# Patient Record
Sex: FEMALE | Race: WHITE | Employment: OTHER | URBAN - METROPOLITAN AREA
[De-identification: names, ages, dates, MRNs, and addresses within clinical notes are randomized per-mention and may not be internally consistent; named-entity substitution may affect disease eponyms.]

---

## 2018-03-20 ENCOUNTER — OFFICE VISIT (OUTPATIENT)
Dept: OBGYN | Age: 73
End: 2018-03-20
Payer: COMMERCIAL

## 2018-03-20 VITALS
HEIGHT: 66 IN | BODY MASS INDEX: 31.18 KG/M2 | SYSTOLIC BLOOD PRESSURE: 122 MMHG | TEMPERATURE: 98.2 F | WEIGHT: 194 LBS | DIASTOLIC BLOOD PRESSURE: 70 MMHG | HEART RATE: 82 BPM

## 2018-03-20 DIAGNOSIS — Z09 FOLLOW UP: Primary | ICD-10-CM

## 2018-03-20 PROCEDURE — G8399 PT W/DXA RESULTS DOCUMENT: HCPCS | Performed by: OBSTETRICS & GYNECOLOGY

## 2018-03-20 PROCEDURE — 1090F PRES/ABSN URINE INCON ASSESS: CPT | Performed by: OBSTETRICS & GYNECOLOGY

## 2018-03-20 PROCEDURE — 1123F ACP DISCUSS/DSCN MKR DOCD: CPT | Performed by: OBSTETRICS & GYNECOLOGY

## 2018-03-20 PROCEDURE — G8484 FLU IMMUNIZE NO ADMIN: HCPCS | Performed by: OBSTETRICS & GYNECOLOGY

## 2018-03-20 PROCEDURE — G8417 CALC BMI ABV UP PARAM F/U: HCPCS | Performed by: OBSTETRICS & GYNECOLOGY

## 2018-03-20 PROCEDURE — 99212 OFFICE O/P EST SF 10 MIN: CPT | Performed by: OBSTETRICS & GYNECOLOGY

## 2018-03-20 PROCEDURE — 4040F PNEUMOC VAC/ADMIN/RCVD: CPT | Performed by: OBSTETRICS & GYNECOLOGY

## 2018-03-20 PROCEDURE — 3017F COLORECTAL CA SCREEN DOC REV: CPT | Performed by: OBSTETRICS & GYNECOLOGY

## 2018-03-20 PROCEDURE — G8427 DOCREV CUR MEDS BY ELIG CLIN: HCPCS | Performed by: OBSTETRICS & GYNECOLOGY

## 2018-03-20 PROCEDURE — 1036F TOBACCO NON-USER: CPT | Performed by: OBSTETRICS & GYNECOLOGY

## 2018-03-20 PROCEDURE — 3014F SCREEN MAMMO DOC REV: CPT | Performed by: OBSTETRICS & GYNECOLOGY

## 2018-03-20 NOTE — PROGRESS NOTES
Sonogram results today Nothing further needed from a GYN standpoint  See back in one year or return if any bleeding.

## 2018-03-20 NOTE — PROGRESS NOTES
Here today for follow up of her sonogram.  Patient states she has not had any bleeding. The sonogram recently done shows again a couple old fibroids. The stripe could not be visualized and I belive that is because of atrophic changes. At this time I see no need for anything further from a Gyn standpoint at this time but would certainly see her immediately if any bleeding. I did not receive anything from Dr. Rock Ramos but she had not seen him since 1993 and those records may have been purged. RTC 1 year or with any bleeding.

## 2019-04-15 ENCOUNTER — HOSPITAL ENCOUNTER (EMERGENCY)
Age: 74
Discharge: PSYCHIATRIC OR SUBSTANCE USE FACILITY | End: 2019-04-16
Attending: EMERGENCY MEDICINE
Payer: COMMERCIAL

## 2019-04-15 DIAGNOSIS — R45.851 SUICIDAL IDEATION: Primary | ICD-10-CM

## 2019-04-15 DIAGNOSIS — N39.0 URINARY TRACT INFECTION IN FEMALE: ICD-10-CM

## 2019-04-15 DIAGNOSIS — T14.91XA SUICIDE ATTEMPT (HCC): ICD-10-CM

## 2019-04-15 LAB
ACETAMINOPHEN LEVEL: <5 MCG/ML (ref 10–30)
ALBUMIN SERPL-MCNC: 4.3 G/DL (ref 3.5–5.2)
ALP BLD-CCNC: 76 U/L (ref 35–104)
ALT SERPL-CCNC: 11 U/L (ref 0–32)
AMPHETAMINE SCREEN, URINE: NOT DETECTED
ANION GAP SERPL CALCULATED.3IONS-SCNC: 10 MMOL/L (ref 7–16)
AST SERPL-CCNC: 15 U/L (ref 0–31)
BACTERIA: ABNORMAL /HPF
BARBITURATE SCREEN URINE: NOT DETECTED
BASOPHILS ABSOLUTE: 0.05 E9/L (ref 0–0.2)
BASOPHILS RELATIVE PERCENT: 0.6 % (ref 0–2)
BENZODIAZEPINE SCREEN, URINE: NOT DETECTED
BILIRUB SERPL-MCNC: <0.2 MG/DL (ref 0–1.2)
BILIRUBIN URINE: NEGATIVE
BLOOD, URINE: NEGATIVE
BUN BLDV-MCNC: 21 MG/DL (ref 8–23)
CALCIUM SERPL-MCNC: 10 MG/DL (ref 8.6–10.2)
CANNABINOID SCREEN URINE: NOT DETECTED
CHLORIDE BLD-SCNC: 109 MMOL/L (ref 98–107)
CLARITY: CLEAR
CO2: 25 MMOL/L (ref 22–29)
COCAINE METABOLITE SCREEN URINE: NOT DETECTED
COLOR: YELLOW
CREAT SERPL-MCNC: 1.4 MG/DL (ref 0.5–1)
EOSINOPHILS ABSOLUTE: 0.3 E9/L (ref 0.05–0.5)
EOSINOPHILS RELATIVE PERCENT: 3.9 % (ref 0–6)
EPITHELIAL CELLS, UA: ABNORMAL /HPF
ETHANOL: <10 MG/DL (ref 0–0.08)
GFR AFRICAN AMERICAN: 45
GFR NON-AFRICAN AMERICAN: 37 ML/MIN/1.73
GLUCOSE BLD-MCNC: 114 MG/DL (ref 74–99)
GLUCOSE URINE: NEGATIVE MG/DL
HCT VFR BLD CALC: 38 % (ref 34–48)
HEMOGLOBIN: 11.7 G/DL (ref 11.5–15.5)
IMMATURE GRANULOCYTES #: 0.02 E9/L
IMMATURE GRANULOCYTES %: 0.3 % (ref 0–5)
KETONES, URINE: NEGATIVE MG/DL
LEUKOCYTE ESTERASE, URINE: ABNORMAL
LYMPHOCYTES ABSOLUTE: 1.83 E9/L (ref 1.5–4)
LYMPHOCYTES RELATIVE PERCENT: 23.6 % (ref 20–42)
MCH RBC QN AUTO: 29.5 PG (ref 26–35)
MCHC RBC AUTO-ENTMCNC: 30.8 % (ref 32–34.5)
MCV RBC AUTO: 95.7 FL (ref 80–99.9)
METHADONE SCREEN, URINE: NOT DETECTED
MONOCYTES ABSOLUTE: 0.41 E9/L (ref 0.1–0.95)
MONOCYTES RELATIVE PERCENT: 5.3 % (ref 2–12)
NEUTROPHILS ABSOLUTE: 5.14 E9/L (ref 1.8–7.3)
NEUTROPHILS RELATIVE PERCENT: 66.3 % (ref 43–80)
NITRITE, URINE: NEGATIVE
OPIATE SCREEN URINE: NOT DETECTED
PDW BLD-RTO: 14.6 FL (ref 11.5–15)
PH UA: 6 (ref 5–9)
PHENCYCLIDINE SCREEN URINE: NOT DETECTED
PLATELET # BLD: 212 E9/L (ref 130–450)
PMV BLD AUTO: 11.2 FL (ref 7–12)
POTASSIUM SERPL-SCNC: 4.4 MMOL/L (ref 3.5–5)
PROPOXYPHENE SCREEN: NOT DETECTED
PROTEIN UA: NEGATIVE MG/DL
RBC # BLD: 3.97 E12/L (ref 3.5–5.5)
RBC UA: ABNORMAL /HPF (ref 0–2)
RENAL EPITHELIAL, UA: ABNORMAL /HPF
SALICYLATE, SERUM: <0.3 MG/DL (ref 0–30)
SODIUM BLD-SCNC: 144 MMOL/L (ref 132–146)
SPECIFIC GRAVITY UA: 1.01 (ref 1–1.03)
T4 TOTAL: 4.8 MCG/DL (ref 4.5–11.7)
TOTAL PROTEIN: 7.5 G/DL (ref 6.4–8.3)
TRICYCLIC ANTIDEPRESSANTS SCREEN SERUM: NEGATIVE NG/ML
TSH SERPL DL<=0.05 MIU/L-ACNC: 0.23 UIU/ML (ref 0.27–4.2)
UROBILINOGEN, URINE: 0.2 E.U./DL
WBC # BLD: 7.8 E9/L (ref 4.5–11.5)
WBC UA: >20 /HPF (ref 0–5)

## 2019-04-15 PROCEDURE — 80053 COMPREHEN METABOLIC PANEL: CPT

## 2019-04-15 PROCEDURE — 84436 ASSAY OF TOTAL THYROXINE: CPT

## 2019-04-15 PROCEDURE — 81001 URINALYSIS AUTO W/SCOPE: CPT

## 2019-04-15 PROCEDURE — 85025 COMPLETE CBC W/AUTO DIFF WBC: CPT

## 2019-04-15 PROCEDURE — G0480 DRUG TEST DEF 1-7 CLASSES: HCPCS

## 2019-04-15 PROCEDURE — 99285 EMERGENCY DEPT VISIT HI MDM: CPT

## 2019-04-15 PROCEDURE — 84443 ASSAY THYROID STIM HORMONE: CPT

## 2019-04-15 PROCEDURE — 36415 COLL VENOUS BLD VENIPUNCTURE: CPT

## 2019-04-15 PROCEDURE — 93005 ELECTROCARDIOGRAM TRACING: CPT

## 2019-04-15 PROCEDURE — 80307 DRUG TEST PRSMV CHEM ANLYZR: CPT

## 2019-04-16 ENCOUNTER — APPOINTMENT (OUTPATIENT)
Dept: CT IMAGING | Age: 74
End: 2019-04-16
Payer: COMMERCIAL

## 2019-04-16 VITALS
HEART RATE: 66 BPM | OXYGEN SATURATION: 98 % | RESPIRATION RATE: 16 BRPM | SYSTOLIC BLOOD PRESSURE: 146 MMHG | WEIGHT: 194 LBS | TEMPERATURE: 98.3 F | DIASTOLIC BLOOD PRESSURE: 69 MMHG | BODY MASS INDEX: 31.31 KG/M2

## 2019-04-16 PROCEDURE — 6360000004 HC RX CONTRAST MEDICATION: Performed by: RADIOLOGY

## 2019-04-16 PROCEDURE — 70498 CT ANGIOGRAPHY NECK: CPT

## 2019-04-16 PROCEDURE — 6370000000 HC RX 637 (ALT 250 FOR IP): Performed by: STUDENT IN AN ORGANIZED HEALTH CARE EDUCATION/TRAINING PROGRAM

## 2019-04-16 PROCEDURE — 2580000003 HC RX 258: Performed by: STUDENT IN AN ORGANIZED HEALTH CARE EDUCATION/TRAINING PROGRAM

## 2019-04-16 PROCEDURE — 2580000003 HC RX 258: Performed by: RADIOLOGY

## 2019-04-16 RX ORDER — CEFDINIR 300 MG/1
300 CAPSULE ORAL 2 TIMES DAILY
Qty: 20 CAPSULE | Refills: 0 | Status: SHIPPED | OUTPATIENT
Start: 2019-04-16 | End: 2019-04-26

## 2019-04-16 RX ORDER — SODIUM CHLORIDE 0.9 % (FLUSH) 0.9 %
10 SYRINGE (ML) INJECTION PRN
Status: COMPLETED | OUTPATIENT
Start: 2019-04-16 | End: 2019-04-16

## 2019-04-16 RX ORDER — KETOCONAZOLE 20 MG/G
CREAM TOPICAL 2 TIMES DAILY
Status: ON HOLD | COMMUNITY
End: 2022-08-10 | Stop reason: HOSPADM

## 2019-04-16 RX ORDER — SENNOSIDES 8.6 MG
650 CAPSULE ORAL EVERY 4 HOURS PRN
COMMUNITY

## 2019-04-16 RX ORDER — CEFDINIR 250 MG/5ML
600 POWDER, FOR SUSPENSION ORAL ONCE
Status: COMPLETED | OUTPATIENT
Start: 2019-04-16 | End: 2019-04-16

## 2019-04-16 RX ORDER — 0.9 % SODIUM CHLORIDE 0.9 %
1000 INTRAVENOUS SOLUTION INTRAVENOUS ONCE
Status: COMPLETED | OUTPATIENT
Start: 2019-04-16 | End: 2019-04-16

## 2019-04-16 RX ORDER — MAGNESIUM HYDROXIDE/ALUMINUM HYDROXICE/SIMETHICONE 120; 1200; 1200 MG/30ML; MG/30ML; MG/30ML
30 SUSPENSION ORAL 2 TIMES DAILY PRN
Status: ON HOLD | COMMUNITY
End: 2022-08-04

## 2019-04-16 RX ORDER — DOCUSATE SODIUM 100 MG/1
100 CAPSULE, LIQUID FILLED ORAL PRN
Status: ON HOLD | COMMUNITY
End: 2022-08-04

## 2019-04-16 RX ORDER — ECHINACEA PURPUREA EXTRACT 125 MG
1 TABLET ORAL 2 TIMES DAILY PRN
Status: ON HOLD | COMMUNITY
End: 2022-08-10 | Stop reason: HOSPADM

## 2019-04-16 RX ADMIN — Medication 10 ML: at 01:52

## 2019-04-16 RX ADMIN — SODIUM CHLORIDE 1000 ML: 9 INJECTION, SOLUTION INTRAVENOUS at 02:27

## 2019-04-16 RX ADMIN — IOPAMIDOL 60 ML: 755 INJECTION, SOLUTION INTRAVENOUS at 01:56

## 2019-04-16 RX ADMIN — CEFDINIR 600 MG: 250 POWDER, FOR SUSPENSION ORAL at 00:37

## 2019-04-16 ASSESSMENT — ENCOUNTER SYMPTOMS
ABDOMINAL DISTENTION: 0
COUGH: 0
DIARRHEA: 0
EYE REDNESS: 0
VOMITING: 0
RHINORRHEA: 0
NAUSEA: 0
SINUS PRESSURE: 0
SORE THROAT: 0
SHORTNESS OF BREATH: 0
BLOOD IN STOOL: 0
BACK PAIN: 0
EYE PAIN: 0
TROUBLE SWALLOWING: 0
ABDOMINAL PAIN: 0
PHOTOPHOBIA: 0
WHEEZING: 0
CONSTIPATION: 0
CHEST TIGHTNESS: 0

## 2019-04-16 NOTE — ED NOTES
Patient back from CT.  Assisted to restroom with wheelchair and 2 person assist.      Trev Louis RN  04/16/19 5834

## 2019-04-16 NOTE — ED NOTES
Pt is alert and oriented x4, pleasant and cooperative. Denies SI/HI. Denies A/V hallucinations. Pt's speech is difficult to understand due to impairment from past CVA. If this RN understood pt correctly, she wonders why she made a suicide attempt. IV fluids infusing to right AC without difficulty. Pt was up to the bathroom with 2 assist in a wheelchair. No other complaints. Ambulates with a steady gait. Will continue to monitor.       Elías Moraes RN  04/16/19 104 Monika Fletcher, ARCHIE  04/16/19 6844

## 2019-04-16 NOTE — ED NOTES
[facility] here to transport pt to [facility].       Boston Temple RN  04/16/19 8606

## 2019-04-16 NOTE — ED NOTES
[facility] requesting a document stating that [facility] will take the patient back after she receives her treatment. Spoke with Gualberto Nair at [facility]. She is going to forward this request to SERA Rodney first thing in the morning for this paperwork. DON arrives between [phone]. Call back number for LAZARO given. Iglesia Roberts at [facility] notified.       Zora Wells RN  04/16/19 4382

## 2019-04-16 NOTE — ED PROVIDER NOTES
Patient is a 19-year-old female who presents to ED for evaluation of suicidal ideation with attempts. Patient reportedly at facility-[facility]-which she was found to have a cord from call white wrapped around her neck, and pillows placed over her head. Patient with significant history of depression, paranoid schizophrenia, bipolar 1 disorder, history of previous suicide attempts. Patient denies any ingestions other than prescribed drug in recommended amounts. Review of Systems   Constitutional: Negative for chills, diaphoresis, fatigue and fever. HENT: Negative for congestion, ear pain, rhinorrhea, sinus pressure, sneezing, sore throat and trouble swallowing. Eyes: Negative for photophobia, pain and redness. Respiratory: Negative for cough, chest tightness, shortness of breath and wheezing. Cardiovascular: Negative for chest pain and palpitations. Gastrointestinal: Negative for abdominal distention, abdominal pain, blood in stool, constipation, diarrhea, nausea and vomiting. Endocrine: Negative for polydipsia and polyuria. Genitourinary: Negative for difficulty urinating, dysuria, flank pain, hematuria and urgency. Musculoskeletal: Negative for arthralgias, back pain, myalgias and neck pain. Skin: Negative for rash and wound. Neurological: Negative for weakness, light-headedness, numbness and headaches. Psychiatric/Behavioral: Positive for behavioral problems, self-injury and suicidal ideas. Negative for agitation and confusion. The patient is not nervous/anxious and is not hyperactive. Physical Exam   Constitutional: She is oriented to person, place, and time. She appears well-developed and well-nourished. No distress. HENT:   Head: Normocephalic and atraumatic. Right Ear: External ear normal.   Left Ear: External ear normal.   Mouth/Throat: Oropharynx is clear and moist. No oropharyngeal exudate. Eyes: Pupils are equal, round, and reactive to light. Conjunctivae and EOM are normal. Right eye exhibits no discharge. Left eye exhibits no discharge. Neck: Normal range of motion. Neck supple. No thyromegaly present. No obvious bruit noted on auscultation. Ligature mary-- erythema-- noted to the anterior neck. Cardiovascular: Normal rate, regular rhythm and normal heart sounds. No murmur heard. Pulmonary/Chest: Effort normal and breath sounds normal. No respiratory distress. She has no wheezes. She has no rales. She exhibits no tenderness. Abdominal: Soft. She exhibits no distension and no mass. There is no tenderness. There is no rebound and no guarding. Musculoskeletal: Normal range of motion. She exhibits no tenderness. Neurological: She is alert and oriented to person, place, and time. Skin: Skin is warm and dry. No rash noted. She is not diaphoretic. Psychiatric: Judgment normal. She is slowed. She exhibits a depressed mood. She expresses suicidal ideation. She expresses no homicidal ideation. She expresses suicidal plans. She expresses no homicidal plans. Procedures    MDM  Number of Diagnoses or Management Options  Suicidal ideation:   Suicide attempt [facility]):   Urinary tract infection in female:   Diagnosis management comments: Patient is a 70-year-old female who presented to ED for evaluation of suicidal ideation with attempt. On arrival to ED, patient alert, in no apparent distress. Anterior portion of neck with evidence of ligature mary with no bruit on auscultation or crepitus on palpation. No ecchymosis surrounding, and no cervical spinous process tenderness to palpation. Patient was placed on suicide precautions with constant observation. Labs reviewed-- UTI, patient was given antibiotic in ED.  Creatinine mildly elevated, waiver was placed for evaluation of vascular structures with benefits outweighing risk.       --------------------------------------------- PAST HISTORY ---------------------------------------------  Past Medical History:  has a past medical history of Arthritis, Ataxia, Atrophy of muscle, Bipolar 1 disorder, mixed, moderate ([address] Cerebrovascular disease, Coordination problem, Hyperlipidemia, Hypertension, Overactive bladder, Paranoid schizophrenia ([address] and Thyroid disease. Past Surgical History:  has a past surgical history that includes Tonsillectomy and adenoidectomy; Endometrial ablation; Dilation and curettage of uterus; bladder suspension; and Induced . Social History:  reports that she has never smoked. She has never used smokeless tobacco. She reports that she does not drink alcohol or use drugs. Family History: Family history is unknown by patient. The patients home medications have been reviewed.     Allergies: Mysoline [primidone] and Tofranil [imipramine hcl]    -------------------------------------------------- RESULTS -------------------------------------------------    LABS:  Results for orders placed or performed during the hospital encounter of 04/15/19   CBC auto differential   Result Value Ref Range    WBC 7.8 4.5 - 11.5 E9/L    RBC 3.97 3.50 - 5.50 E12/L    Hemoglobin 11.7 11.5 - 15.5 g/dL    Hematocrit 38.0 34.0 - 48.0 %    MCV 95.7 80.0 - 99.9 fL    MCH 29.5 26.0 - 35.0 pg    MCHC 30.8 (L) 32.0 - 34.5 %    RDW 14.6 11.5 - 15.0 fL    Platelets 421 484 - 493 E9/L    MPV 11.2 7.0 - 12.0 fL    Neutrophils % 66.3 43.0 - 80.0 %    Immature Granulocytes % 0.3 0.0 - 5.0 %    Lymphocytes % 23.6 20.0 - 42.0 %    Monocytes % 5.3 2.0 - 12.0 %    Eosinophils % 3.9 0.0 - 6.0 %    Basophils % 0.6 0.0 - 2.0 %    Neutrophils # 5.14 1.80 - 7.30 E9/L    Immature Granulocytes # 0.02 E9/L    Lymphocytes # 1.83 1.50 - 4.00 E9/L    Monocytes # 0.41 0.10 - 0.95 E9/L    Eosinophils # 0.30 0.05 - 0.50 E9/L    Basophils # 0.05 0.00 - 0.20 E9/L   Comprehensive Metabolic Panel   Result Value Ref Range    Sodium 144 132 - 146 mmol/L    Potassium 4.4 3.5 - 5.0 mmol/L    Chloride 109 (H) 98 - 107 mmol/L    CO2 25 22 - 29 mmol/L    Anion Gap 10 7 - 16 mmol/L    Glucose 114 (H) 74 - 99 mg/dL    BUN 21 8 - 23 mg/dL    CREATININE 1.4 (H) 0.5 - 1.0 mg/dL    GFR Non-African American 37 >=60 mL/min/1.73    GFR African American 45     Calcium 10.0 8.6 - 10.2 mg/dL    Total Protein 7.5 6.4 - 8.3 g/dL    Alb 4.3 3.5 - 5.2 g/dL    Total Bilirubin <0.2 0.0 - 1.2 mg/dL    Alkaline Phosphatase 76 35 - 104 U/L    ALT 11 0 - 32 U/L    AST 15 0 - 31 U/L   Serum Drug Screen   Result Value Ref Range    Ethanol Lvl <10 mg/dL    Acetaminophen Level <5.0 (L) 10.0 - 42.7 mcg/mL    Salicylate, Serum <2.8 0.0 - 30.0 mg/dL    TCA Scrn NEGATIVE Cutoff:300 ng/mL   Urine Drug Screen   Result Value Ref Range    Amphetamine Screen, Urine NOT DETECTED Negative <1000 ng/mL    Barbiturate Screen, Ur NOT DETECTED Negative < 200 ng/mL    Benzodiazepine Screen, Urine NOT DETECTED Negative < 200 ng/mL    Cannabinoid Scrn, Ur NOT DETECTED Negative < 50ng/mL    Cocaine Metabolite Screen, Urine NOT DETECTED Negative < 300 ng/mL    Opiate Scrn, Ur NOT DETECTED Negative < 300ng/mL    PCP Screen, Urine NOT DETECTED Negative < 25 ng/mL    Methadone Screen, Urine NOT DETECTED Negative <300 ng/mL    Propoxyphene Scrn, Ur NOT DETECTED Negative <300 ng/mL   TSH without Reflex   Result Value Ref Range    TSH 0.229 (L) 0.270 - 4.200 uIU/mL   T4   Result Value Ref Range    T4, Total 4.8 4.5 - 11.7 mcg/dL   Urinalysis   Result Value Ref Range    Color, UA Yellow Straw/Yellow    Clarity, UA Clear Clear    Glucose, Ur Negative Negative mg/dL    Bilirubin Urine Negative Negative    Ketones, Urine Negative Negative mg/dL    Specific Gravity, UA 1.010 1.005 - 1.030    Blood, Urine Negative Negative    pH, UA 6.0 5.0 - 9.0    Protein, UA Negative Negative mg/dL    Urobilinogen, Urine 0.2 <2.0 E.U./dL    Nitrite, Urine Negative Negative    Leukocyte Esterase, Urine LARGE (A) Negative   Microscopic Urinalysis   Result Value Ref Range    WBC, UA >20 0 - 5 /HPF    RBC, UA 1-3 0 - 2 presented to the emergency department for evaluation of Suicidal (at [facility], pt got upset the [facility] was quitting and wrapped call light around neck in an attempt to commit suicide.  )    I have reviewed and discussed the case, including pertinent history (medical, surgical, family and social) and exam findings with the Resident and the Nurse assigned to Latanya Perez. I have personally performed and/or participated in the history, exam, medical decision making, and procedures and agree with all pertinent clinical information. I have reviewed my findings and recommendations with Latanya Perez and members of family present at the time of disposition. MDM: Patient is a 82-year-old female presenting to the emergency department with a chief complaint of suicide attempt. Patient with labs and imaging are reviewed. Patient was urinary tract infection. Patient medically cleared. My findings/plan: The primary encounter diagnosis was Suicidal ideation. Diagnoses of Suicide attempt [facility]) and Urinary tract infection in female were also pertinent to this visit.   Discharge Medication List as of 4/16/2019  3:29 PM      START taking these medications    Details   cefdinir (OMNICEF) 300 MG capsule Take 1 capsule by mouth 2 times daily for 10 days, Disp-20 capsule, R-0Print           [facility], [address] [place] [address],   04/17/19 7321

## 2019-04-16 NOTE — ED NOTES
Patient moved to room 26 to be monitored closer from nurse's station.       Jairo Kaplan RN  04/15/19 1624

## 2019-04-16 NOTE — ED NOTES
Discussed discontinuing patient's CO order with ED resident, patient is no longer having suicidal ideations and was moved closer to nurse's station for safety. Order discontinued at this time.       Jairo Kaplan RN  04/16/19 9155

## 2019-04-16 NOTE — ED NOTES
Patient still waiting for CT. Spoke with CT who reports patient's GFR is too low for her to receive contrast.   Ty Levine, resident notified.       Prakash Welsh RN  04/15/19 4070

## 2019-04-16 NOTE — ED NOTES
Radiology Procedure Waiver   Name: Eduar Ramires  : 1945  MRN: [id number]    Date:  19    Time: 12:07 AM    Benefits of immediately proceeding with Radiology exam(s) without pre-testing outweigh the risks or are not indicated as specified below and therefore the following is/are being waived:    [] Pregnancy test   [] Patients LMP on-time and regular.   [] Patient had Tubal Ligation or has other Contraception Device. [] Patient  is Menopausal or Premenarcheal.    [] Patient had Full or Partial Hysterectomy. [] Protocol for Iodine allergy    [] MRI Questionnaire     [x] BUN/Creatinine   [x] Benefits outweigh risk   [] Patient on Dialysis. [] Recent Normal Labs.   Electronically signed by Rustam Dominguez DO on 19 at 12:07 AM               Rustam Dominguez DO  Resident  [phone]

## 2019-04-16 NOTE — ED NOTES
Patient is a 72year old female who presented to the ED from [facility] for suicide attempt by wrapping the call light cord around her neck. Patient reported to the ED physician that she had the cord around her neck for 1 hour without losing consciousness or having SOB. Patient reports she wanted to end it all d/t \"a combination of things\". Patient denies SI at this time. Patient does report a previous suicide attempt by OD on ibuprofen in May 2017, this was her last admission. Patient states she has HI to slap people, no one specific. Patient denies AVH. Patient is currently on psychiatric medications and reports taking all medications as prescribed. She reports a history of schizophrenia and stroke. Denies drug and alcohol misuse. Reports good family support, good sleep and good appetite. Patient uses a wheelchair to get around and is very unsteady on her feet. Called guardian Easton Leonardo at [facility] to receive consent to treat. Consent given to treat. Spoke with Ankit Resendiz at [facility] who reports an aide had found her with the bed controller cord around her neck. Patient treats with Dr. Shawnee Lugo for psychiatry.        Lorelei Dawson, ARCHIE  04/15/19 [facility] ARCHIE Tamayo  04/15/19 4956

## 2019-04-16 NOTE — ED NOTES
Bed: [facility]  Expected date:   Expected time:   Means of arrival:   Comments:     Reynaldo West RN  04/15/19 2106

## 2019-04-16 NOTE — ED NOTES
Patient is still receiving the ordered 1000 ml N.S.  IV pump states line is partially occluded and will not pump the fluids. Fluids are running slowly at wide open. Dr. Ed Hernandez was notified and is okay with patient receiving them at this rate.           Joanne Cam RN  04/16/19 7312

## 2019-04-16 NOTE — ED NOTES
CALL #3 PLACED TO [facility] - SPOKE WITH PATRICIO KOHLI WILL [facility]ASNP[facility] BY [phone]    RF# [phone]     Joaquim Mallory, EVA  04/16/19 4531

## 2019-04-16 NOTE — ED NOTES
I CALLED [facility] AND SPOKE  [facility] WHO REPORTS THAT SHE NEEDS THE AMBULANCE FORM FAXED [phone] TO THEIR AMBULANCE TEAM.    FORM HAS BEEN FAXED AND WAITING FOR A CALL [address] [facility] [address] [place], [place]  04/16/19 5863

## 2019-04-16 NOTE — ED NOTES
I CALLED [facility] AGAIN, I SPOKE TO SHOLA WHO SAID THAT THE SIGNATURE ON THE EMS FORM HAS TO BE TBY THE DOCTOR    REPRINTED AND FAXED AGAIN (THIRD TIME)      EVA Burns  04/16/19 4779

## 2019-04-16 NOTE — PROGRESS NOTES
Laura Moffett called from [facility]. Patient accepted to [facility] by Dr. Esau Cheadle. Room 309 Bed B. Nurse to nurse call [phone]. Fax [place] to [facility]. Transportation will need arranged by us.

## 2019-04-19 LAB
EKG ATRIAL RATE: 60 BPM
EKG P AXIS: 50 DEGREES
EKG P-R INTERVAL: 146 MS
EKG Q-T INTERVAL: 420 MS
EKG QRS DURATION: 100 MS
EKG QTC CALCULATION (BAZETT): 420 MS
EKG R AXIS: -37 DEGREES
EKG T AXIS: 44 DEGREES
EKG VENTRICULAR RATE: 60 BPM

## 2020-11-25 ENCOUNTER — OFFICE VISIT (OUTPATIENT)
Dept: OBGYN | Age: 75
End: 2020-11-25
Payer: MEDICARE

## 2020-11-25 VITALS
DIASTOLIC BLOOD PRESSURE: 70 MMHG | TEMPERATURE: 98 F | HEART RATE: 73 BPM | SYSTOLIC BLOOD PRESSURE: 140 MMHG | WEIGHT: 194 LBS | BODY MASS INDEX: 31.31 KG/M2

## 2020-11-25 PROCEDURE — 1090F PRES/ABSN URINE INCON ASSESS: CPT | Performed by: OBSTETRICS & GYNECOLOGY

## 2020-11-25 PROCEDURE — G8484 FLU IMMUNIZE NO ADMIN: HCPCS | Performed by: OBSTETRICS & GYNECOLOGY

## 2020-11-25 PROCEDURE — 1036F TOBACCO NON-USER: CPT | Performed by: OBSTETRICS & GYNECOLOGY

## 2020-11-25 PROCEDURE — G8427 DOCREV CUR MEDS BY ELIG CLIN: HCPCS | Performed by: OBSTETRICS & GYNECOLOGY

## 2020-11-25 PROCEDURE — G8417 CALC BMI ABV UP PARAM F/U: HCPCS | Performed by: OBSTETRICS & GYNECOLOGY

## 2020-11-25 PROCEDURE — G8399 PT W/DXA RESULTS DOCUMENT: HCPCS | Performed by: OBSTETRICS & GYNECOLOGY

## 2020-11-25 PROCEDURE — 4040F PNEUMOC VAC/ADMIN/RCVD: CPT | Performed by: OBSTETRICS & GYNECOLOGY

## 2020-11-25 PROCEDURE — 3017F COLORECTAL CA SCREEN DOC REV: CPT | Performed by: OBSTETRICS & GYNECOLOGY

## 2020-11-25 PROCEDURE — 99213 OFFICE O/P EST LOW 20 MIN: CPT | Performed by: OBSTETRICS & GYNECOLOGY

## 2020-11-25 PROCEDURE — 1123F ACP DISCUSS/DSCN MKR DOCD: CPT | Performed by: OBSTETRICS & GYNECOLOGY

## 2020-11-25 PROCEDURE — 99212 OFFICE O/P EST SF 10 MIN: CPT | Performed by: OBSTETRICS & GYNECOLOGY

## 2020-12-14 ENCOUNTER — HOSPITAL ENCOUNTER (OUTPATIENT)
Dept: ULTRASOUND IMAGING | Age: 75
Discharge: HOME | End: 2020-12-16
Payer: MEDICARE

## 2020-12-14 PROCEDURE — 76830 TRANSVAGINAL US NON-OB: CPT

## 2020-12-16 ENCOUNTER — OFFICE VISIT (OUTPATIENT)
Dept: OBGYN | Age: 75
End: 2020-12-16
Payer: MEDICARE

## 2020-12-16 VITALS
DIASTOLIC BLOOD PRESSURE: 78 MMHG | TEMPERATURE: 98 F | HEART RATE: 88 BPM | RESPIRATION RATE: 16 BRPM | BODY MASS INDEX: 31.65 KG/M2 | WEIGHT: 190 LBS | HEIGHT: 65 IN | SYSTOLIC BLOOD PRESSURE: 126 MMHG

## 2020-12-16 PROCEDURE — 1036F TOBACCO NON-USER: CPT | Performed by: OBSTETRICS & GYNECOLOGY

## 2020-12-16 PROCEDURE — G8417 CALC BMI ABV UP PARAM F/U: HCPCS | Performed by: OBSTETRICS & GYNECOLOGY

## 2020-12-16 PROCEDURE — 99212 OFFICE O/P EST SF 10 MIN: CPT | Performed by: OBSTETRICS & GYNECOLOGY

## 2020-12-16 PROCEDURE — 3017F COLORECTAL CA SCREEN DOC REV: CPT | Performed by: OBSTETRICS & GYNECOLOGY

## 2020-12-16 PROCEDURE — G8484 FLU IMMUNIZE NO ADMIN: HCPCS | Performed by: OBSTETRICS & GYNECOLOGY

## 2020-12-16 PROCEDURE — 99213 OFFICE O/P EST LOW 20 MIN: CPT | Performed by: OBSTETRICS & GYNECOLOGY

## 2020-12-16 PROCEDURE — G8399 PT W/DXA RESULTS DOCUMENT: HCPCS | Performed by: OBSTETRICS & GYNECOLOGY

## 2020-12-16 PROCEDURE — G8427 DOCREV CUR MEDS BY ELIG CLIN: HCPCS | Performed by: OBSTETRICS & GYNECOLOGY

## 2020-12-16 PROCEDURE — 4040F PNEUMOC VAC/ADMIN/RCVD: CPT | Performed by: OBSTETRICS & GYNECOLOGY

## 2020-12-16 PROCEDURE — 1090F PRES/ABSN URINE INCON ASSESS: CPT | Performed by: OBSTETRICS & GYNECOLOGY

## 2020-12-16 PROCEDURE — 1123F ACP DISCUSS/DSCN MKR DOCD: CPT | Performed by: OBSTETRICS & GYNECOLOGY

## 2020-12-16 NOTE — PROGRESS NOTES
Review sono results  Will need a D&C  Discussed at length with patient  Schedule her D&C in February with

## 2020-12-16 NOTE — PROGRESS NOTES
Here today to go over her sonogram.  Abnormally thickened endometrium with a complex cystic area noted in the cavity as well. Will need to under go a D&C and I discussed this case with Dr. Desire Armijo. Will schedule with her in January but she will schedule her D&C in February. Long discussion with the patient about the findings and need for D&C. Booklet for D&C also given to the pateint. All questions answered.

## 2020-12-28 ENCOUNTER — TELEPHONE (OUTPATIENT)
Dept: OBGYN | Age: 75
End: 2020-12-28

## 2020-12-28 NOTE — TELEPHONE ENCOUNTER
Surgery scheduled for 2/16/21 @ 9:30 AM  [address]  Hysteroscopy D&C  PRE-OP appt 1/20/21  This nurse spoke with the nurse at the facility this patient lives  Advised nurse that this patient will need medical clearance. The nurse states she will schedule that for this patient   All information was given to nurse at this time.   Voiced understanding

## 2021-01-26 ENCOUNTER — OFFICE VISIT (OUTPATIENT)
Dept: OBGYN | Age: 76
End: 2021-01-26
Payer: MEDICAID

## 2021-01-26 VITALS
WEIGHT: 190 LBS | TEMPERATURE: 97 F | BODY MASS INDEX: 31.65 KG/M2 | DIASTOLIC BLOOD PRESSURE: 64 MMHG | SYSTOLIC BLOOD PRESSURE: 118 MMHG | HEIGHT: 65 IN | HEART RATE: 63 BPM

## 2021-01-26 DIAGNOSIS — N95.0 PMB (POSTMENOPAUSAL BLEEDING): Primary | ICD-10-CM

## 2021-01-26 DIAGNOSIS — R93.89 ENDOMETRIAL THICKENING ON ULTRASOUND: ICD-10-CM

## 2021-01-26 PROCEDURE — 99213 OFFICE O/P EST LOW 20 MIN: CPT | Performed by: OBSTETRICS & GYNECOLOGY

## 2021-01-26 PROCEDURE — 3017F COLORECTAL CA SCREEN DOC REV: CPT | Performed by: OBSTETRICS & GYNECOLOGY

## 2021-01-26 PROCEDURE — 4040F PNEUMOC VAC/ADMIN/RCVD: CPT | Performed by: OBSTETRICS & GYNECOLOGY

## 2021-01-26 PROCEDURE — 1090F PRES/ABSN URINE INCON ASSESS: CPT | Performed by: OBSTETRICS & GYNECOLOGY

## 2021-01-26 PROCEDURE — G8427 DOCREV CUR MEDS BY ELIG CLIN: HCPCS | Performed by: OBSTETRICS & GYNECOLOGY

## 2021-01-26 PROCEDURE — 99212 OFFICE O/P EST SF 10 MIN: CPT | Performed by: OBSTETRICS & GYNECOLOGY

## 2021-01-26 PROCEDURE — G8399 PT W/DXA RESULTS DOCUMENT: HCPCS | Performed by: OBSTETRICS & GYNECOLOGY

## 2021-01-26 PROCEDURE — 1123F ACP DISCUSS/DSCN MKR DOCD: CPT | Performed by: OBSTETRICS & GYNECOLOGY

## 2021-01-26 PROCEDURE — G8417 CALC BMI ABV UP PARAM F/U: HCPCS | Performed by: OBSTETRICS & GYNECOLOGY

## 2021-01-26 PROCEDURE — G8484 FLU IMMUNIZE NO ADMIN: HCPCS | Performed by: OBSTETRICS & GYNECOLOGY

## 2021-01-26 PROCEDURE — 1036F TOBACCO NON-USER: CPT | Performed by: OBSTETRICS & GYNECOLOGY

## 2021-01-26 NOTE — PROGRESS NOTES
Laney Fitzgerald     Patient presents for preop appointment. Patient is scheduled for hysteroscopy, D&C 21. Patient was noted to have blood tinged discharge at her nursing facility. She saw Dr. Beverly Maldonado who ordered a pelvic ultrasound. A 14mm uterine lining was noted. A complex cystic area was noted in the center of the endometrial stripe. Procedure reviewed in detail. Patient is aware she will need a COVID test prior. Risks of surgery discussed, including blood loss requiring transfusion, injury to surrounding structures (bowel, bladder, ureters, etc.), and infection. Normal pap smear 11/15/16.      Past Medical History:   Diagnosis Date    Arthritis     hips    Ataxia     Atrophy of muscle     Bipolar 1 disorder, mixed, moderate (HCC)     Cerebrovascular disease     Coordination problem     Hyperlipidemia     Hypertension     Overactive bladder     Paranoid schizophrenia [facility]     Thyroid disease         Past Surgical History:   Procedure Laterality Date    BLADDER SUSPENSION      DILATION AND CURETTAGE OF UTERUS      ENDOMETRIAL ABLATION      INDUCED       TONSILLECTOMY AND ADENOIDECTOMY          Family History   Family history unknown: Yes        Social History     Tobacco History     Smoking Status  Never Smoker    Smokeless Tobacco Use  Never Used          Alcohol History     Alcohol Use Status  No          Drug Use     Drug Use Status  No          Sexual Activity     Sexually Active  Not Currently Partners  Male                  Current Outpatient Medications:     acetaminophen (ARTHRITIS PAIN RELIEF) 650 MG extended release tablet, Take 650 mg by mouth 2 times daily as needed for Pain DO NOT EXCEED 3 GRAMS MAX TYLENOL PER DAY, Disp: , Rfl:     Hypromellose (NATURAL BALANCE TEARS OP), Apply 1 drop to eye 4 times daily as needed (dry eyes) 1 drop in both eyes, Disp: , Rfl:     docusate sodium (STOOL SOFTENER) 100 MG capsule, Take 100 mg by mouth as needed for Constipation, Disp: , Rfl:     acetaminophen (TYLENOL) 325 MG tablet, Take 650 mg by mouth every 6 hours as needed for Pain, Disp: , Rfl:     aspirin 81 MG EC tablet, Take 1 tablet by mouth 2 times daily, Disp: 30 tablet, Rfl: 3    DULOXETINE HCL PO, Take 30 mg by mouth nightly, Disp: , Rfl:     ketoconazole (NIZORAL) 2 % cream, Apply topically 2 times daily Apply topically to face every shift for skin, Disp: , Rfl:     magnesium hydroxide (MILK OF MAGNESIA) 400 MG/5ML suspension, Take 30 mLs by mouth daily as needed for Constipation GIVE AS NEEDED FOR CONSTIPATION IF NO BM IN 3 DAYS AND PRN DAILY, Disp: , Rfl:     aluminum & magnesium hydroxide-simethicone (MYLANTA) 200-200-20 MG/5ML SUSP suspension, Take 30 mLs by mouth 2 times daily as needed for Indigestion, Disp: , Rfl:     sodium chloride (V-R NASAL SPRAY SALINE) 0.65 % nasal spray, 1 spray by Nasal route 2 times daily as needed (dryness), Disp: , Rfl:     haloperidol decanoate (HALDOL DECANOATE) 50 MG/ML injection, Inject 250 mg into the muscle every 30 days 1st Monday of the month, Disp: , Rfl:     lactase (LACTAID) 3000 UNITS tablet, Take 1 tablet by mouth as needed , Disp: , Rfl:      Allergies   Allergen Reactions    Mysoline [Primidone]     Tofranil [Imipramine Hcl]         Vitals:    01/26/21 1407   BP: 118/64   Pulse: 63   Temp: 97 °F (36.1 °C)        Physical Exam:  General: pleasant, difficulty with word finding, slow speech     Breasts: deferred     Pelvic exam: deferred     Kat Smart was seen today for pre-op exam.    Diagnoses and all orders for this visit:    PMB (postmenopausal bleeding)  -     COVID-19 Ambulatory; Future    Endometrial thickening on ultrasound  -     COVID-19 Ambulatory; Future      Proceed with [facility] due to PMB and thickened endometrium     Return in about 4 weeks (around 2/23/2021) for postop appointment .      Steffi Brunner, MD

## 2021-01-26 NOTE — PROGRESS NOTES
Patient alert and pleasant with no complaints  Here today for pre-op appointment   [facility] scheduled for 2/16/21 @ 9:30 AM  [address] [place]  Patient given all information   Discharge instructions have been discussed with the patient. Patient advised to call our office with any questions or concerns. Voiced understanding.

## 2021-02-02 NOTE — PROGRESS NOTES
Spoke with nurse at [facility].  They will obtain covid test on 2/11/2021 and fax result

## 2021-02-11 RX ORDER — HYDROXYZINE PAMOATE 50 MG/1
50 CAPSULE ORAL EVERY 6 HOURS PRN
Status: ON HOLD | COMMUNITY
End: 2022-08-04

## 2021-02-11 RX ORDER — PANTOPRAZOLE SODIUM 40 MG/1
40 TABLET, DELAYED RELEASE ORAL DAILY
Status: ON HOLD | COMMUNITY
End: 2022-08-04

## 2021-02-11 NOTE — PROGRESS NOTES
Have you been tested for COVID to be done by [facility] 2/11/21          Have you been told you were positive for COVID No  Have you had any known exposure to someone that is positive for COVID No  Do you have a cough                   No              Do you have shortness of breath No                 Do you have a sore throat            No                Are you having chills                    No                Are you having muscle aches. No                    Please come to the hospital wearing a mask and have your significant other wear a mask as well. Both of you should check your temperature before leaving to come here,  if it is 100 or higher please call [phone] for instruction.     [facility] PRE-ADMISSION TESTING INSTRUCTIONS      GENERAL INSTRUCTIONS:    [x] Nothing by mouth after midnight, including gum, candy, mints or water    [x] You may brush your teeth, but do not swallow any water    [x] Take medications as instructed with 1-2 oz of water    [x] Follow preop dosing of blood thinners per physician instructions    [x] Shower or bath with soap, lather and rinse well, AM of Surgery, no lotion, powders or creams to surgical site    [x] Jewelry, body piercing's, eyeglasses, contact lenses and dentures are not permitted into surgery (bring cases)      [x] Please do not wear any nail polish, make up or hair products on the day of surgery    [x] You can expect a call the business day prior to procedure to notify you if your arrival time changes

## 2021-02-16 ENCOUNTER — ANESTHESIA EVENT (OUTPATIENT)
Dept: OPERATING ROOM | Age: 76
End: 2021-02-16
Payer: MEDICARE

## 2021-02-16 ENCOUNTER — HOSPITAL ENCOUNTER (OUTPATIENT)
Age: 76
Setting detail: OUTPATIENT SURGERY
Discharge: HOME | End: 2021-02-16
Attending: OBSTETRICS & GYNECOLOGY | Admitting: OBSTETRICS & GYNECOLOGY
Payer: MEDICARE

## 2021-02-16 ENCOUNTER — ANESTHESIA (OUTPATIENT)
Dept: OPERATING ROOM | Age: 76
End: 2021-02-16
Payer: MEDICARE

## 2021-02-16 VITALS
BODY MASS INDEX: 29.89 KG/M2 | OXYGEN SATURATION: 99 % | HEART RATE: 52 BPM | SYSTOLIC BLOOD PRESSURE: 150 MMHG | DIASTOLIC BLOOD PRESSURE: 88 MMHG | RESPIRATION RATE: 16 BRPM | HEIGHT: 66 IN | WEIGHT: 186 LBS | TEMPERATURE: 97.2 F

## 2021-02-16 VITALS — DIASTOLIC BLOOD PRESSURE: 61 MMHG | OXYGEN SATURATION: 96 % | SYSTOLIC BLOOD PRESSURE: 131 MMHG

## 2021-02-16 LAB
ABO/RH: NORMAL
ANTIBODY SCREEN: NORMAL
EKG ATRIAL RATE: 62 BPM
EKG P AXIS: 24 DEGREES
EKG P-R INTERVAL: 162 MS
EKG Q-T INTERVAL: 404 MS
EKG QRS DURATION: 106 MS
EKG QTC CALCULATION (BAZETT): 410 MS
EKG R AXIS: -25 DEGREES
EKG T AXIS: 50 DEGREES
EKG VENTRICULAR RATE: 62 BPM
HCT VFR BLD CALC: 35 % (ref 34–48)
HEMOGLOBIN: 10.9 G/DL (ref 11.5–15.5)
MCH RBC QN AUTO: 29.6 PG (ref 26–35)
MCHC RBC AUTO-ENTMCNC: 31.1 % (ref 32–34.5)
MCV RBC AUTO: 95.1 FL (ref 80–99.9)
PDW BLD-RTO: 14.9 FL (ref 11.5–15)
PLATELET # BLD: 211 E9/L (ref 130–450)
PMV BLD AUTO: 11.6 FL (ref 7–12)
RBC # BLD: 3.68 E12/L (ref 3.5–5.5)
WBC # BLD: 5.3 E9/L (ref 4.5–11.5)

## 2021-02-16 PROCEDURE — 93005 ELECTROCARDIOGRAM TRACING: CPT

## 2021-02-16 PROCEDURE — 7100000011 HC PHASE II RECOVERY - ADDTL 15 MIN: Performed by: OBSTETRICS & GYNECOLOGY

## 2021-02-16 PROCEDURE — 88305 TISSUE EXAM BY PATHOLOGIST: CPT

## 2021-02-16 PROCEDURE — 2580000003 HC RX 258: Performed by: NURSE ANESTHETIST, CERTIFIED REGISTERED

## 2021-02-16 PROCEDURE — 3600000002 HC SURGERY LEVEL 2 BASE: Performed by: OBSTETRICS & GYNECOLOGY

## 2021-02-16 PROCEDURE — 36415 COLL VENOUS BLD VENIPUNCTURE: CPT

## 2021-02-16 PROCEDURE — 3700000000 HC ANESTHESIA ATTENDED CARE: Performed by: OBSTETRICS & GYNECOLOGY

## 2021-02-16 PROCEDURE — 58558 HYSTEROSCOPY BIOPSY: CPT | Performed by: OBSTETRICS & GYNECOLOGY

## 2021-02-16 PROCEDURE — 2500000003 HC RX 250 WO HCPCS: Performed by: NURSE ANESTHETIST, CERTIFIED REGISTERED

## 2021-02-16 PROCEDURE — 3700000001 HC ADD 15 MINUTES (ANESTHESIA): Performed by: OBSTETRICS & GYNECOLOGY

## 2021-02-16 PROCEDURE — 7100000001 HC PACU RECOVERY - ADDTL 15 MIN: Performed by: OBSTETRICS & GYNECOLOGY

## 2021-02-16 PROCEDURE — 2709999900 HC NON-CHARGEABLE SUPPLY: Performed by: OBSTETRICS & GYNECOLOGY

## 2021-02-16 PROCEDURE — 85027 COMPLETE CBC AUTOMATED: CPT

## 2021-02-16 PROCEDURE — 3600000012 HC SURGERY LEVEL 2 ADDTL 15MIN: Performed by: OBSTETRICS & GYNECOLOGY

## 2021-02-16 PROCEDURE — 86901 BLOOD TYPING SEROLOGIC RH(D): CPT

## 2021-02-16 PROCEDURE — 7100000000 HC PACU RECOVERY - FIRST 15 MIN: Performed by: OBSTETRICS & GYNECOLOGY

## 2021-02-16 PROCEDURE — 86850 RBC ANTIBODY SCREEN: CPT

## 2021-02-16 PROCEDURE — 86900 BLOOD TYPING SEROLOGIC ABO: CPT

## 2021-02-16 PROCEDURE — 7100000010 HC PHASE II RECOVERY - FIRST 15 MIN: Performed by: OBSTETRICS & GYNECOLOGY

## 2021-02-16 PROCEDURE — 6360000002 HC RX W HCPCS: Performed by: NURSE ANESTHETIST, CERTIFIED REGISTERED

## 2021-02-16 RX ORDER — MEPERIDINE HYDROCHLORIDE 25 MG/ML
12.5 INJECTION INTRAMUSCULAR; INTRAVENOUS; SUBCUTANEOUS EVERY 10 MIN PRN
Status: DISCONTINUED | OUTPATIENT
Start: 2021-02-16 | End: 2021-02-16 | Stop reason: HOSPADM

## 2021-02-16 RX ORDER — SODIUM CHLORIDE 0.9 % (FLUSH) 0.9 %
10 SYRINGE (ML) INJECTION PRN
Status: DISCONTINUED | OUTPATIENT
Start: 2021-02-16 | End: 2021-02-16 | Stop reason: SDUPTHER

## 2021-02-16 RX ORDER — ACETAMINOPHEN 325 MG/1
650 TABLET ORAL EVERY 4 HOURS PRN
Status: DISCONTINUED | OUTPATIENT
Start: 2021-02-16 | End: 2021-02-16 | Stop reason: HOSPADM

## 2021-02-16 RX ORDER — PROPOFOL 10 MG/ML
INJECTION, EMULSION INTRAVENOUS PRN
Status: DISCONTINUED | OUTPATIENT
Start: 2021-02-16 | End: 2021-02-16 | Stop reason: SDUPTHER

## 2021-02-16 RX ORDER — ONDANSETRON 2 MG/ML
4 INJECTION INTRAMUSCULAR; INTRAVENOUS EVERY 6 HOURS PRN
Status: DISCONTINUED | OUTPATIENT
Start: 2021-02-16 | End: 2021-02-16 | Stop reason: HOSPADM

## 2021-02-16 RX ORDER — SODIUM CHLORIDE 0.9 % (FLUSH) 0.9 %
10 SYRINGE (ML) INJECTION EVERY 12 HOURS SCHEDULED
Status: DISCONTINUED | OUTPATIENT
Start: 2021-02-16 | End: 2021-02-16 | Stop reason: HOSPADM

## 2021-02-16 RX ORDER — PROMETHAZINE HYDROCHLORIDE 25 MG/ML
6.25 INJECTION, SOLUTION INTRAMUSCULAR; INTRAVENOUS
Status: DISCONTINUED | OUTPATIENT
Start: 2021-02-16 | End: 2021-02-16 | Stop reason: HOSPADM

## 2021-02-16 RX ORDER — IBUPROFEN 800 MG/1
800 TABLET ORAL EVERY 8 HOURS PRN
Status: DISCONTINUED | OUTPATIENT
Start: 2021-02-16 | End: 2021-02-16 | Stop reason: HOSPADM

## 2021-02-16 RX ORDER — SODIUM CHLORIDE 0.9 % (FLUSH) 0.9 %
10 SYRINGE (ML) INJECTION EVERY 12 HOURS SCHEDULED
Status: DISCONTINUED | OUTPATIENT
Start: 2021-02-16 | End: 2021-02-16 | Stop reason: SDUPTHER

## 2021-02-16 RX ORDER — FENTANYL CITRATE 50 UG/ML
INJECTION, SOLUTION INTRAMUSCULAR; INTRAVENOUS PRN
Status: DISCONTINUED | OUTPATIENT
Start: 2021-02-16 | End: 2021-02-16 | Stop reason: SDUPTHER

## 2021-02-16 RX ORDER — ONDANSETRON 2 MG/ML
INJECTION INTRAMUSCULAR; INTRAVENOUS PRN
Status: DISCONTINUED | OUTPATIENT
Start: 2021-02-16 | End: 2021-02-16 | Stop reason: SDUPTHER

## 2021-02-16 RX ORDER — FENTANYL CITRATE 50 UG/ML
50 INJECTION, SOLUTION INTRAMUSCULAR; INTRAVENOUS EVERY 5 MIN PRN
Status: DISCONTINUED | OUTPATIENT
Start: 2021-02-16 | End: 2021-02-16 | Stop reason: HOSPADM

## 2021-02-16 RX ORDER — HYDROCODONE BITARTRATE AND ACETAMINOPHEN 5; 325 MG/1; MG/1
1 TABLET ORAL
Status: DISCONTINUED | OUTPATIENT
Start: 2021-02-16 | End: 2021-02-16 | Stop reason: HOSPADM

## 2021-02-16 RX ORDER — SODIUM CHLORIDE 9 MG/ML
INJECTION, SOLUTION INTRAVENOUS CONTINUOUS PRN
Status: DISCONTINUED | OUTPATIENT
Start: 2021-02-16 | End: 2021-02-16 | Stop reason: SDUPTHER

## 2021-02-16 RX ORDER — PROMETHAZINE HYDROCHLORIDE 25 MG/1
12.5 TABLET ORAL EVERY 6 HOURS PRN
Status: DISCONTINUED | OUTPATIENT
Start: 2021-02-16 | End: 2021-02-16 | Stop reason: HOSPADM

## 2021-02-16 RX ORDER — SODIUM CHLORIDE 0.9 % (FLUSH) 0.9 %
10 SYRINGE (ML) INJECTION PRN
Status: DISCONTINUED | OUTPATIENT
Start: 2021-02-16 | End: 2021-02-16 | Stop reason: HOSPADM

## 2021-02-16 RX ORDER — LIDOCAINE HYDROCHLORIDE 20 MG/ML
INJECTION, SOLUTION EPIDURAL; INFILTRATION; INTRACAUDAL; PERINEURAL PRN
Status: DISCONTINUED | OUTPATIENT
Start: 2021-02-16 | End: 2021-02-16 | Stop reason: SDUPTHER

## 2021-02-16 RX ORDER — DIPHENHYDRAMINE HYDROCHLORIDE 50 MG/ML
12.5 INJECTION INTRAMUSCULAR; INTRAVENOUS
Status: DISCONTINUED | OUTPATIENT
Start: 2021-02-16 | End: 2021-02-16 | Stop reason: HOSPADM

## 2021-02-16 RX ADMIN — ONDANSETRON 4 MG: 2 INJECTION INTRAMUSCULAR; INTRAVENOUS at 09:36

## 2021-02-16 RX ADMIN — PROPOFOL 200 MG: 10 INJECTION, EMULSION INTRAVENOUS at 09:36

## 2021-02-16 RX ADMIN — SODIUM CHLORIDE: 9 INJECTION, SOLUTION INTRAVENOUS at 09:31

## 2021-02-16 RX ADMIN — FENTANYL CITRATE 50 MCG: 50 INJECTION, SOLUTION INTRAMUSCULAR; INTRAVENOUS at 09:36

## 2021-02-16 RX ADMIN — LIDOCAINE HYDROCHLORIDE 60 MG: 20 INJECTION, SOLUTION EPIDURAL; INFILTRATION; INTRACAUDAL; PERINEURAL at 09:36

## 2021-02-16 RX ADMIN — ONDANSETRON 4 MG: 2 INJECTION INTRAMUSCULAR; INTRAVENOUS at 09:45

## 2021-02-16 ASSESSMENT — PAIN SCALES - GENERAL
PAINLEVEL_OUTOF10: 0
PAINLEVEL_OUTOF10: 0

## 2021-02-16 ASSESSMENT — LIFESTYLE VARIABLES: SMOKING_STATUS: 0

## 2021-02-16 ASSESSMENT — PULMONARY FUNCTION TESTS
PIF_VALUE: 1
PIF_VALUE: 0
PIF_VALUE: 12
PIF_VALUE: 4
PIF_VALUE: 0
PIF_VALUE: 10
PIF_VALUE: 12
PIF_VALUE: 3
PIF_VALUE: 12
PIF_VALUE: 10
PIF_VALUE: 3
PIF_VALUE: 12
PIF_VALUE: 12
PIF_VALUE: 0

## 2021-02-16 NOTE — H&P
Department of Gynecology  History and Physical        CHIEF COMPLAINT:   Postmenopausal bleeding     Reason for Admission:  Hysterosopy, D&C    History obtained from patient    HISTORY OF PRESENT ILLNESS:                     The patient is a 70 y.o. female. Patient was noted to have blood tinged discharge at her nursing facility. She saw Dr. Enrique Macias who ordered a pelvic ultrasound. A 14mm uterine lining was noted. A complex cystic area was noted in the center of the endometrial stripe. Procedure reviewed in detail. Patient is aware she will need a COVID test prior. Risks of surgery discussed, including blood loss requiring transfusion, injury to surrounding structures (bowel, bladder, ureters, etc.), and infection.      Past Medical History:        Diagnosis Date    Arthritis     hips    Ataxia     Atrophy of muscle     Bipolar 1 disorder, mixed, moderate (HCC)     Cerebrovascular disease     Coordination problem     Hyperlipidemia     Hypertension     Overactive bladder     Paranoid schizophrenia ([facility]     Thyroid disease      Past Surgical History:        Procedure Laterality Date    BLADDER SUSPENSION      DILATION AND CURETTAGE OF UTERUS      ENDOMETRIAL ABLATION      INDUCED       TONSILLECTOMY AND ADENOIDECTOMY           OB History    Para Term  AB Living   3 1     2 1   SAB TAB Ectopic Molar Multiple Live Births                    # Outcome Date GA Lbr Ever/2nd Weight Sex Delivery Anes PTL Lv   3 AB            2 AB            1 Para                Medications Prior to Admission:   Medications Prior to Admission: pantoprazole (PROTONIX) 40 MG tablet, Take 40 mg by mouth daily  hydrOXYzine (VISTARIL) 50 MG capsule, Take 50 mg by mouth every 6 hours as needed for Anxiety  acetaminophen (ARTHRITIS PAIN RELIEF) 650 MG extended release tablet, Take 650 mg by mouth 2 times daily as needed for Pain DO NOT EXCEED 3 GRAMS MAX TYLENOL PER DAY  ketoconazole (NIZORAL) 2 % cream, endometrium with complex cystic area also noted in   the central uterus/endometrial region. [facility] follow-up/further   evaluation.    Small uterine fibroid         ASSESSMENT AND PLAN:    70 female with PMB  - IVF, NPO  - CBC, T&S  - Consent form reviewed and signed    Ryann White MD

## 2021-02-16 NOTE — ANESTHESIA PRE PROCEDURE
Department of Anesthesiology  Preprocedure Note       Name:  Kareen Calvin   Age:  70 y.o.  :  1945                                          MRN:  [id number]         Date:  2021      Surgeon: Addi Colby):  Stephanie Harrell MD    Procedure: Procedure(s):  DILATATION AND CURETTAGE HYSTEROSCOPY    Medications prior to admission:   Prior to Admission medications    Medication Sig Start Date End Date Taking?  Authorizing Provider   pantoprazole (PROTONIX) 40 MG tablet Take 40 mg by mouth daily   Yes Historical Provider, MD   hydrOXYzine (VISTARIL) 50 MG capsule Take 50 mg by mouth every 6 hours as needed for Anxiety   Yes Historical Provider, MD   acetaminophen (ARTHRITIS PAIN RELIEF) 650 MG extended release tablet Take 650 mg by mouth 2 times daily as needed for Pain DO NOT EXCEED 3 GRAMS MAX TYLENOL PER DAY   Yes Historical Provider, MD   ketoconazole (NIZORAL) 2 % cream Apply topically 2 times daily Apply topically to face every shift for skin   Yes Historical Provider, MD   magnesium hydroxide (MILK OF MAGNESIA) 400 MG/5ML suspension Take 30 mLs by mouth daily as needed for Constipation GIVE AS NEEDED FOR CONSTIPATION IF NO BM IN 3 DAYS AND PRN DAILY   Yes Historical Provider, MD   aluminum & magnesium hydroxide-simethicone (MYLANTA) 200-200-20 MG/5ML SUSP suspension Take 30 mLs by mouth 2 times daily as needed for Indigestion   Yes Historical Provider, MD   Hypromellose (NATURAL BALANCE TEARS OP) Apply 1 drop to eye 4 times daily as needed (dry eyes) 1 drop in both eyes   Yes Historical Provider, MD   sodium chloride (V-R NASAL SPRAY SALINE) 0.65 % nasal spray 1 spray by Nasal route 2 times daily as needed (dryness)   Yes Historical Provider, MD   docusate sodium (STOOL SOFTENER) 100 MG capsule Take 100 mg by mouth as needed for Constipation   Yes Historical Provider, MD   aspirin 81 MG EC tablet Take 1 tablet by mouth 2 times daily 6/10/16  Yes Milton Qureshi MD   lactase (LACTAID) 3000 UNITS tablet Take 1 tablet by mouth as needed    Yes Historical Provider, MD       Current medications:    No current facility-administered medications for this encounter. Allergies: Allergies   Allergen Reactions    Mysoline [Primidone]     Tofranil [Imipramine Hcl]        Problem List:    Patient Active Problem List   Diagnosis Code    [facility] F20.9    Severe episode of recurrent major depressive disorder, with psychotic features ([facility] F33.3    NSAID overdose J96.760W    Metabolic acidosis with increased anion gap and accumulation of organic acids E87.2    Suicide attempt ([facility] T14.91XA       Past Medical History:        Diagnosis Date    Arthritis     hips    Ataxia     Atrophy of muscle     Bipolar 1 disorder, mixed, moderate ([facility]     Cerebrovascular disease     Coordination problem     Hyperlipidemia     Hypertension     Overactive bladder     Paranoid schizophrenia ([facility]     Thyroid disease        Past Surgical History:        Procedure Laterality Date    BLADDER SUSPENSION      DILATION AND CURETTAGE OF UTERUS      ENDOMETRIAL ABLATION      INDUCED       TONSILLECTOMY AND ADENOIDECTOMY         Social History:    Social History     Tobacco Use    Smoking status: Never Smoker    Smokeless tobacco: Never Used   Substance Use Topics    Alcohol use: No                                Counseling given: Not Answered      Vital Signs (Current): There were no vitals filed for this visit.                                            BP Readings from Last 3 Encounters:   21 118/64   20 126/78   20 (!) 140/70       NPO Status:                                                                                 BMI:   Wt Readings from Last 3 Encounters:   21 190 lb (86.2 kg)   20 190 lb (86.2 kg)   20 194 lb (88 kg)     There is no height or weight on file to calculate BMI.    CBC:   Lab Results   Component Value Date    WBC 7.8 04/15/2019 RBC 3.97 04/15/2019    HGB 11.7 04/15/2019    HCT 38.0 04/15/2019    MCV 95.7 04/15/2019    RDW 14.6 04/15/2019     04/15/2019       CMP:   Lab Results   Component Value Date     04/15/2019    K 4.4 04/15/2019     04/15/2019    CO2 25 04/15/2019    BUN 21 04/15/2019    CREATININE 1.4 04/15/2019    GFRAA 45 04/15/2019    LABGLOM 37 04/15/2019    GLUCOSE 114 04/15/2019    PROT 7.5 04/15/2019    CALCIUM 10.0 04/15/2019    BILITOT <0.2 04/15/2019    ALKPHOS 76 04/15/2019    AST 15 04/15/2019    ALT 11 04/15/2019       POC Tests: No results for input(s): POCGLU, POCNA, POCK, POCCL, POCBUN, POCHEMO, POCHCT in the last 72 hours. Coags: No results found for: PROTIME, INR, APTT    HCG (If Applicable): No results found for: PREGTESTUR, PREGSERUM, HCG, HCGQUANT     ABGs: No results found for: PHART, PO2ART, RCW1ZRE, XZZ7KSG, BEART, U4DHQHFH     Type & Screen (If Applicable):  No results found for: LABABO, LABRH    Drug/Infectious Status (If Applicable):  No results found for: HIV, HEPCAB    COVID-19 Screening (If Applicable): No results found for: COVID19      Anesthesia Evaluation  Patient summary reviewed and Nursing notes reviewed no history of anesthetic complications:   Airway: Mallampati: III  TM distance: >3 FB   Neck ROM: full  Mouth opening: > = 3 FB Dental:      Comment: Many are damaged    Pulmonary:Negative Pulmonary ROS breath sounds clear to auscultation      (-) not a current smoker                           Cardiovascular:  Exercise tolerance: poor (<4 METS),   (+) hypertension:, hyperlipidemia      ECG reviewed  Rhythm: regular  Rate: normal           Beta Blocker:  Not on Beta Blocker         Neuro/Psych:   (+) neuromuscular disease:, psychiatric history:depression/anxiety             GI/Hepatic/Renal: Neg GI/Hepatic/Renal ROS            Endo/Other:    (+) : arthritis:., .                 Abdominal:           Vascular: negative vascular ROS.                                      Anesthesia Plan      general     ASA 3       Induction: intravenous. Anesthetic plan and risks discussed with patient.                       Finn Andrews MD   2/16/2021

## 2021-02-16 NOTE — OP NOTE
Operative Note      Patient: Keny Reinoso  YOB: 1945  MRN: [id number]    Date of Procedure: 2/16/2021    Pre-Op Diagnosis: THICKENED ENDOMETRIUM    Post-Op Diagnosis: Same and uterine performation       Procedure(s):  DILATATION AND CURETTAGE HYSTEROSCOPY    Surgeon(s):  Baylee Liu MD    Assistant:   * No surgical staff found *    Anesthesia: General    Estimated Blood Loss (mL): 5    Complications: Other: Uterine perforation at the fundus, hemostatic     Specimens:   Endometrial biopsy     Findings: Small cavity with filmy adhessions, unable to visualize the ostia. Fundus perforation noted with hemostasis, no evidence of injury to bowel. Detailed Description of Procedure: The patient was taken to the operating room where general anesthesia was administered without difficulty. Patient was placed in the dorsal lithotomy position and was prepped and draped in the normal sterile fashion. A sterile retractor was placed in the posterior and anterior vagina. The cervix was visualized. A tenaculum was placed on the anterior lip of the cervix. The cervix was serially dilated using Armando dilators to size 17. Hysteroscopy was then performed. The scope was passed through the cervix to the uterine fundus under direct visualization. The above findings were noted in the uterine cavity. Cervical canal was observed as scope withdrawn. Curette inserted into the uterus and a pop felt. The camera was reinserted and a perforation was noted at the fundus. Bowel was visualized without evidence of injury. There was no bleeding noted from perforation site. The camera was removed. EMC was not performed. There was minimal bleeding noted. The tenaculum was removed with good hemostasis. The patient tolerated the procedure well. The instrument and sponge counts were correct times two. The patient was taken to   the recovery room in stable condition.           Electronically signed by Baylee Liu MD on 2/16/2021 at 9:06 AM

## 2021-02-16 NOTE — ANESTHESIA POSTPROCEDURE EVALUATION
Department of Anesthesiology  Postprocedure Note    Patient: Sammy Choudhury  MRN: [id number]  YOB: 1945  Date of evaluation: 2/16/2021  Time:  12:32 PM     Procedure Summary     Date: 02/16/21 Room / Location: [facility] OR 03 / [facility]    Anesthesia Start: 7950 Anesthesia Stop: [phone]    Procedure: endometrial biopsy, HYSTEROSCOPY (N/A Vagina ) Diagnosis: (THICKENED ENDOMETRIUM)    Surgeons: Rafia Fairchild MD Responsible Provider: Shaheed Ribera MD    Anesthesia Type: general ASA Status: 3          Anesthesia Type: general    Latrell Phase I: Latrell Score: 10    Latrell Phase II: Latrell Score: 10    Last vitals: Reviewed and per EMR flowsheets.        Anesthesia Post Evaluation    Patient location during evaluation: PACU  Patient participation: complete - patient participated  Level of consciousness: awake and alert  Airway patency: patent  Nausea & Vomiting: no vomiting and no nausea  Complications: no  Cardiovascular status: blood pressure returned to baseline  Respiratory status: acceptable  Hydration status: euvolemic

## 2021-02-16 NOTE — PROGRESS NOTES
Per Dr. James Urbina, patient to resume ASA tomorrow 2/17. Leonard Bowie at [facility] aware. Discharge instructions reviewed with patient and Leonard Bowie at [facility], copy sent w pt. Anesthesia instructions reviewed and copy given. Denies questions or concerns.

## 2021-03-04 ENCOUNTER — TELEPHONE (OUTPATIENT)
Dept: ADMINISTRATIVE | Age: 76
End: 2021-03-04

## 2021-03-11 ENCOUNTER — TELEPHONE (OUTPATIENT)
Dept: OBGYN | Age: 76
End: 2021-03-11

## 2021-03-11 NOTE — TELEPHONE ENCOUNTER
Called Patient Facility Tuesday, 3/9 and today, 3/11/21 to speak with Jonas Doll RN re: arranging a time for Dr. Albert Amaya to speak with the patients guardian. Was transferred to phone with no voicemail the first day and today, left a message with another staff member.     -Flavia,

## 2021-04-08 ENCOUNTER — VIRTUAL VISIT (OUTPATIENT)
Dept: OBGYN | Age: 76
End: 2021-04-08
Payer: MEDICARE

## 2021-04-08 DIAGNOSIS — R93.89 ENDOMETRIAL THICKENING ON ULTRASOUND: Primary | ICD-10-CM

## 2021-04-08 PROCEDURE — 99212 OFFICE O/P EST SF 10 MIN: CPT | Performed by: OBSTETRICS & GYNECOLOGY

## 2021-04-08 PROCEDURE — G8428 CUR MEDS NOT DOCUMENT: HCPCS | Performed by: OBSTETRICS & GYNECOLOGY

## 2021-04-08 PROCEDURE — 99211 OFF/OP EST MAY X REQ PHY/QHP: CPT | Performed by: OBSTETRICS & GYNECOLOGY

## 2021-04-08 PROCEDURE — G8417 CALC BMI ABV UP PARAM F/U: HCPCS | Performed by: OBSTETRICS & GYNECOLOGY

## 2021-04-08 NOTE — PROGRESS NOTES
Resa Dakin is a 70 y.o. female evaluated via telephone on 4/8/2021. Consent:  She and/or health care decision maker is aware that that she may receive a bill for this telephone service, depending on her insurance coverage, and has provided verbal consent to proceed: Yes      Documentation:  Spoke with Kasandra Kebede, patient's legal guardian regarding surgery. Patient had [facility] 2/16/21 due to cystic structures noted in her endometrium on pelvic ultrasound 12/14/20. She had been noted to have light tinged blood when wiping at her facility. Filmy adhesions were noted on hysteroscopy, and a uterine perforation happened at that time. Small sampling was performed but not adequate for evaluation. Discussed with guardian that uterine perforation is a common complication of surgery. She was hemostatic at that time and nothing further was needed to be done. Due to the adhesions, an adequate scraping was not performed. The adhesions are likely from her prior uterine ablation. Discussed options going forward, including repeat ultrasound or hysterectomy. Plan to repeat pelvic ultrasound. Will call Chioma back with results when available. I affirm this is a Patient Initiated Episode with a Patient who has not had a related appointment within my department in the past 7 days or scheduled within the next 24 hours. Patient identification was verified at the start of the visit: Yes    Total Time: minutes: 5-10 minutes    The visit was conducted pursuant to the emergency declaration under the [facility], [address] [place] [address] authority and the [employer] Act. Patient identification was verified, and a caregiver was present when appropriate. The patient was located in a state where the provider was credentialed to provide care.     Note: not billable if this call serves to triage the patient into an appointment for the relevant concern Kapil Plummer

## 2021-04-19 ENCOUNTER — HOSPITAL ENCOUNTER (OUTPATIENT)
Dept: ULTRASOUND IMAGING | Age: 76
Discharge: HOME | End: 2021-04-21
Payer: MEDICARE

## 2021-04-19 DIAGNOSIS — R93.89 ENDOMETRIAL THICKENING ON ULTRASOUND: ICD-10-CM

## 2021-04-19 PROCEDURE — 76856 US EXAM PELVIC COMPLETE: CPT

## 2021-04-19 PROCEDURE — 76830 TRANSVAGINAL US NON-OB: CPT

## 2022-08-03 ENCOUNTER — HOSPITAL ENCOUNTER (INPATIENT)
Age: 77
LOS: 8 days | Discharge: NURSING, REHAB OR LONG-TERM CARE | DRG: 469 | End: 2022-08-11
Attending: STUDENT IN AN ORGANIZED HEALTH CARE EDUCATION/TRAINING PROGRAM | Admitting: INTERNAL MEDICINE
Payer: MEDICAID

## 2022-08-03 ENCOUNTER — APPOINTMENT (OUTPATIENT)
Dept: CT IMAGING | Age: 77
DRG: 469 | End: 2022-08-03
Payer: MEDICAID

## 2022-08-03 ENCOUNTER — APPOINTMENT (OUTPATIENT)
Dept: GENERAL RADIOLOGY | Age: 77
DRG: 469 | End: 2022-08-03
Payer: MEDICAID

## 2022-08-03 ENCOUNTER — APPOINTMENT (OUTPATIENT)
Dept: MRI IMAGING | Age: 77
DRG: 469 | End: 2022-08-03
Payer: MEDICAID

## 2022-08-03 DIAGNOSIS — R47.1 DYSARTHRIA: Primary | ICD-10-CM

## 2022-08-03 DIAGNOSIS — N17.9 AKI (ACUTE KIDNEY INJURY) (HCC): ICD-10-CM

## 2022-08-03 DIAGNOSIS — E87.0 HYPERNATREMIA: ICD-10-CM

## 2022-08-03 PROBLEM — N39.0 COMPLICATED UTI (URINARY TRACT INFECTION): Status: ACTIVE | Noted: 2022-08-03

## 2022-08-03 LAB
ALBUMIN SERPL-MCNC: 4.1 G/DL (ref 3.5–5.2)
ALP BLD-CCNC: 82 U/L (ref 35–104)
ALT SERPL-CCNC: 10 U/L (ref 0–32)
ANION GAP SERPL CALCULATED.3IONS-SCNC: 18 MMOL/L (ref 7–16)
APTT: 30.9 SEC (ref 24.5–35.1)
AST SERPL-CCNC: 13 U/L (ref 0–31)
BACTERIA: ABNORMAL /HPF
BASOPHILS ABSOLUTE: 0.06 E9/L (ref 0–0.2)
BASOPHILS RELATIVE PERCENT: 0.5 % (ref 0–2)
BILIRUB SERPL-MCNC: 0.2 MG/DL (ref 0–1.2)
BILIRUBIN URINE: NEGATIVE
BLOOD, URINE: ABNORMAL
BUN BLDV-MCNC: 67 MG/DL (ref 6–23)
CALCIUM SERPL-MCNC: 11.1 MG/DL (ref 8.6–10.2)
CHLORIDE BLD-SCNC: 119 MMOL/L (ref 98–107)
CHP ED QC CHECK: YES
CLARITY: ABNORMAL
CO2: 20 MMOL/L (ref 22–29)
COLOR: YELLOW
CREAT SERPL-MCNC: 2.9 MG/DL (ref 0.5–1)
CREATININE URINE: 43 MG/DL (ref 29–226)
EOSINOPHILS ABSOLUTE: 0.01 E9/L (ref 0.05–0.5)
EOSINOPHILS RELATIVE PERCENT: 0.1 % (ref 0–6)
GFR AFRICAN AMERICAN: 19
GFR NON-AFRICAN AMERICAN: 16 ML/MIN/1.73
GLUCOSE BLD-MCNC: 107 MG/DL
GLUCOSE BLD-MCNC: 107 MG/DL (ref 74–99)
GLUCOSE URINE: NEGATIVE MG/DL
HCT VFR BLD CALC: 34.1 % (ref 34–48)
HEMOGLOBIN: 10.5 G/DL (ref 11.5–15.5)
IMMATURE GRANULOCYTES #: 0.08 E9/L
IMMATURE GRANULOCYTES %: 0.7 % (ref 0–5)
INR BLD: 1
KETONES, URINE: NEGATIVE MG/DL
LACTIC ACID: 1.2 MMOL/L (ref 0.5–2.2)
LEUKOCYTE ESTERASE, URINE: ABNORMAL
LYMPHOCYTES ABSOLUTE: 1.56 E9/L (ref 1.5–4)
LYMPHOCYTES RELATIVE PERCENT: 14.1 % (ref 20–42)
MCH RBC QN AUTO: 28.3 PG (ref 26–35)
MCHC RBC AUTO-ENTMCNC: 30.8 % (ref 32–34.5)
MCV RBC AUTO: 91.9 FL (ref 80–99.9)
METER GLUCOSE: 107 MG/DL (ref 74–99)
MONOCYTES ABSOLUTE: 0.89 E9/L (ref 0.1–0.95)
MONOCYTES RELATIVE PERCENT: 8 % (ref 2–12)
NEUTROPHILS ABSOLUTE: 8.5 E9/L (ref 1.8–7.3)
NEUTROPHILS RELATIVE PERCENT: 76.6 % (ref 43–80)
NITRITE, URINE: POSITIVE
OSMOLALITY URINE: 415 MOSM/KG (ref 300–900)
OSMOLALITY: 348 MOSM/KG (ref 285–310)
PDW BLD-RTO: 16.5 FL (ref 11.5–15)
PH UA: 6 (ref 5–9)
PLATELET # BLD: 278 E9/L (ref 130–450)
PMV BLD AUTO: 11.3 FL (ref 7–12)
POTASSIUM REFLEX MAGNESIUM: 5 MMOL/L (ref 3.5–5)
PROTEIN UA: 30 MG/DL
PROTHROMBIN TIME: 11.1 SEC (ref 9.3–12.4)
RBC # BLD: 3.71 E12/L (ref 3.5–5.5)
RBC UA: ABNORMAL /HPF (ref 0–2)
SODIUM BLD-SCNC: 157 MMOL/L (ref 132–146)
SODIUM URINE: 71 MMOL/L
SPECIFIC GRAVITY UA: 1.02 (ref 1–1.03)
TOTAL PROTEIN: 7.7 G/DL (ref 6.4–8.3)
TROPONIN, HIGH SENSITIVITY: 34 NG/L (ref 0–9)
UROBILINOGEN, URINE: 0.2 E.U./DL
WBC # BLD: 11.1 E9/L (ref 4.5–11.5)
WBC UA: >20 /HPF (ref 0–5)

## 2022-08-03 PROCEDURE — 70496 CT ANGIOGRAPHY HEAD: CPT | Performed by: RADIOLOGY

## 2022-08-03 PROCEDURE — 99449 NTRPROF PH1/NTRNET/EHR 31/>: CPT | Performed by: PSYCHIATRY & NEUROLOGY

## 2022-08-03 PROCEDURE — 82570 ASSAY OF URINE CREATININE: CPT

## 2022-08-03 PROCEDURE — 70496 CT ANGIOGRAPHY HEAD: CPT

## 2022-08-03 PROCEDURE — 81001 URINALYSIS AUTO W/SCOPE: CPT

## 2022-08-03 PROCEDURE — 70450 CT HEAD/BRAIN W/O DYE: CPT | Performed by: RADIOLOGY

## 2022-08-03 PROCEDURE — 4A03X5D MEASUREMENT OF ARTERIAL FLOW, INTRACRANIAL, EXTERNAL APPROACH: ICD-10-PCS | Performed by: RADIOLOGY

## 2022-08-03 PROCEDURE — 93005 ELECTROCARDIOGRAM TRACING: CPT | Performed by: STUDENT IN AN ORGANIZED HEALTH CARE EDUCATION/TRAINING PROGRAM

## 2022-08-03 PROCEDURE — 2580000003 HC RX 258: Performed by: STUDENT IN AN ORGANIZED HEALTH CARE EDUCATION/TRAINING PROGRAM

## 2022-08-03 PROCEDURE — 6360000002 HC RX W HCPCS: Performed by: STUDENT IN AN ORGANIZED HEALTH CARE EDUCATION/TRAINING PROGRAM

## 2022-08-03 PROCEDURE — 6360000004 HC RX CONTRAST MEDICATION: Performed by: RADIOLOGY

## 2022-08-03 PROCEDURE — 83930 ASSAY OF BLOOD OSMOLALITY: CPT

## 2022-08-03 PROCEDURE — 85610 PROTHROMBIN TIME: CPT

## 2022-08-03 PROCEDURE — 71045 X-RAY EXAM CHEST 1 VIEW: CPT

## 2022-08-03 PROCEDURE — 85730 THROMBOPLASTIN TIME PARTIAL: CPT

## 2022-08-03 PROCEDURE — 36415 COLL VENOUS BLD VENIPUNCTURE: CPT

## 2022-08-03 PROCEDURE — 80053 COMPREHEN METABOLIC PANEL: CPT

## 2022-08-03 PROCEDURE — 0042T CT BRAIN PERFUSION: CPT | Performed by: RADIOLOGY

## 2022-08-03 PROCEDURE — 70450 CT HEAD/BRAIN W/O DYE: CPT

## 2022-08-03 PROCEDURE — 96375 TX/PRO/DX INJ NEW DRUG ADDON: CPT

## 2022-08-03 PROCEDURE — 83605 ASSAY OF LACTIC ACID: CPT

## 2022-08-03 PROCEDURE — 70498 CT ANGIOGRAPHY NECK: CPT | Performed by: RADIOLOGY

## 2022-08-03 PROCEDURE — 85025 COMPLETE CBC W/AUTO DIFF WBC: CPT

## 2022-08-03 PROCEDURE — 99285 EMERGENCY DEPT VISIT HI MDM: CPT

## 2022-08-03 PROCEDURE — 87088 URINE BACTERIA CULTURE: CPT

## 2022-08-03 PROCEDURE — 96365 THER/PROPH/DIAG IV INF INIT: CPT

## 2022-08-03 PROCEDURE — 0042T CT BRAIN PERFUSION: CPT

## 2022-08-03 PROCEDURE — 84484 ASSAY OF TROPONIN QUANT: CPT

## 2022-08-03 PROCEDURE — 87186 SC STD MICRODIL/AGAR DIL: CPT

## 2022-08-03 PROCEDURE — 84300 ASSAY OF URINE SODIUM: CPT

## 2022-08-03 PROCEDURE — 82962 GLUCOSE BLOOD TEST: CPT

## 2022-08-03 PROCEDURE — 70498 CT ANGIOGRAPHY NECK: CPT

## 2022-08-03 PROCEDURE — 2140000000 HC CCU INTERMEDIATE R&B

## 2022-08-03 PROCEDURE — 70551 MRI BRAIN STEM W/O DYE: CPT

## 2022-08-03 PROCEDURE — 83935 ASSAY OF URINE OSMOLALITY: CPT

## 2022-08-03 PROCEDURE — 87077 CULTURE AEROBIC IDENTIFY: CPT

## 2022-08-03 RX ORDER — 0.9 % SODIUM CHLORIDE 0.9 %
1000 INTRAVENOUS SOLUTION INTRAVENOUS ONCE
Status: COMPLETED | OUTPATIENT
Start: 2022-08-03 | End: 2022-08-03

## 2022-08-03 RX ORDER — LEVETIRACETAM 10 MG/ML
1000 INJECTION INTRAVASCULAR ONCE
Status: COMPLETED | OUTPATIENT
Start: 2022-08-03 | End: 2022-08-03

## 2022-08-03 RX ORDER — DEXTROSE MONOHYDRATE 50 MG/ML
INJECTION, SOLUTION INTRAVENOUS CONTINUOUS
Status: DISCONTINUED | OUTPATIENT
Start: 2022-08-03 | End: 2022-08-04

## 2022-08-03 RX ADMIN — SODIUM CHLORIDE 1000 ML: 9 INJECTION, SOLUTION INTRAVENOUS at 20:14

## 2022-08-03 RX ADMIN — IOPAMIDOL 105 ML: 755 INJECTION, SOLUTION INTRAVENOUS at 18:24

## 2022-08-03 RX ADMIN — LEVETIRACETAM 1000 MG: 10 INJECTION INTRAVENOUS at 20:54

## 2022-08-03 RX ADMIN — WATER 1000 MG: 1 INJECTION INTRAMUSCULAR; INTRAVENOUS; SUBCUTANEOUS at 20:55

## 2022-08-03 RX ADMIN — DEXTROSE MONOHYDRATE: 50 INJECTION, SOLUTION INTRAVENOUS at 21:43

## 2022-08-03 ASSESSMENT — PAIN DESCRIPTION - PAIN TYPE: TYPE: ACUTE PAIN

## 2022-08-03 ASSESSMENT — PAIN DESCRIPTION - FREQUENCY: FREQUENCY: CONTINUOUS

## 2022-08-03 ASSESSMENT — PAIN - FUNCTIONAL ASSESSMENT: PAIN_FUNCTIONAL_ASSESSMENT: 0-10

## 2022-08-03 ASSESSMENT — PAIN SCALES - GENERAL: PAINLEVEL_OUTOF10: 5

## 2022-08-03 ASSESSMENT — PAIN DESCRIPTION - LOCATION: LOCATION: SHOULDER;HIP

## 2022-08-03 ASSESSMENT — PAIN DESCRIPTION - ORIENTATION: ORIENTATION: RIGHT

## 2022-08-03 NOTE — LETTER
[place] Department Medicaid  CERTIFICATION OF NECESSITY  FOR NON-EMERGENCY TRANSPORTATION   BY GROUND AMBULANCE      Individual Information   1. Name: Adam Apple 2. [place] Medicaid Billing Number:    3. Address: [place] [address]      Transportation Provider Information   4. Provider Name:    5. [place] Medicaid Provider Number:  National Provider Identifier (NPI):      Certification  7. Criteria:  During transport, this individual requires:  [x] Medical treatment or continuous     supervision by an EMT. [] The administration or regulation of oxygen by another person. [] Supervised protective restraint. 8. Period Beginning Date: 8/4/22   9. Length  [x] Not more than 10 day(s)  [] One Year     Additional Information Relevant to Certification   10. Comments or Explanations, If Necessary or Appropriate   Dysarthria, Hypernatremia, JOSE A (acute kidney injury) ([facility] Complicated UTI, altered mental status, alert and oriented to self, hx of CVA     Certifying Practitioner Information   11. Name of Practitioner: Paige Blunt MD   12. [place] Medicaid Provider Number, If Applicable:  [address] Provider Identifier (NPI):      Signature Information   14. Date of Signature: 8/4/22 15. Name of Person Signing: Allison Ferris   12. Signature and Professional Designation: Electronically signed by EVA Alas on 8/4/2022 at 3:44 PM     OD 12975  Rev. 7/2015    [facility] Encounter Date/Time: 8/3/2022 [address] [place] [address] Account: [de-identified]    MRN: [id number]    Patient: Adam Apple    Contact Serial #: [id number]      ENCOUNTER          Patient Class: I Private Enc?   No Unit [facility] 5068/3552-Z   Hospital Service: MED   Encounter DX: Dysarthria [R47.1]   ADM Provider: Paige Blunt MD   Procedure:     ATT Provider: Paige Blunt MD   REF Provider:        Admission DX: Dysarthria, Hypernatremia, JOSE A (acute kidney injury) ([facility] Complicated UTI (urinary tract infection) and DX codes: R47.1, E87.0, N17.9, N39.0      PATIENT                 Name: Roque Garibay : 1945 (76 yrs)   Address: [address] [place] [address] Sex: Female   City: [facility] [place] [facility] [address]         Marital Status: Single   Employer: RETIRED         Mormonism: Scientologist   Primary Care Provider: Alexandra Black MD         Primary Phone: [phone]   EMERGENCY CONTACT   Contact Name Legal Guardian? Relationship to Patient Home Phone Work Phone   1. Stephenie Montoya  2. Mellisa Junior Yes    Legal Guardian  Other [phone]              GUARANTOR            Guarantor: Roque Garibay     : 1945   Address: [address] [place] [address]* Sex: Female     [facility] [address]     Relation to Patient: Self       Home Phone: [phone]   Guarantor ID: [id number]       Work Phone:     Guarantor Employer: RETIRED         Status: RETIRED      COVERAGE        PRIMARY INSURANCE   Payor: Shira Garcia* Plan: [employer] M*   Payor Address: [facility]       Group Number: [id number] Insurance Type: INDEMNITY   Subscriber Name: Michelle Morales : 1945   Subscriber ID: [id number] Pat. Rel. to Sub: Self   SECONDARY INSURANCE   Payor:   Plan:     Payor Address: ,          Group Number:   Insurance Type:     Subscriber Name:   Subscriber :     Subscriber ID:   Pat.  Rel. to Sub:             CSN: [id number]

## 2022-08-03 NOTE — ED NOTES
Consent to treat patient verified by Jairon Huff RN, and Eve Crenshaw RN    MRI screening verified with the legal guardian Lc Domingo.      Sumit Lr RN  08/03/22 [id number]

## 2022-08-03 NOTE — VIRTUAL HEALTH
[facility] [place] [facility] Vascular Neurology Consult for  [address] Stroke Alert through [address] @ 06:41  8/3/2022 7:57 PM  Pt Name: Andrei Booker  MRN: [id number]  YOB: 1945  Date of evaluation: 8/3/2022  Primary Care Physician: Jame Cogan, MD  Reason for Evaluation: Stroke evaluation with Phone Consult, Discussion and Review of imaging    Andrei Booker is a 78 y.o. female who presents with     Slurred speech  Language difficulty    Generalized weakness    She has past medical history of Arthritis, Ataxia, Atrophy of muscle, Bipolar 1 disorder, mixed, moderate ([address] Cerebrovascular disease, Coordination problem, Hyperlipidemia, Hypertension, Overactive bladder, Paranoid schizophrenia ([address] and Thyroid disease. LKW: 530pm  NIH:  15    Allergies  is allergic to mysoline [primidone] and tofranil [imipramine hcl]. Medications  Prior to Admission medications    Medication Sig Start Date End Date Taking?  Authorizing Provider   pantoprazole (PROTONIX) 40 MG tablet Take 40 mg by mouth daily    Historical Provider, MD   hydrOXYzine (VISTARIL) 50 MG capsule Take 50 mg by mouth every 6 hours as needed for Anxiety    Historical Provider, MD   acetaminophen (ARTHRITIS PAIN RELIEF) 650 MG extended release tablet Take 650 mg by mouth 2 times daily as needed for Pain DO NOT EXCEED 3 GRAMS MAX TYLENOL PER DAY    Historical Provider, MD   ketoconazole (NIZORAL) 2 % cream Apply topically 2 times daily Apply topically to face every shift for skin    Historical Provider, MD   magnesium hydroxide (MILK OF MAGNESIA) 400 MG/5ML suspension Take 30 mLs by mouth daily as needed for Constipation GIVE AS NEEDED FOR CONSTIPATION IF NO BM IN 3 DAYS AND PRN DAILY    Historical Provider, MD   aluminum & magnesium hydroxide-simethicone (MYLANTA) 200-200-20 MG/5ML SUSP suspension Take 30 mLs by mouth 2 times daily as needed for Indigestion    Historical Provider, MD   Hypromellose (NATURAL BALANCE TEARS OP) Apply 1 drop to eye 4 times daily as needed (dry eyes) 1 drop in both eyes    Historical Provider, MD   sodium chloride (V-R NASAL SPRAY SALINE) 0.65 % nasal spray 1 spray by Nasal route 2 times daily as needed (dryness)    Historical Provider, MD   docusate sodium (STOOL SOFTENER) 100 MG capsule Take 100 mg by mouth as needed for Constipation    Historical Provider, MD   aspirin 81 MG EC tablet Take 1 tablet by mouth 2 times daily 6/10/16   Prudy Show, MD   lactase (LACTAID) 3000 UNITS tablet Take 1 tablet by mouth as needed     Historical Provider, MD    Scheduled Meds:   sodium chloride  1,000 mL IntraVENous Once     Continuous Infusions:  PRN Meds:.  Past Medical History   has a past medical history of Arthritis, Ataxia, Atrophy of muscle, Bipolar 1 disorder, mixed, moderate ([address] Cerebrovascular disease, Coordination problem, Hyperlipidemia, Hypertension, Overactive bladder, Paranoid schizophrenia ([address] and Thyroid disease.   Social History  Social History     Socioeconomic History    Marital status: Single     Spouse name: Not on file    Number of children: Not on file    Years of education: Not on file    Highest education level: Not on file   Occupational History    Not on file   Tobacco Use    Smoking status: Never    Smokeless tobacco: Never   Vaping Use    Vaping Use: Never used   Substance and Sexual Activity    Alcohol use: No    Drug use: No    Sexual activity: Not on file   Other Topics Concern    Not on file   Social History Narrative    Not on file     Social Determinants of Health     Financial Resource Strain: Not on file   Food Insecurity: Not on file   Transportation Needs: Not on file   Physical Activity: Not on file   Stress: Not on file   Social Connections: Not on file   Intimate Partner Violence: Not on file   Housing Stability: Not on file     Family History      Family history unknown: Yes       OBJECTIVE  BP (!) 152/79   Pulse 89   Temp 98.1 °F (36.7 °C) Resp 18   Wt 186 lb (84.4 kg)   SpO2 95%   BMI 30.02 kg/m²        Pre-Morbid mRS: 2    Imaging:  Images were personally reviewed with VIZ. AI used to review images including:  CT brain without contrast: atrophy  CTA imaging: no large vessel occlusion   MRI brain, artifact but no clear acute infarction    Assessment    Andrei Booker is a 78 y.o. female who presents with     Slurred speech  Language difficulty    Generalized weakness    She has past medical history of Arthritis, Ataxia, Atrophy of muscle, Bipolar 1 disorder, mixed, moderate ([address] Cerebrovascular disease, Coordination problem, Hyperlipidemia, Hypertension, Overactive bladder, Paranoid schizophrenia ([address] and Thyroid disease. DDx:    LKW: 530pm  NIH:  13    DDX:  Toxic metabolic encephalopathy  Psych medical side effect  Seizure    Recommendations:  Stat MRI with DWI and FLAIR  Keppra Load  EEG  Gen Neuro consult    Discussed with ED Physician    At least 35 min of Telemedicine and time in conversation directly with ED staff and physician for the patient who is in imminent and life threatening deterioration without further treatment and evaluation. This Virtual Visit was conducted with patient's (and/or legal guardian's) consent, to provide telestroke consultation and necessary medical care. Time spent examining patient, reviewing the images personally, reviewing the chart, perform high complexity decision making and speaking with the nursing staff regarding recommendations    This is a Phone Consult, I have not seen the patient face to face, the telemedicine device was not utilized.     Gayla Hylton MD, MD   Stroke, Neurocritical Care And/or [address] [place] [address] Stroke [address]  Electronically signed 8/3/2022 at 7:57 PM

## 2022-08-03 NOTE — ED PROVIDER NOTES
Department of Emergency Medicine   ED  Provider Note  Admit Date/RoomTime: 8/3/2022  6:00 PM  ED Room: 01/01        8/3/22  6:37 PM EDT    Stroke Alert called: 1815    HISTORY OF PRESENT ILLNESS:  (Nurses Notes Reviewed)    Chief Complaint:   Fall (Shoes slipped off, fell onto right side, c/o right shoulder and hip pain)      Source of history provided by:  nursing home. History/Exam Limitations: Aphasic, dysarthric. Ronak Moffett is a 78 y.o. old female presenting to the emergency department by EMS, with unknown onset of aphasia and dysarthria, which began prior to arrival.  Last known well time: Approximately [id number]. The episode occurred at a nursing home. Since recognized the symptoms have been persistent. She has history of stroke. She has stroke risk factors of: hyperlipidemia and hypertension. There have been associated symptoms of nothing. There has been history of recent trauma, patient was found down. Baseline mentation is alert and oriented x3 with normal speech according to facility. Code Status on file: Prior. NIH Stroke Scale at time of initial evaluation:    NIH Stroke Scale/Score at time of initial evaluation:  1A: Level of Consciousness 0 - alert; keenly responsive   1B: Ask Month and Age 2 - answers neither question correctly   1C:  Tell Patient To Open and Close Eyes, then Hand  Squeeze 0 - performs both tasks correctly   2: Test Horizontal Extraocular Movements 0 - normal   3: Test Visual Fields 0 - no visual loss   4: Test Facial Palsy 0 - normal symmetric movement   5A: Test Left Arm Motor Drift 0 - no drift, limb holds 90 (or 45) degrees for full 10 seconds   5B: Test Right Arm Motor Drift 0 - no drift, limb holds 90 (or 45) degrees for full 10 seconds   6A: Test Left Leg Motor Drift 0 - no drift; leg holds 30 degree position for full 5 seconds   6B: Test Right Leg Motor Drift 0 - no drift; leg holds 30 degree position for full 5 seconds   7: Test Limb Ataxia (FNF/Heel-Shin) 2 - present in two limbs   8: Test Sensation 0 - normal; no sensory loss   9: Test Language/Aphasia 2 - severe aphasia; all communication is through fragmentary expression; great need for inference, questioning, and guessing by the listener. Range of information that can be exchanged is limited; listener carries burden of communication. Examiner cannot identify materials provided from patient response. 10: Test Dysarthria 2 - severe; patient speech is so slurred as to be unintelligible in the absence of or our of proportion to any dysphagia, or is mute/anarthric    11: Test Extinction/Inattention 0 - no abnormality   Total 8           Past Medical History:  has a past medical history of Arthritis, Ataxia, Atrophy of muscle, Bipolar 1 disorder, mixed, moderate ([address] Cerebrovascular disease, Coordination problem, Hyperlipidemia, Hypertension, Overactive bladder, Paranoid schizophrenia ([address] and Thyroid disease. Past Surgical History:  has a past surgical history that includes Tonsillectomy and adenoidectomy; Endometrial ablation; Dilation and curettage of uterus; bladder suspension; Induced ; and Dilation and curettage of uterus (N/A, 2021). Social History:  reports that she has never smoked. She has never used smokeless tobacco. She reports that she does not drink alcohol and does not use drugs. Prior Functional Status(Modified [place] Scale): 3=Moderate disability; requiring some help, but able to walk without assistance    Family History: Family history is unknown by patient. The patients home medications have been reviewed. Prior to Admission medications    Medication Sig Start Date End Date Taking?  Authorizing Provider   pantoprazole (PROTONIX) 40 MG tablet Take 40 mg by mouth daily    Historical Provider, MD   hydrOXYzine (VISTARIL) 50 MG capsule Take 50 mg by mouth every 6 hours as needed for Anxiety    Historical Provider, MD   acetaminophen (ARTHRITIS PAIN RELIEF) 650 MG extended release tablet Take 650 mg by mouth 2 times daily as needed for Pain DO NOT EXCEED 3 GRAMS MAX TYLENOL PER DAY    Historical Provider, MD   ketoconazole (NIZORAL) 2 % cream Apply topically 2 times daily Apply topically to face every shift for skin    Historical Provider, MD   magnesium hydroxide (MILK OF MAGNESIA) 400 MG/5ML suspension Take 30 mLs by mouth daily as needed for Constipation GIVE AS NEEDED FOR CONSTIPATION IF NO BM IN 3 DAYS AND PRN DAILY    Historical Provider, MD   aluminum & magnesium hydroxide-simethicone (MYLANTA) 200-200-20 MG/5ML SUSP suspension Take 30 mLs by mouth 2 times daily as needed for Indigestion    Historical Provider, MD   Hypromellose (NATURAL BALANCE TEARS OP) Apply 1 drop to eye 4 times daily as needed (dry eyes) 1 drop in both eyes    Historical Provider, MD   sodium chloride (V-R NASAL SPRAY SALINE) 0.65 % nasal spray 1 spray by Nasal route 2 times daily as needed (dryness)    Historical Provider, MD   docusate sodium (STOOL SOFTENER) 100 MG capsule Take 100 mg by mouth as needed for Constipation    Historical Provider, MD   aspirin 81 MG EC tablet Take 1 tablet by mouth 2 times daily 6/10/16   Ton Rosado MD   lactase (LACTAID) 3000 UNITS tablet Take 1 tablet by mouth as needed     Historical Provider, MD       Allergies: Mysoline [primidone] and Tofranil [imipramine hcl]       Review of Systems:   Pertinent positives and negatives are stated within HPI, all other systems reviewed and are negative. Review of Systems   Unable to perform ROS: Patient nonverbal      ---------------------------------------------------PHYSICAL EXAM--------------------------------------    Constitutional/General: Alert and oriented x3, well appearing, non toxic in NAD  Head: Normocephalic and atraumatic  Eyes: PERRL, EOMI  Mouth: Oropharynx clear, handling secretions, no trismus.  no facial droop  Neck: Supple, full ROM, non tender to palpation in the midline, no Panel w/ Reflex to MG   Result Value Ref Range    Sodium 157 (H) 132 - 146 mmol/L    Potassium reflex Magnesium 5.0 3.5 - 5.0 mmol/L    Chloride 119 (H) 98 - 107 mmol/L    CO2 20 (L) 22 - 29 mmol/L    Anion Gap 18 (H) 7 - 16 mmol/L    Glucose 107 (H) 74 - 99 mg/dL    BUN 67 (H) 6 - 23 mg/dL    Creatinine 2.9 (H) 0.5 - 1.0 mg/dL    GFR Non-African American 16 >=60 mL/min/1.73    GFR African American 19     Calcium 11.1 (H) 8.6 - 10.2 mg/dL    Total Protein 7.7 6.4 - 8.3 g/dL    Albumin 4.1 3.5 - 5.2 g/dL    Total Bilirubin 0.2 0.0 - 1.2 mg/dL    Alkaline Phosphatase 82 35 - 104 U/L    ALT 10 0 - 32 U/L    AST 13 0 - 31 U/L   Troponin   Result Value Ref Range    Troponin, High Sensitivity 34 (H) 0 - 9 ng/L   APTT   Result Value Ref Range    aPTT 30.9 24.5 - 35.1 sec   Protime-INR   Result Value Ref Range    Protime 11.1 9.3 - 12.4 sec    INR 1.0    Urinalysis with Microscopic   Result Value Ref Range    Color, UA Yellow Straw/Yellow    Clarity, UA SL CLOUDY Clear    Glucose, Ur Negative Negative mg/dL    Bilirubin Urine Negative Negative    Ketones, Urine Negative Negative mg/dL    Specific Gravity, UA 1.020 1.005 - 1.030    Blood, Urine MODERATE (A) Negative    pH, UA 6.0 5.0 - 9.0    Protein, UA 30 (A) Negative mg/dL    Urobilinogen, Urine 0.2 <2.0 E.U./dL    Nitrite, Urine POSITIVE (A) Negative    Leukocyte Esterase, Urine MODERATE (A) Negative    WBC, UA >20 (A) 0 - 5 /HPF    RBC, UA 1-3 0 - 2 /HPF    Bacteria, UA MANY (A) None Seen /HPF   Lactic Acid   Result Value Ref Range    Lactic Acid 1.2 0.5 - 2.2 mmol/L   CREATININE, RANDOM URINE   Result Value Ref Range    Creatinine, Ur 43 29 - 226 mg/dL   SODIUM, URINE, RANDOM   Result Value Ref Range    Sodium, Ur 71 Not Established mmol/L   OSMOLALITY, SERUM   Result Value Ref Range    Osmolality 348 (H) 285 - 310 mOsm/Kg   POCT Glucose   Result Value Ref Range    Glucose 107 mg/dL    QC OK?  yes    POCT Glucose   Result Value Ref Range    Meter Glucose 107 (H) 74 - 99 mg/dL       RADIOLOGY:  Interpreted by Radiologist.  [facility]   Final Result   1. Artifact on DWI sequence limits evaluation for an acute infarction. Within this limitation, no acute infarction is seen. 2.  No acute intracranial abnormality. 3. Somewhat disproportionately prominent volume loss in the superior   cerebellar hemispheres and the vermis, which may be the result of multiple   potential etiologies, including ethanol abuse, certain seizure medications   and neuro degenerative processes. Clinical correlation is needed. RECOMMENDATIONS:   Unavailable         XR CHEST PORTABLE   Final Result   No acute cardiopulmonary abnormality. CT Head WO Contrast   Final Result   Diffuse atrophy likely age related   Findings compatible with small vessel ischemic changes. The findings were called to the ordering physician. CTA HEAD W CONTRAST   Final Result   1. Estimated stenosis of the proximal right and left internal carotid   artery by NASCET criteria is not hemodynamically significant   2. Mild atherosclerotic disease . 3. No large vessel occlusion identified            This study was analyzed by the [employer] ai algorithm. CTA NECK W CONTRAST   Final Result   1. Estimated stenosis of the proximal right and left internal carotid   artery by NASCET criteria is not hemodynamically significant   2. Mild atherosclerotic disease . 3. No large vessel occlusion identified            This study was analyzed by the [employer] ai algorithm. CT BRAIN PERFUSION   Final Result      No significant ischemic penumbra identified      This study was analyzed by the [employer] ai algorithm. ED Course as of 08/03/22 2331   Wed Aug 03, 2022   2001 EKG shows sinus tachycardia with a ventricular rate of 105 bpm.  There is a left axis deviation. Does not meet STEMI criteria. There are nonspecific T wave inversions in aVL. New compared to previous EKG on 3/23/2022.   As interpreted by myself ED physician. [BB]      ED Course User Index  [BB] Justin Guerin DO         ------------------------- NURSING NOTES AND VITALS REVIEWED ---------------------------   The nursing notes within the ED encounter and vital signs as below have been reviewed. /61   Pulse 97   Temp 98.1 °F (36.7 °C)   Resp 19   Wt 186 lb (84.4 kg)   SpO2 96%   BMI 30.02 kg/m²   Oxygen Saturation Interpretation: Normal    The patients available past medical records and past encounters were reviewed. ------------------------------ ED COURSE/MEDICAL DECISION MAKING----------------------  Medications   dextrose 5 % solution ( IntraVENous New Bag 8/3/22 2143)   iopamidol (ISOVUE-370) 76 % injection 105 mL (105 mLs IntraVENous Given 8/3/22 1824)   0.9 % sodium chloride bolus (0 mLs IntraVENous Stopped 8/3/22 2131)   cefTRIAXone (ROCEPHIN) 1,000 mg in sterile water 10 mL IV syringe (1,000 mg IntraVENous Given 8/3/22 2055)   levETIRAcetam (KEPPRA) 1000 mg/100 mL IVPB (0 mg IntraVENous Stopped 8/3/22 2131)       Based upon patient's stroke like symptoms a stroke neurology consult is indicated. Consult to Neurology completed. [facility] Neurology via Telepresence      Acute CVA Core Measures:      NIH Stroke Scale Total:    t-PA Eligibility: was recommeded by [employer] and under the order of the ED physician  IV t-PA was considered and not given at the recommendation of telestroke neurology. Given timeframe and patient's psych history. It was recommended to obtain a STAT MRI. MRI did not show concern for stroke and so the recommendation was to give heparin and correct patient's metabolic issues. Medical Decision Making:    Patient presents with concern for stroke. Stroke alert called. No LVO. UA showed UTI. Patient started on ceftriaxone. CMP showed a hypernatremia. Patient also had an JOSE A. Nephrology was consulted who recommended starting the patient on a D5W at 100cc/hr.  Decision

## 2022-08-04 LAB
ANION GAP SERPL CALCULATED.3IONS-SCNC: 12 MMOL/L (ref 7–16)
ANION GAP SERPL CALCULATED.3IONS-SCNC: 9 MMOL/L (ref 7–16)
BUN BLDV-MCNC: 61 MG/DL (ref 6–23)
BUN BLDV-MCNC: 63 MG/DL (ref 6–23)
CALCIUM SERPL-MCNC: 9.8 MG/DL (ref 8.6–10.2)
CALCIUM SERPL-MCNC: 9.8 MG/DL (ref 8.6–10.2)
CHLORIDE BLD-SCNC: 123 MMOL/L (ref 98–107)
CHLORIDE BLD-SCNC: 123 MMOL/L (ref 98–107)
CO2: 22 MMOL/L (ref 22–29)
CO2: 23 MMOL/L (ref 22–29)
CREAT SERPL-MCNC: 2.5 MG/DL (ref 0.5–1)
CREAT SERPL-MCNC: 2.6 MG/DL (ref 0.5–1)
EKG ATRIAL RATE: 105 BPM
EKG P AXIS: 64 DEGREES
EKG P-R INTERVAL: 156 MS
EKG Q-T INTERVAL: 354 MS
EKG QRS DURATION: 96 MS
EKG QTC CALCULATION (BAZETT): 467 MS
EKG R AXIS: -33 DEGREES
EKG T AXIS: 80 DEGREES
EKG VENTRICULAR RATE: 105 BPM
GFR AFRICAN AMERICAN: 22
GFR AFRICAN AMERICAN: 23
GFR NON-AFRICAN AMERICAN: 18 ML/MIN/1.73
GFR NON-AFRICAN AMERICAN: 19 ML/MIN/1.73
GLUCOSE BLD-MCNC: 130 MG/DL (ref 74–99)
GLUCOSE BLD-MCNC: 87 MG/DL (ref 74–99)
POTASSIUM SERPL-SCNC: 4.1 MMOL/L (ref 3.5–5)
POTASSIUM SERPL-SCNC: 4.5 MMOL/L (ref 3.5–5)
SODIUM BLD-SCNC: 155 MMOL/L (ref 132–146)
SODIUM BLD-SCNC: 157 MMOL/L (ref 132–146)

## 2022-08-04 PROCEDURE — 2580000003 HC RX 258: Performed by: INTERNAL MEDICINE

## 2022-08-04 PROCEDURE — 36415 COLL VENOUS BLD VENIPUNCTURE: CPT

## 2022-08-04 PROCEDURE — 6370000000 HC RX 637 (ALT 250 FOR IP): Performed by: INTERNAL MEDICINE

## 2022-08-04 PROCEDURE — 99222 1ST HOSP IP/OBS MODERATE 55: CPT | Performed by: INTERNAL MEDICINE

## 2022-08-04 PROCEDURE — 6360000002 HC RX W HCPCS: Performed by: INTERNAL MEDICINE

## 2022-08-04 PROCEDURE — 2140000000 HC CCU INTERMEDIATE R&B

## 2022-08-04 PROCEDURE — 2580000003 HC RX 258

## 2022-08-04 PROCEDURE — 80048 BASIC METABOLIC PNL TOTAL CA: CPT

## 2022-08-04 RX ORDER — ASPIRIN 81 MG/1
81 TABLET ORAL 2 TIMES DAILY
Status: DISCONTINUED | OUTPATIENT
Start: 2022-08-04 | End: 2022-08-11 | Stop reason: HOSPADM

## 2022-08-04 RX ORDER — LEVETIRACETAM 500 MG/1
500 TABLET ORAL 2 TIMES DAILY
Status: DISCONTINUED | OUTPATIENT
Start: 2022-08-04 | End: 2022-08-11 | Stop reason: HOSPADM

## 2022-08-04 RX ORDER — RIVASTIGMINE 13.3 MG/24H
1 PATCH, EXTENDED RELEASE TRANSDERMAL NIGHTLY
COMMUNITY

## 2022-08-04 RX ORDER — GLYCERIN .002; .002; .01 MG/MG; MG/MG; MG/MG
1 SOLUTION/ DROPS OPHTHALMIC 2 TIMES DAILY PRN
Status: ON HOLD | COMMUNITY
End: 2022-08-10 | Stop reason: HOSPADM

## 2022-08-04 RX ORDER — HYDROXYZINE PAMOATE 25 MG/1
50 CAPSULE ORAL EVERY 6 HOURS PRN
Status: DISCONTINUED | OUTPATIENT
Start: 2022-08-04 | End: 2022-08-11 | Stop reason: HOSPADM

## 2022-08-04 RX ORDER — PANTOPRAZOLE SODIUM 40 MG/1
40 TABLET, DELAYED RELEASE ORAL DAILY
Status: DISCONTINUED | OUTPATIENT
Start: 2022-08-04 | End: 2022-08-11 | Stop reason: HOSPADM

## 2022-08-04 RX ORDER — ACETAMINOPHEN 325 MG/1
650 TABLET ORAL 2 TIMES DAILY PRN
Status: DISCONTINUED | OUTPATIENT
Start: 2022-08-04 | End: 2022-08-11 | Stop reason: HOSPADM

## 2022-08-04 RX ORDER — BENZTROPINE MESYLATE 0.5 MG/1
0.5 TABLET ORAL 2 TIMES DAILY
Status: ON HOLD | COMMUNITY
End: 2022-08-10 | Stop reason: HOSPADM

## 2022-08-04 RX ORDER — DOCUSATE SODIUM 100 MG/1
100 CAPSULE, LIQUID FILLED ORAL PRN
Status: DISCONTINUED | OUTPATIENT
Start: 2022-08-04 | End: 2022-08-11 | Stop reason: HOSPADM

## 2022-08-04 RX ORDER — SODIUM CHLORIDE 0.9 % (FLUSH) 0.9 %
SYRINGE (ML) INJECTION
Status: COMPLETED
Start: 2022-08-04 | End: 2022-08-04

## 2022-08-04 RX ORDER — DEXTROSE AND SODIUM CHLORIDE 5; .45 G/100ML; G/100ML
INJECTION, SOLUTION INTRAVENOUS CONTINUOUS
Status: DISCONTINUED | OUTPATIENT
Start: 2022-08-04 | End: 2022-08-08

## 2022-08-04 RX ORDER — IBUPROFEN 600 MG/1
600 TABLET ORAL EVERY 6 HOURS PRN
Status: ON HOLD | COMMUNITY
End: 2022-08-10 | Stop reason: HOSPADM

## 2022-08-04 RX ADMIN — SODIUM CHLORIDE, PRESERVATIVE FREE: 5 INJECTION INTRAVENOUS at 14:31

## 2022-08-04 RX ADMIN — LEVETIRACETAM 500 MG: 500 TABLET, FILM COATED ORAL at 14:30

## 2022-08-04 RX ADMIN — WATER 1000 MG: 1 INJECTION INTRAMUSCULAR; INTRAVENOUS; SUBCUTANEOUS at 20:52

## 2022-08-04 RX ADMIN — ASPIRIN 81 MG: 81 TABLET, COATED ORAL at 20:56

## 2022-08-04 RX ADMIN — DEXTROSE AND SODIUM CHLORIDE: 5; 450 INJECTION, SOLUTION INTRAVENOUS at 14:30

## 2022-08-04 RX ADMIN — PANTOPRAZOLE SODIUM 40 MG: 40 TABLET, DELAYED RELEASE ORAL at 14:30

## 2022-08-04 RX ADMIN — ASPIRIN 81 MG: 81 TABLET, COATED ORAL at 14:29

## 2022-08-04 RX ADMIN — LEVETIRACETAM 500 MG: 500 TABLET, FILM COATED ORAL at 20:56

## 2022-08-04 ASSESSMENT — PAIN SCALES - GENERAL
PAINLEVEL_OUTOF10: 0
PAINLEVEL_OUTOF10: 0

## 2022-08-04 ASSESSMENT — PAIN SCALES - WONG BAKER: WONGBAKER_NUMERICALRESPONSE: 0

## 2022-08-04 NOTE — CARE COORDINATION
Patient presented to the ED from [facility] following a fall, dysarthria, hypernatremia; admitted for complicated UTI. Call placed to patient's legal guardian, Gold Garduno through [facility]. She confirmed the plan is for the patient to return to [facility]. She mentioned the patient as being very talkative at baseline, typically alert and oriented and able to push herself around in a wheelchair. Patient was having some behaviors at the facility and plan was for the patient to be transferred to [address] [place] [address] if the patient needs to be sent there instead of [place] due to behaviors while in the hospital, she is in agreement. Per nursing rounds, patient is non-verbal and total care. Brookie Sicard, liaison at [facility] and she reports the patient is a long-term care resident, can return anytime and will only need a covid test on day of discharge. Ambulance form completed and envelope placed on the soft chart.     Kat Kerr, MAYRA, LSW [phone]

## 2022-08-04 NOTE — CONSULTS
Neuro Inpatient Consult Note       Shelley Vale is a 78 y.o. right handed     Neurology was consulted for aphasia and dysarthria    Past Medical History:     Past Medical History:   Diagnosis Date    Arthritis     hips    Ataxia     Atrophy of muscle     Bipolar 1 disorder, mixed, moderate ([facility]     Cerebrovascular disease     Coordination problem     Hyperlipidemia     Hypertension     Overactive bladder     Paranoid schizophrenia ([facility]     Thyroid disease        Past Surgical History:     Past Surgical History:   Procedure Laterality Date    BLADDER SUSPENSION      DILATION AND CURETTAGE OF UTERUS      DILATION AND CURETTAGE OF UTERUS N/A 2/16/2021    endometrial biopsy, HYSTEROSCOPY performed by Romina Bailey MD at [facility] ADENOIDECTOMY         Allergies:     Mysoline [primidone] and Tofranil [imipramine hcl]    Medications:     Prior to Admission medications    Medication Sig Start Date End Date Taking?  Authorizing Provider   pantoprazole (PROTONIX) 40 MG tablet Take 40 mg by mouth daily    Historical Provider, MD   hydrOXYzine (VISTARIL) 50 MG capsule Take 50 mg by mouth every 6 hours as needed for Anxiety    Historical Provider, MD   acetaminophen (ARTHRITIS PAIN RELIEF) 650 MG extended release tablet Take 650 mg by mouth 2 times daily as needed for Pain DO NOT EXCEED 3 GRAMS MAX TYLENOL PER DAY    Historical Provider, MD   ketoconazole (NIZORAL) 2 % cream Apply topically 2 times daily Apply topically to face every shift for skin    Historical Provider, MD   magnesium hydroxide (MILK OF MAGNESIA) 400 MG/5ML suspension Take 30 mLs by mouth daily as needed for Constipation GIVE AS NEEDED FOR CONSTIPATION IF NO BM IN 3 DAYS AND PRN DAILY    Historical Provider, MD   aluminum & magnesium hydroxide-simethicone (MYLANTA) 200-200-20 MG/5ML SUSP suspension Take 30 mLs by mouth 2 times daily as needed for Indigestion    Historical Provider, MD Hypromellose (NATURAL BALANCE TEARS OP) Apply 1 drop to eye 4 times daily as needed (dry eyes) 1 drop in both eyes    Historical Provider, MD   sodium chloride (V-R NASAL SPRAY SALINE) 0.65 % nasal spray 1 spray by Nasal route 2 times daily as needed (dryness)    Historical Provider, MD   docusate sodium (STOOL SOFTENER) 100 MG capsule Take 100 mg by mouth as needed for Constipation    Historical Provider, MD   aspirin 81 MG EC tablet Take 1 tablet by mouth 2 times daily 6/10/16   Beata Rebollar MD   lactase (LACTAID) 3000 UNITS tablet Take 1 tablet by mouth as needed     Historical Provider, MD       Social History:     Denies ETOH, tobacco, or illicit drugs    Review of Systems:     No chest pain or palpitations  No SOB  No vertigo, lightheadedness or loss of consciousness  No incontinence of bowels or bladder  No itching or bruising appreciated  No numbness, tingling or focal arm/leg weakness    ROS is otherwise negative     Family History:     Family History   Family history unknown: Yes        History of Present Illness:     Patient is a 68yo F with a PMHx of bipolar disorder, DJD, paranoid schizophrenia, cognitive communication deficit who presented to the ED for concerns of aphasia and dysarthria. Neurology consulted for concerns of aphasia and dysarthria. LKW approximately [id number]. Patient is from nursing home. Since symptom onset, symptoms have been persistently present. In the ED, vitals overall stable and CBC WNL. BMP significant for Na: 157, BUN: 67, Cr: 2.9. UA significant for Blood: MODERATE, Nitrites: POSITIVE, Leukocyte Esterase: MODERATE, WBC: > 20, Bacteria: MANY. Ucx ordered and pending at this time. Imaging showed CTA head and neck no significant stenosis of internal carotid A. Bilaterally. CT Brain perfusion: No significant ischemic penumbra. CT Head w/o contrast: Diffuse atrophy likely age related with small vessel ischemic changes.      Patient seen at bedside this AM. Patient still seen to be dysarthric with fragmentation of speech. Patient responds coherently and is AAOx2 (not oriented to self [states name is [place]], oriented to place and time). Baseline is AAOx3 with normal speech per facility. Patient states she is aware that her speech is slower than baseline. Patient able to properly follow commands. Objective:     BP (!) 115/54   Pulse 56   Temp 97.3 °F (36.3 °C) (Oral)   Resp 16   Wt 186 lb (84.4 kg)   SpO2 100%   BMI 30.02 kg/m²     General appearance: alert, cooperative and no distress, bradykinetic with fragmented speech. AAOx3. Head: normocephalic, without obvious abnormality, atraumatic  Eyes: conjunctivae/corneas clear.  .  Neck: no adenopathy, no carotid bruit, supple, symmetrical, trachea midline and thyroid not enlarged, symmetric, no tenderness/mass/nodules  Lungs: clear to auscultation bilaterally  Heart: regular rate and rhythm, S1, S2 normal, no murmur, click, rub or gallop  Extremities: normal, atraumatic, no cyanosis or edema  Pulses: 2+ and symmetric  Skin: color, texture, turgor normal---no rashes or lesions    Mental Status: alert, oriented, thought content appropriate    Appropriate attention/concentration  Intact fundus of knowledge  Memories intact    Speech: dysarthric  Language: no aphasias    Cranial Nerves:  I: smell    II: visual acuity     II: visual fields Full    II: pupils SHELBY   III,VII: ptosis None   III,IV,VI: extraocular muscles  EOMI without nystagmus    V: mastication Normal   V: facial light touch sensation  Normal   V,VII: corneal reflex  Present   VII: facial muscle function - upper  Normal   VII: facial muscle function - lower Normal   VIII: hearing Normal   IX: soft palate elevation  Normal   IX,X: gag reflex Present   XI: trapezius strength  5/5   XI: sternocleidomastoid strength 5/5   XI: neck extension strength  5/5   XII: tongue strength  Normal     Motor:  Right   5/5              Left   5/5               Right Bicep 5/5           Left Bicep  5/5              Right Triceps   5/5       Left Triceps  5/5          Right Deltoid  5/5     Left Deltoid  5/5         Right IPS  5/5            Left IPS  5/5               Right Quadriceps  5/5          Left Quadriceps    5/5           Right Gastrocnemius    5/5    Left Gastrocnemius   5/5  Right Ant Tibialis   5/5  Left Ant Tibialis   5/5   5/5 throughout  Normal bulk and tone   No drift  No abnormal movements    Sensory:  LT and PP normal  Vibration normal     Coordination:   FN, FFM and JADE normal  HS normal    DTR:   Right Brachioradialis reflex 2+  Left Brachioradialis reflex 2+  Right Biceps reflex 2+  Left Biceps reflex 2+  Right Triceps reflex 2+  Left Triceps reflex 2+  Right Quadriceps reflex 2+  Left Quadriceps reflex 2+  Right Achilles reflex 2+  Left Achilles reflex 2+    No Babinskis  No Turner's     No other pathological reflexes    Laboratory/Radiology:     CBC with Differential:    Lab Results   Component Value Date/Time    WBC 11.1 08/03/2022 06:30 PM    RBC 3.71 08/03/2022 06:30 PM    HGB 10.5 08/03/2022 06:30 PM    HCT 34.1 08/03/2022 06:30 PM     08/03/2022 06:30 PM    MCV 91.9 08/03/2022 06:30 PM    MCH 28.3 08/03/2022 06:30 PM    MCHC 30.8 08/03/2022 06:30 PM    RDW 16.5 08/03/2022 06:30 PM    LYMPHOPCT 14.1 08/03/2022 06:30 PM    MONOPCT 8.0 08/03/2022 06:30 PM    BASOPCT 0.5 08/03/2022 06:30 PM    MONOSABS 0.89 08/03/2022 06:30 PM    LYMPHSABS 1.56 08/03/2022 06:30 PM    EOSABS 0.01 08/03/2022 06:30 PM    BASOSABS 0.06 08/03/2022 06:30 PM     CMP:    Lab Results   Component Value Date/Time     08/03/2022 06:30 PM    K 5.0 08/03/2022 06:30 PM     08/03/2022 06:30 PM    CO2 20 08/03/2022 06:30 PM    BUN 67 08/03/2022 06:30 PM    CREATININE 2.9 08/03/2022 06:30 PM    GFRAA 19 08/03/2022 06:30 PM    LABGLOM 16 08/03/2022 06:30 PM    GLUCOSE 107 08/03/2022 06:30 PM    PROT 7.7 08/03/2022 06:30 PM    LABALBU 4.1 08/03/2022 06:30 PM    CALCIUM 11.1 08/03/2022 06:30 PM    BILITOT 0.2 08/03/2022 06:30 PM    ALKPHOS 82 08/03/2022 06:30 PM    AST 13 08/03/2022 06:30 PM    ALT 10 08/03/2022 06:30 PM       I independently reviewed all pertinent labs and imaging studies today    Assessment:     Worsening dysarthria with concerns for cerebellar dysfunction in the setting of UTI - concerns for metabolic etiology in regards to acute worsening of symptoms  NIHSS: 8 on admission  STAT MRI with DWI and FLAIR showed no acute intracranial abnormalities  Patient loaded on Keppra and started on Keppra 500mg BID. EEG ordered  Altered Mental Status in the presence of new-found UTI and dehydration  Nephrology on board for concerns of JOSE A and hypernatremia - patient currently on D5 1/2 NS @150cc/hr per Nephro  In concerns for UTI, on Rocephin at this time with Ucx pending. Plan:     Speech eval consulted for concerns of dysarthria  Continue to monitor mentation in the setting of concerns for UTI and and dehydration. Continue abx and fluid replacement per primary and nephro  Will follow pending results, concerns for acute mentation change likely in concerns of UTI.  Will continue to monitor    Danitza Easley MD,   10:20 AM  8/4/2022

## 2022-08-04 NOTE — CONSULTS
Department of Internal Medicine  Nephrology Nurse Practitioner Consult Note      Reason for Consult:  Hypernatremia and JOSE A on CKD  Requesting Physician:  IVETH Jeffers    CHIEF COMPLAINT:  Fall    History Obtained From:  electronic medical record    HISTORY OF PRESENT ILLNESS:  Briefly Ms. Bernabe Porras is a 78 yro female with a PMH CKD stage 3, HTN, over active bladder, bipolar schizophrenia, and CVA who was admitted on August 8, 2022 after she presented to the ER for evaluation following a fall at her SNF accompanied by aphasia and dysarthria, baseline in alert and oriented with normal speech. CT head demonstrated diffuse atrophy likely age related, MRI brain showed no acute intracranial abnormality. Labs on admission were significant for sodium 157, potassium 5.0, chloride 119, bicarbonate 20, BUN 67, creatinine 2.9 mg/dl and urinalysis was positive for nitrates and reveal leukocytes. We are consulted for hypernatremia. Ibuprofen, rivastigmine, cariprazine, pantoprazole and hydroxyzine are medications she was taking prior to admission.     Past Medical History:        Diagnosis Date    Arthritis     hips    Ataxia     Atrophy of muscle     Bipolar 1 disorder, mixed, moderate (HCC)     Cerebrovascular disease     Coordination problem     Hyperlipidemia     Hypertension     Overactive bladder     Paranoid schizophrenia ([facility] [address]     Thyroid disease      Past Surgical History:        Procedure Laterality Date    BLADDER SUSPENSION      DILATION AND CURETTAGE OF UTERUS      DILATION AND CURETTAGE OF UTERUS N/A 2/16/2021    endometrial biopsy, HYSTEROSCOPY performed by Eunice Montes MD at [address] [place] [address] AND ADENOIDECTOMY       Current Medications:    Current Facility-Administered Medications: acetaminophen (TYLENOL) tablet 650 mg, 650 mg, Oral, BID PRN  aspirin EC tablet 81 mg, 81 mg, Oral, BID  docusate sodium (COLACE) capsule 100 mg, 100 mg, Oral, PRN  hydrOXYzine pamoate (VISTARIL) capsule 50 mg, 50 mg, Oral, Q6H PRN  pantoprazole (PROTONIX) tablet 40 mg, 40 mg, Oral, Daily  levETIRAcetam (KEPPRA) tablet 500 mg, 500 mg, Oral, BID  cefTRIAXone (ROCEPHIN) 1,000 mg in sterile water 10 mL IV syringe, 1,000 mg, IntraVENous, Q24H  dextrose 5 % and 0.45 % sodium chloride infusion, , IntraVENous, Continuous  Allergies:  Mysoline [primidone] and Tofranil [imipramine hcl]    Social History:    TOBACCO:   reports that she has never smoked. She has never used smokeless tobacco.  ETOH:   reports no history of alcohol use.     Family History:       Family history unknown: Yes     REVIEW OF SYSTEMS:    Review of systems not obtained due to patient factors - mental status  PHYSICAL EXAM:      Vitals:    VITALS:  BP (!) 115/54   Pulse 56   Temp 97.3 °F (36.3 °C) (Oral)   Resp 16   Wt 186 lb (84.4 kg)   SpO2 100%   BMI 30.02 kg/m²   24HR INTAKE/OUTPUT:    Intake/Output Summary (Last 24 hours) at 8/4/2022 1325  Last data filed at 8/4/2022 0314  Gross per 24 hour   Intake --   Output 550 ml   Net -550 ml       Constitutional:  Awake, alert, NAD  HEENT:  PERRL, normocephalic, atraumatic  Respiratory:  CTAB, normal respirations, on room air  Cardiovascular/Edema:  RRR, S1/S2  Gastrointestinal:  abd rounded, soft, non tender, BS+  Neurologic:  Alert, confused  Skin:  Warm, dry, no rash or lesions  Other:      DATA:    CBC:   Lab Results   Component Value Date/Time    WBC 11.1 08/03/2022 06:30 PM    RBC 3.71 08/03/2022 06:30 PM    HGB 10.5 08/03/2022 06:30 PM    HCT 34.1 08/03/2022 06:30 PM    MCV 91.9 08/03/2022 06:30 PM    MCH 28.3 08/03/2022 06:30 PM    MCHC 30.8 08/03/2022 06:30 PM    RDW 16.5 08/03/2022 06:30 PM     08/03/2022 06:30 PM    MPV 11.3 08/03/2022 06:30 PM     CMP:    Lab Results   Component Value Date/Time     08/03/2022 06:30 PM    K 5.0 08/03/2022 06:30 PM     08/03/2022 06:30 PM    CO2 20 08/03/2022 06:30 PM    BUN 67 08/03/2022 06:30 PM    CREATININE 2.9 08/03/2022 06:30 PM    GFRAA 19 08/03/2022 06:30 PM    LABGLOM 16 08/03/2022 06:30 PM    GLUCOSE 107 08/03/2022 06:30 PM    PROT 7.7 08/03/2022 06:30 PM    LABALBU 4.1 08/03/2022 06:30 PM    CALCIUM 11.1 08/03/2022 06:30 PM    BILITOT 0.2 08/03/2022 06:30 PM    ALKPHOS 82 08/03/2022 06:30 PM    AST 13 08/03/2022 06:30 PM    ALT 10 08/03/2022 06:30 PM     Magnesium:  No results found for: MG  Phosphorus:  No results found for: PHOS  Radiology Review:      CT Head August 3, 2022   Diffuse atrophy likely age related   Findings compatible with small vessel ischemic changes. The findings were called to the ordering physician. CTA Head with contrast August 3, 2022   1. Estimated stenosis of the proximal right and left internal carotid   artery by NASCET criteria is not hemodynamically significant   2. Mild atherosclerotic disease . 3. No large vessel occlusion identified               This study was analyzed by the Viz. ai algorithm. MRI Brain WO Contrast August 3, 2022   1. Artifact on DWI sequence limits evaluation for an acute infarction. Within this limitation, no acute infarction is seen. 2.  No acute intracranial abnormality. 3. Somewhat disproportionately prominent volume loss in the superior   cerebellar hemispheres and the vermis, which may be the result of multiple   potential etiologies, including ethanol abuse, certain seizure medications   and neuro degenerative processes. Clinical correlation is needed. RECOMMENDATIONS:   Unavailable           Chest X-ray August 3,  2022   No acute cardiopulmonary abnormality. BRIEF SUMMARY OF INITIAL CONSULT:    Briefly Ms. Christopher Arce is a 78 yro female with a PMH CKD stage 3, HTN, over active bladder, bipolar schizophrenia, and CVA who was admitted on August 8, 2022 after she presented to the ER for evaluation following a fall at her SNF accompanied by aphasia and dysarthria, baseline in alert and oriented with normal speech. CT head demonstrated diffuse atrophy likely age related, MRI brain showed no acute intracranial abnormality. Labs on admission were significant for sodium 157, potassium 5.0, chloride 119, bicarbonate 20, BUN 67, creatinine 2.9 mg/dl and urinalysis was positive for nitrites and reveal leukocytes. We are consulted for hypernatremia. Ibuprofen, rivastigmine, cariprazine, pantoprazole and hydroxyzine are medications she was taking prior to admission. IMPRESSION/RECOMMENDATIONS:      JOSE A stage II on CKD, likely volume responsive pre-renal JOSE A from prolonged poor oral intake vs ischemic ATN from acute drop in BP vs contrast associated JOSE A. Creatinine on  admission 2.9 mg/dl. FENa 2.1%. CKD stage 3b with baseline creatinine about 1.7-1.9 mg/dl. Will need to obtain UACR/UPCR once UTI resolves. Hypernatremia, 2/2 decreased free water intake. Calculated free water deficit about 5.1L. Will change IVF to D5 0.45% at 150 cc/hr  Borderline hyperkalemia, 2/2 decrease GFR from JOSE A. Hyperchloremic metabolic acidosis, 2/2 decreased GFR from JOSE A. Hypercalcemia, possible d/t dehydration. Will work up by obtaining ionized calcium, vitamin D 25 level and PTH level.  --------------------------------------------------------------  UTI, UA demonstrates + nitites and moderate leukocytes, on ceftriaxone  Fall with accompanied aphasia and dysarthia, CT head demonstrates diffuse atrophy likely age related. Bipolar schizophrenia, on vistaril, Vralyar and Exelon  Reported hx of HTN, not on antihypertensives  GERD, on PPI  Nutrition, NPO, on D5 0.45%    Plan:    STAT bmp  Change fluids to D5 0.45% at 150cc/hr  Place rojas catheter  Strict I&Os  Monitor renal function daily, repeat BMP 4pm  Monitor potassium levels  Obtain proBNP in am       Thank you Gabriela for including us in the care of Ms. Mccall Better  Electronically signed by FISH Arcos - CNP on 8/4/2022 at 1:26 PM

## 2022-08-04 NOTE — H&P
hydroxide-simethicone (MYLANTA) [phone] MG/5ML SUSP suspension Take 30 mLs by mouth 2 times daily as needed for Indigestion    Historical Provider, MD   Hypromellose (NATURAL BALANCE TEARS OP) Apply 1 drop to eye 4 times daily as needed (dry eyes) 1 drop in both eyes    Historical Provider, MD   sodium chloride (V-R NASAL SPRAY SALINE) 0.65 % nasal spray 1 spray by Nasal route 2 times daily as needed (dryness)    Historical Provider, MD   docusate sodium (STOOL SOFTENER) 100 MG capsule Take 100 mg by mouth as needed for Constipation    Historical Provider, MD   aspirin 81 MG EC tablet Take 1 tablet by mouth 2 times daily 6/10/16   Ton Rosado MD   lactase (LACTAID) 3000 UNITS tablet Take 1 tablet by mouth as needed     Historical Provider, MD        Allergies: Mysoline [primidone] and Tofranil [imipramine hcl]    Social History     Tobacco Use    Smoking status: Never    Smokeless tobacco: Never   Substance Use Topics    Alcohol use: No        Review of Systems:  Respiratory: negative for cough and hemoptysis  Cardiovascular: negative for chest pain and dyspnea  Gastrointestinal: negative for abdominal pain, diarrhea, nausea and vomiting  Genitourinary:negative for dysuria and hematuria  Derm: negative for rash and skin lesion(s)  Neurological: negative for seizures and tremors  Endocrine: negative for diabetic symptoms including polydipsia and polyuria    Physical Exam:  Vitals:    08/04/22 0830   BP: (!) 115/54   Pulse: 56   Resp: 16   Temp: 97.3 °F (36.3 °C)   SpO2: 100%      Skin:  Warm and dry. No rash or bruises  HEENT:  PERRLA, EOMI  Neck:  No JVD, No thyromegaly, No carotid bruit  Cardiac:  RRR, No gallop or murmur  Lungs:  CTA, Normal percussion  Abdomen: Normal bowel sounds, no HSM, non-tender  Extremities:  No clubbing, edema or cyanosis  Neurological:  Moves all extremities.     Labs:    CBC with Differential:    Lab Results   Component Value Date/Time    WBC 11.1 08/03/2022 06:30 PM    RBC 3.71 08/03/2022 06:30 PM    HGB 10.5 08/03/2022 06:30 PM    HCT 34.1 08/03/2022 06:30 PM     08/03/2022 06:30 PM    MCV 91.9 08/03/2022 06:30 PM    MCH 28.3 08/03/2022 06:30 PM    MCHC 30.8 08/03/2022 06:30 PM    RDW 16.5 08/03/2022 06:30 PM    LYMPHOPCT 14.1 08/03/2022 06:30 PM    MONOPCT 8.0 08/03/2022 06:30 PM    BASOPCT 0.5 08/03/2022 06:30 PM    MONOSABS 0.89 08/03/2022 06:30 PM    LYMPHSABS 1.56 08/03/2022 06:30 PM    EOSABS 0.01 08/03/2022 06:30 PM    BASOSABS 0.06 08/03/2022 06:30 PM     CMP:    Lab Results   Component Value Date/Time     08/03/2022 06:30 PM    K 5.0 08/03/2022 06:30 PM     08/03/2022 06:30 PM    CO2 20 08/03/2022 06:30 PM    BUN 67 08/03/2022 06:30 PM    CREATININE 2.9 08/03/2022 06:30 PM    GFRAA 19 08/03/2022 06:30 PM    LABGLOM 16 08/03/2022 06:30 PM    GLUCOSE 107 08/03/2022 06:30 PM    PROT 7.7 08/03/2022 06:30 PM    LABALBU 4.1 08/03/2022 06:30 PM    CALCIUM 11.1 08/03/2022 06:30 PM    BILITOT 0.2 08/03/2022 06:30 PM    ALKPHOS 82 08/03/2022 06:30 PM    AST 13 08/03/2022 06:30 PM    ALT 10 08/03/2022 06:30 PM      Imaging:MRI head :  1. Artifact on DWI sequence limits evaluation for an acute infarction. Within this limitation, no acute infarction is seen. 2.  No acute intracranial abnormality. 3. Somewhat disproportionately prominent volume loss in the superior   cerebellar hemispheres and the vermis, which may be the result of multiple   potential etiologies, including ethanol abuse, certain seizure medications   and neuro degenerative processes. Clinical correlation is needed.          Assessment and Plan:    Patient Active Problem List   Diagnosis    JOSE A  Hypernatremia  Dehydration   UTI  Schizophrenia   Bipolar   HTN

## 2022-08-05 PROBLEM — R41.0 DELIRIUM: Status: ACTIVE | Noted: 2022-08-05

## 2022-08-05 PROBLEM — R47.1 DYSARTHRIA: Status: ACTIVE | Noted: 2022-08-05

## 2022-08-05 LAB
ANION GAP SERPL CALCULATED.3IONS-SCNC: 12 MMOL/L (ref 7–16)
ANION GAP SERPL CALCULATED.3IONS-SCNC: 13 MMOL/L (ref 7–16)
BUN BLDV-MCNC: 48 MG/DL (ref 6–23)
BUN BLDV-MCNC: 53 MG/DL (ref 6–23)
CALCIUM SERPL-MCNC: 9.2 MG/DL (ref 8.6–10.2)
CALCIUM SERPL-MCNC: 9.6 MG/DL (ref 8.6–10.2)
CHLORIDE BLD-SCNC: 123 MMOL/L (ref 98–107)
CHLORIDE BLD-SCNC: 123 MMOL/L (ref 98–107)
CO2: 18 MMOL/L (ref 22–29)
CO2: 20 MMOL/L (ref 22–29)
CREAT SERPL-MCNC: 2.1 MG/DL (ref 0.5–1)
CREAT SERPL-MCNC: 2.3 MG/DL (ref 0.5–1)
GFR AFRICAN AMERICAN: 25
GFR AFRICAN AMERICAN: 28
GFR NON-AFRICAN AMERICAN: 21 ML/MIN/1.73
GFR NON-AFRICAN AMERICAN: 23 ML/MIN/1.73
GLUCOSE BLD-MCNC: 88 MG/DL (ref 74–99)
GLUCOSE BLD-MCNC: 93 MG/DL (ref 74–99)
POTASSIUM SERPL-SCNC: 4.3 MMOL/L (ref 3.5–5)
POTASSIUM SERPL-SCNC: 4.4 MMOL/L (ref 3.5–5)
PRO-BNP: 303 PG/ML (ref 0–450)
SODIUM BLD-SCNC: 154 MMOL/L (ref 132–146)
SODIUM BLD-SCNC: 155 MMOL/L (ref 132–146)

## 2022-08-05 PROCEDURE — 90792 PSYCH DIAG EVAL W/MED SRVCS: CPT | Performed by: NURSE PRACTITIONER

## 2022-08-05 PROCEDURE — 97129 THER IVNTJ 1ST 15 MIN: CPT

## 2022-08-05 PROCEDURE — 6360000002 HC RX W HCPCS: Performed by: INTERNAL MEDICINE

## 2022-08-05 PROCEDURE — 2140000000 HC CCU INTERMEDIATE R&B

## 2022-08-05 PROCEDURE — 2580000003 HC RX 258: Performed by: INTERNAL MEDICINE

## 2022-08-05 PROCEDURE — 80048 BASIC METABOLIC PNL TOTAL CA: CPT

## 2022-08-05 PROCEDURE — 99233 SBSQ HOSP IP/OBS HIGH 50: CPT

## 2022-08-05 PROCEDURE — 83880 ASSAY OF NATRIURETIC PEPTIDE: CPT

## 2022-08-05 PROCEDURE — 6370000000 HC RX 637 (ALT 250 FOR IP): Performed by: INTERNAL MEDICINE

## 2022-08-05 PROCEDURE — 6370000000 HC RX 637 (ALT 250 FOR IP): Performed by: NURSE PRACTITIONER

## 2022-08-05 PROCEDURE — 99232 SBSQ HOSP IP/OBS MODERATE 35: CPT | Performed by: INTERNAL MEDICINE

## 2022-08-05 PROCEDURE — 36415 COLL VENOUS BLD VENIPUNCTURE: CPT

## 2022-08-05 PROCEDURE — 92523 SPEECH SOUND LANG COMPREHEN: CPT

## 2022-08-05 PROCEDURE — 97110 THERAPEUTIC EXERCISES: CPT

## 2022-08-05 RX ORDER — MECOBALAMIN 5000 MCG
5 TABLET,DISINTEGRATING ORAL DAILY
Status: DISCONTINUED | OUTPATIENT
Start: 2022-08-05 | End: 2022-08-11 | Stop reason: HOSPADM

## 2022-08-05 RX ORDER — DIVALPROEX SODIUM 125 MG/1
250 CAPSULE, COATED PELLETS ORAL EVERY 12 HOURS SCHEDULED
Status: DISCONTINUED | OUTPATIENT
Start: 2022-08-05 | End: 2022-08-11 | Stop reason: HOSPADM

## 2022-08-05 RX ADMIN — LEVETIRACETAM 500 MG: 500 TABLET, FILM COATED ORAL at 20:43

## 2022-08-05 RX ADMIN — ASPIRIN 81 MG: 81 TABLET, COATED ORAL at 08:31

## 2022-08-05 RX ADMIN — WATER 1000 MG: 1 INJECTION INTRAMUSCULAR; INTRAVENOUS; SUBCUTANEOUS at 10:39

## 2022-08-05 RX ADMIN — ASPIRIN 81 MG: 81 TABLET, COATED ORAL at 20:43

## 2022-08-05 RX ADMIN — LEVETIRACETAM 500 MG: 500 TABLET, FILM COATED ORAL at 08:31

## 2022-08-05 RX ADMIN — DIVALPROEX SODIUM 250 MG: 125 CAPSULE, COATED PELLETS ORAL at 20:43

## 2022-08-05 RX ADMIN — PANTOPRAZOLE SODIUM 40 MG: 40 TABLET, DELAYED RELEASE ORAL at 08:31

## 2022-08-05 RX ADMIN — DEXTROSE AND SODIUM CHLORIDE: 5; 450 INJECTION, SOLUTION INTRAVENOUS at 18:14

## 2022-08-05 ASSESSMENT — PAIN SCALES - GENERAL: PAINLEVEL_OUTOF10: 0

## 2022-08-05 ASSESSMENT — PAIN SCALES - WONG BAKER
WONGBAKER_NUMERICALRESPONSE: 0
WONGBAKER_NUMERICALRESPONSE: 0

## 2022-08-05 NOTE — CONSULTS
Reason for consult: Bipolar, behavior problem      Consulting Physician: Dr. Candance Lab      HPI:  Patient is a 68-year-old female with a past medical history of DJD, cognitive communication deficit, paranoid schizophrenia and bipolar disorder presented to the ED for aphasia and dysarthria, neurology was consulted for these concerns. Patient was found to have UTI, dehydration and nephrology was also consulted for concerns of JOSE A and hypernatremia. The patient is from a nursing home and has a guardian Maddie Mendieta. Psychiatry was consulted due to bipolar, behavior problem. Upon assessment today the patient agrees to speak with me and Dr Franky Parks, she participates in assessment to the best of her ability and remains pleasant. She is not demonstrating any behavioral disturbances during our encounter. She is calm, cooperative. She denies any auditory of visual hallucinations. She is oriented to self, place and situation. She is able to tell me the name of her Guardian and that she resides at [facility]. She does have past history of schizophrenia, however review of her medication list does not show that she is taking any medications that support this currently. Her behavioral presentation is more in line with delirium than psychiatric. She denies any suicidal or homicidal ideation intent or plan. here is no acute need for psychiatric intervention. Please see the below recommendations. MSE  Mental status examination reveals a 68-year-old  female average hygiene average grooming is cooperative for assessment of the rest of her ability. Psychomotor reveals no abnormality. Speech is dysthartic and delayed, however she is able to answer questions with relevance. Eye contact is good during assessment. Mood is I am okay. Affect is relaxed, cooperative. Thought process is linear. Thought content is devoid of auditory or visual hallucinations currently.   She is devoid of suicidal or homicidal ideation intent or plan. Memory is not assessed. Insight judgment impulse control are adequate. DX  Delirium      Plan/Recommendations: The patient case, plan of care and recommendations has been discussed with Dr Melinda Silvestre and the collaborative psychiatric care team.     Depakote sprinkle 250 mg BID for 7 days for mood stability and reduce any confusion. Melatonin 5 mg daily at 1800 for 10 days  to reset sleep wake cycle and reduce confusion. Patient does not meet criteria for inpatient psychiatric hospitalization. Her presentation seems to be more in line with delirium and is likely secondary to her UTI.     Thank you for the consult psychiatry signs off

## 2022-08-05 NOTE — PROGRESS NOTES
Department of Internal Medicine  Nephrology Progress Note      Events reviewed. SUBJECTIVE:  We are following Ms. Jose Arshad for Hypernatremia and JOSE A. She remains with fragmented speech and dysarthria.     PHYSICAL EXAM:      Vitals:    VITALS:  BP (!) 114/50   Pulse 62   Temp 97.5 °F (36.4 °C) (Axillary)   Resp 16   Ht 5' 6\" (1.676 m)   Wt 186 lb (84.4 kg)   SpO2 97%   BMI 30.02 kg/m²   24HR INTAKE/OUTPUT:    Intake/Output Summary (Last 24 hours) at 8/5/2022 1333  Last data filed at 8/5/2022 1057  Gross per 24 hour   Intake 60 ml   Output 1825 ml   Net -1765 ml         Constitutional:  Awake, alert, NAD  HEENT:  PERRL, normocephalic, atraumatic  Respiratory:  CTAB, normal respirations, on room air  Cardiovascular/Edema:  RRR, S1/S2  Gastrointestinal:  abd rounded, soft, non tender, BS+  Neurologic:  Alert, confused  Skin:  Warm, dry, no rash or lesions  Other:      Scheduled Meds:   aspirin  81 mg Oral BID    pantoprazole  40 mg Oral Daily    levETIRAcetam  500 mg Oral BID    cefTRIAXone (ROCEPHIN) IV  1,000 mg IntraVENous Q24H     Continuous Infusions:   dextrose 5 % and 0.45 % NaCl 150 mL/hr at 08/04/22 1430     PRN Meds:.acetaminophen, docusate sodium, hydrOXYzine pamoate    DATA:    CBC:   Lab Results   Component Value Date/Time    WBC 11.1 08/03/2022 06:30 PM    RBC 3.71 08/03/2022 06:30 PM    HGB 10.5 08/03/2022 06:30 PM    HCT 34.1 08/03/2022 06:30 PM    MCV 91.9 08/03/2022 06:30 PM    MCH 28.3 08/03/2022 06:30 PM    MCHC 30.8 08/03/2022 06:30 PM    RDW 16.5 08/03/2022 06:30 PM     08/03/2022 06:30 PM    MPV 11.3 08/03/2022 06:30 PM     CMP:    Lab Results   Component Value Date/Time     08/05/2022 05:00 AM    K 4.4 08/05/2022 05:00 AM    K 5.0 08/03/2022 06:30 PM     08/05/2022 05:00 AM    CO2 18 08/05/2022 05:00 AM    BUN 53 08/05/2022 05:00 AM    CREATININE 2.3 08/05/2022 05:00 AM    GFRAA 25 08/05/2022 05:00 AM    LABGLOM 21 08/05/2022 05:00 AM    GLUCOSE 88 08/05/2022 05:00 AM    PROT 7.7 08/03/2022 06:30 PM    LABALBU 4.1 08/03/2022 06:30 PM    CALCIUM 9.6 08/05/2022 05:00 AM    BILITOT 0.2 08/03/2022 06:30 PM    ALKPHOS 82 08/03/2022 06:30 PM    AST 13 08/03/2022 06:30 PM    ALT 10 08/03/2022 06:30 PM     Magnesium:  No results found for: MG  Phosphorus:  No results found for: [facility]  Radiology Review:      CT Head August 3, 2022   Diffuse atrophy likely age related   Findings compatible with small vessel ischemic changes. The findings were called to the ordering physician. CTA Head with contrast August 3, 2022   1. Estimated stenosis of the proximal right and left internal carotid   artery by NASCET criteria is not hemodynamically significant   2. Mild atherosclerotic disease . 3. No large vessel occlusion identified               This study was analyzed by the Viz. ai algorithm. MRI Brain WO Contrast August 3, 2022   1. Artifact on DWI sequence limits evaluation for an acute infarction. Within this limitation, no acute infarction is seen. 2.  No acute intracranial abnormality. 3. Somewhat disproportionately prominent volume loss in the superior   cerebellar hemispheres and the vermis, which may be the result of multiple   potential etiologies, including ethanol abuse, certain seizure medications   and neuro degenerative processes. Clinical correlation is needed. RECOMMENDATIONS:   Unavailable           Chest X-ray August 3,  2022   No acute cardiopulmonary abnormality. BRIEF SUMMARY OF INITIAL CONSULT:    Briefly Ms. Irma López is a 78 yro female with a PMH CKD stage 3, HTN, over active bladder, bipolar schizophrenia, and CVA who was admitted on August 8, 2022 after she presented to the ER for evaluation following a fall at her SNF accompanied by aphasia and dysarthria, baseline in alert and oriented with normal speech.  CT head demonstrated diffuse atrophy likely age related, MRI brain showed no acute intracranial abnormality. Labs on admission were significant for sodium 157, potassium 5.0, chloride 119, bicarbonate 20, BUN 67, creatinine 2.9 mg/dl and urinalysis was positive for nitrites and reveal leukocytes. We are consulted for hypernatremia. Ibuprofen, rivastigmine, cariprazine, pantoprazole and hydroxyzine are medications she was taking prior to admission. Problems resolved. Borderline hyperkalemia, 2/2 decrease GFR from JOSE A. Hypercalcemia, possible d/t dehydration. Will work up by obtaining ionized calcium, vitamin D 25 level and PTH level. IMPRESSION/RECOMMENDATIONS:      JOSE A stage II on CKD, likely volume responsive pre-renal JOSE A from prolonged poor oral intake vs ischemic ATN from acute drop in BP vs contrast associated JOSE A. Creatinine on  admission 2.9 mg/dl. FENa 2.1%. Renal function has improved over the last 24 hrs, creatinine 2.3 mg/ld, to continue IV fluids. CKD stage 3b with baseline creatinine about 1.7-1.9 mg/dl. Will need to obtain UACR/UPCR once UTI resolves. Hypernatremia, 2/2 decreased free water intake. Calculated free water deficit about 5.1L. Continue D5 0.45% at 150 cc/hr  Hyperchloremic metabolic acidosis, 2/2 decreased GFR from JOSE A. --------------------------------------------------------------  UTI, UA demonstrates + nitites and moderate leukocytes, on ceftriaxone  Fall with accompanied aphasia and dysarthia, CT head demonstrates diffuse atrophy likely age related. Bipolar schizophrenia, on vistaril, Vralyar and Exelon  Reported hx of HTN, not on antihypertensives  GERD, on PPI  Nutrition, regular, remains on D5 0.45%    Plan:    Continue D5 0.45% at 150cc/hr  Continue strict I&Os  Continue to monitor renal function daily, repeat BMP 4pm  Continue to monitor potassium levels       Thank you Teagan Ferris for including us in the care of Ms. Ike Britt  Electronically signed by Ramón Cerna, FISH - CNP on 8/5/2022 at 1:33 PM   Mg/dl.

## 2022-08-05 NOTE — PROGRESS NOTES
Shelley Vale is a 78 y.o. right handed female with a history of previous CVA, hyperlipidemia, hypertension, paranoid schizophrenia, overactive bladder, bipolar disorder, and arthritis presented to the ER on 8/4 for dysarthria and aphasia. Patient resides in a nursing home   And was noticed to have fragmented speech and dysarthria. She was found to have a UTI and was started on antibiotics. CT brain without contrast demonstrated atrophy, CTA imaging resulted in no large vessel occlusion, and MRI brain demonstrated artifact but no clear acute infarction. She continues to have fragmented speech and dysarthria. She also has some expressive aphasia and takes a long time to say the words she is trying to speak. When asked what he name was she asked me what I wanted he name to be. I asked her again if her name was Torin Gruber and she asked me if she was supposed to be Torin Gruber.          Allergies as of 08/03/2022 - Fully Reviewed 08/03/2022   Allergen Reaction Noted    Mysoline [primidone]  06/07/2016    Tofranil [imipramine hcl]  06/07/2016       Objective:     BP (!) 114/50   Pulse 62   Temp 97.5 °F (36.4 °C) (Axillary)   Resp 16   Ht 5' 6\" (1.676 m)   Wt 186 lb (84.4 kg)   SpO2 97%   BMI 30.02 kg/m²      General appearance: alert, appears stated age and cooperative  Head: Normocephalic, without obvious abnormality, atraumatic  Extremities: no cyanosis or edema  Pulses: 2+ and symmetric  Skin: no rashes or lesions    Mental Status: Alert, oriented to place and time    Speech: clear  Language: appropriate    Cranial Nerves:  I: smell    II: visual acuity     II: visual fields Full   II: pupils SHELBY   III,VII: ptosis None   III,IV,VI: extraocular muscles  EOMI without nystagmus    V: mastication Normal   V: facial light touch sensation  Normal   V,VII: corneal reflex  Present   VII: facial muscle function - upper     VII: facial muscle function - lower Normal   VIII: hearing Normal   IX: soft palate elevation Normal   IX,X: gag reflex    XI: trapezius strength  5/5   XI: sternocleidomastoid strength 5/5   XI: neck extension strength  5/5   XII: tongue strength  Normal     Motor:  5/5 throughout upper extremities, 4- lower extremities  Decreased bulk and tone    Sensory:  Normal to LT and PP  Vibration normal in the ankles     Coordination:   FN ataxic , FFM and JADE symmetrical    Gait:  Deferred for patient safety    DTR:   2+ throughout    No Babinski    No Turner's     Laboratory/Radiology:     CBC with Differential:    Lab Results   Component Value Date/Time    WBC 11.1 08/03/2022 06:30 PM    RBC 3.71 08/03/2022 06:30 PM    HGB 10.5 08/03/2022 06:30 PM    HCT 34.1 08/03/2022 06:30 PM     08/03/2022 06:30 PM    MCV 91.9 08/03/2022 06:30 PM    MCH 28.3 08/03/2022 06:30 PM    MCHC 30.8 08/03/2022 06:30 PM    RDW 16.5 08/03/2022 06:30 PM    LYMPHOPCT 14.1 08/03/2022 06:30 PM    MONOPCT 8.0 08/03/2022 06:30 PM    BASOPCT 0.5 08/03/2022 06:30 PM    MONOSABS 0.89 08/03/2022 06:30 PM    LYMPHSABS 1.56 08/03/2022 06:30 PM    EOSABS 0.01 08/03/2022 06:30 PM    BASOSABS 0.06 08/03/2022 06:30 PM     CMP:    Lab Results   Component Value Date/Time     08/05/2022 05:00 AM    K 4.4 08/05/2022 05:00 AM    K 5.0 08/03/2022 06:30 PM     08/05/2022 05:00 AM    CO2 18 08/05/2022 05:00 AM    BUN 53 08/05/2022 05:00 AM    CREATININE 2.3 08/05/2022 05:00 AM    GFRAA 25 08/05/2022 05:00 AM    LABGLOM 21 08/05/2022 05:00 AM    GLUCOSE 88 08/05/2022 05:00 AM    PROT 7.7 08/03/2022 06:30 PM    LABALBU 4.1 08/03/2022 06:30 PM    CALCIUM 9.6 08/05/2022 05:00 AM    BILITOT 0.2 08/03/2022 06:30 PM    ALKPHOS 82 08/03/2022 06:30 PM    AST 13 08/03/2022 06:30 PM    ALT 10 08/03/2022 06:30 PM     FLP:    Lab Results   Component Value Date/Time    TRIG 134 06/09/2016 07:20 AM    HDL 30 06/09/2016 07:20 AM    LDLCALC 111 06/09/2016 07:20 AM    LABVLDL 27 06/09/2016 07:20 AM   CTH  Diffuse atrophy likely age related  Findings compatible with small vessel ischemic changes. CTA Head/ Neck/Brain perfusion  1. Estimated stenosis of the proximal right and left internal carotid  artery by NASCET criteria is not hemodynamically significant  2. Mild atherosclerotic disease . 3. No large vessel occlusion identified      MRI Brain  1. Artifact on DWI sequence limits evaluation for an acute infarction. Within this limitation, no acute infarction is seen. 2.  No acute intracranial abnormality. 3. Somewhat disproportionately prominent volume loss in the superior  cerebellar hemispheres and the vermis, which may be the result of multiple  potential etiologies, including ethanol abuse, certain seizure medications  and neuro degenerative processes. Clinical correlation is needed. I personally reviewed the patient's lab and imaging studies at this time. Assessment:     Worsening dysarthria with concerns for cerebellar dysfunction in the setting of UTI - concerns for metabolic etiology in regards to acute worsening of symptoms  NIHSS: 8 on admission  STAT MRI with DWI and FLAIR showed no acute intracranial abnormalities  Patient loaded on Keppra and started on Keppra 500mg BID. EEG ordered  Altered Mental Status in the presence of new-found UTI and dehydration  Nephrology on board for concerns of JOSE A and hypernatremia - patient currently on D5 1/2 NS @150cc/hr per Nephro  In concerns for UTI, on Rocephin at this time with Ucx pending.        Plan:     Speech evaluation  Continue antibiotic therapy  PT/OT  Neurology will sign off - call if needed       FISH Yost CNP  11:23 AM  8/5/2022

## 2022-08-05 NOTE — PROGRESS NOTES
Admit Date: 8/3/2022    Subjective:     Awake NAD calm     Objective:     Patient Vitals for the past 8 hrs:   BP Pulse Resp SpO2   08/05/22 0006 116/61 73 19 97 %     I/O last 3 completed shifts: In: 0   Out: 1250 [Urine:1250]  I/O this shift:  In: -   Out: 625 [Urine:625]    HEENT: Normal  NECK: Thyroid normal. No carotid bruit. No lymphphadenopathy. CVS: RRR  RS: Clear. No wheeze. No rhonchi. Good airflow bilaterally. ABD: Soft. Non tender. No mass. Normal BS. EXT: No edema. Non tender. Pulses present. Skin intact.   NEURO: moving all extremities       Scheduled Meds:   aspirin  81 mg Oral BID    pantoprazole  40 mg Oral Daily    levETIRAcetam  500 mg Oral BID    cefTRIAXone (ROCEPHIN) IV  1,000 mg IntraVENous Q24H     Continuous Infusions:   dextrose 5 % and 0.45 % NaCl 150 mL/hr at 08/04/22 1430       CBC with Differential:    Lab Results   Component Value Date/Time    WBC 11.1 08/03/2022 06:30 PM    RBC 3.71 08/03/2022 06:30 PM    HGB 10.5 08/03/2022 06:30 PM    HCT 34.1 08/03/2022 06:30 PM     08/03/2022 06:30 PM    MCV 91.9 08/03/2022 06:30 PM    MCH 28.3 08/03/2022 06:30 PM    MCHC 30.8 08/03/2022 06:30 PM    RDW 16.5 08/03/2022 06:30 PM    LYMPHOPCT 14.1 08/03/2022 06:30 PM    MONOPCT 8.0 08/03/2022 06:30 PM    BASOPCT 0.5 08/03/2022 06:30 PM    MONOSABS 0.89 08/03/2022 06:30 PM    LYMPHSABS 1.56 08/03/2022 06:30 PM    EOSABS 0.01 08/03/2022 06:30 PM    BASOSABS 0.06 08/03/2022 06:30 PM     CMP:    Lab Results   Component Value Date/Time     08/04/2022 03:37 PM    K 4.1 08/04/2022 03:37 PM    K 5.0 08/03/2022 06:30 PM     08/04/2022 03:37 PM    CO2 23 08/04/2022 03:37 PM    BUN 61 08/04/2022 03:37 PM    CREATININE 2.5 08/04/2022 03:37 PM    GFRAA 23 08/04/2022 03:37 PM    LABGLOM 19 08/04/2022 03:37 PM    PROT 7.7 08/03/2022 06:30 PM    LABALBU 4.1 08/03/2022 06:30 PM    CALCIUM 9.8 08/04/2022 03:37 PM    BILITOT 0.2 08/03/2022 06:30 PM    ALKPHOS 82 08/03/2022 06:30 PM    AST 13 08/03/2022 06:30 PM    ALT 10 08/03/2022 06:30 PM     PT/INR:    Lab Results   Component Value Date/Time    PROTIME 11.1 08/03/2022 06:15 PM    INR 1.0 08/03/2022 06:15 PM       Assessment:     Principal Problem:  JOSE A on CKD   Hypernatremia  Dehydration  UTI  Schizophrenia  Bipolar  HTN       Plan:   Continue IV fluid as per renal , ATB pending culture ,psych.  Evaluation for behavior problem at [facility] , monitor

## 2022-08-05 NOTE — PROGRESS NOTES
SPEECH/LANGUAGE PATHOLOGY  SPEECH/LANGUAGE/COGNITIVE EVALUATION   and PLAN OF CARE      PATIENT NAME:  Alexus Mosher  (female)     MRN:  [id number]    :  1945  (78 y.o.)  STATUS:  Inpatient: Room 6423/6423-B    TODAY'S DATE:  2022  REFERRING PROVIDER:   Dr. Greenberg Quiet: SLP eval and treat  Date of order:  22  REASON FOR REFERRAL:  AMS  EVALUATING THERAPIST: BRYCE Campos    ADMITTING DIAGNOSIS: Dysarthria [R47.1]  Hypernatremia [E87.0]  JOSE A (acute kidney injury) ([facility] [address] [W79.1]  Complicated UTI (urinary tract infection) [N39.0]    VISIT DIAGNOSIS:   Visit Diagnoses         Codes    Dysarthria    -  Primary R47.1    JOSE A (acute kidney injury) ([facility] [address]     N17.9    Hypernatremia     E87.0               SPEECH THERAPY  PLAN OF CARE   The speech therapy  POC is established based on physician order, speech pathology diagnosis and results of clinical assessment     SPEECH PATHOLOGY DIAGNOSIS:    Moderate-marked cognitive linguistic deficits, dysarthria    Speech Pathology intervention is recommended up to 6 times per week for LOS or when goals are met with emphasis on the following:      Conditions Requiring Skilled Therapeutic Intervention for speech, language and/or cognition    Dysarthria   Cognitive linguistic impairment    Specific Speech Therapy Interventions to Include:   Receptive language training   Expressive language training   Therapeutic tasks for Cognition  Therapeutic exercises for dysarthria    Specific instructions for next treatment: To initiate therapeutic exercises  To initiate language tasks  To initiate memory tasks  To initiate thought organization tasks  To initiate speech production tasks    SHORT/LONG TERM GOALS  Pt will improve orientation to spatial and temporal surroundings with use of external memory aides.   Pt will improve immediate, short term, recent memory during structured and unstructured tasks with 30% accuracy   Pt will improve problem solving/thought organization during structured and unstructured tasks with 30% accuracy   Pt will improve receptive and expressive language skills with adequate thought content, organization, and processing time to facilitate improved communication with maximal.  Pt will improve speech intelligibility and increased articulatory precision via compensatory strategies and oral motor exercises     Patient goals: Patient/family involved in developing goals and treatment plan:   Treatment goals discussed with Patient    The Patient did not demonstrate complete understanding of the diagnosis, prognosis and plan of care   The patient/family Did not state,     This plan may be re-evaluated and revised as warranted.         Rehabilitation Potential/Prognosis: good                CLINICAL ASSESSMENT:  MOTOR SPEECH       Oral Peripheral Examination   Generalized oral weakness    Parameters of Speech Production  Respiration:  Adequate for speech production  Articulation:  Distortion, Anticipatory struggle  Resonance:  Within functional limits  Quality:   Strained  Pitch:    Low  Intensity: Within functional limits  Fluency:  Dysfluent  Prosody Monotone    RECEPTIVE LANGUAGE    Comprehension of Yes/No Questions:   Inconsistent    Process  Simple Verbal Commands:   Within functional limits  Process Intermediate Verbal Commands:   Latent  Process Complex Verbal Commands:     Unable    Comprehension of Conversation:      Inconsistent      EXPRESSIVE LANGUAGE     Serials: Impaired    Imitation:  Words   Functional   Sentences Functional    Naming:  (Modality used:  Verbal)  Confrontation Naming  Functional  Functional Description  Impaired  Response Naming: Functional    Conversation:      Confusion was noted during conversation    COGNITION     Attention/Orientation  Attention: Easily Distracted  Orientation:  Oriented to Reason for hospitalization    Memory   Immediate Recall: Repeated 3/3    Delayed Recall:   Recalled 0/3    Long Term Recall:   Recalled Family    Organization/Problem Solving/Reasoning   Verbal Sequencing:   Impaired        Verbal Problem solving:   Impaired          CLINICAL OBSERVATIONS NOTED DURING THE EVALUATION  Latent responses, Inconsistent responses, Perseveration errors, Cueing was required, and Reduced eye contact                  EDUCATION:   The Speech Language Pathologist (SLP) completed education regarding results of evaluation and that intervention is warranted at this time. Learner: Patient  Education: Reviewed results and recommendations of this evaluation  Evaluation of Education:  No evidence of learning    Evaluation Time includes thorough review of current medical information, gathering information on past medical history/social history and prior level of function, completion of standardized testing/informal observation of tasks, assessment of data and education on plan of care and goals. CPT code:    30438  eval speech sound lang comprehension      The admitting diagnosis and active problem list, as listed below have been reviewed prior to initiation of this evaluation.         ACTIVE PROBLEM LIST:   Patient Active Problem List   Diagnosis    Schizophrenia ([facility] [address]    Severe episode of recurrent major depressive disorder, with psychotic features (HCC)    NSAID overdose    Metabolic acidosis with increased anion gap and accumulation of organic acids    Suicide attempt ([facility] [address]    Post-menopausal bleeding    Complicated UTI (urinary tract infection)    Dysarthria

## 2022-08-06 LAB
ANION GAP SERPL CALCULATED.3IONS-SCNC: 10 MMOL/L (ref 7–16)
BUN BLDV-MCNC: 42 MG/DL (ref 6–23)
CALCIUM SERPL-MCNC: 8.9 MG/DL (ref 8.6–10.2)
CHLORIDE BLD-SCNC: 121 MMOL/L (ref 98–107)
CO2: 20 MMOL/L (ref 22–29)
CREAT SERPL-MCNC: 2.1 MG/DL (ref 0.5–1)
GFR AFRICAN AMERICAN: 28
GFR NON-AFRICAN AMERICAN: 23 ML/MIN/1.73
GLUCOSE BLD-MCNC: 122 MG/DL (ref 74–99)
ORGANISM: ABNORMAL
POTASSIUM SERPL-SCNC: 4.1 MMOL/L (ref 3.5–5)
SODIUM BLD-SCNC: 151 MMOL/L (ref 132–146)
URINE CULTURE, ROUTINE: ABNORMAL

## 2022-08-06 PROCEDURE — 6370000000 HC RX 637 (ALT 250 FOR IP): Performed by: INTERNAL MEDICINE

## 2022-08-06 PROCEDURE — 36415 COLL VENOUS BLD VENIPUNCTURE: CPT

## 2022-08-06 PROCEDURE — 2580000003 HC RX 258: Performed by: INTERNAL MEDICINE

## 2022-08-06 PROCEDURE — 2140000000 HC CCU INTERMEDIATE R&B

## 2022-08-06 PROCEDURE — 6370000000 HC RX 637 (ALT 250 FOR IP): Performed by: NURSE PRACTITIONER

## 2022-08-06 PROCEDURE — 6360000002 HC RX W HCPCS: Performed by: INTERNAL MEDICINE

## 2022-08-06 PROCEDURE — 80048 BASIC METABOLIC PNL TOTAL CA: CPT

## 2022-08-06 RX ADMIN — DEXTROSE AND SODIUM CHLORIDE: 5; 450 INJECTION, SOLUTION INTRAVENOUS at 23:28

## 2022-08-06 RX ADMIN — DIVALPROEX SODIUM 250 MG: 125 CAPSULE, COATED PELLETS ORAL at 09:14

## 2022-08-06 RX ADMIN — ASPIRIN 81 MG: 81 TABLET, COATED ORAL at 21:04

## 2022-08-06 RX ADMIN — WATER 1000 MG: 1 INJECTION INTRAMUSCULAR; INTRAVENOUS; SUBCUTANEOUS at 11:17

## 2022-08-06 RX ADMIN — DIVALPROEX SODIUM 250 MG: 125 CAPSULE, COATED PELLETS ORAL at 21:04

## 2022-08-06 RX ADMIN — ASPIRIN 81 MG: 81 TABLET, COATED ORAL at 09:15

## 2022-08-06 RX ADMIN — Medication 5 MG: at 18:19

## 2022-08-06 RX ADMIN — DEXTROSE AND SODIUM CHLORIDE: 5; 450 INJECTION, SOLUTION INTRAVENOUS at 00:57

## 2022-08-06 RX ADMIN — LEVETIRACETAM 500 MG: 500 TABLET, FILM COATED ORAL at 21:05

## 2022-08-06 RX ADMIN — LEVETIRACETAM 500 MG: 500 TABLET, FILM COATED ORAL at 09:15

## 2022-08-06 RX ADMIN — PANTOPRAZOLE SODIUM 40 MG: 40 TABLET, DELAYED RELEASE ORAL at 09:14

## 2022-08-06 NOTE — PROGRESS NOTES
Department of Internal Medicine  Nephrology Progress Note      Events reviewed. SUBJECTIVE:  We are following MsJoey Dhaliwal Che for Hypernatremia and JOSE A. She remains with fragmented speech and dysarthria.     PHYSICAL EXAM:      Vitals:    VITALS:  /68   Pulse 73   Temp (!) 96.3 °F (35.7 °C) (Temporal)   Resp 18   Ht 5' 6\" (1.676 m)   Wt 186 lb (84.4 kg)   SpO2 95%   BMI 30.02 kg/m²   24HR INTAKE/OUTPUT:    Intake/Output Summary (Last 24 hours) at 8/6/2022 1043  Last data filed at 8/6/2022 0546  Gross per 24 hour   Intake 180 ml   Output 1200 ml   Net -1020 ml         Constitutional:  Awake, alert, NAD  HEENT:  PERRL, normocephalic, atraumatic  Respiratory:  Crackles auscultated posteriorly, normal respirations, on room air sat 95%  Cardiovascular/Edema:  RRR, S1/S2  Gastrointestinal:  abd rounded, soft, non tender, BS+  Neurologic:  Alert, confused  Skin:  Warm, dry, no rash or lesions  Other:      Scheduled Meds:   divalproex  250 mg Oral 2 times per day    melatonin  5 mg Oral Daily    aspirin  81 mg Oral BID    pantoprazole  40 mg Oral Daily    levETIRAcetam  500 mg Oral BID    cefTRIAXone (ROCEPHIN) IV  1,000 mg IntraVENous Q24H     Continuous Infusions:   dextrose 5 % and 0.45 % NaCl 150 mL/hr at 08/06/22 0057     PRN Meds:.acetaminophen, docusate sodium, hydrOXYzine pamoate    DATA:    CBC:   Lab Results   Component Value Date/Time    WBC 11.1 08/03/2022 06:30 PM    RBC 3.71 08/03/2022 06:30 PM    HGB 10.5 08/03/2022 06:30 PM    HCT 34.1 08/03/2022 06:30 PM    MCV 91.9 08/03/2022 06:30 PM    MCH 28.3 08/03/2022 06:30 PM    MCHC 30.8 08/03/2022 06:30 PM    RDW 16.5 08/03/2022 06:30 PM     08/03/2022 06:30 PM    MPV 11.3 08/03/2022 06:30 PM     CMP:    Lab Results   Component Value Date/Time     08/06/2022 05:41 AM    K 4.1 08/06/2022 05:41 AM    K 5.0 08/03/2022 06:30 PM     08/06/2022 05:41 AM    CO2 20 08/06/2022 05:41 AM    BUN 42 08/06/2022 05:41 AM    CREATININE 2.1 08/06/2022 05:41 AM    GFRAA 28 08/06/2022 05:41 AM    LABGLOM 23 08/06/2022 05:41 AM    GLUCOSE 122 08/06/2022 05:41 AM    PROT 7.7 08/03/2022 06:30 PM    LABALBU 4.1 08/03/2022 06:30 PM    CALCIUM 8.9 08/06/2022 05:41 AM    BILITOT 0.2 08/03/2022 06:30 PM    ALKPHOS 82 08/03/2022 06:30 PM    AST 13 08/03/2022 06:30 PM    ALT 10 08/03/2022 06:30 PM     Magnesium:  No results found for: MG  Phosphorus:  No results found for: PHOS  Radiology Review:      CT Head August 3, 2022   Diffuse atrophy likely age related   Findings compatible with small vessel ischemic changes. The findings were called to the ordering physician. CTA Head with contrast August 3, 2022   1. Estimated stenosis of the proximal right and left internal carotid   artery by NASCET criteria is not hemodynamically significant   2. Mild atherosclerotic disease . 3. No large vessel occlusion identified               This study was analyzed by the [employer] algorithm. MRI Brain WO Contrast August 3, 2022   1. Artifact on DWI sequence limits evaluation for an acute infarction. Within this limitation, no acute infarction is seen. 2.  No acute intracranial abnormality. 3. Somewhat disproportionately prominent volume loss in the superior   cerebellar hemispheres and the vermis, which may be the result of multiple   potential etiologies, including ethanol abuse, certain seizure medications   and neuro degenerative processes. Clinical correlation is needed. RECOMMENDATIONS:   Unavailable           Chest X-ray August 3,  2022   No acute cardiopulmonary abnormality. BRIEF SUMMARY OF INITIAL CONSULT:    Briefly Ms. Patricia Guillen is a 78 yro female with a PMH CKD stage 3, HTN, over active bladder, bipolar schizophrenia, and CVA who was admitted on August 8, 2022 after she presented to the ER for evaluation following a fall at her SNF accompanied by aphasia and dysarthria, baseline in alert and oriented with normal speech. CT head demonstrated diffuse atrophy likely age related, MRI brain showed no acute intracranial abnormality. Labs on admission were significant for sodium 157, potassium 5.0, chloride 119, bicarbonate 20, BUN 67, creatinine 2.9 mg/dl and urinalysis was positive for nitrites and reveal leukocytes. We are consulted for hypernatremia. Ibuprofen, rivastigmine, cariprazine, pantoprazole and hydroxyzine are medications she was taking prior to admission. Problems resolved. Borderline hyperkalemia, 2/2 decrease GFR from JOSE A. Hypercalcemia, possible d/t dehydration. Will work up by obtaining ionized calcium, vitamin D 25 level and PTH level. IMPRESSION/RECOMMENDATIONS:      JOSE A stage II on CKD, likely volume responsive pre-renal JOSE A from prolonged poor oral intake vs ischemic ATN from acute drop in BP vs contrast associated JOSE A. Creatinine on  admission 2.9 mg/dl. FENa 2.1%. Renal function has improved over the last 24 hrs, creatinine 2.3 mg/ld, to continue IV fluids. CKD stage 3b with baseline creatinine about 1.7-1.9 mg/dl. Will need to obtain UACR/UPCR once UTI resolves. Hypernatremia, 2/2 decreased free water intake. Calculated free water deficit about 5.1L. Continue D5 0.45% at 150 cc/hr  Hyperchloremic metabolic acidosis, 2/2 decreased GFR from JOSE A. --------------------------------------------------------------  UTI, UA demonstrates + nitites and moderate leukocytes, on ceftriaxone  Fall with accompanied aphasia and dysarthia, CT head demonstrates diffuse atrophy likely age related.    Bipolar schizophrenia, on vistaril, Vralyar and Exelon  Reported hx of HTN, not on antihypertensives  GERD, on PPI  Nutrition, regular, remains on D5 0.45%    Plan:    Continue D5 0.45% at 150cc/hr  Chest X-ray in am  Continue strict I&Os  Continue to monitor renal function daily  Continue to monitor potassium levels     Electronically signed by FISH Pitts on 8/6/2022 at 10:43 AM

## 2022-08-06 NOTE — PROGRESS NOTES
PT SEEN AND EXAMINED. She is admitted for Hypernatremia and JOSE A. She remains with fragmented speech and dysarthria. Chart reviewed. meds reviewed. D/w nursing + family as available. EXAM: IN GENERAL, NAD. AWAKE AND ALERT. ROS NEGx10 EXCEPT: Unintelligible. /68   Pulse 73   Temp (!) 96.3 °F (35.7 °C) (Temporal)   Resp 18   Ht 5' 6\" (1.676 m)   Wt 186 lb (84.4 kg)   SpO2 95%   BMI 30.02 kg/m²   GEN: A+O NAD. HEENT: NCAT. EOMI. NAGA  NECK: NO JVD. TRACH MIDLINE. NO BRUITS. NO THYROMEGALY. LUNGS: CTA BL NO RALES, RHONCHI OR WHEEZES. GOOD EXCURSION. CV: Regular rate and rhythm, NO Murmurs, Rubs, Or gallops  ABD: Soft. Nontender. Normal bowel sounds. No organomegaly  EXT:No clubbing cyanosis or edema  Neuro: Alert and oriented x 3. No focal motor deficits. No sensory deficits. Reflexes appear intact.   Labs/data reviewedLABS: CBC with Differential:    Lab Results   Component Value Date/Time    WBC 11.1 08/03/2022 06:30 PM    RBC 3.71 08/03/2022 06:30 PM    HGB 10.5 08/03/2022 06:30 PM    HCT 34.1 08/03/2022 06:30 PM     08/03/2022 06:30 PM    MCV 91.9 08/03/2022 06:30 PM    MCH 28.3 08/03/2022 06:30 PM    MCHC 30.8 08/03/2022 06:30 PM    RDW 16.5 08/03/2022 06:30 PM    LYMPHOPCT 14.1 08/03/2022 06:30 PM    MONOPCT 8.0 08/03/2022 06:30 PM    BASOPCT 0.5 08/03/2022 06:30 PM    MONOSABS 0.89 08/03/2022 06:30 PM    LYMPHSABS 1.56 08/03/2022 06:30 PM    EOSABS 0.01 08/03/2022 06:30 PM    BASOSABS 0.06 08/03/2022 06:30 PM     Platelets:    Lab Results   Component Value Date/Time     08/03/2022 06:30 PM     CMP:    Lab Results   Component Value Date/Time     08/06/2022 05:41 AM    K 4.1 08/06/2022 05:41 AM    K 5.0 08/03/2022 06:30 PM     08/06/2022 05:41 AM    CO2 20 08/06/2022 05:41 AM    BUN 42 08/06/2022 05:41 AM    CREATININE 2.1 08/06/2022 05:41 AM    GFRAA 28 08/06/2022 05:41 AM    LABGLOM 23 08/06/2022 05:41 AM    GLUCOSE 122 08/06/2022 05:41 AM    PROT 7.7 08/03/2022 06:30 PM LABALBU 4.1 2022 06:30 PM    CALCIUM 8.9 2022 05:41 AM    BILITOT 0.2 2022 06:30 PM    ALKPHOS 82 2022 06:30 PM    AST 13 2022 06:30 PM    ALT 10 2022 06:30 PM     Magnesium:  No results found for: MG  LDH:  No results found for: LDH  PT/INR:    Lab Results   Component Value Date/Time    PROTIME 11.1 2022 06:15 PM    INR 1.0 2022 06:15 PM     Last 3 Troponin:    Lab Results   Component Value Date/Time    TROPONINI <0.01 2017 11:10 PM    TROPONINI <0.01 2016 01:45 PM     ABG:    Lab Results   Component Value Date/Time    PH 7.334 2017 11:48 PM    PCO2 35.6 2017 11:48 PM    PO2 78.5 2017 11:48 PM    HCO3 18.5 2017 11:48 PM    BE -6.6 2017 11:48 PM    O2SAT 94.8 2017 11:48 PM     IRON:  No results found for: IRON  IMAGING    CT Head WO Contrast    Result Date: 8/3/2022  Patient MRN:  [id number] : 1945 Age: 78 years Gender: Female Order Date:  8/3/2022 6:18 PM EXAM: CT HEAD WO CONTRAST NUMBER OF IMAGES:  277 INDICATION:  AMS AMS Decision Support Exception - unselect if not a suspected or confirmed emergency medical condition->Emergency Medical Condition (MA) What reading provider will be dictating this exam?->[facility] COMPARISON: None Technique: Low-dose CT  acquisition technique included one of following options; 1 . Automated exposure control, 2. Adjustment of MA and or KV according to patient's size or 3. Use of iterative reconstruction. Multiple CT sections were obtained with sagittal and coronal MPR reconstructions. The ventricles are prominent. The gyri and sulci appear  prominent. The white matter appears  prominent. There is no evidence for hemorrhage. There is no infarct identified. There is no mass effect identified. There is no mass identified. Diffuse atrophy likely age related Findings compatible with small vessel ischemic changes. The findings were called to the ordering physician.      XR CHEST are bilateral thyroid masses identified. Further evaluation with ultrasound is suggested. On the right measuring approximately 1 cm new from previous. There are calcifications seen. 1. Estimated stenosis of the proximal right and left internal carotid artery by NASCET criteria is not hemodynamically significant 2. Mild atherosclerotic disease . 3. No large vessel occlusion identified This study was analyzed by the Real Time Tomography. ai algorithm. CT BRAIN PERFUSION    Result Date: 8/3/2022  Patient MRN: [id number] : 1945 Age:  78 years Gender: Female Order Date: 8/3/2022 6:18 PM Exam: CT BRAIN PERFUSION Number of Images: 333 views Indication:   AMS AMS Decision Support Exception - unselect if not a suspected or confirmed emergency medical condition->Emergency Medical Condition (MA) What reading provider will be dictating this exam?->[facility] Comparison: None. Findings: Perfusion images demonstrate symmetric blood volume Blood flow images demonstrate symmetric blood flow There is no significant ischemic penumbra identified. There is no significant core infarct identified. No significant ischemic penumbra identified This study was analyzed by the Real Time Tomography. ai algorithm. CTA HEAD W CONTRAST    Result Date: 8/3/2022  Patient MRN:  [id number] : 1945 Age: 78 years Gender: Female Order Date:  8/3/2022 6:18 PM EXAM: CTA NECK W CONTRAST, CTA HEAD W CONTRAST NUMBER OF IMAGES:  12 INDICATION:  AMS AMS Decision Support Exception - unselect if not a suspected or confirmed emergency medical condition->Emergency Medical Condition (MA) What reading provider will be dictating this exam?->[facility] COMPARISON: 2019 Technique: Low-dose CT  acquisition technique included one of following options; 1 . Automated exposure control, 2. Adjustment of MA and or KV according to patient's size or 3. Use of iterative reconstruction.  Contiguous spiral images were obtained in the axial plane, following the administration of intravenous contrast using CT angiographic protocol. Sagittal and coronal images were reconstructed from the axial plane acquisition. Additional MIP reconstructions were presented to aid in the interpretation of this study. Images were obtained from the skull base cranially. There is mild calcified plaque identified in the vessels compatible with atherosclerotic disease. The right carotid is unremarkable. The left carotid is unremarkable. The right vertebral artery is unremarkable The left vertebral artery is unremarkable The basilar artery is unremarkable The middle cerebral arteries are unremarkable The anterior cerebral arteries are unremarkable The posterior cerebral arteries are unremarkable There are bilateral thyroid masses identified. Further evaluation with ultrasound is suggested. On the right measuring approximately 1 cm new from previous. There are calcifications seen. 1. Estimated stenosis of the proximal right and left internal carotid artery by NASCET criteria is not hemodynamically significant 2. Mild atherosclerotic disease . 3. No large vessel occlusion identified This study was analyzed by the Viz. ai algorithm. MRI BRAIN WO CONTRAST    Result Date: 8/3/2022  EXAMINATION: MRI OF THE BRAIN WITHOUT CONTRAST  8/3/2022 7:38 pm TECHNIQUE: Multiplanar multisequence MRI of the brain was performed without the administration of intravenous contrast. COMPARISON: CT head without contrast, 08/03/2022 HISTORY: ORDERING SYSTEM PROVIDED HISTORY: R/O Stroke TECHNOLOGIST PROVIDED HISTORY: Reason for exam:->R/O Stroke Decision Support Exception - unselect if not a suspected or confirmed emergency medical condition->Emergency Medical Condition (MA) What reading provider will be dictating this exam?->[facility] FINDINGS: INTRACRANIAL STRUCTURES/VENTRICLES: Susceptibility artifact limits evaluation of the DWI sequence. Within this limitation, there is no evidence of an acute or early subacute infarction.   No mass effect, edema or hemorrhage is seen in the brain. There is disproportionately prominent atrophy of the superior cerebellar hemispheres and the vermis. No significant cerebral volume loss is appreciated. Minimal chronic microvascular ischemic changes. No hydrocephalus or extra-axial fluid is seen. ORBITS: The visualized portion of the orbits demonstrate no acute abnormality. SINUSES: Minimal mucosal thickening in the paranasal sinuses. The mastoids are clear. BONES/SOFT TISSUES: The bone marrow signal intensity appears normal. The soft tissues demonstrate no acute abnormality. 1. Artifact on DWI sequence limits evaluation for an acute infarction. Within this limitation, no acute infarction is seen. 2.  No acute intracranial abnormality. 3. Somewhat disproportionately prominent volume loss in the superior cerebellar hemispheres and the vermis, which may be the result of multiple potential etiologies, including ethanol abuse, certain seizure medications and neuro degenerative processes. Clinical correlation is needed. RECOMMENDATIONS: Unavailable       DIET:  ADULT DIET;  Regular    Medications:    Scheduled Meds:   divalproex  250 mg Oral 2 times per day    melatonin  5 mg Oral Daily    aspirin  81 mg Oral BID    pantoprazole  40 mg Oral Daily    levETIRAcetam  500 mg Oral BID    cefTRIAXone (ROCEPHIN) IV  1,000 mg IntraVENous Q24H       Continuous Infusions:   dextrose 5 % and 0.45 % NaCl 150 mL/hr at 08/06/22 0057       PRN Meds:acetaminophen, docusate sodium, hydrOXYzine pamoate    A/P:      Patient Active Problem List   Diagnosis    Schizophrenia ([address]    Severe episode of recurrent major depressive disorder, with psychotic features ([address]    NSAID overdose    Metabolic acidosis with increased anion gap and accumulation of organic acids    Suicide attempt ([address]    Post-menopausal bleeding    Complicated UTI (urinary tract infection)    Dysarthria    Delirium    JOSE A stage II on CKD, likely volume responsive pre-renal JOSE A

## 2022-08-07 ENCOUNTER — APPOINTMENT (OUTPATIENT)
Dept: GENERAL RADIOLOGY | Age: 77
DRG: 469 | End: 2022-08-07
Payer: MEDICAID

## 2022-08-07 LAB
ANION GAP SERPL CALCULATED.3IONS-SCNC: 7 MMOL/L (ref 7–16)
BUN BLDV-MCNC: 38 MG/DL (ref 6–23)
CALCIUM SERPL-MCNC: 8.6 MG/DL (ref 8.6–10.2)
CHLORIDE BLD-SCNC: 121 MMOL/L (ref 98–107)
CO2: 18 MMOL/L (ref 22–29)
CREAT SERPL-MCNC: 2.1 MG/DL (ref 0.5–1)
GFR AFRICAN AMERICAN: 28
GFR NON-AFRICAN AMERICAN: 23 ML/MIN/1.73
GLUCOSE BLD-MCNC: 96 MG/DL (ref 74–99)
POTASSIUM SERPL-SCNC: 4.5 MMOL/L (ref 3.5–5)
SODIUM BLD-SCNC: 146 MMOL/L (ref 132–146)

## 2022-08-07 PROCEDURE — 2580000003 HC RX 258: Performed by: INTERNAL MEDICINE

## 2022-08-07 PROCEDURE — 80048 BASIC METABOLIC PNL TOTAL CA: CPT

## 2022-08-07 PROCEDURE — 6370000000 HC RX 637 (ALT 250 FOR IP): Performed by: NURSE PRACTITIONER

## 2022-08-07 PROCEDURE — 71045 X-RAY EXAM CHEST 1 VIEW: CPT

## 2022-08-07 PROCEDURE — 36415 COLL VENOUS BLD VENIPUNCTURE: CPT

## 2022-08-07 PROCEDURE — 6370000000 HC RX 637 (ALT 250 FOR IP): Performed by: INTERNAL MEDICINE

## 2022-08-07 PROCEDURE — 6360000002 HC RX W HCPCS: Performed by: INTERNAL MEDICINE

## 2022-08-07 PROCEDURE — 2140000000 HC CCU INTERMEDIATE R&B

## 2022-08-07 RX ADMIN — DIVALPROEX SODIUM 250 MG: 125 CAPSULE, COATED PELLETS ORAL at 08:30

## 2022-08-07 RX ADMIN — PANTOPRAZOLE SODIUM 40 MG: 40 TABLET, DELAYED RELEASE ORAL at 08:30

## 2022-08-07 RX ADMIN — ASPIRIN 81 MG: 81 TABLET, COATED ORAL at 20:45

## 2022-08-07 RX ADMIN — WATER 1000 MG: 1 INJECTION INTRAMUSCULAR; INTRAVENOUS; SUBCUTANEOUS at 11:40

## 2022-08-07 RX ADMIN — DEXTROSE AND SODIUM CHLORIDE: 5; 450 INJECTION, SOLUTION INTRAVENOUS at 21:44

## 2022-08-07 RX ADMIN — LEVETIRACETAM 500 MG: 500 TABLET, FILM COATED ORAL at 20:45

## 2022-08-07 RX ADMIN — LEVETIRACETAM 500 MG: 500 TABLET, FILM COATED ORAL at 08:30

## 2022-08-07 RX ADMIN — Medication 5 MG: at 20:44

## 2022-08-07 RX ADMIN — DIVALPROEX SODIUM 250 MG: 125 CAPSULE, COATED PELLETS ORAL at 20:44

## 2022-08-07 RX ADMIN — ASPIRIN 81 MG: 81 TABLET, COATED ORAL at 08:30

## 2022-08-07 NOTE — PROGRESS NOTES
Department of Internal Medicine  Nephrology Progress Note      Events reviewed. SUBJECTIVE:  We are following MsJoey Goel for Hypernatremia and JOSE A. She remains with fragmented speech and dysarthria.     PHYSICAL EXAM:      Vitals:    VITALS:  /67   Pulse 67   Temp 97.9 °F (36.6 °C) (Oral)   Resp 16   Ht 5' 6\" (1.676 m)   Wt 186 lb (84.4 kg)   SpO2 98%   BMI 30.02 kg/m²   24HR INTAKE/OUTPUT:    Intake/Output Summary (Last 24 hours) at 8/7/2022 1109  Last data filed at 8/7/2022 0835  Gross per 24 hour   Intake 2270 ml   Output 2700 ml   Net -430 ml         Constitutional:  Awake, alert, NAD  HEENT:  PERRL, normocephalic, atraumatic  Respiratory:  Crackles auscultated posteriorly, normal respirations, on room air sat 95%  Cardiovascular/Edema:  RRR, S1/S2  Gastrointestinal:  abd rounded, soft, non tender, BS+  Neurologic:  Alert, confused  Skin:  Warm, dry, no rash or lesions  Other:      Scheduled Meds:   divalproex  250 mg Oral 2 times per day    melatonin  5 mg Oral Daily    aspirin  81 mg Oral BID    pantoprazole  40 mg Oral Daily    levETIRAcetam  500 mg Oral BID    cefTRIAXone (ROCEPHIN) IV  1,000 mg IntraVENous Q24H     Continuous Infusions:   dextrose 5 % and 0.45 % NaCl 150 mL/hr at 08/06/22 2328     PRN Meds:.acetaminophen, docusate sodium, hydrOXYzine pamoate    DATA:    CBC:   Lab Results   Component Value Date/Time    WBC 11.1 08/03/2022 06:30 PM    RBC 3.71 08/03/2022 06:30 PM    HGB 10.5 08/03/2022 06:30 PM    HCT 34.1 08/03/2022 06:30 PM    MCV 91.9 08/03/2022 06:30 PM    MCH 28.3 08/03/2022 06:30 PM    MCHC 30.8 08/03/2022 06:30 PM    RDW 16.5 08/03/2022 06:30 PM     08/03/2022 06:30 PM    MPV 11.3 08/03/2022 06:30 PM     CMP:    Lab Results   Component Value Date/Time     08/07/2022 05:45 AM    K 4.5 08/07/2022 05:45 AM    K 5.0 08/03/2022 06:30 PM     08/07/2022 05:45 AM    CO2 18 08/07/2022 05:45 AM    BUN 38 08/07/2022 05:45 AM    CREATININE 2.1 08/07/2022 05:45 AM    GFRAA 28 08/07/2022 05:45 AM    LABGLOM 23 08/07/2022 05:45 AM    GLUCOSE 96 08/07/2022 05:45 AM    PROT 7.7 08/03/2022 06:30 PM    LABALBU 4.1 08/03/2022 06:30 PM    CALCIUM 8.6 08/07/2022 05:45 AM    BILITOT 0.2 08/03/2022 06:30 PM    ALKPHOS 82 08/03/2022 06:30 PM    AST 13 08/03/2022 06:30 PM    ALT 10 08/03/2022 06:30 PM     Magnesium:  No results found for: MG  Phosphorus:  No results found for: PHOS  Radiology Review:      CT Head August 3, 2022   Diffuse atrophy likely age related   Findings compatible with small vessel ischemic changes. The findings were called to the ordering physician. CTA Head with contrast August 3, 2022   1. Estimated stenosis of the proximal right and left internal carotid   artery by NASCET criteria is not hemodynamically significant   2. Mild atherosclerotic disease . 3. No large vessel occlusion identified               This study was analyzed by the Viz. ai algorithm. MRI Brain WO Contrast August 3, 2022   1. Artifact on DWI sequence limits evaluation for an acute infarction. Within this limitation, no acute infarction is seen. 2.  No acute intracranial abnormality. 3. Somewhat disproportionately prominent volume loss in the superior   cerebellar hemispheres and the vermis, which may be the result of multiple   potential etiologies, including ethanol abuse, certain seizure medications   and neuro degenerative processes. Clinical correlation is needed. RECOMMENDATIONS:   Unavailable           Chest X-ray August 3,  2022   No acute cardiopulmonary abnormality. BRIEF SUMMARY OF INITIAL CONSULT:    Briefly Ms. Rodney Pisano is a 78 yro female with a PMH CKD stage 3, HTN, over active bladder, bipolar schizophrenia, and CVA who was admitted on August 8, 2022 after she presented to the ER for evaluation following a fall at her SNF accompanied by aphasia and dysarthria, baseline in alert and oriented with normal speech. CT head demonstrated diffuse atrophy likely age related, MRI brain showed no acute intracranial abnormality. Labs on admission were significant for sodium 157, potassium 5.0, chloride 119, bicarbonate 20, BUN 67, creatinine 2.9 mg/dl and urinalysis was positive for nitrites and reveal leukocytes. We are consulted for hypernatremia. Ibuprofen, rivastigmine, cariprazine, pantoprazole and hydroxyzine are medications she was taking prior to admission. Problems resolved. Borderline hyperkalemia, 2/2 decrease GFR from JOSE A. Hypercalcemia, possible d/t dehydration. Will work up by obtaining ionized calcium, vitamin D 25 level and PTH level. IMPRESSION/RECOMMENDATIONS:      JOSE A stage II on CKD, likely volume responsive pre-renal JOSE A from prolonged poor oral intake vs ischemic ATN from acute drop in BP vs contrast associated JOSE A. Creatinine on  admission 2.9 mg/dl. FENa 2.1%. Renal function has improved over the last 24 hrs, creatinine 2.3 mg/ld, to continue IV fluids. CKD stage 3b with baseline creatinine about 1.7-1.9 mg/dl. Will need to obtain UACR/UPCR once UTI resolves. Hypernatremia, 2/2 decreased free water intake. Calculated free water deficit about 5.1L. Continue D5 0.45% at 150 cc/hr  Hyperchloremic metabolic acidosis, 2/2 decreased GFR from JOSE A. --------------------------------------------------------------  UTI, UA demonstrates + nitites and moderate leukocytes, on ceftriaxone  Fall with accompanied aphasia and dysarthia, CT head demonstrates diffuse atrophy likely age related.    Bipolar schizophrenia, on vistaril, Vralyar and Exelon  Reported hx of HTN, not on antihypertensives  GERD, on PPI  Nutrition, regular, remains on D5 0.45%    Plan:    Continue D5 0.45% at 150cc/hr  Chest X-ray reviewed  Continue strict I&Os  Continue to monitor renal function daily  Continue to monitor potassium levels     Electronically signed by Jesusita Day MD on 8/7/2022 at 11:09 AM

## 2022-08-07 NOTE — PROGRESS NOTES
PT SEEN AND EXAMINED. She is admitted for Hypernatremia and JOSE A. She remains with fragmented speech and dysarthria. Chart reviewed. meds reviewed. D/w nursing + family as available. EXAM: IN GENERAL, NAD. AWAKE AND ALERT. ROS NEGx10 EXCEPT: Unintelligible. /67   Pulse 67   Temp 97.9 °F (36.6 °C) (Oral)   Resp 16   Ht 5' 6\" (1.676 m)   Wt 186 lb (84.4 kg)   SpO2 98%   BMI 30.02 kg/m²   GEN: A+O NAD. HEENT: NCAT. EOMI. NAGA  NECK: NO JVD. TRACH MIDLINE. NO BRUITS. NO THYROMEGALY. LUNGS: CTA BL NO RALES, RHONCHI OR WHEEZES. GOOD EXCURSION. CV: Regular rate and rhythm, NO Murmurs, Rubs, Or gallops  ABD: Soft. Nontender. Normal bowel sounds. No organomegaly  EXT:No clubbing cyanosis or edema  Neuro: Alert and oriented x 3. No focal motor deficits. No sensory deficits. Reflexes appear intact.   Labs/data reviewedLABS: CBC with Differential:    Lab Results   Component Value Date/Time    WBC 11.1 08/03/2022 06:30 PM    RBC 3.71 08/03/2022 06:30 PM    HGB 10.5 08/03/2022 06:30 PM    HCT 34.1 08/03/2022 06:30 PM     08/03/2022 06:30 PM    MCV 91.9 08/03/2022 06:30 PM    MCH 28.3 08/03/2022 06:30 PM    MCHC 30.8 08/03/2022 06:30 PM    RDW 16.5 08/03/2022 06:30 PM    LYMPHOPCT 14.1 08/03/2022 06:30 PM    MONOPCT 8.0 08/03/2022 06:30 PM    BASOPCT 0.5 08/03/2022 06:30 PM    MONOSABS 0.89 08/03/2022 06:30 PM    LYMPHSABS 1.56 08/03/2022 06:30 PM    EOSABS 0.01 08/03/2022 06:30 PM    BASOSABS 0.06 08/03/2022 06:30 PM     Platelets:    Lab Results   Component Value Date/Time     08/03/2022 06:30 PM     CMP:    Lab Results   Component Value Date/Time     08/07/2022 05:45 AM    K 4.5 08/07/2022 05:45 AM    K 5.0 08/03/2022 06:30 PM     08/07/2022 05:45 AM    CO2 18 08/07/2022 05:45 AM    BUN 38 08/07/2022 05:45 AM    CREATININE 2.1 08/07/2022 05:45 AM    GFRAA 28 08/07/2022 05:45 AM    LABGLOM 23 08/07/2022 05:45 AM    GLUCOSE 96 08/07/2022 05:45 AM    PROT 7.7 08/03/2022 06:30 PM LABALBU 4.1 2022 06:30 PM    CALCIUM 8.6 2022 05:45 AM    BILITOT 0.2 2022 06:30 PM    ALKPHOS 82 2022 06:30 PM    AST 13 2022 06:30 PM    ALT 10 2022 06:30 PM     Magnesium:  No results found for: MG  LDH:  No results found for: LDH  PT/INR:    Lab Results   Component Value Date/Time    PROTIME 11.1 2022 06:15 PM    INR 1.0 2022 06:15 PM     Last 3 Troponin:    Lab Results   Component Value Date/Time    TROPONINI <0.01 2017 11:10 PM    TROPONINI <0.01 2016 01:45 PM     ABG:    Lab Results   Component Value Date/Time    PH 7.334 2017 11:48 PM    PCO2 35.6 2017 11:48 PM    PO2 78.5 2017 11:48 PM    HCO3 18.5 2017 11:48 PM    BE -6.6 2017 11:48 PM    O2SAT 94.8 2017 11:48 PM     IRON:  No results found for: IRON  IMAGING    CT Head WO Contrast    Result Date: 8/3/2022  Patient MRN:  [id number] : 1945 Age: 78 years Gender: Female Order Date:  8/3/2022 6:18 PM EXAM: CT HEAD WO CONTRAST NUMBER OF IMAGES:  277 INDICATION:  AMS AMS Decision Support Exception - unselect if not a suspected or confirmed emergency medical condition->Emergency Medical Condition (MA) What reading provider will be dictating this exam?->[facility] COMPARISON: None Technique: Low-dose CT  acquisition technique included one of following options; 1 . Automated exposure control, 2. Adjustment of MA and or KV according to patient's size or 3. Use of iterative reconstruction. Multiple CT sections were obtained with sagittal and coronal MPR reconstructions. The ventricles are prominent. The gyri and sulci appear  prominent. The white matter appears  prominent. There is no evidence for hemorrhage. There is no infarct identified. There is no mass effect identified. There is no mass identified. Diffuse atrophy likely age related Findings compatible with small vessel ischemic changes. The findings were called to the ordering physician.      XR CHEST PORTABLEWriter    Result Date: 8/3/2022  EXAMINATION: ONE XRAY VIEW OF THE CHEST 8/3/2022 7:08 pm COMPARISON: 2016 HISTORY: ORDERING SYSTEM PROVIDED HISTORY: AMS TECHNOLOGIST PROVIDED HISTORY: Reason for exam:->AMS What reading provider will be dictating this exam?->[facility] FINDINGS: The lungs are clear. The heart is normal in size. The aorta is tortuous. No pulmonary vascular congestion. No pneumothorax or pleural effusion is seen. No acute cardiopulmonary abnormality. CTA NECK W CONTRAST    Result Date: 8/3/2022  Patient MRN:  [id number] : 1945 Age: 78 years Gender: Female Order Date:  8/3/2022 6:18 PM EXAM: CTA NECK W CONTRAST, CTA HEAD W CONTRAST NUMBER OF IMAGES:  12 INDICATION:  AMS AMS Decision Support Exception - unselect if not a suspected or confirmed emergency medical condition->Emergency Medical Condition (MA) What reading provider will be dictating this exam?->[facility] COMPARISON: 2019 Technique: Low-dose CT  acquisition technique included one of following options; 1 . Automated exposure control, 2. Adjustment of MA and or KV according to patient's size or 3. Use of iterative reconstruction. Contiguous spiral images were obtained in the axial plane, following the administration of intravenous contrast using CT angiographic protocol. Sagittal and coronal images were reconstructed from the axial plane acquisition. Additional MIP reconstructions were presented to aid in the interpretation of this study. Images were obtained from the skull base cranially. There is mild calcified plaque identified in the vessels compatible with atherosclerotic disease. The right carotid is unremarkable. The left carotid is unremarkable.  The right vertebral artery is unremarkable The left vertebral artery is unremarkable The basilar artery is unremarkable The middle cerebral arteries are unremarkable The anterior cerebral arteries are unremarkable The posterior cerebral arteries are unremarkable There contrast using CT angiographic protocol. Sagittal and coronal images were reconstructed from the axial plane acquisition. Additional MIP reconstructions were presented to aid in the interpretation of this study. Images were obtained from the skull base cranially. There is mild calcified plaque identified in the vessels compatible with atherosclerotic disease. The right carotid is unremarkable. The left carotid is unremarkable. The right vertebral artery is unremarkable The left vertebral artery is unremarkable The basilar artery is unremarkable The middle cerebral arteries are unremarkable The anterior cerebral arteries are unremarkable The posterior cerebral arteries are unremarkable There are bilateral thyroid masses identified. Further evaluation with ultrasound is suggested. On the right measuring approximately 1 cm new from previous. There are calcifications seen. 1. Estimated stenosis of the proximal right and left internal carotid artery by NASCET criteria is not hemodynamically significant 2. Mild atherosclerotic disease . 3. No large vessel occlusion identified This study was analyzed by the Viz. ai algorithm. MRI BRAIN WO CONTRAST    Result Date: 8/3/2022  EXAMINATION: MRI OF THE BRAIN WITHOUT CONTRAST  8/3/2022 7:38 pm TECHNIQUE: Multiplanar multisequence MRI of the brain was performed without the administration of intravenous contrast. COMPARISON: CT head without contrast, 08/03/2022 HISTORY: ORDERING SYSTEM PROVIDED HISTORY: R/O Stroke TECHNOLOGIST PROVIDED HISTORY: Reason for exam:->R/O Stroke Decision Support Exception - unselect if not a suspected or confirmed emergency medical condition->Emergency Medical Condition (MA) What reading provider will be dictating this exam?->[facility] FINDINGS: INTRACRANIAL STRUCTURES/VENTRICLES: Susceptibility artifact limits evaluation of the DWI sequence. Within this limitation, there is no evidence of an acute or early subacute infarction.   No mass effect, edema or hemorrhage is seen in the brain. There is disproportionately prominent atrophy of the superior cerebellar hemispheres and the vermis. No significant cerebral volume loss is appreciated. Minimal chronic microvascular ischemic changes. No hydrocephalus or extra-axial fluid is seen. ORBITS: The visualized portion of the orbits demonstrate no acute abnormality. SINUSES: Minimal mucosal thickening in the paranasal sinuses. The mastoids are clear. BONES/SOFT TISSUES: The bone marrow signal intensity appears normal. The soft tissues demonstrate no acute abnormality. 1. Artifact on DWI sequence limits evaluation for an acute infarction. Within this limitation, no acute infarction is seen. 2.  No acute intracranial abnormality. 3. Somewhat disproportionately prominent volume loss in the superior cerebellar hemispheres and the vermis, which may be the result of multiple potential etiologies, including ethanol abuse, certain seizure medications and neuro degenerative processes. Clinical correlation is needed. RECOMMENDATIONS: Unavailable       DIET:  ADULT DIET;  Regular    Medications:    Scheduled Meds:   divalproex  250 mg Oral 2 times per day    melatonin  5 mg Oral Daily    aspirin  81 mg Oral BID    pantoprazole  40 mg Oral Daily    levETIRAcetam  500 mg Oral BID       Continuous Infusions:   dextrose 5 % and 0.45 % NaCl 150 mL/hr at 08/06/22 2328       PRN Meds:acetaminophen, docusate sodium, hydrOXYzine pamoate    A/P:      Patient Active Problem List   Diagnosis    Schizophrenia ([address]    Severe episode of recurrent major depressive disorder, with psychotic features ([address]    NSAID overdose    Metabolic acidosis with increased anion gap and accumulation of organic acids    Suicide attempt ([address]    Post-menopausal bleeding    Complicated UTI (urinary tract infection)    Dysarthria    Delirium    JOSE A stage II on CKD, likely volume responsive pre-renal JOSE A from prolonged poor oral intake vs ischemic ATN from acute drop in BP vs contrast associated JOSE A. Creatinine on  admission 2.9 mg/dl. FENa 2.1%. Renal function has improved over the last 24 hrs, creatinine 2.3 mg/ld, to continue IV fluids. CKD stage 3b with baseline creatinine about 1.7-1.9 mg/dl. Will need to obtain UACR/UPCR once UTI resolves. Hypernatremia, 2/2 decreased free water intake. Calculated free water deficit about 5.1L. Continue D5 0.45% at 150 cc/hr  Hyperchloremic metabolic acidosis, 2/2 decreased GFR from JOSE A. --------------------------------------------------------------  UTI, UA demonstrates + nitites and moderate leukocytes, on ceftriaxone - consider changing to [address] with accompanied aphasia and dysarthia, CT head demonstrates diffuse atrophy likely age related.   Bipolar schizophrenia, on vistaril, Yohan and Exelon  Reported hx of HTN, not on antihypertensives  GERD, on PPI  Nutrition, regular, remains on D5 0.45%I    I can be reached though [facility] or [facility] at [phone]

## 2022-08-08 LAB
ANION GAP SERPL CALCULATED.3IONS-SCNC: 9 MMOL/L (ref 7–16)
BUN BLDV-MCNC: 34 MG/DL (ref 6–23)
CALCIUM SERPL-MCNC: 8.6 MG/DL (ref 8.6–10.2)
CHLORIDE BLD-SCNC: 120 MMOL/L (ref 98–107)
CO2: 16 MMOL/L (ref 22–29)
CREAT SERPL-MCNC: 1.8 MG/DL (ref 0.5–1)
GFR AFRICAN AMERICAN: 33
GFR NON-AFRICAN AMERICAN: 27 ML/MIN/1.73
GLUCOSE BLD-MCNC: 106 MG/DL (ref 74–99)
PH VENOUS: 7.35 (ref 7.35–7.45)
POTASSIUM SERPL-SCNC: 4.3 MMOL/L (ref 3.5–5)
SODIUM BLD-SCNC: 145 MMOL/L (ref 132–146)
VALPROIC ACID LEVEL: 3 MCG/ML (ref 50–100)

## 2022-08-08 PROCEDURE — 97110 THERAPEUTIC EXERCISES: CPT

## 2022-08-08 PROCEDURE — 99232 SBSQ HOSP IP/OBS MODERATE 35: CPT | Performed by: INTERNAL MEDICINE

## 2022-08-08 PROCEDURE — 97130 THER IVNTJ EA ADDL 15 MIN: CPT

## 2022-08-08 PROCEDURE — 80164 ASSAY DIPROPYLACETIC ACD TOT: CPT

## 2022-08-08 PROCEDURE — 2500000003 HC RX 250 WO HCPCS: Performed by: INTERNAL MEDICINE

## 2022-08-08 PROCEDURE — 6370000000 HC RX 637 (ALT 250 FOR IP): Performed by: NURSE PRACTITIONER

## 2022-08-08 PROCEDURE — 80048 BASIC METABOLIC PNL TOTAL CA: CPT

## 2022-08-08 PROCEDURE — 6370000000 HC RX 637 (ALT 250 FOR IP): Performed by: INTERNAL MEDICINE

## 2022-08-08 PROCEDURE — 2580000003 HC RX 258: Performed by: INTERNAL MEDICINE

## 2022-08-08 PROCEDURE — 2140000000 HC CCU INTERMEDIATE R&B

## 2022-08-08 PROCEDURE — 36415 COLL VENOUS BLD VENIPUNCTURE: CPT

## 2022-08-08 PROCEDURE — 82800 BLOOD PH: CPT

## 2022-08-08 PROCEDURE — 97129 THER IVNTJ 1ST 15 MIN: CPT

## 2022-08-08 RX ADMIN — ASPIRIN 81 MG: 81 TABLET, COATED ORAL at 20:48

## 2022-08-08 RX ADMIN — LEVETIRACETAM 500 MG: 500 TABLET, FILM COATED ORAL at 20:48

## 2022-08-08 RX ADMIN — SODIUM BICARBONATE: 84 INJECTION, SOLUTION INTRAVENOUS at 13:04

## 2022-08-08 RX ADMIN — SODIUM BICARBONATE: 84 INJECTION, SOLUTION INTRAVENOUS at 23:06

## 2022-08-08 RX ADMIN — DIVALPROEX SODIUM 250 MG: 125 CAPSULE, COATED PELLETS ORAL at 20:48

## 2022-08-08 RX ADMIN — DEXTROSE AND SODIUM CHLORIDE: 5; 450 INJECTION, SOLUTION INTRAVENOUS at 03:58

## 2022-08-08 RX ADMIN — Medication 5 MG: at 18:10

## 2022-08-08 NOTE — PROGRESS NOTES
Patient's oral medications given in pudding, patient appeared to take medication and swallowed water after. However when staff re-entered room patient was observed spitting pills out onto bed and floor. Patient was asked if they could be given again, she was agreeable. When medications were administered again with and then without pudding patient continued to spit them out.      Candace Massey RN, BSN

## 2022-08-08 NOTE — PROGRESS NOTES
Patient's IV infiltrated, 2 attempts to gain IV access with no success. IVF currently delayed. Will ask  night turn to attempt IV as well as to get BMP.      Mary Ventura RN, BSN

## 2022-08-08 NOTE — PLAN OF CARE
Problem: Discharge Planning  Goal: Discharge to home or other facility with appropriate resources  Outcome: Progressing     Problem: Pain  Goal: Verbalizes/displays adequate comfort level or baseline comfort level  Outcome: Progressing     Problem: Skin/Tissue Integrity  Goal: Absence of new skin breakdown  Description: 1. Monitor for areas of redness and/or skin breakdown  2. Assess vascular access sites hourly  3. Every 4-6 hours minimum:  Change oxygen saturation probe site  4. Every 4-6 hours:  If on nasal continuous positive airway pressure, respiratory therapy assess nares and determine need for appliance change or resting period.   Outcome: Progressing     Problem: Safety - Adult  Goal: Free from fall injury  Outcome: Progressing  Flowsheets (Taken 8/8/2022 4657)  Free From Fall Injury: Based on caregiver fall risk screen, instruct family/caregiver to ask for assistance with transferring infant if caregiver noted to have fall risk factors

## 2022-08-08 NOTE — PROGRESS NOTES
Department of Internal Medicine  Nephrology Progress Note      Events reviewed. SUBJECTIVE:  We are following MsJoey Sparks for Hypernatremia and JOSE A. She remains with fragmented speech and dysarthria.     PHYSICAL EXAM:      Vitals:    VITALS:  /74   Pulse 79   Temp 97.6 °F (36.4 °C) (Axillary)   Resp 18   Ht 5' 6\" (1.676 m)   Wt 186 lb (84.4 kg)   SpO2 98%   BMI 30.02 kg/m²   24HR INTAKE/OUTPUT:    Intake/Output Summary (Last 24 hours) at 8/8/2022 1356  Last data filed at 8/8/2022 0539  Gross per 24 hour   Intake 2360 ml   Output 2000 ml   Net 360 ml         Constitutional:  Awake, alert, NAD  HEENT:  PERRL, normocephalic, atraumatic  Respiratory:  Crackles auscultated posteriorly, normal respirations, on room air sat 95%  Cardiovascular/Edema:  RRR, S1/S2  Gastrointestinal:  abd rounded, soft, non tender, BS+  Neurologic:  Alert, confused  Skin:  Warm, dry, no rash or lesions  Other:      Scheduled Meds:   divalproex  250 mg Oral 2 times per day    melatonin  5 mg Oral Daily    aspirin  81 mg Oral BID    pantoprazole  40 mg Oral Daily    levETIRAcetam  500 mg Oral BID     Continuous Infusions:   IV infusion builder 100 mL/hr at 08/08/22 1304     PRN Meds:.acetaminophen, docusate sodium, hydrOXYzine pamoate    DATA:    CBC:   Lab Results   Component Value Date/Time    WBC 11.1 08/03/2022 06:30 PM    RBC 3.71 08/03/2022 06:30 PM    HGB 10.5 08/03/2022 06:30 PM    HCT 34.1 08/03/2022 06:30 PM    MCV 91.9 08/03/2022 06:30 PM    MCH 28.3 08/03/2022 06:30 PM    MCHC 30.8 08/03/2022 06:30 PM    RDW 16.5 08/03/2022 06:30 PM     08/03/2022 06:30 PM    MPV 11.3 08/03/2022 06:30 PM     CMP:    Lab Results   Component Value Date/Time     08/08/2022 05:15 AM    K 4.3 08/08/2022 05:15 AM    K 5.0 08/03/2022 06:30 PM     08/08/2022 05:15 AM    CO2 16 08/08/2022 05:15 AM    BUN 34 08/08/2022 05:15 AM    CREATININE 1.8 08/08/2022 05:15 AM    GFRAA 33 08/08/2022 05:15 AM    LABGLOM 27 08/08/2022 05:15 AM    GLUCOSE 106 08/08/2022 05:15 AM    PROT 7.7 08/03/2022 06:30 PM    LABALBU 4.1 08/03/2022 06:30 PM    CALCIUM 8.6 08/08/2022 05:15 AM    BILITOT 0.2 08/03/2022 06:30 PM    ALKPHOS 82 08/03/2022 06:30 PM    AST 13 08/03/2022 06:30 PM    ALT 10 08/03/2022 06:30 PM     Magnesium:  No results found for: MG  Phosphorus:  No results found for: PHOS  Radiology Review:      CT Head August 3, 2022   Diffuse atrophy likely age related   Findings compatible with small vessel ischemic changes. The findings were called to the ordering physician. CTA Head with contrast August 3, 2022   1. Estimated stenosis of the proximal right and left internal carotid   artery by NASCET criteria is not hemodynamically significant   2. Mild atherosclerotic disease . 3. No large vessel occlusion identified               This study was analyzed by the Viz. ai algorithm. MRI Brain WO Contrast August 3, 2022   1. Artifact on DWI sequence limits evaluation for an acute infarction. Within this limitation, no acute infarction is seen. 2.  No acute intracranial abnormality. 3. Somewhat disproportionately prominent volume loss in the superior   cerebellar hemispheres and the vermis, which may be the result of multiple   potential etiologies, including ethanol abuse, certain seizure medications   and neuro degenerative processes. Clinical correlation is needed. RECOMMENDATIONS:   Unavailable           Chest X-ray August 3,  2022   No acute cardiopulmonary abnormality. BRIEF SUMMARY OF INITIAL CONSULT:    Briefly Ms. Lexi Goel is a 78 yro female with a PMH CKD stage 3, HTN, over active bladder, bipolar schizophrenia, and CVA who was admitted on August 8, 2022 after she presented to the ER for evaluation following a fall at her SNF accompanied by aphasia and dysarthria, baseline in alert and oriented with normal speech.  CT head demonstrated diffuse atrophy likely age related, MRI brain showed no acute intracranial abnormality. Labs on admission were significant for sodium 157, potassium 5.0, chloride 119, bicarbonate 20, BUN 67, creatinine 2.9 mg/dl and urinalysis was positive for nitrites and reveal leukocytes. We are consulted for hypernatremia. Ibuprofen, rivastigmine, cariprazine, pantoprazole and hydroxyzine are medications she was taking prior to admission. Problems resolved. Borderline hyperkalemia, 2/2 decrease GFR from JOSE A. Hypercalcemia, possible d/t dehydration. Will work up by obtaining ionized calcium, vitamin D 25 level and PTH level. IMPRESSION/RECOMMENDATIONS:      JOSE A stage II on CKD, volume responsive pre-renal JOSE A from prolonged poor oral intake vs ischemic ATN from acute drop in BP vs contrast associated JOSE A. Creatinine on  admission 2.9 mg/dl. FENa 2.1%. Renal function has improved over the last 24 hrs, creatinine 1.8 mg/ld, to continue IV fluids. CKD stage 3b with baseline creatinine about 1.7-1.9 mg/dl. Will need to obtain UACR/UPCR once UTI resolves. Hypernatremia, 2/2 decreased free water intake. Calculated free water deficit about 5.1L on admission. Na levels Improving, to continue IVF. Low bicarbonate levels, Hyperchloremic metabolic acidosis vs respiratory alkalsosis. Will obtain venous pH for definitive diagnosis.  --------------------------------------------------------------  UTI, UA demonstrates + nitites and moderate leukocytes, on ceftriaxone  Fall with accompanied aphasia and dysarthia, CT head demonstrates diffuse atrophy likely age related.    Bipolar schizophrenia, on vistaril, Vralyar and Exelon  Reported hx of HTN, not on antihypertensives  GERD, on PPI  Nutrition, regular    Plan:    Change IVF to D5 with 75 mEq sodium bicarbonate at 100 cc/hr  Continue strict I&Os  Continue to monitor renal function daily  Continue to monitor potassium levels  Obtain venous pH  Monitor bicarbonate levels     Electronically signed by

## 2022-08-08 NOTE — DISCHARGE INSTR - COC
Continuity of Care Form    Patient Name: Roque Garibay   :  1945  MRN:  [id number]    Admit date:  8/3/2022  Discharge date:  22    Code Status Order: Prior   Advance Directives:     Admitting Physician:  Alexandra Black MD  PCP: Alexandra Black MD    Discharging Nurse: [facility] Unit/Room#: 8057/5244-E  Discharging Unit Phone Number: [phone]    Emergency Contact:   Extended Emergency Contact Information  Primary Emergency Contact: Stephenie Montoya  Address: [address] [place] [address]           [address], [address] Phone: [phone]  Relation: Legal Guardian  Secondary Emergency Contact: Mellisa Pacheco  Address: [address] [place] [address] Phone: [phone]  Relation: Other    Past Surgical History:  Past Surgical History:   Procedure Laterality Date    BLADDER SUSPENSION      DILATION AND CURETTAGE OF UTERUS      DILATION AND CURETTAGE OF UTERUS N/A 2021    endometrial biopsy, HYSTEROSCOPY performed by Elodia Schroeder MD at [facility]         Immunization History:   Immunization History   Administered Date(s) Administered    COVID-19, PFIZER PURPLE top, DILUTE for use, (age 15 y+), 30mcg/0.3mL 2021, 02/10/2021, 2022    Influenza Vaccine, unspecified formulation 10/01/2016       Active Problems:  Patient Active Problem List   Diagnosis Code    Schizophrenia ([address] F20.9    Severe episode of recurrent major depressive disorder, with psychotic features ([address] F33.3    NSAID overdose Z10.159N    Metabolic acidosis with increased anion gap and accumulation of organic acids E87.2    Suicide attempt ([address] T14.91XA    Post-menopausal bleeding Y10.0    Complicated UTI (urinary tract infection) N39.0    Dysarthria R47.1    Delirium R41.0       Isolation/Infection:   Isolation            No Isolation          Patient Infection Status       None to display            Nurse Assessment:  Last Vital Signs: /66   Pulse 84   Temp 98.5 °F (36.9 °C) (Oral)   Resp 17   Ht 5' 6\" (1.676 m)   Wt 186 lb (84.4 kg)   SpO2 97%   BMI 30.02 kg/m²     Last documented pain score (0-10 scale): Pain Level: 0  Last Weight:   Wt Readings from Last 1 Encounters:   08/03/22 186 lb (84.4 kg)     Mental Status:  oriented, alert, coherent, and can be confused with situation at times     IV Access:  - None    Nursing Mobility/ADLs:  Walking   Assisted  Transfer  Assisted  Bathing  Assisted  Dressing  Assisted  Toileting  Assisted  Feeding  Independent  Med Admin  Assisted  Med Delivery   whole and prefers mixed with applesauce    Wound Care Documentation and Therapy:  Wound 05/14/17 Other (Comment) Buttocks Right; Inner; Upper (Active)   Number of days: 1911       Incision 02/16/21 Vagina (Active)   Number of days: 538        Elimination:  Continence: Bowel: No  Bladder: No  Urinary Catheter: Removal Date 8/10/22    Colostomy/Ileostomy/Ileal Conduit: No       Date of Last BM: 8/11/22    Intake/Output Summary (Last 24 hours) at 8/8/2022 0648  Last data filed at 8/8/2022 0539  Gross per 24 hour   Intake 2360 ml   Output 3025 ml   Net -665 ml     I/O last 3 completed shifts: In: 2630 [P.O.:830; I.V.:1800]  Out: 3675 [Urine:3675]    Safety Concerns: At Risk for Falls and History of Seizures    Impairments/Disabilities:      Speech and dysarthria     Nutrition Therapy:  Current Nutrition Therapy:   - Oral Diet:  General    Routes of Feeding: Oral  Liquids: Thin Liquids  Daily Fluid Restriction: no  Last Modified Barium Swallow with Video (Video Swallowing Test): not done    Treatments at the Time of Hospital Discharge:   Respiratory Treatments: n/a  Oxygen Therapy:  is not on home oxygen therapy.   Ventilator:    - No ventilator support    Rehab Therapies: Physical Therapy, Occupational Therapy, and Speech/Language Therapy  Weight Bearing Status/Restrictions: No weight bearing restrictions  Other Medical Equipment (for information only, NOT a DME order):  walker, bedside commode, and hospital bed  Other Treatments:       Patient's personal belongings (please select all that are sent with patient):  {[facility] DME Belongings:[id number]}    RN SIGNATURE:  Electronically signed by Peña Cullen RN on 8/11/22 at 2:04 PM EDT    CASE MANAGEMENT/SOCIAL WORK SECTION    Inpatient Status Date: ***    Readmission Risk Assessment Score:  Readmission Risk              Risk of Unplanned Readmission:  [id number]           Discharging to Facility/ Agency   Name:   Address:  Phone:  Fax:    Dialysis Facility (if applicable)   Name:  Address:  Dialysis Schedule:  Phone:  Fax:    / signature: {Esignature:[id number]}    PHYSICIAN SECTION    Prognosis: {Prognosis:5036971638}    Condition at Discharge: Juan John Kei Patient Condition:[id number]}    Rehab Potential (if transferring to Rehab): {Prognosis:8785769070}    Recommended Labs or Other Treatments After Discharge: ***    Physician Certification: I certify the above information and transfer of Alexus Showers  is necessary for the continuing treatment of the diagnosis listed and that she requires {Admit to Appropriate Level of Care:59268} for {GREATER/LESS:647631953} 30 days.      Update Admission H&P: {P DME Changes in JSK:312790638}    PHYSICIAN SIGNATURE:  Chelsey Tate MD.

## 2022-08-08 NOTE — PROGRESS NOTES
Patient is refusing morning depakote she states \"it is normally a plain white capsule\" when discussing medications with patient she also states \" I don't normally take it at home\" continued to refuse depakote even after patient informed she has taken medication at multiple times this admission.      Domonique Kruger RN, BSN

## 2022-08-08 NOTE — PROGRESS NOTES
Admit Date: 8/3/2022    Subjective:     Week end event reviewed  Awake feels fine back to her baseline     Objective:     No data found. I/O last 3 completed shifts: In: 2630 [P.O.:830; I.V.:1800]  Out: 0985 [Urine:3675]  I/O this shift:  In: 2000 [P.O.:200; I.V.:1800]  Out: 1500 [Urine:1500]    HEENT: Normal  NECK: Thyroid normal. No carotid bruit. No lymphphadenopathy. CVS: RRR  RS: Clear. No wheeze. No rhonchi. Good airflow bilaterally. ABD: Soft. Non tender. No mass. Normal BS. EXT: No edema. Non tender. Pulses present.    NEURO: moving all extremities       Scheduled Meds:   divalproex  250 mg Oral 2 times per day    melatonin  5 mg Oral Daily    aspirin  81 mg Oral BID    pantoprazole  40 mg Oral Daily    levETIRAcetam  500 mg Oral BID     Continuous Infusions:   dextrose 5 % and 0.45 % NaCl 150 mL/hr at 08/08/22 0358       CBC with Differential:    Lab Results   Component Value Date/Time    WBC 11.1 08/03/2022 06:30 PM    RBC 3.71 08/03/2022 06:30 PM    HGB 10.5 08/03/2022 06:30 PM    HCT 34.1 08/03/2022 06:30 PM     08/03/2022 06:30 PM    MCV 91.9 08/03/2022 06:30 PM    MCH 28.3 08/03/2022 06:30 PM    MCHC 30.8 08/03/2022 06:30 PM    RDW 16.5 08/03/2022 06:30 PM    LYMPHOPCT 14.1 08/03/2022 06:30 PM    MONOPCT 8.0 08/03/2022 06:30 PM    BASOPCT 0.5 08/03/2022 06:30 PM    MONOSABS 0.89 08/03/2022 06:30 PM    LYMPHSABS 1.56 08/03/2022 06:30 PM    EOSABS 0.01 08/03/2022 06:30 PM    BASOSABS 0.06 08/03/2022 06:30 PM     CMP:    Lab Results   Component Value Date/Time     08/08/2022 05:15 AM    K 4.3 08/08/2022 05:15 AM    K 5.0 08/03/2022 06:30 PM     08/08/2022 05:15 AM    CO2 16 08/08/2022 05:15 AM    BUN 34 08/08/2022 05:15 AM    CREATININE 1.8 08/08/2022 05:15 AM    GFRAA 33 08/08/2022 05:15 AM    LABGLOM 27 08/08/2022 05:15 AM    PROT 7.7 08/03/2022 06:30 PM    LABALBU 4.1 08/03/2022 06:30 PM    CALCIUM 8.6 08/08/2022 05:15 AM    BILITOT 0.2 08/03/2022 06:30 PM    ALKPHOS 82 08/03/2022 06:30 PM    AST 13 08/03/2022 06:30 PM    ALT 10 08/03/2022 06:30 PM     PT/INR:    Lab Results   Component Value Date/Time    PROTIME 11.1 08/03/2022 06:15 PM    INR 1.0 08/03/2022 06:15 PM       Assessment:     Principal Problem:    JOSE A on CKD   Hypernatremia  Dehydration  UTI  Schizophrenia  Bipolar  HTN       Plan:   Improvement continue ATB, fluid per renal transfer soon

## 2022-08-09 LAB
ANION GAP SERPL CALCULATED.3IONS-SCNC: 8 MMOL/L (ref 7–16)
BUN BLDV-MCNC: 32 MG/DL (ref 6–23)
CALCIUM SERPL-MCNC: 8.8 MG/DL (ref 8.6–10.2)
CHLORIDE BLD-SCNC: 117 MMOL/L (ref 98–107)
CO2: 21 MMOL/L (ref 22–29)
CREAT SERPL-MCNC: 1.7 MG/DL (ref 0.5–1)
GFR AFRICAN AMERICAN: 35
GFR NON-AFRICAN AMERICAN: 29 ML/MIN/1.73
GLUCOSE BLD-MCNC: 102 MG/DL (ref 74–99)
HCT VFR BLD CALC: 30.3 % (ref 34–48)
HEMOGLOBIN: 9.2 G/DL (ref 11.5–15.5)
MAGNESIUM: 1.9 MG/DL (ref 1.6–2.6)
MCH RBC QN AUTO: 28 PG (ref 26–35)
MCHC RBC AUTO-ENTMCNC: 30.4 % (ref 32–34.5)
MCV RBC AUTO: 92.4 FL (ref 80–99.9)
PDW BLD-RTO: 16.1 FL (ref 11.5–15)
PHOSPHORUS: 3.4 MG/DL (ref 2.5–4.5)
PLATELET # BLD: 212 E9/L (ref 130–450)
PMV BLD AUTO: 11.9 FL (ref 7–12)
POTASSIUM SERPL-SCNC: 4.2 MMOL/L (ref 3.5–5)
RBC # BLD: 3.28 E12/L (ref 3.5–5.5)
SODIUM BLD-SCNC: 146 MMOL/L (ref 132–146)
WBC # BLD: 9.6 E9/L (ref 4.5–11.5)

## 2022-08-09 PROCEDURE — 84100 ASSAY OF PHOSPHORUS: CPT

## 2022-08-09 PROCEDURE — 1200000000 HC SEMI PRIVATE

## 2022-08-09 PROCEDURE — 2500000003 HC RX 250 WO HCPCS: Performed by: INTERNAL MEDICINE

## 2022-08-09 PROCEDURE — 6370000000 HC RX 637 (ALT 250 FOR IP): Performed by: NURSE PRACTITIONER

## 2022-08-09 PROCEDURE — 6370000000 HC RX 637 (ALT 250 FOR IP): Performed by: INTERNAL MEDICINE

## 2022-08-09 PROCEDURE — 2140000000 HC CCU INTERMEDIATE R&B

## 2022-08-09 PROCEDURE — 97130 THER IVNTJ EA ADDL 15 MIN: CPT

## 2022-08-09 PROCEDURE — 97129 THER IVNTJ 1ST 15 MIN: CPT

## 2022-08-09 PROCEDURE — 2580000003 HC RX 258: Performed by: INTERNAL MEDICINE

## 2022-08-09 PROCEDURE — 85027 COMPLETE CBC AUTOMATED: CPT

## 2022-08-09 PROCEDURE — 36415 COLL VENOUS BLD VENIPUNCTURE: CPT

## 2022-08-09 PROCEDURE — 83735 ASSAY OF MAGNESIUM: CPT

## 2022-08-09 PROCEDURE — 99232 SBSQ HOSP IP/OBS MODERATE 35: CPT | Performed by: INTERNAL MEDICINE

## 2022-08-09 PROCEDURE — 80048 BASIC METABOLIC PNL TOTAL CA: CPT

## 2022-08-09 RX ADMIN — LEVETIRACETAM 500 MG: 500 TABLET, FILM COATED ORAL at 20:21

## 2022-08-09 RX ADMIN — ASPIRIN 81 MG: 81 TABLET, COATED ORAL at 08:48

## 2022-08-09 RX ADMIN — LEVETIRACETAM 500 MG: 500 TABLET, FILM COATED ORAL at 08:48

## 2022-08-09 RX ADMIN — DIVALPROEX SODIUM 250 MG: 125 CAPSULE, COATED PELLETS ORAL at 20:21

## 2022-08-09 RX ADMIN — SODIUM BICARBONATE: 84 INJECTION, SOLUTION INTRAVENOUS at 10:20

## 2022-08-09 RX ADMIN — Medication 5 MG: at 20:21

## 2022-08-09 RX ADMIN — ASPIRIN 81 MG: 81 TABLET, COATED ORAL at 20:22

## 2022-08-09 RX ADMIN — PANTOPRAZOLE SODIUM 40 MG: 40 TABLET, DELAYED RELEASE ORAL at 08:48

## 2022-08-09 RX ADMIN — SODIUM BICARBONATE: 84 INJECTION, SOLUTION INTRAVENOUS at 12:15

## 2022-08-09 RX ADMIN — DIVALPROEX SODIUM 250 MG: 125 CAPSULE, COATED PELLETS ORAL at 10:21

## 2022-08-09 NOTE — PROGRESS NOTES
Admit Date: 8/3/2022    Subjective:     Awake comfortable NAD     Objective:     No data found. I/O last 3 completed shifts: In: 2360 [P.O.:560; I.V.:1800]  Out: 4625 [Urine:4625]  I/O this shift:  In: -   Out: 1075 [Urine:1075]    HEENT: Normal  NECK: Thyroid normal. No carotid bruit. No lymphphadenopathy. CVS: RRR  RS: Clear. No wheeze. No rhonchi. Good airflow bilaterally. ABD: Soft. Non tender. No mass. Normal BS. EXT: No edema. Non tender. Pulses present.    NEURO: moving all extremities       Scheduled Meds:   divalproex  250 mg Oral 2 times per day    melatonin  5 mg Oral Daily    aspirin  81 mg Oral BID    pantoprazole  40 mg Oral Daily    levETIRAcetam  500 mg Oral BID     Continuous Infusions:   IV infusion builder 100 mL/hr at 08/08/22 2306       CBC with Differential:    Lab Results   Component Value Date/Time    WBC 11.1 08/03/2022 06:30 PM    RBC 3.71 08/03/2022 06:30 PM    HGB 10.5 08/03/2022 06:30 PM    HCT 34.1 08/03/2022 06:30 PM     08/03/2022 06:30 PM    MCV 91.9 08/03/2022 06:30 PM    MCH 28.3 08/03/2022 06:30 PM    MCHC 30.8 08/03/2022 06:30 PM    RDW 16.5 08/03/2022 06:30 PM    LYMPHOPCT 14.1 08/03/2022 06:30 PM    MONOPCT 8.0 08/03/2022 06:30 PM    BASOPCT 0.5 08/03/2022 06:30 PM    MONOSABS 0.89 08/03/2022 06:30 PM    LYMPHSABS 1.56 08/03/2022 06:30 PM    EOSABS 0.01 08/03/2022 06:30 PM    BASOSABS 0.06 08/03/2022 06:30 PM     CMP:    Lab Results   Component Value Date/Time     08/08/2022 05:15 AM    K 4.3 08/08/2022 05:15 AM    K 5.0 08/03/2022 06:30 PM     08/08/2022 05:15 AM    CO2 16 08/08/2022 05:15 AM    BUN 34 08/08/2022 05:15 AM    CREATININE 1.8 08/08/2022 05:15 AM    GFRAA 33 08/08/2022 05:15 AM    LABGLOM 27 08/08/2022 05:15 AM    PROT 7.7 08/03/2022 06:30 PM    LABALBU 4.1 08/03/2022 06:30 PM    CALCIUM 8.6 08/08/2022 05:15 AM    BILITOT 0.2 08/03/2022 06:30 PM    ALKPHOS 82 08/03/2022 06:30 PM    AST 13 08/03/2022 06:30 PM    ALT 10 08/03/2022 06:30 PM PT/INR:    Lab Results   Component Value Date/Time    PROTIME 11.1 08/03/2022 06:15 PM    INR 1.0 08/03/2022 06:15 PM       Assessment:     Principal Problem:  JOSE A on CKD improving  Hypernatremia resolved   Dehydration  UTI  Schizophrenia  Bipolar  HTN       Plan:   Continue IV fluid per renal  ,monitor renal function

## 2022-08-09 NOTE — CARE COORDINATION
Patient continues on IV fluids, renal following. Seen by psych on 8/5, recommended Depakote and patient does not meet criteria for inpatient psych. Plan is for patient to return to [facility] and will only need a covid test on day of discharge. Ambulance form completed and envelope placed on the soft chart.     MAYRA Dillard, LSW [phone]

## 2022-08-09 NOTE — PROGRESS NOTES
Department of Internal Medicine  Nephrology Progress Note      Events reviewed. SUBJECTIVE:  We are following MsJoey Guillen for Hypernatremia and JOSE A. She remains with fragmented speech and dysarthria.     PHYSICAL EXAM:      Vitals:    VITALS:  /76   Pulse 60   Temp 98.3 °F (36.8 °C) (Oral)   Resp 18   Ht 5' 6\" (1.676 m)   Wt 186 lb (84.4 kg)   SpO2 100%   BMI 30.02 kg/m²   24HR INTAKE/OUTPUT:    Intake/Output Summary (Last 24 hours) at 8/9/2022 1218  Last data filed at 8/9/2022 1025  Gross per 24 hour   Intake 240 ml   Output 2675 ml   Net -2435 ml         Constitutional:  Awake, alert, NAD  HEENT:  PERRL, normocephalic, atraumatic  Respiratory:  Crackles auscultated posteriorly, normal respirations, on room air sat 95%  Cardiovascular/Edema:  RRR, S1/S2  Gastrointestinal:  abd rounded, soft, non tender, BS+  Neurologic:  Alert, confused  Skin:  Warm, dry, no rash or lesions  Other:      Scheduled Meds:   divalproex  250 mg Oral 2 times per day    melatonin  5 mg Oral Daily    aspirin  81 mg Oral BID    pantoprazole  40 mg Oral Daily    levETIRAcetam  500 mg Oral BID     Continuous Infusions:   IV infusion builder 100 mL/hr at 08/09/22 1215     PRN Meds:.acetaminophen, docusate sodium, hydrOXYzine pamoate    DATA:    CBC:   Lab Results   Component Value Date/Time    WBC 9.6 08/09/2022 05:05 AM    RBC 3.28 08/09/2022 05:05 AM    HGB 9.2 08/09/2022 05:05 AM    HCT 30.3 08/09/2022 05:05 AM    MCV 92.4 08/09/2022 05:05 AM    MCH 28.0 08/09/2022 05:05 AM    MCHC 30.4 08/09/2022 05:05 AM    RDW 16.1 08/09/2022 05:05 AM     08/09/2022 05:05 AM    MPV 11.9 08/09/2022 05:05 AM     CMP:    Lab Results   Component Value Date/Time     08/09/2022 05:05 AM    K 4.2 08/09/2022 05:05 AM    K 5.0 08/03/2022 06:30 PM     08/09/2022 05:05 AM    CO2 21 08/09/2022 05:05 AM    BUN 32 08/09/2022 05:05 AM    CREATININE 1.7 08/09/2022 05:05 AM    GFRAA 35 08/09/2022 05:05 AM    LABGLOM 29 08/09/2022 05:05 AM    GLUCOSE 102 08/09/2022 05:05 AM    PROT 7.7 08/03/2022 06:30 PM    LABALBU 4.1 08/03/2022 06:30 PM    CALCIUM 8.8 08/09/2022 05:05 AM    BILITOT 0.2 08/03/2022 06:30 PM    ALKPHOS 82 08/03/2022 06:30 PM    AST 13 08/03/2022 06:30 PM    ALT 10 08/03/2022 06:30 PM     Magnesium:    Lab Results   Component Value Date/Time    MG 1.9 08/09/2022 05:05 AM     Phosphorus:    Lab Results   Component Value Date/Time    PHOS 3.4 08/09/2022 05:05 AM     Radiology Review:      CT Head August 3, 2022   Diffuse atrophy likely age related   Findings compatible with small vessel ischemic changes. The findings were called to the ordering physician. CTA Head with contrast August 3, 2022   1. Estimated stenosis of the proximal right and left internal carotid   artery by NASCET criteria is not hemodynamically significant   2. Mild atherosclerotic disease . 3. No large vessel occlusion identified               This study was analyzed by the Viz. ai algorithm. MRI Brain WO Contrast August 3, 2022   1. Artifact on DWI sequence limits evaluation for an acute infarction. Within this limitation, no acute infarction is seen. 2.  No acute intracranial abnormality. 3. Somewhat disproportionately prominent volume loss in the superior   cerebellar hemispheres and the vermis, which may be the result of multiple   potential etiologies, including ethanol abuse, certain seizure medications   and neuro degenerative processes. Clinical correlation is needed. RECOMMENDATIONS:   Unavailable           Chest X-ray August 3,  2022   No acute cardiopulmonary abnormality. BRIEF SUMMARY OF INITIAL CONSULT:    Briefly Ms. Rodney Pisano is a 78 yro female with a PMH CKD stage 3, HTN, over active bladder, bipolar schizophrenia, and CVA who was admitted on August 8, 2022 after she presented to the ER for evaluation following a fall at her SNF accompanied by aphasia and dysarthria, baseline in alert and oriented with normal speech. CT head demonstrated diffuse atrophy likely age related, MRI brain showed no acute intracranial abnormality. Labs on admission were significant for sodium 157, potassium 5.0, chloride 119, bicarbonate 20, BUN 67, creatinine 2.9 mg/dl and urinalysis was positive for nitrites and reveal leukocytes. We are consulted for hypernatremia. Ibuprofen, rivastigmine, cariprazine, pantoprazole and hydroxyzine are medications she was taking prior to admission. Problems resolved. Borderline hyperkalemia, 2/2 decrease GFR from JOSE A. Hypercalcemia, possible d/t dehydration. Will work up by obtaining ionized calcium, vitamin D 25 level and PTH level. JOSE A stage II on CKD, volume responsive pre-renal JOSE A from prolonged poor oral intake vs ischemic ATN from acute drop in BP vs contrast associated JOSE A. Creatinine on  admission 2.9 mg/dl. FENa 2.1%. Renal function has improved to baseline, creatinine 1.7 mg/ld, to continue IV fluids for 24 more hrs. IMPRESSION/RECOMMENDATIONS:      CKD stage 3b with baseline creatinine about 1.7-1.9 mg/dl. Will need to obtain UACR/UPCR once UTI resolves. Hypernatremia, 2/2 decreased free water intake. Calculated free water deficit about 5.1L on admission. Na levels Improving, to continue IVF. Acidemia, venous pH 7.35 (estimated arterial pH 5.83), metabolic acidosis (hyperchloremic) with respiratory alkalosis, remains on bicarb drip.  --------------------------------------------------------------  UTI, UA demonstrates + nitites and moderate leukocytes, s/p ceftriaxone  Fall with accompanied aphasia and dysarthia, CT head demonstrates diffuse atrophy likely age related.    Bipolar schizophrenia, on vistaril, Vralyar and Exelon at home  Reported hx of HTN, not on antihypertensives  GERD, on PPI  Anemia, obtain iron studies in am  Nutrition, regular    Plan:    Change IVF to D5 with 50 mEq sodium bicarbonate at 100 cc/hr  Continue strict I&Os  Continue to monitor renal function daily  Continue to monitor potassium levels  Monitor bicarbonate levels  Obtain iron studies     Electronically signed by FISH Valenzuela CNP on 8/9/2022 at 12:18 PM

## 2022-08-10 LAB
ANION GAP SERPL CALCULATED.3IONS-SCNC: 10 MMOL/L (ref 7–16)
ANION GAP SERPL CALCULATED.3IONS-SCNC: 9 MMOL/L (ref 7–16)
BUN BLDV-MCNC: 30 MG/DL (ref 6–23)
BUN BLDV-MCNC: 33 MG/DL (ref 6–23)
CALCIUM SERPL-MCNC: 9 MG/DL (ref 8.6–10.2)
CALCIUM SERPL-MCNC: 9 MG/DL (ref 8.6–10.2)
CHLORIDE BLD-SCNC: 110 MMOL/L (ref 98–107)
CHLORIDE BLD-SCNC: 113 MMOL/L (ref 98–107)
CO2: 24 MMOL/L (ref 22–29)
CO2: 24 MMOL/L (ref 22–29)
CREAT SERPL-MCNC: 1.7 MG/DL (ref 0.5–1)
CREAT SERPL-MCNC: 1.7 MG/DL (ref 0.5–1)
FERRITIN: 167 NG/ML
FOLATE: 3.3 NG/ML (ref 4.8–24.2)
GFR AFRICAN AMERICAN: 35
GFR AFRICAN AMERICAN: 35
GFR NON-AFRICAN AMERICAN: 29 ML/MIN/1.73
GFR NON-AFRICAN AMERICAN: 29 ML/MIN/1.73
GLUCOSE BLD-MCNC: 101 MG/DL (ref 74–99)
GLUCOSE BLD-MCNC: 121 MG/DL (ref 74–99)
IRON SATURATION: 18 % (ref 15–50)
IRON: 31 MCG/DL (ref 37–145)
POTASSIUM SERPL-SCNC: 4.1 MMOL/L (ref 3.5–5)
POTASSIUM SERPL-SCNC: 4.3 MMOL/L (ref 3.5–5)
SARS-COV-2, NAAT: NOT DETECTED
SODIUM BLD-SCNC: 144 MMOL/L (ref 132–146)
SODIUM BLD-SCNC: 146 MMOL/L (ref 132–146)
TOTAL IRON BINDING CAPACITY: 172 MCG/DL (ref 250–450)
VITAMIN B-12: 516 PG/ML (ref 211–946)

## 2022-08-10 PROCEDURE — 87635 SARS-COV-2 COVID-19 AMP PRB: CPT

## 2022-08-10 PROCEDURE — 6370000000 HC RX 637 (ALT 250 FOR IP): Performed by: NURSE PRACTITIONER

## 2022-08-10 PROCEDURE — 99232 SBSQ HOSP IP/OBS MODERATE 35: CPT | Performed by: INTERNAL MEDICINE

## 2022-08-10 PROCEDURE — 2580000003 HC RX 258: Performed by: INTERNAL MEDICINE

## 2022-08-10 PROCEDURE — 6370000000 HC RX 637 (ALT 250 FOR IP): Performed by: INTERNAL MEDICINE

## 2022-08-10 PROCEDURE — 97130 THER IVNTJ EA ADDL 15 MIN: CPT

## 2022-08-10 PROCEDURE — 9900000074 HC THERAPEUTIC ACTIVITIES PER 15 MIN (SELF-PAY)

## 2022-08-10 PROCEDURE — 2500000003 HC RX 250 WO HCPCS: Performed by: INTERNAL MEDICINE

## 2022-08-10 PROCEDURE — 83550 IRON BINDING TEST: CPT

## 2022-08-10 PROCEDURE — 80048 BASIC METABOLIC PNL TOTAL CA: CPT

## 2022-08-10 PROCEDURE — 1200000000 HC SEMI PRIVATE

## 2022-08-10 PROCEDURE — 82746 ASSAY OF FOLIC ACID SERUM: CPT

## 2022-08-10 PROCEDURE — 83540 ASSAY OF IRON: CPT

## 2022-08-10 PROCEDURE — 97110 THERAPEUTIC EXERCISES: CPT

## 2022-08-10 PROCEDURE — 36415 COLL VENOUS BLD VENIPUNCTURE: CPT

## 2022-08-10 PROCEDURE — 82607 VITAMIN B-12: CPT

## 2022-08-10 PROCEDURE — 2140000000 HC CCU INTERMEDIATE R&B

## 2022-08-10 PROCEDURE — 82728 ASSAY OF FERRITIN: CPT

## 2022-08-10 RX ORDER — DIVALPROEX SODIUM 125 MG/1
250 CAPSULE, COATED PELLETS ORAL EVERY 12 HOURS SCHEDULED
Qty: 60 CAPSULE | Refills: 3 | Status: ON HOLD | OUTPATIENT
Start: 2022-08-10 | End: 2022-10-01

## 2022-08-10 RX ORDER — PANTOPRAZOLE SODIUM 40 MG/1
40 TABLET, DELAYED RELEASE ORAL DAILY
Qty: 30 TABLET | Refills: 3 | Status: SHIPPED | OUTPATIENT
Start: 2022-08-10

## 2022-08-10 RX ORDER — LEVETIRACETAM 500 MG/1
500 TABLET ORAL 2 TIMES DAILY
Qty: 60 TABLET | Refills: 3 | Status: SHIPPED | OUTPATIENT
Start: 2022-08-10

## 2022-08-10 RX ORDER — DEXTROSE MONOHYDRATE 50 MG/ML
INJECTION, SOLUTION INTRAVENOUS CONTINUOUS
Status: DISCONTINUED | OUTPATIENT
Start: 2022-08-10 | End: 2022-08-11 | Stop reason: HOSPADM

## 2022-08-10 RX ORDER — MECOBALAMIN 5000 MCG
5 TABLET,DISINTEGRATING ORAL DAILY
Qty: 6 TABLET | Refills: 0 | Status: SHIPPED | OUTPATIENT
Start: 2022-08-10 | End: 2022-08-16

## 2022-08-10 RX ORDER — FOLIC ACID 1 MG/1
1 TABLET ORAL DAILY
Status: DISCONTINUED | OUTPATIENT
Start: 2022-08-10 | End: 2022-08-11 | Stop reason: HOSPADM

## 2022-08-10 RX ADMIN — ASPIRIN 81 MG: 81 TABLET, COATED ORAL at 20:47

## 2022-08-10 RX ADMIN — FOLIC ACID 1 MG: 1 TABLET ORAL at 09:06

## 2022-08-10 RX ADMIN — DIVALPROEX SODIUM 250 MG: 125 CAPSULE, COATED PELLETS ORAL at 20:47

## 2022-08-10 RX ADMIN — DIVALPROEX SODIUM 250 MG: 125 CAPSULE, COATED PELLETS ORAL at 08:53

## 2022-08-10 RX ADMIN — LEVETIRACETAM 500 MG: 500 TABLET, FILM COATED ORAL at 08:54

## 2022-08-10 RX ADMIN — ACETAMINOPHEN 650 MG: 325 TABLET ORAL at 08:57

## 2022-08-10 RX ADMIN — ASPIRIN 81 MG: 81 TABLET, COATED ORAL at 08:54

## 2022-08-10 RX ADMIN — Medication 5 MG: at 18:51

## 2022-08-10 RX ADMIN — SODIUM BICARBONATE: 84 INJECTION, SOLUTION INTRAVENOUS at 00:22

## 2022-08-10 RX ADMIN — LEVETIRACETAM 500 MG: 500 TABLET, FILM COATED ORAL at 20:47

## 2022-08-10 RX ADMIN — PANTOPRAZOLE SODIUM 40 MG: 40 TABLET, DELAYED RELEASE ORAL at 08:54

## 2022-08-10 RX ADMIN — DEXTROSE MONOHYDRATE: 50 INJECTION, SOLUTION INTRAVENOUS at 09:15

## 2022-08-10 ASSESSMENT — PAIN DESCRIPTION - DESCRIPTORS: DESCRIPTORS: ACHING

## 2022-08-10 ASSESSMENT — PAIN SCALES - GENERAL: PAINLEVEL_OUTOF10: 3

## 2022-08-10 ASSESSMENT — PAIN DESCRIPTION - LOCATION: LOCATION: BACK

## 2022-08-10 ASSESSMENT — PAIN - FUNCTIONAL ASSESSMENT: PAIN_FUNCTIONAL_ASSESSMENT: ACTIVITIES ARE NOT PREVENTED

## 2022-08-10 ASSESSMENT — PAIN DESCRIPTION - ORIENTATION: ORIENTATION: LOWER

## 2022-08-10 NOTE — PROGRESS NOTES
LABGLOM 29 08/10/2022 05:41 AM    GLUCOSE 121 08/10/2022 05:41 AM    PROT 7.7 08/03/2022 06:30 PM    LABALBU 4.1 08/03/2022 06:30 PM    CALCIUM 9.0 08/10/2022 05:41 AM    BILITOT 0.2 08/03/2022 06:30 PM    ALKPHOS 82 08/03/2022 06:30 PM    AST 13 08/03/2022 06:30 PM    ALT 10 08/03/2022 06:30 PM     Magnesium:    Lab Results   Component Value Date/Time    MG 1.9 08/09/2022 05:05 AM     Phosphorus:    Lab Results   Component Value Date/Time    PHOS 3.4 08/09/2022 05:05 AM     Radiology Review:      CT Head August 3, 2022   Diffuse atrophy likely age related   Findings compatible with small vessel ischemic changes. The findings were called to the ordering physician. CTA Head with contrast August 3, 2022   1. Estimated stenosis of the proximal right and left internal carotid   artery by NASCET criteria is not hemodynamically significant   2. Mild atherosclerotic disease . 3. No large vessel occlusion identified               This study was analyzed by the Viz. ai algorithm. MRI Brain WO Contrast August 3, 2022   1. Artifact on DWI sequence limits evaluation for an acute infarction. Within this limitation, no acute infarction is seen. 2.  No acute intracranial abnormality. 3. Somewhat disproportionately prominent volume loss in the superior   cerebellar hemispheres and the vermis, which may be the result of multiple   potential etiologies, including ethanol abuse, certain seizure medications   and neuro degenerative processes. Clinical correlation is needed. RECOMMENDATIONS:   Unavailable           Chest X-ray August 3,  2022   No acute cardiopulmonary abnormality. BRIEF SUMMARY OF INITIAL CONSULT:    Briefly Ms. Majo Berg is a 78 yro female with a PMH CKD stage 3, HTN, over active bladder, bipolar schizophrenia, and CVA who was admitted on August 8, 2022 after she presented to the ER for evaluation following a fall at her SNF accompanied by aphasia and dysarthria, baseline in alert and oriented with normal speech. CT head demonstrated diffuse atrophy likely age related, MRI brain showed no acute intracranial abnormality. Labs on admission were significant for sodium 157, potassium 5.0, chloride 119, bicarbonate 20, BUN 67, creatinine 2.9 mg/dl and urinalysis was positive for nitrites and reveal leukocytes. We are consulted for hypernatremia. Ibuprofen, rivastigmine, cariprazine, pantoprazole and hydroxyzine are medications she was taking prior to admission. Problems resolved. Borderline hyperkalemia, 2/2 decrease GFR from JOSE A. Hypercalcemia, possible d/t dehydration. Will work up by obtaining ionized calcium, vitamin D 25 level and PTH level. JOSE A stage II on CKD, volume responsive pre-renal JOSE A from prolonged poor oral intake vs ischemic ATN from acute drop in BP vs contrast associated JOSE A. Creatinine on  admission 2.9 mg/dl. FENa 2.1%. Renal function has improved to baseline, creatinine 1.7 mg/ld, to continue IV fluids for 24 more hrs. IMPRESSION/RECOMMENDATIONS:      CKD stage 3b with baseline creatinine about 1.7-1.9 mg/dl. Will need to obtain UACR/UPCR once UTI resolves. Hypernatremia, 2/2 decreased free water intake. Calculated free water deficit about 5.1L on admission. Change fluids to D5W at 75 cc/hr, would like to see sodium about 143. Acidemia, venous pH 7.35 (estimated arterial pH 5.96), metabolic acidosis (hyperchloremic) with respiratory alkalosis. Bicarb drip has been discontinued. Folate deficiency (3.3) started on folic acid 1 mg po daily  --------------------------------------------------------------  UTI, UA demonstrates + nitites and moderate leukocytes, s/p ceftriaxone  Fall with accompanied aphasia and dysarthia, CT head demonstrates diffuse atrophy likely age related. Bipolar schizophrenia, on vistaril, Vralyar and Exelon at home  Reported hx of HTN, not on antihypertensives  GERD, on PPI  Anemia with folate deficiency.

## 2022-08-10 NOTE — PROGRESS NOTES
Patient's scheduled labs for 1400 still have not been drawn. Call placed to lab to follow up on reasoning for labs not drawn.  stated someone would be up. Patient lost IV access, was able to insert IV and draw labs from insertion site. BMP sent to lab.      Arleth Hernandez RN, BSN

## 2022-08-10 NOTE — CARE COORDINATION
Care Coordination: Attending had discharged as na was 145 and 146 last few days. Renal does not feel pt should discharge unless na <143 and possible consideration for peg tube in future for recurrent hyper natremia.  Will check with attending for possible downgrade    Kolton Kumari

## 2022-08-10 NOTE — CARE COORDINATION
Discharge order noted. Patient to discharge back to [facility]. Ambulance transport arranged via [facility] for 10:30A . Contacted patient's guardian, Alta Salgado to inform her of patient's discharge back to [place]; left a detailed message. Ambulance form completed and envelope placed on the soft chart. Nurse and facility informed.     Velvet Hitchcock MSW, LSW [phone]

## 2022-08-10 NOTE — PROGRESS NOTES
Admit Date: 8/3/2022    Subjective:     Awake comfortable back to her baseline   Dysarthria chronic     Objective:     Patient Vitals for the past 8 hrs:   BP Temp Temp src Pulse Resp SpO2   08/10/22 0340 134/69 98.1 °F (36.7 °C) Oral 66 14 96 %     I/O last 3 completed shifts: In: 600 [P.O.:600]  Out: 3825 [NBUNA:9357]  I/O this shift:  In: 1500 [P.O.:300; I.V.:1200]  Out: 1475 [Urine:1475]    HEENT: Normal  NECK: Thyroid normal. No carotid bruit. No lymphphadenopathy. CVS: RRR  RS: Clear. No wheeze. No rhonchi. Good airflow bilaterally. ABD: Soft. Non tender. No mass. Normal BS. EXT: No edema. Non tender. Pulses present. Skin intact.   NEURO: moving all extremities       Scheduled Meds:   divalproex  250 mg Oral 2 times per day    melatonin  5 mg Oral Daily    aspirin  81 mg Oral BID    pantoprazole  40 mg Oral Daily    levETIRAcetam  500 mg Oral BID     Continuous Infusions:   IV infusion builder 100 mL/hr at 08/10/22 0022       CBC with Differential:    Lab Results   Component Value Date/Time    WBC 9.6 08/09/2022 05:05 AM    RBC 3.28 08/09/2022 05:05 AM    HGB 9.2 08/09/2022 05:05 AM    HCT 30.3 08/09/2022 05:05 AM     08/09/2022 05:05 AM    MCV 92.4 08/09/2022 05:05 AM    MCH 28.0 08/09/2022 05:05 AM    MCHC 30.4 08/09/2022 05:05 AM    RDW 16.1 08/09/2022 05:05 AM    LYMPHOPCT 14.1 08/03/2022 06:30 PM    MONOPCT 8.0 08/03/2022 06:30 PM    BASOPCT 0.5 08/03/2022 06:30 PM    MONOSABS 0.89 08/03/2022 06:30 PM    LYMPHSABS 1.56 08/03/2022 06:30 PM    EOSABS 0.01 08/03/2022 06:30 PM    BASOSABS 0.06 08/03/2022 06:30 PM     CMP:    Lab Results   Component Value Date/Time     08/10/2022 05:41 AM    K 4.3 08/10/2022 05:41 AM    K 5.0 08/03/2022 06:30 PM     08/10/2022 05:41 AM    CO2 24 08/10/2022 05:41 AM    BUN 33 08/10/2022 05:41 AM    CREATININE 1.7 08/10/2022 05:41 AM    GFRAA 35 08/10/2022 05:41 AM    LABGLOM 29 08/10/2022 05:41 AM    PROT 7.7 08/03/2022 06:30 PM    LABALBU 4.1 08/03/2022 06:30 PM    CALCIUM 9.0 08/10/2022 05:41 AM    BILITOT 0.2 08/03/2022 06:30 PM    ALKPHOS 82 08/03/2022 06:30 PM    AST 13 08/03/2022 06:30 PM    ALT 10 08/03/2022 06:30 PM     PT/INR:    Lab Results   Component Value Date/Time    PROTIME 11.1 08/03/2022 06:15 PM    INR 1.0 08/03/2022 06:15 PM       Assessment:     Principal Problem:    JOSE A on CKD improving  Hypernatremia resolved   Dehydration  UTI  Schizophrenia  Bipolar  HTN   Iron deficiency anemia       Plan:   Stable ,start iron supplement ,transfer to [facility].

## 2022-08-10 NOTE — PROGRESS NOTES
Called Dr. Beatriz Smith office to discontinue discharge. Nephrology said patient's sodium and BNP are elevated and they are not ok with her discharging.  Have not receive call back

## 2022-08-11 VITALS
BODY MASS INDEX: 29.89 KG/M2 | RESPIRATION RATE: 16 BRPM | TEMPERATURE: 98.4 F | WEIGHT: 186 LBS | SYSTOLIC BLOOD PRESSURE: 121 MMHG | OXYGEN SATURATION: 99 % | HEIGHT: 66 IN | DIASTOLIC BLOOD PRESSURE: 70 MMHG | HEART RATE: 59 BPM

## 2022-08-11 LAB
ANION GAP SERPL CALCULATED.3IONS-SCNC: 8 MMOL/L (ref 7–16)
BUN BLDV-MCNC: 31 MG/DL (ref 6–23)
CALCIUM SERPL-MCNC: 8.9 MG/DL (ref 8.6–10.2)
CHLORIDE BLD-SCNC: 109 MMOL/L (ref 98–107)
CO2: 24 MMOL/L (ref 22–29)
CREAT SERPL-MCNC: 1.6 MG/DL (ref 0.5–1)
GFR AFRICAN AMERICAN: 38
GFR NON-AFRICAN AMERICAN: 31 ML/MIN/1.73
GLUCOSE BLD-MCNC: 106 MG/DL (ref 74–99)
POTASSIUM SERPL-SCNC: 4.3 MMOL/L (ref 3.5–5)
SODIUM BLD-SCNC: 141 MMOL/L (ref 132–146)

## 2022-08-11 PROCEDURE — 80048 BASIC METABOLIC PNL TOTAL CA: CPT

## 2022-08-11 PROCEDURE — 6370000000 HC RX 637 (ALT 250 FOR IP): Performed by: NURSE PRACTITIONER

## 2022-08-11 PROCEDURE — 2580000003 HC RX 258: Performed by: INTERNAL MEDICINE

## 2022-08-11 PROCEDURE — 97129 THER IVNTJ 1ST 15 MIN: CPT

## 2022-08-11 PROCEDURE — 6370000000 HC RX 637 (ALT 250 FOR IP): Performed by: INTERNAL MEDICINE

## 2022-08-11 PROCEDURE — 99239 HOSP IP/OBS DSCHRG MGMT >30: CPT | Performed by: INTERNAL MEDICINE

## 2022-08-11 PROCEDURE — 36415 COLL VENOUS BLD VENIPUNCTURE: CPT

## 2022-08-11 PROCEDURE — 97110 THERAPEUTIC EXERCISES: CPT

## 2022-08-11 RX ADMIN — PANTOPRAZOLE SODIUM 40 MG: 40 TABLET, DELAYED RELEASE ORAL at 09:35

## 2022-08-11 RX ADMIN — DEXTROSE MONOHYDRATE: 50 INJECTION, SOLUTION INTRAVENOUS at 02:36

## 2022-08-11 RX ADMIN — FOLIC ACID 1 MG: 1 TABLET ORAL at 09:35

## 2022-08-11 RX ADMIN — ASPIRIN 81 MG: 81 TABLET, COATED ORAL at 09:35

## 2022-08-11 RX ADMIN — DIVALPROEX SODIUM 250 MG: 125 CAPSULE, COATED PELLETS ORAL at 09:35

## 2022-08-11 RX ADMIN — LEVETIRACETAM 500 MG: 500 TABLET, FILM COATED ORAL at 09:35

## 2022-08-11 NOTE — PROGRESS NOTES
Admit Date: 8/3/2022    Subjective:     Discharge held per renal   Doing fine back to her baseline     Objective:     Patient Vitals for the past 8 hrs:   BP Temp Temp src Pulse Resp SpO2   08/11/22 0741 121/70 98.4 °F (36.9 °C) Oral 59 16 99 %   08/11/22 0230 107/64 97.9 °F (36.6 °C) Oral 79 14 99 %     I/O last 3 completed shifts: In: 3125.8 [P.O.:420; I.V.:2705.8]  Out: 2275 [Urine:2275]  No intake/output data recorded. HEENT: Normal  NECK: Thyroid normal. No carotid bruit. No lymphphadenopathy. CVS: RRR  RS: Clear. No wheeze. No rhonchi. Good airflow bilaterally. ABD: Soft. Non tender. No mass. Normal BS. EXT: No edema. Non tender. Pulses present. Skin intact.   NEURO: unchanged       Scheduled Meds:   folic acid  1 mg Oral Daily    divalproex  250 mg Oral 2 times per day    melatonin  5 mg Oral Daily    aspirin  81 mg Oral BID    pantoprazole  40 mg Oral Daily    levETIRAcetam  500 mg Oral BID     Continuous Infusions:   dextrose 75 mL/hr at 08/11/22 0523       CBC with Differential:    Lab Results   Component Value Date/Time    WBC 9.6 08/09/2022 05:05 AM    RBC 3.28 08/09/2022 05:05 AM    HGB 9.2 08/09/2022 05:05 AM    HCT 30.3 08/09/2022 05:05 AM     08/09/2022 05:05 AM    MCV 92.4 08/09/2022 05:05 AM    MCH 28.0 08/09/2022 05:05 AM    MCHC 30.4 08/09/2022 05:05 AM    RDW 16.1 08/09/2022 05:05 AM    LYMPHOPCT 14.1 08/03/2022 06:30 PM    MONOPCT 8.0 08/03/2022 06:30 PM    BASOPCT 0.5 08/03/2022 06:30 PM    MONOSABS 0.89 08/03/2022 06:30 PM    LYMPHSABS 1.56 08/03/2022 06:30 PM    EOSABS 0.01 08/03/2022 06:30 PM    BASOSABS 0.06 08/03/2022 06:30 PM     CMP:    Lab Results   Component Value Date/Time     08/11/2022 05:32 AM    K 4.3 08/11/2022 05:32 AM    K 5.0 08/03/2022 06:30 PM     08/11/2022 05:32 AM    CO2 24 08/11/2022 05:32 AM    BUN 31 08/11/2022 05:32 AM    CREATININE 1.6 08/11/2022 05:32 AM    GFRAA 38 08/11/2022 05:32 AM    LABGLOM 31 08/11/2022 05:32 AM    PROT 7.7 08/03/2022 06:30 PM    LABALBU 4.1 08/03/2022 06:30 PM    CALCIUM 8.9 08/11/2022 05:32 AM    BILITOT 0.2 08/03/2022 06:30 PM    ALKPHOS 82 08/03/2022 06:30 PM    AST 13 08/03/2022 06:30 PM    ALT 10 08/03/2022 06:30 PM     PT/INR:    Lab Results   Component Value Date/Time    PROTIME 11.1 08/03/2022 06:15 PM    INR 1.0 08/03/2022 06:15 PM       Assessment:     Principal Problem:   JOSE A on CKD   Hypernatremia resolved   Dehydration  UTI  Schizophrenia  Bipolar  HTN   Iron deficiency anemia       Plan:   Stable for discharge

## 2022-08-11 NOTE — PROGRESS NOTES
Department of Internal Medicine  Nephrology Progress Note      Events reviewed. SUBJECTIVE:  We are following Ms. Getachew Maria for Hypernatremia and JOSE A. She remains with fragmented speech and dysarthria.     PHYSICAL EXAM:      Vitals:    VITALS:  /70   Pulse 59   Temp 98.4 °F (36.9 °C) (Oral)   Resp 16   Ht 5' 6\" (1.676 m)   Wt 186 lb (84.4 kg)   SpO2 99%   BMI 30.02 kg/m²   24HR INTAKE/OUTPUT:    Intake/Output Summary (Last 24 hours) at 8/11/2022 0950  Last data filed at 8/11/2022 0523  Gross per 24 hour   Intake 1625.81 ml   Output --   Net 1625.81 ml         Constitutional:  Awake, alert, NAD  HEENT:  PERRL, normocephalic, atraumatic  Respiratory:  Crackles auscultated posteriorly, normal respirations, on room air sat 95%  Cardiovascular/Edema:  RRR, S1/S2  Gastrointestinal:  abd rounded, soft, non tender, BS+  Neurologic:  Alert, confused  Skin:  Warm, dry, no rash or lesions  Other: No edema    Scheduled Meds:   folic acid  1 mg Oral Daily    divalproex  250 mg Oral 2 times per day    melatonin  5 mg Oral Daily    aspirin  81 mg Oral BID    pantoprazole  40 mg Oral Daily    levETIRAcetam  500 mg Oral BID     Continuous Infusions:   dextrose Stopped (08/11/22 0935)     PRN Meds:.acetaminophen, docusate sodium, hydrOXYzine pamoate    DATA:    CBC:   Lab Results   Component Value Date/Time    WBC 9.6 08/09/2022 05:05 AM    RBC 3.28 08/09/2022 05:05 AM    HGB 9.2 08/09/2022 05:05 AM    HCT 30.3 08/09/2022 05:05 AM    MCV 92.4 08/09/2022 05:05 AM    MCH 28.0 08/09/2022 05:05 AM    MCHC 30.4 08/09/2022 05:05 AM    RDW 16.1 08/09/2022 05:05 AM     08/09/2022 05:05 AM    MPV 11.9 08/09/2022 05:05 AM     CMP:    Lab Results   Component Value Date/Time     08/11/2022 05:32 AM    K 4.3 08/11/2022 05:32 AM    K 5.0 08/03/2022 06:30 PM     08/11/2022 05:32 AM    CO2 24 08/11/2022 05:32 AM    BUN 31 08/11/2022 05:32 AM    CREATININE 1.6 08/11/2022 05:32 AM    GFRAA 38 08/11/2022 05:32 AM LABGLOM 31 08/11/2022 05:32 AM    GLUCOSE 106 08/11/2022 05:32 AM    PROT 7.7 08/03/2022 06:30 PM    LABALBU 4.1 08/03/2022 06:30 PM    CALCIUM 8.9 08/11/2022 05:32 AM    BILITOT 0.2 08/03/2022 06:30 PM    ALKPHOS 82 08/03/2022 06:30 PM    AST 13 08/03/2022 06:30 PM    ALT 10 08/03/2022 06:30 PM     Magnesium:    Lab Results   Component Value Date/Time    MG 1.9 08/09/2022 05:05 AM     Phosphorus:    Lab Results   Component Value Date/Time    PHOS 3.4 08/09/2022 05:05 AM     Radiology Review:      CT Head August 3, 2022   Diffuse atrophy likely age related   Findings compatible with small vessel ischemic changes. The findings were called to the ordering physician. CTA Head with contrast August 3, 2022   1. Estimated stenosis of the proximal right and left internal carotid   artery by NASCET criteria is not hemodynamically significant   2. Mild atherosclerotic disease . 3. No large vessel occlusion identified               This study was analyzed by the [employer] ai algorithm. MRI Brain WO Contrast August 3, 2022   1. Artifact on DWI sequence limits evaluation for an acute infarction. Within this limitation, no acute infarction is seen. 2.  No acute intracranial abnormality. 3. Somewhat disproportionately prominent volume loss in the superior   cerebellar hemispheres and the vermis, which may be the result of multiple   potential etiologies, including ethanol abuse, certain seizure medications   and neuro degenerative processes. Clinical correlation is needed. RECOMMENDATIONS:   Unavailable           Chest X-ray August 3,  2022   No acute cardiopulmonary abnormality. BRIEF SUMMARY OF INITIAL CONSULT:    Briefly Ms. Carlos Thomas is a 78 yro female with a PMH CKD stage 3, HTN, over active bladder, bipolar schizophrenia, and CVA who was admitted on August 8, 2022 after she presented to the ER for evaluation following a fall at her SNF accompanied by aphasia and dysarthria, baseline in alert and oriented with normal speech. CT head demonstrated diffuse atrophy likely age related, MRI brain showed no acute intracranial abnormality. Labs on admission were significant for sodium 157, potassium 5.0, chloride 119, bicarbonate 20, BUN 67, creatinine 2.9 mg/dl and urinalysis was positive for nitrites and reveal leukocytes. We are consulted for hypernatremia. Ibuprofen, rivastigmine, cariprazine, pantoprazole and hydroxyzine are medications she was taking prior to admission. Problems resolved. Borderline hyperkalemia, 2/2 decrease GFR from JOSE A. Hypercalcemia, possible d/t dehydration. Will work up by obtaining ionized calcium, vitamin D 25 level and PTH level. JOSE A stage II on CKD, volume responsive pre-renal JOSE A from prolonged poor oral intake vs ischemic ATN from acute drop in BP vs contrast associated JOSE A. Creatinine on  admission 2.9 mg/dl. FENa 2.1%. Renal function has improved to baseline, creatinine 1.7 mg/ld, to continue IV fluids for 24 more hrs. Acidemia, venous pH 7.35 (estimated arterial pH 8.91), metabolic acidosis (hyperchloremic) with respiratory alkalosis. Bicarb drip has been discontinued. UTI, UA demonstrates + nitites and moderate leukocytes, s/p ceftriaxone      IMPRESSION/RECOMMENDATIONS:      CKD stage 3b with baseline creatinine about 1.7-1.9 mg/dl. Renal function remains stable. Hypernatremia, 2/2 decreased free water intake. Calculated free water deficit about 5.1L on admission. Resolved, sodium levels down to 141. Folate deficiency (3.3) started on folic acid 1 mg po daily  --------------------------------------------------------------    S/p Fall with accompanied aphasia and dysarthia, CT head demonstrates diffuse atrophy likely age related. Bipolar schizophrenia, on vistaril, Vralyar and Exelon at home  Reported hx of HTN, not on antihypertensives  GERD, on PPI  Anemia with folate deficiency.  Started on supplementation  Nutrition,

## 2022-08-11 NOTE — PROGRESS NOTES
Nurse to nurse called to [employer] to Beatriceneptali Perry. Patient's IV and cardiac monitor removed and placed in appropriate storage per unit guidelines. Patient and guardian updated on discharge and plans for transport at 1430.      Homer Quiros RN, BSN

## 2022-08-11 NOTE — CARE COORDINATION
Patient cleared for discharge. Ambulance transport set up for 2:30P via [facility]. Contacted patient's legal guardian, Clarisa Washington but no answer; left detailed message regarding patient's discharge. Facility and nurse informed.     Hansel Chacon, MAYRA, LSW [phone]

## 2022-08-11 NOTE — PLAN OF CARE
Problem: Discharge Planning  Goal: Discharge to home or other facility with appropriate resources  8/11/2022 1146 by Juni Villegas RN  Outcome: Completed  8/10/2022 2155 by Valentina Montemayor RN  Outcome: Progressing     Problem: Pain  Goal: Verbalizes/displays adequate comfort level or baseline comfort level  8/11/2022 1146 by Juni Villegas RN  Outcome: Completed  8/10/2022 2155 by Valentina Montemayor RN  Outcome: Progressing     Problem: Skin/Tissue Integrity  Goal: Absence of new skin breakdown  Description: 1. Monitor for areas of redness and/or skin breakdown  2. Assess vascular access sites hourly  3. Every 4-6 hours minimum:  Change oxygen saturation probe site  4. Every 4-6 hours:  If on nasal continuous positive airway pressure, respiratory therapy assess nares and determine need for appliance change or resting period.   8/11/2022 1146 by Juni Villegas RN  Outcome: Completed  8/10/2022 2155 by Valentina Montemayor RN  Outcome: Progressing     Problem: Safety - Adult  Goal: Free from fall injury  8/11/2022 1146 by Juni Villegas RN  Outcome: Completed  8/10/2022 2155 by Valentina Montemayor RN  Outcome: Progressing     Problem: ABCDS Injury Assessment  Goal: Absence of physical injury  Outcome: Completed

## 2022-08-12 NOTE — DISCHARGE SUMMARY
Discharge Summary    Date: 8/12/2022  Patient Name: Andrea Laura    YOB: 1945     Age: 78 y.o. Admit Date: 8/3/2022  Discharge Date: 8/11/2022  Discharge Condition: [address]    Admission Diagnosis  Dysarthria [R47.1]; Hypernatremia [E87.0]; JOSE A (acute kidney injury) ([address] [N17.9]; Complicated UTI (urinary tract infection) [N39.0]      Discharge Diagnosis  Principal Problem:    Complicated UTI (urinary tract infection)  Active Problems:    Dysarthria    Delirium  Resolved Problems:    * No resolved hospital problems. [facility] Stay  Narrative of Hospital Course:  [facility] patient admitted from ER with AMS known bipolar schizophrenia and chronic speech problem was hypernatremic and had UTI seen by neurology ,psych and renal treated with IV fluid ATB responded slowly back to her baseline transferred back to NH     Consultants:  Jesus Kirkland TO IV TEAM    Surgeries/procedures Performed:      Treatments:            Discharge Plan/Disposition:  To [facility]    Hospital/Incidental Findings Requiring Follow Up:    Patient Instructions:    Diet: Regular Diet    Activity:Activity as Tolerated  For number of days (if applicable): Other Instructions:    Provider Follow-Up:   No follow-ups on file. Significant Diagnostic Studies:    Recent Labs:  Admission on 08/03/2022, Discharged on 08/11/2022  No results displayed because visit has over 200 results. ------------    Radiology last 7 days:  XR CHEST PORTABLE    Result Date: 8/7/2022  No radiographic evidence of pulmonary edema/CHF.        [unfilled]    Discharge Medications    Discharge Medication List as of 8/11/2022  2:05 PM    START taking these medications    divalproex (DEPAKOTE SPRINKLE) 125 MG capsule  Take 2 capsules by mouth in the morning and 2 capsules before bedtime. Do all this for 6 doses. , Disp-60 capsule, R-3  Normal    levETIRAcetam (KEPPRA) 500 MG tablet  Take 1 tablet by mouth in the morning and 1 tablet before bedtime. , Disp-60 tablet, R-3  Normal    melatonin 5 MG TBDP disintegrating tablet  Take 1 tablet by mouth in the morning for 6 doses. , Disp-6 tablet, R-0  Normal          Discharge Medication List as of 8/11/2022  2:05 PM    CONTINUE these medications which have CHANGED    pantoprazole (PROTONIX) 40 MG tablet  Take 1 tablet by mouth in the morning., Disp-30 tablet, R-3  Normal          Discharge Medication List as of 8/11/2022  2:05 PM    CONTINUE these medications which have NOT CHANGED    rivastigmine (EXELON) 13.3 MG/24HR  Place 1 patch onto the skin nightly  Historical Med    acetaminophen (ARTHRITIS PAIN RELIEF) 650 MG extended release tablet  Take 650 mg by mouth 2 times daily as needed for Pain DO NOT EXCEED 3 GRAMS MAX TYLENOL PER DAY  Historical Med    aspirin 81 MG EC tablet  Take 1 tablet by mouth 2 times daily, Disp-30 tablet, R-3          Discharge Medication List as of 8/11/2022  2:05 PM    STOP taking these medications    glycerin-hypromellose- (ARTIFICIAL TEARS) 0.2-0.2-1 % SOLN opthalmic solution  Comments:  Reason for Stopping:    benztropine (COGENTIN) 0.5 MG tablet  Comments:  Reason for Stopping:    ibuprofen (ADVIL;MOTRIN) 600 MG tablet  Comments:  Reason for Stopping:    cariprazine hcl (VRAYLAR) 1.5 MG capsule  Comments:  Reason for Stopping:    hydrOXYzine (VISTARIL) 50 MG capsule  Comments:  Reason for Stopping:    ketoconazole (NIZORAL) 2 % cream  Comments:  Reason for Stopping:    magnesium hydroxide (MILK OF MAGNESIA) 400 MG/5ML suspension  Comments:  Reason for Stopping:    aluminum & magnesium hydroxide-simethicone (MYLANTA) 200-200-20 MG/5ML SUSP suspension  Comments:  Reason for Stopping:    Hypromellose (NATURAL BALANCE TEARS OP)  Comments:  Reason for Stopping:    sodium chloride (V-R NASAL SPRAY SALINE) 0.65 % nasal spray  Comments:  Reason for Stopping:    docusate sodium (STOOL SOFTENER) 100 MG capsule  Comments:  Reason for Stopping:    lactase (LACTAID) 3000 UNITS tablet  Comments:  Reason for Stopping:          Time Spent on Discharge:  30 minutes were spent in patient examination, evaluation, counseling as well as medication reconciliation, prescriptions for required medications, discharge plan, and follow up.     Electronically signed by Alexandra Black MD on 8/12/22 at 6:21 AM EDT

## 2022-09-20 PROCEDURE — 99307 SBSQ NF CARE SF MDM 10: CPT | Performed by: INTERNAL MEDICINE

## 2022-09-21 ENCOUNTER — OUTSIDE SERVICES (OUTPATIENT)
Dept: PRIMARY CARE CLINIC | Age: 77
End: 2022-09-21
Payer: MEDICARE

## 2022-09-21 DIAGNOSIS — F33.3 SEVERE EPISODE OF RECURRENT MAJOR DEPRESSIVE DISORDER, WITH PSYCHOTIC FEATURES (HCC): ICD-10-CM

## 2022-09-21 DIAGNOSIS — R47.1 DYSARTHRIA: ICD-10-CM

## 2022-09-21 DIAGNOSIS — F20.1 DISORGANIZED SCHIZOPHRENIA (HCC): Primary | ICD-10-CM

## 2022-09-21 DIAGNOSIS — R41.0 DELIRIUM: ICD-10-CM

## 2022-10-01 ENCOUNTER — APPOINTMENT (OUTPATIENT)
Dept: GENERAL RADIOLOGY | Age: 77
DRG: 683 | End: 2022-10-01
Payer: MEDICARE

## 2022-10-01 ENCOUNTER — HOSPITAL ENCOUNTER (INPATIENT)
Age: 77
LOS: 4 days | Discharge: NURSING, REHAB OR LONG-TERM CARE | DRG: 683 | End: 2022-10-05
Attending: EMERGENCY MEDICINE | Admitting: INTERNAL MEDICINE
Payer: MEDICARE

## 2022-10-01 ENCOUNTER — APPOINTMENT (OUTPATIENT)
Dept: CT IMAGING | Age: 77
DRG: 683 | End: 2022-10-01
Payer: MEDICARE

## 2022-10-01 DIAGNOSIS — E87.0 HYPERNATREMIA: Primary | ICD-10-CM

## 2022-10-01 DIAGNOSIS — R29.90 STROKE-LIKE EPISODE: ICD-10-CM

## 2022-10-01 LAB
ALBUMIN SERPL-MCNC: 3.9 G/DL (ref 3.5–5.2)
ALP BLD-CCNC: 71 U/L (ref 35–104)
ALT SERPL-CCNC: 8 U/L (ref 0–32)
ANION GAP SERPL CALCULATED.3IONS-SCNC: 13 MMOL/L (ref 7–16)
APTT: 33.6 SEC (ref 24.5–35.1)
AST SERPL-CCNC: 15 U/L (ref 0–31)
BASOPHILS ABSOLUTE: 0.03 E9/L (ref 0–0.2)
BASOPHILS RELATIVE PERCENT: 0.4 % (ref 0–2)
BILIRUB SERPL-MCNC: 0.2 MG/DL (ref 0–1.2)
BILIRUBIN URINE: NEGATIVE
BLOOD, URINE: NEGATIVE
BUN BLDV-MCNC: 27 MG/DL (ref 6–23)
CALCIUM SERPL-MCNC: 10.3 MG/DL (ref 8.6–10.2)
CHLORIDE BLD-SCNC: 113 MMOL/L (ref 98–107)
CLARITY: CLEAR
CO2: 22 MMOL/L (ref 22–29)
COLOR: YELLOW
CREAT SERPL-MCNC: 1.6 MG/DL (ref 0.5–1)
EOSINOPHILS ABSOLUTE: 0.05 E9/L (ref 0.05–0.5)
EOSINOPHILS RELATIVE PERCENT: 0.7 % (ref 0–6)
GFR AFRICAN AMERICAN: 38
GFR NON-AFRICAN AMERICAN: 31 ML/MIN/1.73
GLUCOSE BLD-MCNC: 104 MG/DL (ref 74–99)
GLUCOSE URINE: NEGATIVE MG/DL
HCT VFR BLD CALC: 34.6 % (ref 34–48)
HEMOGLOBIN: 10.8 G/DL (ref 11.5–15.5)
IMMATURE GRANULOCYTES #: 0.02 E9/L
IMMATURE GRANULOCYTES %: 0.3 % (ref 0–5)
INR BLD: 1
KETONES, URINE: NEGATIVE MG/DL
LEUKOCYTE ESTERASE, URINE: NEGATIVE
LYMPHOCYTES ABSOLUTE: 1.7 E9/L (ref 1.5–4)
LYMPHOCYTES RELATIVE PERCENT: 22.2 % (ref 20–42)
MCH RBC QN AUTO: 29.1 PG (ref 26–35)
MCHC RBC AUTO-ENTMCNC: 31.2 % (ref 32–34.5)
MCV RBC AUTO: 93.3 FL (ref 80–99.9)
MONOCYTES ABSOLUTE: 0.41 E9/L (ref 0.1–0.95)
MONOCYTES RELATIVE PERCENT: 5.4 % (ref 2–12)
NEUTROPHILS ABSOLUTE: 5.45 E9/L (ref 1.8–7.3)
NEUTROPHILS RELATIVE PERCENT: 71 % (ref 43–80)
NITRITE, URINE: NEGATIVE
PDW BLD-RTO: 16.5 FL (ref 11.5–15)
PH UA: 7 (ref 5–9)
PLATELET # BLD: 209 E9/L (ref 130–450)
PMV BLD AUTO: 11.8 FL (ref 7–12)
POTASSIUM SERPL-SCNC: 5.2 MMOL/L (ref 3.5–5)
PROTEIN UA: NEGATIVE MG/DL
PROTHROMBIN TIME: 10.9 SEC (ref 9.3–12.4)
RBC # BLD: 3.71 E12/L (ref 3.5–5.5)
SODIUM BLD-SCNC: 148 MMOL/L (ref 132–146)
SPECIFIC GRAVITY UA: 1.01 (ref 1–1.03)
TOTAL PROTEIN: 7 G/DL (ref 6.4–8.3)
TROPONIN, HIGH SENSITIVITY: 27 NG/L (ref 0–9)
TROPONIN, HIGH SENSITIVITY: 28 NG/L (ref 0–9)
UROBILINOGEN, URINE: 0.2 E.U./DL
WBC # BLD: 7.7 E9/L (ref 4.5–11.5)

## 2022-10-01 PROCEDURE — 70496 CT ANGIOGRAPHY HEAD: CPT | Performed by: RADIOLOGY

## 2022-10-01 PROCEDURE — 71045 X-RAY EXAM CHEST 1 VIEW: CPT

## 2022-10-01 PROCEDURE — 0042T CT BRAIN PERFUSION: CPT | Performed by: RADIOLOGY

## 2022-10-01 PROCEDURE — 85025 COMPLETE CBC W/AUTO DIFF WBC: CPT

## 2022-10-01 PROCEDURE — 99285 EMERGENCY DEPT VISIT HI MDM: CPT

## 2022-10-01 PROCEDURE — 93005 ELECTROCARDIOGRAM TRACING: CPT | Performed by: EMERGENCY MEDICINE

## 2022-10-01 PROCEDURE — 85730 THROMBOPLASTIN TIME PARTIAL: CPT

## 2022-10-01 PROCEDURE — 2580000003 HC RX 258: Performed by: INTERNAL MEDICINE

## 2022-10-01 PROCEDURE — 36415 COLL VENOUS BLD VENIPUNCTURE: CPT

## 2022-10-01 PROCEDURE — 80164 ASSAY DIPROPYLACETIC ACD TOT: CPT

## 2022-10-01 PROCEDURE — 81003 URINALYSIS AUTO W/O SCOPE: CPT

## 2022-10-01 PROCEDURE — 84484 ASSAY OF TROPONIN QUANT: CPT

## 2022-10-01 PROCEDURE — 2060000000 HC ICU INTERMEDIATE R&B

## 2022-10-01 PROCEDURE — 70498 CT ANGIOGRAPHY NECK: CPT | Performed by: RADIOLOGY

## 2022-10-01 PROCEDURE — 70498 CT ANGIOGRAPHY NECK: CPT

## 2022-10-01 PROCEDURE — 70450 CT HEAD/BRAIN W/O DYE: CPT

## 2022-10-01 PROCEDURE — 6360000004 HC RX CONTRAST MEDICATION: Performed by: RADIOLOGY

## 2022-10-01 PROCEDURE — 0042T CT BRAIN PERFUSION: CPT

## 2022-10-01 PROCEDURE — 6360000002 HC RX W HCPCS: Performed by: INTERNAL MEDICINE

## 2022-10-01 PROCEDURE — 85610 PROTHROMBIN TIME: CPT

## 2022-10-01 PROCEDURE — 80053 COMPREHEN METABOLIC PANEL: CPT

## 2022-10-01 PROCEDURE — 70450 CT HEAD/BRAIN W/O DYE: CPT | Performed by: RADIOLOGY

## 2022-10-01 PROCEDURE — 99449 NTRPROF PH1/NTRNET/EHR 31/>: CPT | Performed by: PSYCHIATRY & NEUROLOGY

## 2022-10-01 PROCEDURE — 6370000000 HC RX 637 (ALT 250 FOR IP): Performed by: INTERNAL MEDICINE

## 2022-10-01 PROCEDURE — 70496 CT ANGIOGRAPHY HEAD: CPT

## 2022-10-01 RX ORDER — SODIUM CHLORIDE 9 MG/ML
INJECTION, SOLUTION INTRAVENOUS CONTINUOUS
Status: DISCONTINUED | OUTPATIENT
Start: 2022-10-01 | End: 2022-10-01

## 2022-10-01 RX ORDER — ASPIRIN 81 MG/1
81 TABLET ORAL 2 TIMES DAILY
Status: DISCONTINUED | OUTPATIENT
Start: 2022-10-01 | End: 2022-10-05 | Stop reason: HOSPADM

## 2022-10-01 RX ORDER — ACETAMINOPHEN 500 MG
500 TABLET ORAL EVERY 4 HOURS PRN
Status: DISCONTINUED | OUTPATIENT
Start: 2022-10-01 | End: 2022-10-05 | Stop reason: HOSPADM

## 2022-10-01 RX ORDER — ENOXAPARIN SODIUM 100 MG/ML
40 INJECTION SUBCUTANEOUS DAILY
Status: DISCONTINUED | OUTPATIENT
Start: 2022-10-01 | End: 2022-10-03

## 2022-10-01 RX ORDER — MECOBALAMIN 5000 MCG
5 TABLET,DISINTEGRATING ORAL NIGHTLY
Status: DISCONTINUED | OUTPATIENT
Start: 2022-10-01 | End: 2022-10-05 | Stop reason: HOSPADM

## 2022-10-01 RX ORDER — RIVASTIGMINE 13.3 MG/24H
1 PATCH, EXTENDED RELEASE TRANSDERMAL NIGHTLY
Status: DISCONTINUED | OUTPATIENT
Start: 2022-10-01 | End: 2022-10-05 | Stop reason: HOSPADM

## 2022-10-01 RX ORDER — DEXTROSE AND SODIUM CHLORIDE 5; .45 G/100ML; G/100ML
INJECTION, SOLUTION INTRAVENOUS CONTINUOUS
Status: DISCONTINUED | OUTPATIENT
Start: 2022-10-01 | End: 2022-10-05 | Stop reason: HOSPADM

## 2022-10-01 RX ORDER — PANTOPRAZOLE SODIUM 40 MG/1
40 TABLET, DELAYED RELEASE ORAL DAILY
Status: DISCONTINUED | OUTPATIENT
Start: 2022-10-01 | End: 2022-10-05 | Stop reason: HOSPADM

## 2022-10-01 RX ORDER — LEVETIRACETAM 500 MG/1
500 TABLET ORAL 2 TIMES DAILY
Status: DISCONTINUED | OUTPATIENT
Start: 2022-10-01 | End: 2022-10-05 | Stop reason: HOSPADM

## 2022-10-01 RX ORDER — DIVALPROEX SODIUM 125 MG/1
250 CAPSULE, COATED PELLETS ORAL EVERY 12 HOURS SCHEDULED
Status: DISCONTINUED | OUTPATIENT
Start: 2022-10-01 | End: 2022-10-05 | Stop reason: HOSPADM

## 2022-10-01 RX ADMIN — ASPIRIN 81 MG: 81 TABLET, COATED ORAL at 21:13

## 2022-10-01 RX ADMIN — IOPAMIDOL 100 ML: 755 INJECTION, SOLUTION INTRAVENOUS at 13:58

## 2022-10-01 RX ADMIN — DEXTROSE AND SODIUM CHLORIDE: 5; 450 INJECTION, SOLUTION INTRAVENOUS at 16:59

## 2022-10-01 RX ADMIN — DEXTROSE AND SODIUM CHLORIDE: 5; 450 INJECTION, SOLUTION INTRAVENOUS at 21:12

## 2022-10-01 RX ADMIN — LEVETIRACETAM 500 MG: 500 TABLET, FILM COATED ORAL at 21:13

## 2022-10-01 ASSESSMENT — PAIN - FUNCTIONAL ASSESSMENT: PAIN_FUNCTIONAL_ASSESSMENT: NONE - DENIES PAIN

## 2022-10-01 NOTE — PROGRESS NOTES
Home medication list reviewed using facility paperwork. Depakote sprinkles not listed on medication list from facility.  Home med list updated accordingly

## 2022-10-01 NOTE — ED PROVIDER NOTES
Department of Emergency Medicine   ED  Provider Note  Admit Date/RoomTime: 10/1/2022  1:44 PM  ED Room:           History of Present Illness:  10/1/22, Time: 3:36 PM EDT  Chief Complaint   Patient presents with    Cerebrovascular Accident     From ECF, slurred speech and left sided weakness. LKW 1.5 hours PTA. Karla Medeiros is a 78 y.o. female presenting to the ED for possible stroke. Last well-known was 1.5 hours prior to arrival.  She was found at her nursing home with dysarthria and left-sided weakness. It was reported that these are new findings for her. She does have history of previous stroke, unclear what her baseline is. Symptoms came on gradually, nothing makes it better or worse, no associated pain. She is on no anticoagulation other than aspirin. EMS reports that she had severe left-sided weakness and expressive aphasia in route. It is much improved since arrival.  Denies fever, chills, nausea, vomiting, neck pain or paresthesia, lethargy, or any other symptoms or complaints. Review of Systems:   Pertinent positives and negatives are stated within HPI, all other systems reviewed and are negative.        --------------------------------------------- PAST HISTORY ---------------------------------------------  Past Medical History:  has a past medical history of Arthritis, Ataxia, Atrophy of muscle, Bipolar 1 disorder, mixed, moderate ([address] Cerebrovascular disease, Coordination problem, Hyperlipidemia, Hypertension, Overactive bladder, Paranoid schizophrenia ([address] and Thyroid disease. Past Surgical History:  has a past surgical history that includes Tonsillectomy and adenoidectomy; Endometrial ablation; Dilation and curettage of uterus; bladder suspension; Induced ; and Dilation and curettage of uterus (N/A, 2021). Social History:  reports that she has never smoked.  She has never used smokeless tobacco. She reports that she does not drink alcohol and does not use drugs. Family History: Family history is unknown by patient. . Unless otherwise noted, family history is non contributory    The patients home medications have been reviewed. Allergies: Mysoline [primidone] and Tofranil [imipramine hcl]        ---------------------------------------------------PHYSICAL EXAM--------------------------------------    Constitutional/General: Alert and oriented x3  Head: Normocephalic and atraumatic  Eyes: PERRL, EOMI, sclera non icteric  Mouth: Oropharynx clear, handling secretions, no trismus, no asymmetry of the posterior oropharynx or uvular edema  Neck: Supple, full ROM, no stridor, no meningeal signs  Respiratory: Lungs clear to auscultation bilaterally, no wheezes, rales, or rhonchi. Not in respiratory distress  Cardiovascular:  Regular rate. Regular rhythm. 2+ distal pulses. Equal extremity pulses. Chest: No chest wall tenderness  GI:  Abdomen Soft, Non tender, Non distended. No rebound, guarding, or rigidity. No pulsatile masses. Musculoskeletal: Moves all extremities x 4. Warm and well perfused, no clubbing, cyanosis, or edema. Capillary refill <3 seconds  Integument: skin warm and dry. No rashes. Neurologic: GCS 15, see NIH  Psychiatric: Normal Affect      NIH Stroke Scale/Score at time of initial evaluation:  1A: Level of Consciousness 0 - alert; keenly responsive   1B: Ask Month and Age 0 - answers both questions correctly   1C:  Tell Patient To Open and Close Eyes, then Hand  Squeeze 0 - performs both tasks correctly   2: Test Horizontal Extraocular Movements 0 - normal   3: Test Visual Fields 0 - no visual loss   4: Test Facial Palsy 0 - normal symmetric movement   5A: Test Left Arm Motor Drift 0 - no drift, limb holds 90 (or 45) degrees for full 10 seconds   5B: Test Right Arm Motor Drift 0 - no drift, limb holds 90 (or 45) degrees for full 10 seconds   6A: Test Left Leg Motor Drift 0 - no drift; leg holds 30 degree position for full 5 seconds 6B: Test Right Leg Motor Drift 0 - no drift; leg holds 30 degree position for full 5 seconds   7: Test Limb Ataxia   (FNF/Heel-Shin) 0 - absent   8: Test Sensation 0 - normal; no sensory loss   9: Test Language/Aphasia 1 - mild to moderate aphasia; some obvious loss of fluency or facility of comprehension without significant limitation on ideas expressed or form of expression. Reduction of speech and/or comprehension, however, makes conversation about provided materials difficult or impossible. For example, in conversation about provided materials, examiner can identify picture or naming card content from patient's response. 10: Test Dysarthria 1 - mild to moderate, patient slurs at least some words and at worst, can be understood with some difficulty   11: Test Extinction/Inattention 0 - no abnormality   Total Score: 2   10/1/22 at 229 PM             -------------------------------------------------- RESULTS -------------------------------------------------  I have personally reviewed all laboratory and imaging results for this patient. Results are listed below.      LABS: (Lab results interpreted by me)  Results for orders placed or performed during the hospital encounter of 10/01/22   CBC with Auto Differential   Result Value Ref Range    WBC 7.7 4.5 - 11.5 E9/L    RBC 3.71 3.50 - 5.50 E12/L    Hemoglobin 10.8 (L) 11.5 - 15.5 g/dL    Hematocrit 34.6 34.0 - 48.0 %    MCV 93.3 80.0 - 99.9 fL    MCH 29.1 26.0 - 35.0 pg    MCHC 31.2 (L) 32.0 - 34.5 %    RDW 16.5 (H) 11.5 - 15.0 fL    Platelets 306 057 - 861 E9/L    MPV 11.8 7.0 - 12.0 fL    Neutrophils % 71.0 43.0 - 80.0 %    Immature Granulocytes % 0.3 0.0 - 5.0 %    Lymphocytes % 22.2 20.0 - 42.0 %    Monocytes % 5.4 2.0 - 12.0 %    Eosinophils % 0.7 0.0 - 6.0 %    Basophils % 0.4 0.0 - 2.0 %    Neutrophils Absolute 5.45 1.80 - 7.30 E9/L    Immature Granulocytes # 0.02 E9/L    Lymphocytes Absolute 1.70 1.50 - 4.00 E9/L    Monocytes Absolute 0.41 0.10 - 0.95 E9/L    Eosinophils Absolute 0.05 0.05 - 0.50 E9/L    Basophils Absolute 0.03 0.00 - 0.20 E9/L   Comprehensive Metabolic Panel   Result Value Ref Range    Sodium 148 (H) 132 - 146 mmol/L    Potassium 5.2 (H) 3.5 - 5.0 mmol/L    Chloride 113 (H) 98 - 107 mmol/L    CO2 22 22 - 29 mmol/L    Anion Gap 13 7 - 16 mmol/L    Glucose 104 (H) 74 - 99 mg/dL    BUN 27 (H) 6 - 23 mg/dL    Creatinine 1.6 (H) 0.5 - 1.0 mg/dL    GFR Non-African American 31 >=60 mL/min/1.73    GFR African American 38     Calcium 10.3 (H) 8.6 - 10.2 mg/dL    Total Protein 7.0 6.4 - 8.3 g/dL    Albumin 3.9 3.5 - 5.2 g/dL    Total Bilirubin 0.2 0.0 - 1.2 mg/dL    Alkaline Phosphatase 71 35 - 104 U/L    ALT 8 0 - 32 U/L    AST 15 0 - 31 U/L   Protime-INR   Result Value Ref Range    Protime 10.9 9.3 - 12.4 sec    INR 1.0    Troponin   Result Value Ref Range    Troponin, High Sensitivity 28 (H) 0 - 9 ng/L   APTT   Result Value Ref Range    aPTT 33.6 24.5 - 35.1 sec   ,       RADIOLOGY:  Interpreted by Radiologist unless otherwise specified  XR CHEST PORTABLE   Final Result   No acute cardiopulmonary disease. CT HEAD WO CONTRAST   Final Result   Diffuse atrophy likely age related   Findings compatible with small vessel ischemic changes. CT BRAIN PERFUSION   Final Result      No significant ischemic penumbra identified      This study was analyzed by the [employer]. ai algorithm. Findings were called to the ordering physician            CTA HEAD W CONTRAST   Final Result   1. Estimated stenosis of the proximal right and left internal carotid   artery by NASCET criteria is not hemodynamically significant   2. Mild atherosclerotic disease . 3. No large vessel occlusion identified            This study was analyzed by the [employer]. ai algorithm. CTA NECK W CONTRAST   Final Result   1. Estimated stenosis of the proximal right and left internal carotid   artery by NASCET criteria is not hemodynamically significant   2.  Mild atherosclerotic disease . 3. No large vessel occlusion identified            This study was analyzed by the [employer] ai algorithm. EKG Interpretation  Interpreted by emergency department physician, Dr. Arabella Victoria     Sinus rhythm, rate 57, no STEMI, no ischemic change        ------------------------- NURSING NOTES AND VITALS REVIEWED ---------------------------   The nursing notes within the ED encounter and vital signs as below have been reviewed by myself  BP (!) 152/63   Pulse (!) 42   Temp 98.6 °F (37 °C)   Resp 18   SpO2 98%     Oxygen Saturation Interpretation: Normal    The patients available past medical records and past encounters were reviewed. ------------------------------ ED COURSE/MEDICAL DECISION MAKING----------------------  Medications   0.9 % sodium chloride infusion (has no administration in time range)   iopamidol (ISOVUE-370) 76 % injection 100 mL (100 mLs IntraVENous Given 10/1/22 0926)           The cardiac monitor revealed Sinus with a heart rate in the 80s as interpreted by me. The cardiac monitor was ordered secondary to the patient's cva and to monitor the patient for dysrhythmia. CPT [id number]         Medical Decision Making:    Stroke alert called. Imaging reviewed. Chart reviewed, patient does have some baseline expressive aphasia, she does have broken speech pattern. Discussed with neurology, after their review of the CT scan, and chart, they do not feel the patient is a TNKase candidate. No LVO. Labs and imaging reviewed otherwise. Patient will be admitted. Please note that the withdrawal or failure to initiate urgent interventions for this patient would likely result in a life threatening deterioration or permanent disability. Accordingly this patient received 30 minutes of critical care time, excluding separately billable procedures. Counseling:    The emergency provider has spoken with the patient and discussed todays results, in addition to providing specific details for the plan of care and counseling regarding the diagnosis and prognosis. Questions are answered at this time and they are agreeable with the plan.       --------------------------------- IMPRESSION AND DISPOSITION ---------------------------------    IMPRESSION  1. Hypernatremia    2. Stroke-like episode        DISPOSITION  Disposition: Admit to telemetry  Patient condition is stable        NOTE: This report was transcribed using voice recognition software.  Every effort was made to ensure accuracy; however, inadvertent computerized transcription errors may be present        Sotero Bobo MD  10/01/22 9977

## 2022-10-01 NOTE — VIRTUAL HEALTH
Consults  Patient Location:  [address] [place] [address] Emergency Department    Provider Location (City/State):   [facility], [place], [place] Jennifer Maynard    This virtual visit was conducted via interactive/real-time audio/video. [facility] Stroke and Vascular Neurology Consult for  [address] Stroke Alert through [address] @ 13:54  10/1/2022 1:58 PM  Pt Name: Althea Garcia  MRN: [id number]  YOB: 1945  Date of evaluation: 10/1/2022  Primary Care Physician: Inocencio Pisano MD  Reason for Evaluation: Stroke evaluation with Phone Consult, Discussion and Review of imaging    Althea Garcia is a 78 y.o. female with hx of sz, dysarthria, schizophrenia, UTI, came in for worsening dysarthria, left sided weakness    LKW: noon? ??  NIH:  6    Allergies  is allergic to mysoline [primidone] and tofranil [imipramine hcl]. Medications  Prior to Admission medications    Medication Sig Start Date End Date Taking? Authorizing Provider   divalproex (DEPAKOTE SPRINKLE) 125 MG capsule Take 2 capsules by mouth in the morning and 2 capsules before bedtime. Do all this for 6 doses. 8/10/22 8/13/22  Inocencio Pisano MD   levETIRAcetam (KEPPRA) 500 MG tablet Take 1 tablet by mouth in the morning and 1 tablet before bedtime. 8/10/22   Inocencio Pisano MD   melatonin 5 MG TBDP disintegrating tablet Take 1 tablet by mouth in the morning for 6 doses. 8/10/22 8/16/22  Inocencio Pisano MD   pantoprazole (PROTONIX) 40 MG tablet Take 1 tablet by mouth in the morning. 8/10/22   Inocencio Pisano MD   rivastigmine (EXELON) 13.3 MG/24HR Place 1 patch onto the skin nightly    Historical Provider, MD   benztropine (COGENTIN) 0.5 MG tablet Take 0.5 mg by mouth in the morning and 0.5 mg before bedtime.   8/10/22  Historical Provider, MD   acetaminophen (ARTHRITIS PAIN RELIEF) 650 MG extended release tablet Take 650 mg by mouth 2 times daily as needed for Pain DO NOT EXCEED 3 GRAMS MAX TYLENOL PER DAY    Historical Provider, MD   sodium chloride (V-R NASAL SPRAY SALINE) 0.65 % nasal spray 1 spray by Nasal route 2 times daily as needed (dryness)  8/10/22  Historical Provider, MD   aspirin 81 MG EC tablet Take 1 tablet by mouth 2 times daily 6/10/16   Melven Shone, MD    Scheduled Meds:  Continuous Infusions:  PRN Meds:. iopamidol  Past Medical History   has a past medical history of Arthritis, Ataxia, Atrophy of muscle, Bipolar 1 disorder, mixed, moderate ([address] Cerebrovascular disease, Coordination problem, Hyperlipidemia, Hypertension, Overactive bladder, Paranoid schizophrenia ([address] and Thyroid disease. Social History  Social History     Socioeconomic History    Marital status: Single     Spouse name: Not on file    Number of children: Not on file    Years of education: Not on file    Highest education level: Not on file   Occupational History    Not on file   Tobacco Use    Smoking status: Never    Smokeless tobacco: Never   Vaping Use    Vaping Use: Never used   Substance and Sexual Activity    Alcohol use: No    Drug use: No    Sexual activity: Not on file   Other Topics Concern    Not on file   Social History Narrative    Not on file     Social Determinants of Health     Financial Resource Strain: Not on file   Food Insecurity: Not on file   Transportation Needs: Not on file   Physical Activity: Not on file   Stress: Not on file   Social Connections: Not on file   Intimate Partner Violence: Not on file   Housing Stability: Not on file     Family History      Family history unknown: Yes       OBJECTIVE  There were no vitals taken for this visit. Pre-Morbid mRS: 0    Imaging:  Images were personally reviewed with LIZ. AI used to review images including:  CT brain without contrast: nothing acute  CTA imaging: no LVO  CTP: no mismatch    Assessment    Ann Combs is a 78 y.o. female with hx of sz, dysarthria, schizophrenia, UTI, came in for worsening dysarthria, left sided weakness      Recommendations:  NIH 6  Recommend Inpatient Neurology Consult for further assessment and evaluation    consider . [id number]  No LVO, not a TNK candidate  Likely metabolic, vs worsening baseline? Discussed with ED Physician        This is a Phone Consult, I have not seen the patient face to face, the telemedicine device was not utilized.     Earl Marsh MD   Stroke, Neurocritical Care And/or [address] [place] [address] Stroke [id number]  Electronically signed 10/1/2022 at 1:58 PM

## 2022-10-01 NOTE — ED NOTES
To ED via EMS from [facility], sent in concerned for stroke symptoms. Reported per EMS, left sided weakness and slurred speech. They reported symptoms started about 1.5hrs pta or at ~1215 today. On arrival pt was assessed by Dr. Kermit Armas, pt was + for aphasia and dysarthria. Pt reported her left sided weakness has been ongoing for 2 wks. She states her speech issues has been present x \"forever\". Per nursing home paperwork, she had a CVA in 2003, she has documented hx of having cognitive communication deficit, dysarthria and anarthria following the CVA. In addition, pt has psychiatric hx of paranoid schizophrenia and bipolar. Pt's NIH on arrival was 2. For the dysarthria and aphasia. Weakness was not appreciated. After pt was assessed at the door she was taken directly to CT on the EMS stretcher at 1342. She returned back to ER to 22 at 1400. While the pt was in CT, Dr. Kermit Armas did speak with nursing staff at the nursing home and they reported the pt's speech is baseline, they felt she had new weakness.           Vel Moran RN  10/01/22 0177

## 2022-10-01 NOTE — LETTER
[address] Medicaid  CERTIFICATION OF NECESSITY  FOR TRANSPORTATION   BY WHEELCHAIR VAN     Individual Information   1. Name: Luke Tess 2. [facility] Medicaid Billing Number:    3. Address: [place] [address]      Transportation Provider Information   4. Provider Name:    5. [place] Medicaid Provider Number:  National Provider Identifier (NPI):      Certification  7. Criteria:  By signing this document, the practitioner certifies that two statements are true:  A. This individual must be accompanied by a mobility-related assistive device from the point of pick-up to the point of drop-off. B. Transport of this individual by standard passenger vehicle or common carrier is precluded or contraindicated. 8. Period Beginning Date:10/      /2022   9. Length  [x] Not more than 1 day(s)  [] One Year     Additional Information Relevant to Certification   10. Comments or Explanations, If Necessary or Appropriate   LUE weakness, ataxic       Certifying Practitioner Information   11. Name of Practitioner: Julio Carlson M.D.    12. [facility] Provider Number, If Applicable: *** 13. National Provider Identifier (NPI): ***     Signature Information   14. Date of Signature: 10/      /2022 15. Name of Person Signing: Hattie Key   12. Signature and Professional Designation: ***     [facility] [id number]  Rev. 2015          Luke Pulido : 1945 (76 yrs)    Address: [facility]* Sex: Female   City: [place] [address]         Marital Status: Single   Employer: RETIRED         Rastafari: Gaurav Olsen   Primary Care Provider: Nisa Almanzar MD         Primary Phone: [phone]   EMERGENCY CONTACT   Contact Name Legal Guardian? Relationship to Patient Home Phone Work Phone   1. Chioma Bai  2.  Gina Flores    Legal Guardian  Other [phone]              GUARANTOR            Guarantor: Luke Pulido     : 1945   Address: [facility]* Sex: Female     [place] [address]     Relation to Patient: Self       Home Phone: [phone]   Guarantor ID: [id number]       Work Phone:     Guarantor Employer: RETIRED         Status: RETIRED      COVERAGE        PRIMARY INSURANCE   Payor: MEDICARE Plan: MEDICARE PART A AND B   Payor Address: [place] [address],  [place] [address], [facility] [address]       Group Number:   Insurance Type: INDEMNITY   Subscriber Name: Noble Ernst : 1945   Subscriber ID: [id number] Pat. Rel. to Sub: Self   SECONDARY INSURANCE   Payor: Austin Interiano* Plan: [place]*   Payor Address:  [address] [facility]          Group Number: [id number] Insurance Type: INDEMNITY   Subscriber Name: Noble Ernst : 1945   Subscriber ID: [id number] Pat.  Rel. to Sub: SELF       #

## 2022-10-01 NOTE — ED NOTES
Time Neurologist [email or web address]      Time Stroke Alert called:1342  :    Time Neurologist called back:1348 Dr Kendrick Haynes    X-Ray/CT notified:      Evelina Hernandez  10/01/22 [address]  10/01/22 7329

## 2022-10-02 LAB
ALBUMIN SERPL-MCNC: 3.2 G/DL (ref 3.5–5.2)
ALP BLD-CCNC: 53 U/L (ref 35–104)
ALT SERPL-CCNC: 7 U/L (ref 0–32)
ANION GAP SERPL CALCULATED.3IONS-SCNC: 7 MMOL/L (ref 7–16)
AST SERPL-CCNC: 13 U/L (ref 0–31)
BASOPHILS ABSOLUTE: 0.04 E9/L (ref 0–0.2)
BASOPHILS RELATIVE PERCENT: 0.5 % (ref 0–2)
BILIRUB SERPL-MCNC: <0.2 MG/DL (ref 0–1.2)
BUN BLDV-MCNC: 28 MG/DL (ref 6–23)
CALCIUM SERPL-MCNC: 9.1 MG/DL (ref 8.6–10.2)
CHLORIDE BLD-SCNC: 111 MMOL/L (ref 98–107)
CO2: 23 MMOL/L (ref 22–29)
CREAT SERPL-MCNC: 1.8 MG/DL (ref 0.5–1)
EOSINOPHILS ABSOLUTE: 0.22 E9/L (ref 0.05–0.5)
EOSINOPHILS RELATIVE PERCENT: 3 % (ref 0–6)
GFR AFRICAN AMERICAN: 33
GFR NON-AFRICAN AMERICAN: 27 ML/MIN/1.73
GLUCOSE BLD-MCNC: 83 MG/DL (ref 74–99)
HCT VFR BLD CALC: 28.5 % (ref 34–48)
HEMOGLOBIN: 8.5 G/DL (ref 11.5–15.5)
IMMATURE GRANULOCYTES #: 0.03 E9/L
IMMATURE GRANULOCYTES %: 0.4 % (ref 0–5)
LYMPHOCYTES ABSOLUTE: 3.02 E9/L (ref 1.5–4)
LYMPHOCYTES RELATIVE PERCENT: 40.9 % (ref 20–42)
MCH RBC QN AUTO: 28 PG (ref 26–35)
MCHC RBC AUTO-ENTMCNC: 29.8 % (ref 32–34.5)
MCV RBC AUTO: 93.8 FL (ref 80–99.9)
METER GLUCOSE: 145 MG/DL (ref 74–99)
MONOCYTES ABSOLUTE: 0.57 E9/L (ref 0.1–0.95)
MONOCYTES RELATIVE PERCENT: 7.7 % (ref 2–12)
NEUTROPHILS ABSOLUTE: 3.5 E9/L (ref 1.8–7.3)
NEUTROPHILS RELATIVE PERCENT: 47.5 % (ref 43–80)
PDW BLD-RTO: 16.9 FL (ref 11.5–15)
PLATELET # BLD: 176 E9/L (ref 130–450)
PMV BLD AUTO: 12.2 FL (ref 7–12)
POTASSIUM SERPL-SCNC: 4.7 MMOL/L (ref 3.5–5)
RBC # BLD: 3.04 E12/L (ref 3.5–5.5)
SODIUM BLD-SCNC: 141 MMOL/L (ref 132–146)
TOTAL PROTEIN: 5.7 G/DL (ref 6.4–8.3)
VALPROIC ACID LEVEL: 3 MCG/ML (ref 50–100)
WBC # BLD: 7.4 E9/L (ref 4.5–11.5)

## 2022-10-02 PROCEDURE — 97530 THERAPEUTIC ACTIVITIES: CPT

## 2022-10-02 PROCEDURE — 85025 COMPLETE CBC W/AUTO DIFF WBC: CPT

## 2022-10-02 PROCEDURE — 2580000003 HC RX 258: Performed by: INTERNAL MEDICINE

## 2022-10-02 PROCEDURE — 2060000000 HC ICU INTERMEDIATE R&B

## 2022-10-02 PROCEDURE — 6360000002 HC RX W HCPCS: Performed by: INTERNAL MEDICINE

## 2022-10-02 PROCEDURE — 36415 COLL VENOUS BLD VENIPUNCTURE: CPT

## 2022-10-02 PROCEDURE — 97165 OT EVAL LOW COMPLEX 30 MIN: CPT

## 2022-10-02 PROCEDURE — 6370000000 HC RX 637 (ALT 250 FOR IP): Performed by: INTERNAL MEDICINE

## 2022-10-02 PROCEDURE — 80053 COMPREHEN METABOLIC PANEL: CPT

## 2022-10-02 PROCEDURE — 82962 GLUCOSE BLOOD TEST: CPT

## 2022-10-02 PROCEDURE — 97535 SELF CARE MNGMENT TRAINING: CPT

## 2022-10-02 RX ADMIN — ASPIRIN 81 MG: 81 TABLET, COATED ORAL at 09:32

## 2022-10-02 RX ADMIN — DEXTROSE AND SODIUM CHLORIDE: 5; 450 INJECTION, SOLUTION INTRAVENOUS at 18:03

## 2022-10-02 RX ADMIN — LEVETIRACETAM 500 MG: 500 TABLET, FILM COATED ORAL at 09:37

## 2022-10-02 RX ADMIN — PANTOPRAZOLE SODIUM 40 MG: 40 TABLET, DELAYED RELEASE ORAL at 09:37

## 2022-10-02 ASSESSMENT — PAIN SCALES - GENERAL: PAINLEVEL_OUTOF10: 2

## 2022-10-02 NOTE — PROGRESS NOTES
Occupational Therapy  OCCUPATIONAL THERAPY INITIAL EVALUATION       [address] [address] [address]       BGZZ:06/3/1557                                                  Patient Name: Zainab Becerril  MRN: [id number]  : 1945  Room: [address] [place] [address]    Evaluating OT: Armida Olivo, OTR/L #[id number]    Referring Provider[de-identified]  Sarah Paredes MD    Specific Provider Orders/Date: OT evaluation and treatment 10/2/22    Diagnosis:  Hypernatremia [E87.0]  Stroke-like episode [R29.90]      Pertinent Medical History:  has a past medical history of Arthritis, Ataxia, Atrophy of muscle, Bipolar 1 disorder, mixed, moderate ([address] Cerebrovascular disease, Coordination problem, Hyperlipidemia, Hypertension, Overactive bladder, Paranoid schizophrenia ([address] and Thyroid disease.        Precautions:  Fall Risk, bed alarm, TAPS, incontinence of urine, L hemiparesis,    Assessment of current deficits   [x] Functional mobility   [x]ADLs  [x] Strength               [x]Cognition   [x] Functional transfers   [] IADLs         [x] Safety Awareness   [x]Endurance   [x] Fine Coordination              [x] Balance      [] Vision/perception   []Sensation    [x]Gross Motor Coordination  [x] ROM  [] Delirium                   [x] Motor Control     OT PLAN OF CARE   OT POC based on physician orders, patient diagnosis and results of clinical assessment    Frequency/Duration  2-5 days/wk for 2 weeks PRN   Specific OT Treatment to include:   * Instruction/training on adapted ADL techniques and AE recommendations to increase functional independence within precautions       * Functional transfer/mobility training/DME recommendations for increased independence, safety, and fall prevention  * Patient/Family education to increase follow through with safety techniques and functional independence  * Cognitive retraining/development of therapeutic activities to improve problem solving, judgement, memory, and attention for increased safety/participation in ADL/IADL tasks  * Therapeutic exercise to improve motor endurance, ROM, and functional strength for ADLs/functional transfers  * Therapeutic activities to facilitate/challenge dynamic balance, stand tolerance for increased safety and independence with ADLs  * Therapeutic activities to facilitate gross/fine motor skills for increased independence with ADLs  * Neuro-muscular re-education: facilitation of righting/equilibrium reactions, midline orientation, scapular stability/mobility, normalization of muscle tone, and facilitation of volitional active controled movement  * Positioning to improve skin integrity, interaction with environment and functional independence    Modified Ulices Scale   Score     Description  0             No symptoms  1             No significant disability despite symptoms  2             Slight disability; able to look after own affairs  3             Moderate disability; able to ambulate without assist/ requires assist with ADLs  4             Moderate/Severe disability;requires assist to ambulate/assist with ADLs  5             Severe disability;bedridden/incontinent   6               Score:   4    Recommended Adaptive Equipment:  TBD     Home Living:  Pt lives in [address] [place] [address]  Prior Level of Function: questionable historian, Pt reported that she had recent onset of weakness to L side as of about 3 weeks ago. Prior to 3 weeks pt stated she was fairly indep with dressing and toileting   assist  with IADLs; she stated she mostly used the w/c at her facility. She said she was indep with functional transfers.       Pain Level: none    Cognition: A&O: 4/4;  slurred/slow speech,   Follows multi step directions: good    Memory:  fair    Sequencing:  fair    Problem solving:  poor+   Judgement/safety:  poor    Functional Assessment:   AM-PAC Daily Activity Raw Score:      Initial Eval Status  Date: 10/2/22 Treatment Status  Date: STG=LTG  Time frame: 10-14 days   Feeding Minimal Assist   Requires upright positioning, spillage of food and beverage  Modified [place]    Grooming Moderate Assist   Supervision    UB Dressing Moderate Assist   Supervision    LB Dressing Maximal Assist   Pt attempted to georges socks while in bed but needed assist to complete d/t longer toe nails  Stand by Assist    Bathing Max Assist  Cues for sequencing, assist for limited reach and impaired sitting balance    Minimal Assist    Toileting Dependent   Incontinence of urine,dependent hygiene  Moderate Assist    Bed Mobility  Supine to sit: Moderate Assist   Sit to supine: Moderate Assist   Supine to sit: Stand by Assist   Sit to supine: Stand by Assist    Functional Transfers Sit to stand: Moderate Assist   Stand to sit: Moderate Assist   Stand pivot: NT   Stand by Assist    Functional Mobility Moderate Assist  with ww  2-3 side steps  Stand by Assist  with ww  Short distance   Balance Sitting: Minimal Assist - unsteady seated EOB, flexed posture     Standing: Moderate Assist with ww, flexed posture  Sitting: Modified [place]       Standing: Stand by Assist    Activity Tolerance Fair  Pt appears motivated  good   Visual/  Perceptual Glasses: yes  Perception: NT, appears grossly intact     WNL     BUE  ROM/Strength/  Fine motor Coordination Hand dominance: R    RUE: ROM WFL     Strength: grossly 3+/5      Strength: fair     Coordination:  fair    LUE: ROM diminished in shoulder, elbow, wrist and fingers, all isolated AROM     Strength: grossly 3-/5      Strength: poor     Coordination: poor  Increase LUE AROM to [facility]  Increase LUE strength 1/3 muscle grade,  Increase LUE coordination to [facility] for automatic use during ADL tasks. Hearing: WFL   Sensation:  No c/o numbness or tingling   Tone: impaired L side   Edema:  None noted    Comments:   RN cleared patient for OT. Upon arrival, patient was in bed.  Pt reported her weakness to L side is recent as of 3 weeks ago. .  Therapist facilitated and instructed pt on adapted  techniques & compensatory strategies to improve safety and independence with basic ADLs, bed mobility,  functional transfers & functional mobility  to allow pt to achieve highest level of independence and safely. Patient presents with weakness to LUE, decreased sitting/standing balance,  decreased  ADLs,  bed mobility,  functional transfers, and functional mobility . At end of session, patient in bed with call light and phone within reach, all lines and tubes intact. Pt would benefit from continued skilled OT to increase safety and independence with completion of ADL tasks and functional mobility for improved quality of life. Treatment: OT treatment provided this date includes: ADL-  Instruction/training on safety and adapted techniques for completion of bating,dressing, toileting tasks. Mobility-  Instruction/training on safety and improved independence with bed mobility , functional transfers , and functional mobility with ww. Therapist facilitated and provided cues for body alignment and hand/feet placement. Sitting EOB x 20 minutes to improve dynamic sitting balance and activity tolerance during ADLs. Neuromuscular Reeducation to facilitate balance/righting reactions for increased function with ADLs tasks:    Neuromuscular Facilitation of L UE functional movement/ROM. [place] Course motor dexterity    Cognitive retraining -  Cues for safety during functional transfers, ADLs, ,Mod cues for sequencing, problem solving during ADLs   Skilled positioning/alignment-  Proper Positioning/Alignment in bed with use of TAPS         Rehab Potential: Good  for established goals     Patient / Family Goal:  Not stated    Patient and/or family were instructed on functional diagnosis, prognosis/goals and OT plan of care. Demonstrated fair+ understanding.      Eval Complexity: Low    Time In: 2:45  Time Out: 3:33  Total Treatment Time: 32 minutes    Min Units   OT Eval Low 67729  x     OT Eval Medium 56425      OT Eval High 19604       OT Re-Eval [id number]       Therapeutic Ex 55387       Therapeutic Activities 34961  15 1   ADL/Self Care 00012  17 1   Orthotic Management 02093       Neuro Re-Ed 91050       Non-Billable Time          Evaluation Time includes thorough review of current medical information, gathering information on past medical history/social history and prior level of function, completion of standardized testing/informal observation of tasks, assessment of data and education on plan of care and goals.             [place], [place] #21425

## 2022-10-03 LAB
ALBUMIN SERPL-MCNC: 3 G/DL (ref 3.5–5.2)
ALP BLD-CCNC: 51 U/L (ref 35–104)
ALT SERPL-CCNC: 7 U/L (ref 0–32)
ANION GAP SERPL CALCULATED.3IONS-SCNC: 13 MMOL/L (ref 7–16)
AST SERPL-CCNC: 12 U/L (ref 0–31)
BACTERIA: ABNORMAL /HPF
BASOPHILS ABSOLUTE: 0.06 E9/L (ref 0–0.2)
BASOPHILS RELATIVE PERCENT: 0.9 % (ref 0–2)
BILIRUB SERPL-MCNC: <0.2 MG/DL (ref 0–1.2)
BILIRUBIN URINE: NEGATIVE
BLOOD, URINE: NEGATIVE
BUN BLDV-MCNC: 27 MG/DL (ref 6–23)
CALCIUM SERPL-MCNC: 9.1 MG/DL (ref 8.6–10.2)
CHLORIDE BLD-SCNC: 115 MMOL/L (ref 98–107)
CLARITY: CLEAR
CO2: 19 MMOL/L (ref 22–29)
COLOR: YELLOW
CREAT SERPL-MCNC: 1.9 MG/DL (ref 0.5–1)
EKG ATRIAL RATE: 57 BPM
EKG P AXIS: 47 DEGREES
EKG P-R INTERVAL: 138 MS
EKG Q-T INTERVAL: 416 MS
EKG QRS DURATION: 102 MS
EKG QTC CALCULATION (BAZETT): 404 MS
EKG R AXIS: -30 DEGREES
EKG T AXIS: 67 DEGREES
EKG VENTRICULAR RATE: 57 BPM
EOSINOPHILS ABSOLUTE: 0.21 E9/L (ref 0.05–0.5)
EOSINOPHILS RELATIVE PERCENT: 3.3 % (ref 0–6)
GFR AFRICAN AMERICAN: 31
GFR NON-AFRICAN AMERICAN: 26 ML/MIN/1.73
GLUCOSE BLD-MCNC: 85 MG/DL (ref 74–99)
GLUCOSE URINE: NEGATIVE MG/DL
HCT VFR BLD CALC: 29.4 % (ref 34–48)
HEMOGLOBIN: 8.8 G/DL (ref 11.5–15.5)
IMMATURE GRANULOCYTES #: 0.02 E9/L
IMMATURE GRANULOCYTES %: 0.3 % (ref 0–5)
KETONES, URINE: NEGATIVE MG/DL
LEUKOCYTE ESTERASE, URINE: ABNORMAL
LYMPHOCYTES ABSOLUTE: 2.52 E9/L (ref 1.5–4)
LYMPHOCYTES RELATIVE PERCENT: 39.1 % (ref 20–42)
MCH RBC QN AUTO: 28.9 PG (ref 26–35)
MCHC RBC AUTO-ENTMCNC: 29.9 % (ref 32–34.5)
MCV RBC AUTO: 96.7 FL (ref 80–99.9)
METER GLUCOSE: 97 MG/DL (ref 74–99)
MONOCYTES ABSOLUTE: 0.52 E9/L (ref 0.1–0.95)
MONOCYTES RELATIVE PERCENT: 8.1 % (ref 2–12)
NEUTROPHILS ABSOLUTE: 3.12 E9/L (ref 1.8–7.3)
NEUTROPHILS RELATIVE PERCENT: 48.3 % (ref 43–80)
NITRITE, URINE: POSITIVE
PDW BLD-RTO: 16.9 FL (ref 11.5–15)
PH UA: 6 (ref 5–9)
PLATELET # BLD: 177 E9/L (ref 130–450)
PMV BLD AUTO: 12 FL (ref 7–12)
POTASSIUM SERPL-SCNC: 4.7 MMOL/L (ref 3.5–5)
PROTEIN UA: NEGATIVE MG/DL
RBC # BLD: 3.04 E12/L (ref 3.5–5.5)
RBC UA: ABNORMAL /HPF (ref 0–2)
SODIUM BLD-SCNC: 147 MMOL/L (ref 132–146)
SPECIFIC GRAVITY UA: <=1.005 (ref 1–1.03)
TOTAL PROTEIN: 5.3 G/DL (ref 6.4–8.3)
TSH SERPL DL<=0.05 MIU/L-ACNC: 0.74 UIU/ML (ref 0.27–4.2)
UROBILINOGEN, URINE: 0.2 E.U./DL
WBC # BLD: 6.5 E9/L (ref 4.5–11.5)
WBC UA: ABNORMAL /HPF (ref 0–5)

## 2022-10-03 PROCEDURE — 81001 URINALYSIS AUTO W/SCOPE: CPT

## 2022-10-03 PROCEDURE — 2580000003 HC RX 258: Performed by: INTERNAL MEDICINE

## 2022-10-03 PROCEDURE — 6370000000 HC RX 637 (ALT 250 FOR IP): Performed by: INTERNAL MEDICINE

## 2022-10-03 PROCEDURE — 87186 SC STD MICRODIL/AGAR DIL: CPT

## 2022-10-03 PROCEDURE — 82962 GLUCOSE BLOOD TEST: CPT

## 2022-10-03 PROCEDURE — 97161 PT EVAL LOW COMPLEX 20 MIN: CPT

## 2022-10-03 PROCEDURE — 87088 URINE BACTERIA CULTURE: CPT

## 2022-10-03 PROCEDURE — 99233 SBSQ HOSP IP/OBS HIGH 50: CPT | Performed by: INTERNAL MEDICINE

## 2022-10-03 PROCEDURE — 6360000002 HC RX W HCPCS: Performed by: INTERNAL MEDICINE

## 2022-10-03 PROCEDURE — 85025 COMPLETE CBC W/AUTO DIFF WBC: CPT

## 2022-10-03 PROCEDURE — 36415 COLL VENOUS BLD VENIPUNCTURE: CPT

## 2022-10-03 PROCEDURE — 80053 COMPREHEN METABOLIC PANEL: CPT

## 2022-10-03 PROCEDURE — 87077 CULTURE AEROBIC IDENTIFY: CPT

## 2022-10-03 PROCEDURE — 2060000000 HC ICU INTERMEDIATE R&B

## 2022-10-03 PROCEDURE — 84443 ASSAY THYROID STIM HORMONE: CPT

## 2022-10-03 PROCEDURE — 97530 THERAPEUTIC ACTIVITIES: CPT

## 2022-10-03 RX ORDER — ENOXAPARIN SODIUM 100 MG/ML
30 INJECTION SUBCUTANEOUS DAILY
Status: DISCONTINUED | OUTPATIENT
Start: 2022-10-04 | End: 2022-10-05 | Stop reason: HOSPADM

## 2022-10-03 RX ADMIN — CEFTRIAXONE 1000 MG: 1 INJECTION, POWDER, FOR SOLUTION INTRAMUSCULAR; INTRAVENOUS at 15:43

## 2022-10-03 RX ADMIN — ASPIRIN 81 MG: 81 TABLET, COATED ORAL at 08:22

## 2022-10-03 RX ADMIN — DEXTROSE AND SODIUM CHLORIDE: 5; 450 INJECTION, SOLUTION INTRAVENOUS at 04:54

## 2022-10-03 RX ADMIN — LEVETIRACETAM 500 MG: 500 TABLET, FILM COATED ORAL at 20:34

## 2022-10-03 RX ADMIN — ACETAMINOPHEN 500 MG: 500 TABLET ORAL at 22:28

## 2022-10-03 RX ADMIN — LEVETIRACETAM 500 MG: 500 TABLET, FILM COATED ORAL at 08:22

## 2022-10-03 RX ADMIN — DIVALPROEX SODIUM 250 MG: 125 CAPSULE, COATED PELLETS ORAL at 20:34

## 2022-10-03 RX ADMIN — DEXTROSE AND SODIUM CHLORIDE: 5; 450 INJECTION, SOLUTION INTRAVENOUS at 14:34

## 2022-10-03 RX ADMIN — CARIPRAZINE 1.5 MG: 1.5 CAPSULE, GELATIN COATED ORAL at 20:34

## 2022-10-03 RX ADMIN — Medication 5 MG: at 20:34

## 2022-10-03 RX ADMIN — ASPIRIN 81 MG: 81 TABLET, COATED ORAL at 20:34

## 2022-10-03 ASSESSMENT — PAIN SCALES - GENERAL
PAINLEVEL_OUTOF10: 0
PAINLEVEL_OUTOF10: 0

## 2022-10-03 NOTE — PROGRESS NOTES
Admit Date: 10/1/2022    Subjective:     Week end event reviewed  Awake warming blanket refusing medication     Objective:     No data found. I/O last 3 completed shifts: In: 240 [P.O.:240]  Out: -   I/O this shift:  In: -   Out: 600 [Urine:600]    HEENT: Normal  NECK: Thyroid normal. No carotid bruit. No lymphphadenopathy. CVS: RRR  RS: Clear. No wheeze. No rhonchi. Good airflow bilaterally. ABD: Soft. Non tender. No mass. Normal BS.obese   EXT: No edema. Non tender. Pulses present.    NEURO: no focal deficit ,dysarthria       Scheduled Meds:   aspirin  81 mg Oral BID    cariprazine hcl  1.5 mg Oral Nightly    divalproex  250 mg Oral 2 times per day    levETIRAcetam  500 mg Oral BID    melatonin  5 mg Oral Nightly    pantoprazole  40 mg Oral Daily    rivastigmine  1 patch TransDERmal Nightly    enoxaparin  40 mg SubCUTAneous Daily     Continuous Infusions:   dextrose 5 % and 0.45 % NaCl 100 mL/hr at 10/03/22 0454       CBC with Differential:    Lab Results   Component Value Date/Time    WBC 6.5 10/03/2022 05:43 AM    RBC 3.04 10/03/2022 05:43 AM    HGB 8.8 10/03/2022 05:43 AM    HCT 29.4 10/03/2022 05:43 AM     10/03/2022 05:43 AM    MCV 96.7 10/03/2022 05:43 AM    MCH 28.9 10/03/2022 05:43 AM    MCHC 29.9 10/03/2022 05:43 AM    RDW 16.9 10/03/2022 05:43 AM    LYMPHOPCT 39.1 10/03/2022 05:43 AM    MONOPCT 8.1 10/03/2022 05:43 AM    BASOPCT 0.9 10/03/2022 05:43 AM    MONOSABS 0.52 10/03/2022 05:43 AM    LYMPHSABS 2.52 10/03/2022 05:43 AM    EOSABS 0.21 10/03/2022 05:43 AM    BASOSABS 0.06 10/03/2022 05:43 AM     CMP:    Lab Results   Component Value Date/Time     10/02/2022 04:57 AM    K 4.7 10/02/2022 04:57 AM    K 5.0 08/03/2022 06:30 PM     10/02/2022 04:57 AM    CO2 23 10/02/2022 04:57 AM    BUN 28 10/02/2022 04:57 AM    CREATININE 1.8 10/02/2022 04:57 AM    GFRAA 33 10/02/2022 04:57 AM    LABGLOM 27 10/02/2022 04:57 AM    PROT 5.7 10/02/2022 04:57 AM    LABALBU 3.2 10/02/2022 04:57 AM CALCIUM 9.1 10/02/2022 04:57 AM    BILITOT <0.2 10/02/2022 04:57 AM    ALKPHOS 53 10/02/2022 04:57 AM    AST 13 10/02/2022 04:57 AM    ALT 7 10/02/2022 04:57 AM     PT/INR:    Lab Results   Component Value Date/Time    PROTIME 10.9 10/01/2022 01:50 PM    INR 1.0 10/01/2022 01:50 PM     Lab.  This Am pending     Assessment:     Principal Problem:    Hypernatremia    JOSE A    Schizophrenia     Dysarthria     Hypothermia     Plan:   Continue IV fluid ,monitor renal function , check TSH

## 2022-10-03 NOTE — PROGRESS NOTES
Physical Therapy  Physical Therapy Initial Assessment     Name: Zainab Becerril  : 1945  MRN: [id number]      Date of Service: 10/3/2022    Evaluating PT:  Priyank Uribe, PT, DPT [id number]    Room #:  6431/4707-K  Diagnosis:  Hypernatremia [E87.0]  Stroke-like episode [R29.90]  PMHx/PSHx:    Past Medical History:   Diagnosis Date    Arthritis     hips    Ataxia     Atrophy of muscle     Bipolar 1 disorder, mixed, moderate ([facility] [address]     Cerebrovascular disease     Coordination problem     Hyperlipidemia     Hypertension     Overactive bladder     Paranoid schizophrenia ([facility] [address]     Thyroid disease      Procedure/Surgery:  None  Precautions:  Falls, bed alarm, dysarthria, LUE weakness, ataxic  Equipment Needs:  TBD    SUBJECTIVE:    Pt was admitted from [facility]. Pt was not active with PT. Pt used wheelchair for mobility and reported completing stand pivot transfers independently. OBJECTIVE:   Initial Evaluation  Date: 10/3/22 Treatment Short Term/ Long Term   Goals   AM-PAC 6 Clicks 52/65     Was pt agreeable to Eval/treatment? Yes     Does pt have pain?  Back pain due to laying in bed - no rating given     Bed Mobility  Rolling: NT  Supine to sit: ModA  Sit to supine: ModA  Scooting: Hector  SBA   Transfers Sit to stand: ModA  Stand to sit: ModA  Stand pivot: NT  SBA with [employer]   Ambulation   5 feet fwd/bkwds with ModA with [employer]  >25 feet with SBA with [employer]   Stair negotiation: ascended and descended NT     ROM BUE:  WFL  BLE:  WFL     Strength BUE:  LUE weakness  BLE:  4+/5 grossly  Increase by 1/3 MMT grade   Balance Sitting EOB:  Hector  Dynamic Standing:  ModA with [employer]  Sitting EOB:  Independent  Dynamic Standing:  SBA with [employer]     Pt is A & O x 4  Sensation:  Intact light touch to BLEs  Edema:  None    Therapeutic Exercises:  BLE AROM x 5 reps, static standing with and without WW x 3 minutes    Patient education  Pt educated on safety    Patient response to education:   Pt verbalized understanding Pt demonstrated skill Pt requires further education in this area   x x x     ASSESSMENT:    Conditions Requiring Skilled Therapeutic Intervention:    [x]Decreased strength     []Decreased ROM  [x]Decreased functional mobility  [x]Decreased balance   [x]Decreased endurance   [x]Decreased posture  []Decreased sensation  [x]Decreased coordination   []Decreased vision  []Decreased safety awareness   []Increased pain       Comments:  Pt was in bed upon arrival, agreeable to initial evaluation. Dysarthria apparent but pt was able to communicate effectively. Increased time required for all tasks as pt exhibited good effort in attempt to be independent. Upon sitting EOB, pt relied heavily on RUE. Assisted pt with positioning of L hand and UE on EOB. Upon standing, pt demonstrated flexed posture with heavy reliance of BUEs onto [employer]. Ataxic-like movements noted with short distance ambulation from EOB. Pt reported weakness and BLEs, specifically knees. Static standing completed at EOB for endurance training. Pt was left in bed with all needs met and call light in reach. Bed alarm on and LUE elevated. Treatment:  Patient practiced and was instructed in the following treatment:    Bed mobility training - pt given verbal and tactile cues to facilitate proper sequencing and safety during supine>sit as well as provided with physical assistance. Sitting EOB for >10 total minutes for upright tolerance, postural awareness and BLE ROM  Transfer training - pt was given verbal and tactile cues to facilitate proper hand placement, technique and safety during sit to stand, stand to sit and stand pivot transfers as well as provided with physical assistance. Gait training- pt was given verbal and tactile cues to facilitate safety, balance, posture and use of WW during ambulation as well as provided with physical assistance. Pt's/ family goals   1. Return to PLOF    Prognosis is good for reaching above PT goals.     Patient and or family understand(s) diagnosis, prognosis, and plan of care. yes    PHYSICAL THERAPY PLAN OF CARE:    PT POC is established based on physician order and patient diagnosis     Referring provider/PT Order:  Lanie Torres MD /10/02/22 1300 PT eval and treat  Diagnosis:  Hypernatremia [E87.0]  Stroke-like episode [R29.90]  Specific instructions for next treatment:  Progress activity    Current Treatment Recommendations:     [x] Strengthening to improve independence with functional mobility   [] ROM to improve independence with functional mobility   [x] Balance Training to improve static/dynamic balance and to reduce fall risk  [x] Endurance Training to improve activity tolerance during functional mobility   [x] Transfer Training to improve safety and independence with all functional transfers   [x] Gait Training to improve gait mechanics, endurance and asses need for appropriate assistive device  [x] Stair Training in preparation for safe discharge home and/or into the community   [x] Positioning to prevent skin breakdown and contractures  [x] Safety and Education Training   [x] Patient/Caregiver Education   [] HEP  [] Other     PT long term treatment goals are located in above grid    Frequency of treatments: 2-5x/week x 1-2 weeks. Time in  0733  Time out  0810    Total Treatment Time  23 minutes     Evaluation Time includes thorough review of current medical information, gathering information on past medical history/social history and prior level of function, completion of standardized testing/informal observation of tasks, assessment of data and education on plan of care and goals.     CPT codes:  [x] Low Complexity PT evaluation 24318  [] Moderate Complexity PT evaluation 55542  [] High Complexity PT evaluation 89589  [] PT Re-evaluation 03693  [] Gait training 73671 - minutes  [] Manual therapy 68992 - minutes  [x] Therapeutic activities 28019 23 minutes  [] Therapeutic exercises 77202 - minutes  [] Neuromuscular reeducation 37042 - minutes     Jelena Douglass, PT, DPT  [id number]

## 2022-10-03 NOTE — PROGRESS NOTES
Messaged Dr. Anna Marie Morales if pt needed a neurology consult for left sided weakness. He said no due left sided weakness is chronic. Also, messaged if pt can be downgraded to [facility]. Awaiting call back.

## 2022-10-03 NOTE — CARE COORDINATION
SOCIAL WORK/CASEMANAGEMENT TRANSITION OF CARE PLANNINGDot Purdy Niraj, [address]  pt is from [facility]. She is a long term pt there under iloc but out of bed hold days. Precert is needed for pt to return but rep said they can get it back the same day if submitted early. All discharge paper work is in place. PT and OT notes are not needed for the precert. I left  and she return called from the legal guardian from [employer], who wants pt to return. She will need call on discharge.   EVA Gupta  10/3/2022

## 2022-10-03 NOTE — DISCHARGE INSTR - COC
Isolation            No Isolation          Patient Infection Status       None to display            Nurse Assessment:  Last Vital Signs: BP (!) 103/56   Pulse 58   Temp 97.5 °F (36.4 °C) (Oral)   Resp 16   SpO2 96%     Last documented pain score (0-10 scale): Pain Level: 2 (a little pain on her right side)  Last Weight:   Wt Readings from Last 1 Encounters:   08/03/22 186 lb (84.4 kg)     Mental Status:  oriented    IV Access:  - None    Nursing Mobility/ADLs:  Walking   Dependent  Transfer  Assisted  Bathing  Dependent  Dressing  Dependent  Toileting  Dependent  Feeding  Dependent  Med Admin  Dependent  Med Delivery   whole    Wound Care Documentation and Therapy:  Wound 05/14/17 Other (Comment) Buttocks Right; Inner; Upper (Active)   Number of days: 1967       Incision 02/16/21 Vagina (Active)   Number of days: 856        Elimination:  Continence: Bowel: Yes  Bladder: Yes  Urinary Catheter: None   Colostomy/Ileostomy/Ileal Conduit: No       Date of Last BM: 10-3-22    Intake/Output Summary (Last 24 hours) at 10/3/2022 9032  Last data filed at 10/3/2022 0341  Gross per 24 hour   Intake --   Output 600 ml   Net -600 ml     I/O last 3 completed shifts:  In: -   Out: 600 [Urine:600]    Safety Concerns:     None    Impairments/Disabilities:      Speech and Contractures - \    Nutrition Therapy:  Current Nutrition Therapy:   - Oral Diet:  General    Routes of Feeding: Oral  Liquids: Thin Liquids  Daily Fluid Restriction: no  Last Modified Barium Swallow with Video (Video Swallowing Test): not done    Treatments at the Time of Hospital Discharge:   Respiratory Treatments:   Oxygen Therapy:  is not on home oxygen therapy.   Ventilator:    - No ventilator support    Rehab Therapies: Physical Therapy and Occupational Therapy  Weight Bearing Status/Restrictions: No weight bearing restrictions  Other Medical Equipment (for information only, NOT a DME order):  wheelchair  Other Treatments:     Patient's personal belongings (please select all that are sent with patient):  None    RN SIGNATURE:  Electronically signed by Kipp Bence, RN on 10/5/22 at 12:22 PM EDT    CASE MANAGEMENT/SOCIAL WORK SECTION    Inpatient Status Date: 10/1/2022 (OBSERVATION STATUS)    Readmission Risk Assessment Score:  Readmission Risk              Risk of Unplanned Readmission:  18           Discharging to Facility/ Agency   Name: [facility]  Address: [address] [place] [address] [place] [address]  Phone: [phone]  Fax:     Dialysis Facility (if applicable)   Name:  Address:  Dialysis Schedule:  Phone:  Fax:    / signature: Electronically signed by James Bishop RN on 10/5/2022 at 11:01 AM      PHYSICIAN SECTION    Prognosis: {Prognosis:8435332440}    Condition at Discharge: 50Hilary Choudhury Patient Condition:[id number]}    Rehab Potential (if transferring to Rehab): {Prognosis:3113622021}    Recommended Labs or Other Treatments After Discharge: ***    Physician Certification: I certify the above information and transfer of Carlene Coronel  is necessary for the continuing treatment of the diagnosis listed and that she requires Intermediate Nursing Care for greater 30 days.      Update Admission H&P: {CHP DME Changes in BAQYA:489813532}    PHYSICIAN SIGNATURE:  Lety Colon MD.

## 2022-10-04 LAB
ALBUMIN SERPL-MCNC: 2.7 G/DL (ref 3.5–5.2)
ALP BLD-CCNC: 53 U/L (ref 35–104)
ALT SERPL-CCNC: 6 U/L (ref 0–32)
ANION GAP SERPL CALCULATED.3IONS-SCNC: 9 MMOL/L (ref 7–16)
AST SERPL-CCNC: 11 U/L (ref 0–31)
BASOPHILS ABSOLUTE: 0.06 E9/L (ref 0–0.2)
BASOPHILS RELATIVE PERCENT: 0.9 % (ref 0–2)
BILIRUB SERPL-MCNC: <0.2 MG/DL (ref 0–1.2)
BUN BLDV-MCNC: 27 MG/DL (ref 6–23)
CALCIUM SERPL-MCNC: 8.7 MG/DL (ref 8.6–10.2)
CHLORIDE BLD-SCNC: 116 MMOL/L (ref 98–107)
CO2: 19 MMOL/L (ref 22–29)
CREAT SERPL-MCNC: 1.7 MG/DL (ref 0.5–1)
EOSINOPHILS ABSOLUTE: 0.25 E9/L (ref 0.05–0.5)
EOSINOPHILS RELATIVE PERCENT: 3.9 % (ref 0–6)
GFR AFRICAN AMERICAN: 35
GFR NON-AFRICAN AMERICAN: 29 ML/MIN/1.73
GLUCOSE BLD-MCNC: 97 MG/DL (ref 74–99)
HCT VFR BLD CALC: 29.8 % (ref 34–48)
HEMOGLOBIN: 9 G/DL (ref 11.5–15.5)
IMMATURE GRANULOCYTES #: 0.01 E9/L
IMMATURE GRANULOCYTES %: 0.2 % (ref 0–5)
LYMPHOCYTES ABSOLUTE: 2.57 E9/L (ref 1.5–4)
LYMPHOCYTES RELATIVE PERCENT: 40 % (ref 20–42)
MCH RBC QN AUTO: 28.8 PG (ref 26–35)
MCHC RBC AUTO-ENTMCNC: 30.2 % (ref 32–34.5)
MCV RBC AUTO: 95.5 FL (ref 80–99.9)
MONOCYTES ABSOLUTE: 0.52 E9/L (ref 0.1–0.95)
MONOCYTES RELATIVE PERCENT: 8.1 % (ref 2–12)
NEUTROPHILS ABSOLUTE: 3.02 E9/L (ref 1.8–7.3)
NEUTROPHILS RELATIVE PERCENT: 46.9 % (ref 43–80)
PDW BLD-RTO: 16.8 FL (ref 11.5–15)
PLATELET # BLD: 171 E9/L (ref 130–450)
PMV BLD AUTO: 12.4 FL (ref 7–12)
POTASSIUM SERPL-SCNC: 4.7 MMOL/L (ref 3.5–5)
RBC # BLD: 3.12 E12/L (ref 3.5–5.5)
SODIUM BLD-SCNC: 144 MMOL/L (ref 132–146)
TOTAL PROTEIN: 5.1 G/DL (ref 6.4–8.3)
WBC # BLD: 6.4 E9/L (ref 4.5–11.5)

## 2022-10-04 PROCEDURE — 6370000000 HC RX 637 (ALT 250 FOR IP): Performed by: INTERNAL MEDICINE

## 2022-10-04 PROCEDURE — 2580000003 HC RX 258: Performed by: INTERNAL MEDICINE

## 2022-10-04 PROCEDURE — 36415 COLL VENOUS BLD VENIPUNCTURE: CPT

## 2022-10-04 PROCEDURE — 80053 COMPREHEN METABOLIC PANEL: CPT

## 2022-10-04 PROCEDURE — 85025 COMPLETE CBC W/AUTO DIFF WBC: CPT

## 2022-10-04 PROCEDURE — 1200000000 HC SEMI PRIVATE

## 2022-10-04 PROCEDURE — 6360000002 HC RX W HCPCS: Performed by: INTERNAL MEDICINE

## 2022-10-04 PROCEDURE — 99233 SBSQ HOSP IP/OBS HIGH 50: CPT | Performed by: INTERNAL MEDICINE

## 2022-10-04 RX ADMIN — LEVETIRACETAM 500 MG: 500 TABLET, FILM COATED ORAL at 08:48

## 2022-10-04 RX ADMIN — DEXTROSE AND SODIUM CHLORIDE: 5; 450 INJECTION, SOLUTION INTRAVENOUS at 02:42

## 2022-10-04 RX ADMIN — CARIPRAZINE 1.5 MG: 1.5 CAPSULE, GELATIN COATED ORAL at 22:29

## 2022-10-04 RX ADMIN — CEFTRIAXONE 1000 MG: 1 INJECTION, POWDER, FOR SOLUTION INTRAMUSCULAR; INTRAVENOUS at 18:11

## 2022-10-04 RX ADMIN — DIVALPROEX SODIUM 250 MG: 125 CAPSULE, COATED PELLETS ORAL at 20:44

## 2022-10-04 RX ADMIN — PANTOPRAZOLE SODIUM 40 MG: 40 TABLET, DELAYED RELEASE ORAL at 08:48

## 2022-10-04 RX ADMIN — ASPIRIN 81 MG: 81 TABLET, COATED ORAL at 20:44

## 2022-10-04 RX ADMIN — DEXTROSE AND SODIUM CHLORIDE: 5; 450 INJECTION, SOLUTION INTRAVENOUS at 23:16

## 2022-10-04 RX ADMIN — Medication 5 MG: at 20:44

## 2022-10-04 RX ADMIN — ASPIRIN 81 MG: 81 TABLET, COATED ORAL at 08:48

## 2022-10-04 RX ADMIN — LEVETIRACETAM 500 MG: 500 TABLET, FILM COATED ORAL at 20:44

## 2022-10-04 ASSESSMENT — PAIN SCALES - GENERAL
PAINLEVEL_OUTOF10: 0

## 2022-10-04 NOTE — PROGRESS NOTES
Admit Date: 10/1/2022    Subjective:     Awake more alert     Objective:     Patient Vitals for the past 8 hrs:   Pulse SpO2   10/04/22 0345 56 97 %   10/03/22 2345 58 96 %     I/O last 3 completed shifts:  In: -   Out: 600 [Urine:600]  I/O this shift: In: 430 [P.O.:60; I.V.:370]  Out: 650 [Urine:650]    HEENT: Normal  NECK: Thyroid normal. No carotid bruit. No lymphphadenopathy. CVS: RRR  RS: Clear. No wheeze. No rhonchi. Good airflow bilaterally. ABD: Soft. Non tender. No mass. Normal BS.obese   EXT: No edema. Non tender. Pulses present.    NEURO: dysarthria left sided weakness       Scheduled Meds:   enoxaparin  30 mg SubCUTAneous Daily    cefTRIAXone (ROCEPHIN) IV  1,000 mg IntraVENous Q24H    aspirin  81 mg Oral BID    cariprazine hcl  1.5 mg Oral Nightly    divalproex  250 mg Oral 2 times per day    levETIRAcetam  500 mg Oral BID    melatonin  5 mg Oral Nightly    pantoprazole  40 mg Oral Daily    rivastigmine  1 patch TransDERmal Nightly     Continuous Infusions:   dextrose 5 % and 0.45 % NaCl 100 mL/hr at 10/04/22 0345       CBC with Differential:    Lab Results   Component Value Date/Time    WBC 6.4 10/04/2022 04:49 AM    RBC 3.12 10/04/2022 04:49 AM    HGB 9.0 10/04/2022 04:49 AM    HCT 29.8 10/04/2022 04:49 AM     10/04/2022 04:49 AM    MCV 95.5 10/04/2022 04:49 AM    MCH 28.8 10/04/2022 04:49 AM    MCHC 30.2 10/04/2022 04:49 AM    RDW 16.8 10/04/2022 04:49 AM    LYMPHOPCT 40.0 10/04/2022 04:49 AM    MONOPCT 8.1 10/04/2022 04:49 AM    BASOPCT 0.9 10/04/2022 04:49 AM    MONOSABS 0.52 10/04/2022 04:49 AM    LYMPHSABS 2.57 10/04/2022 04:49 AM    EOSABS 0.25 10/04/2022 04:49 AM    BASOSABS 0.06 10/04/2022 04:49 AM     CMP:    Lab Results   Component Value Date/Time     10/04/2022 04:49 AM    K 4.7 10/04/2022 04:49 AM    K 5.0 08/03/2022 06:30 PM     10/04/2022 04:49 AM    CO2 19 10/04/2022 04:49 AM    BUN 27 10/04/2022 04:49 AM    CREATININE 1.7 10/04/2022 04:49 AM    GFRAA 35 10/04/2022 04:49 AM    LABGLOM 29 10/04/2022 04:49 AM    PROT 5.1 10/04/2022 04:49 AM    LABALBU 2.7 10/04/2022 04:49 AM    CALCIUM 8.7 10/04/2022 04:49 AM    BILITOT <0.2 10/04/2022 04:49 AM    ALKPHOS 53 10/04/2022 04:49 AM    AST 11 10/04/2022 04:49 AM    ALT 6 10/04/2022 04:49 AM     PT/INR:    Lab Results   Component Value Date/Time    PROTIME 10.9 10/01/2022 01:50 PM    INR 1.0 10/01/2022 01:50 PM     U/A pyuria     Assessment:     Principal Problem:    Hypernatremia    JOSE A    Schizophrenia     Dysarthria     Hypernatremia     UTI       Plan:   Continue IV fluid, started on Rocephin pending culture sensitivity ,start transfer palnning to [facility]

## 2022-10-04 NOTE — PLAN OF CARE
Problem: Discharge Planning  Goal: Discharge to home or other facility with appropriate resources  Outcome: Progressing     Problem: Skin/Tissue Integrity  Goal: Absence of new skin breakdown  Outcome: Progressing     Problem: Pain  Goal: Verbalizes/displays adequate comfort level or baseline comfort level  Outcome: Progressing     Problem: Safety - Adult  Goal: Free from fall injury  Outcome: Progressing

## 2022-10-04 NOTE — PLAN OF CARE
Problem: Discharge Planning  Goal: Discharge to home or other facility with appropriate resources  10/4/2022 1404 by Chari Liriano RN  Outcome: Progressing  10/4/2022 0421 by Janelle Cali RN  Outcome: Progressing     Problem: Skin/Tissue Integrity  Goal: Absence of new skin breakdown  Description: 1. Monitor for areas of redness and/or skin breakdown  2. Assess vascular access sites hourly  3. Every 4-6 hours minimum:  Change oxygen saturation probe site  4. Every 4-6 hours:  If on nasal continuous positive airway pressure, respiratory therapy assess nares and determine need for appliance change or resting period.   10/4/2022 1404 by Chari Liriano RN  Outcome: Progressing  10/4/2022 0421 by Janelle Cali RN  Outcome: Progressing     Problem: Pain  Goal: Verbalizes/displays adequate comfort level or baseline comfort level  10/4/2022 1404 by Chari Liriano RN  Outcome: Progressing  10/4/2022 0421 by Janelle Cali RN  Outcome: Progressing     Problem: Safety - Adult  Goal: Free from fall injury  10/4/2022 1404 by Chari Liriano RN  Outcome: Progressing  10/4/2022 0421 by Janelle Cali RN  Outcome: Progressing

## 2022-10-05 VITALS
RESPIRATION RATE: 18 BRPM | DIASTOLIC BLOOD PRESSURE: 66 MMHG | SYSTOLIC BLOOD PRESSURE: 120 MMHG | TEMPERATURE: 97.5 F | BODY MASS INDEX: 27.92 KG/M2 | WEIGHT: 173 LBS | OXYGEN SATURATION: 94 % | HEART RATE: 68 BPM

## 2022-10-05 LAB
ORGANISM: ABNORMAL
URINE CULTURE, ROUTINE: ABNORMAL

## 2022-10-05 PROCEDURE — 97535 SELF CARE MNGMENT TRAINING: CPT

## 2022-10-05 PROCEDURE — 97530 THERAPEUTIC ACTIVITIES: CPT

## 2022-10-05 PROCEDURE — 6360000002 HC RX W HCPCS: Performed by: INTERNAL MEDICINE

## 2022-10-05 PROCEDURE — 6370000000 HC RX 637 (ALT 250 FOR IP): Performed by: INTERNAL MEDICINE

## 2022-10-05 PROCEDURE — 99239 HOSP IP/OBS DSCHRG MGMT >30: CPT | Performed by: INTERNAL MEDICINE

## 2022-10-05 RX ORDER — DIVALPROEX SODIUM 125 MG/1
250 CAPSULE, COATED PELLETS ORAL EVERY 12 HOURS SCHEDULED
Qty: 60 CAPSULE | Refills: 3 | Status: SHIPPED | OUTPATIENT
Start: 2022-10-05 | End: 2022-10-08

## 2022-10-05 RX ORDER — LEVOFLOXACIN 250 MG/1
500 TABLET ORAL DAILY
Qty: 5 TABLET | Refills: 0 | Status: SHIPPED | OUTPATIENT
Start: 2022-10-05 | End: 2022-10-10

## 2022-10-05 RX ADMIN — DIVALPROEX SODIUM 250 MG: 125 CAPSULE, COATED PELLETS ORAL at 09:13

## 2022-10-05 RX ADMIN — PANTOPRAZOLE SODIUM 40 MG: 40 TABLET, DELAYED RELEASE ORAL at 09:13

## 2022-10-05 RX ADMIN — ENOXAPARIN SODIUM 30 MG: 100 INJECTION SUBCUTANEOUS at 09:13

## 2022-10-05 RX ADMIN — ASPIRIN 81 MG: 81 TABLET, COATED ORAL at 09:13

## 2022-10-05 RX ADMIN — LEVETIRACETAM 500 MG: 500 TABLET, FILM COATED ORAL at 09:13

## 2022-10-05 ASSESSMENT — PAIN SCALES - GENERAL
PAINLEVEL_OUTOF10: 0
PAINLEVEL_OUTOF10: 0

## 2022-10-05 NOTE — CARE COORDINATION
10/5 Update CM note. Discharge order noted. Spoke with Heriberto Barone at [address] will provide transport at 1:00 PM. No COVID test is required. CONNOR/destination complete. Bedside RN and DAFNEvalente Tuckerr (legal guardian at [address] [facility]) notified of pickup time. No transport paperwork needed.     Marylou Moyer, BUZZN, RN  [facility] Case Management   Cell: [phone]

## 2022-10-05 NOTE — PROGRESS NOTES
GFRAA 35 10/04/2022 04:49 AM    LABGLOM 29 10/04/2022 04:49 AM    PROT 5.1 10/04/2022 04:49 AM    LABALBU 2.7 10/04/2022 04:49 AM    CALCIUM 8.7 10/04/2022 04:49 AM    BILITOT <0.2 10/04/2022 04:49 AM    ALKPHOS 53 10/04/2022 04:49 AM    AST 11 10/04/2022 04:49 AM    ALT 6 10/04/2022 04:49 AM     PT/INR:    Lab Results   Component Value Date/Time    PROTIME 10.9 10/01/2022 01:50 PM    INR 1.0 10/01/2022 01:50 PM       Assessment:     Principal Problem:  Hypernatremia resolved     JOSE A    Schizophrenia     Dysarthria     Hypernatremia     UTI       Plan:   Stable will switch to oral ATB and transfer to NH.

## 2022-10-05 NOTE — PROGRESS NOTES
Physical Therapy  Physical Therapy Treatment    Name: Claudia Luciano  : 1945  MRN: [id number]      Date of Service: 10/5/2022    Evaluating PT:  Jose Moody PT, DPT [id number]    Room #:  3613/0401-L  Diagnosis:  Hypernatremia [E87.0]  Stroke-like episode [R29.90]  PMHx/PSHx:    Past Medical History:   Diagnosis Date    Arthritis     hips    Ataxia     Atrophy of muscle     Bipolar 1 disorder, mixed, moderate ([facility] [address]     Cerebrovascular disease     Coordination problem     Hyperlipidemia     Hypertension     Overactive bladder     Paranoid schizophrenia ([facility] [address]     Thyroid disease      Procedure/Surgery:  None  Precautions:  Falls, bed alarm, dysarthria, LUE weakness, ataxic  Equipment Needs:  TBD    SUBJECTIVE:    Pt was admitted from [facility]. Pt was not active with PT. Pt used wheelchair for mobility and reported completing stand pivot transfers independently. OBJECTIVE:   Initial Evaluation  Date: 10/3/22 Treatment  10/05/22 Short Term/ Long Term   Goals   AM-PAC 6 Clicks 36/13 85/13    Was pt agreeable to Eval/treatment? Yes Yes    Does pt have pain? Back pain due to laying in bed - no rating given No pain    Bed Mobility  Rolling: NT  Supine to sit: ModA  Sit to supine: ModA  Scooting: Hector Supine to sit: Min A with HOB elevated  Scooting: Hector SBA   Transfers Sit to stand: ModA  Stand to sit: ModA  Stand pivot: NT Sit to stand: Min x2  Stand to sit: Min x2  Stand pivot: Mod x2 with HHA SBA with [employer]   Ambulation   5 feet fwd/bkwds with ModA with [employer] 6 feet fwd/bkwds with Mod x2 HHA (poor L grasp this date) >25 feet with SBA with [employer]   Stair negotiation: ascended and descended NT     ROM BUE:  WFL  BLE:  WFL     Strength BUE:  LUE weakness  BLE:  4+/5 grossly  Increase by 1/3 MMT grade   Balance Sitting EOB:  Hector  Dynamic Standing:  ModA with [employer] Sitting EOB:  Hector  Dynamic Standing:   Mod x2 with HHA Sitting EOB:  Independent  Dynamic Standing:  SBA with [employer]     Pt is A & O x 4  Sensation:  Intact light touch to BLEs  Edema:  None    Therapeutic Exercises:  BLE AROM x 5 reps, static standing with and without WW x 3 minutes    Patient education  Pt educated on safety    Patient response to education:   Pt verbalized understanding Pt demonstrated skill Pt requires further education in this area   x x x     ASSESSMENT:    Conditions Requiring Skilled Therapeutic Intervention:    [x]Decreased strength     []Decreased ROM  [x]Decreased functional mobility  [x]Decreased balance   [x]Decreased endurance   [x]Decreased posture  []Decreased sensation  [x]Decreased coordination   []Decreased vision  []Decreased safety awareness   []Increased pain       Comments:  Pt was in bed upon arrival, agreeable to treatment. Dysarthria apparent but pt was able to communicate effectively, describing no pain but persisting weakness and numbness of L hand. Increased time required for all tasks as pt exhibited improved effort with reduced assistance. Upon sitting EOB, pt had increased left lateral lean but was stable. Due to ataxia and poor  strength this date, AD was not utilized. Ataxic-like movements persisted, but improvements with recruitment for occasionally successful foot clearance of the L LE during swing phase. Patient left upright in chair with alarm and L UE support with pillows, call light in reach and needs met. She will benefit from skilled PT post DC in order to return to PLOF. Treatment:  Patient practiced and was instructed in the following treatment:    Bed mobility training - pt given verbal and tactile cues to facilitate proper sequencing and safety during supine>sit as well as provided with physical assistance.   Sitting EOB for >10 total minutes for upright tolerance, postural awareness and BLE ROM  Transfer training - pt was given verbal and tactile cues to facilitate proper hand placement, technique and safety during sit to stand, stand to sit and stand pivot transfers as well as provided with physical assistance. Gait training- pt was given verbal and tactile cues to facilitate safety, balance, posture and use of WW during ambulation as well as provided with physical assistance. PHYSICAL THERAPY PLAN OF CARE:    Pt progressing toward goals slowly. POC to remain as previously noted.     Time in  0915  Time out  0953    Total Treatment Time  38 minutes       CPT codes:  [] Low Complexity PT evaluation 19736  [] Moderate Complexity PT evaluation 49184  [] High Complexity PT evaluation 48874  [] PT Re-evaluation 78453  [] Gait training 19936 - minutes  [] Manual therapy 95356 - minutes  [x] Therapeutic activities 33353 30 minutes  [] Therapeutic exercises 57617 - minutes  [] Neuromuscular reeducation 43613 - minutes     Pj Del Angel PT, DPT  [id number]

## 2022-10-05 NOTE — PROGRESS NOTES
Occupational Therapy  OT BEDSIDE TREATMENT NOTE   [address] [facility] [address] [facility]  [address] [place] [address]       Date:10/5/2022  Patient Name: Jazmin Malagon  MRN: [id number]  : 1945  Room: [address] [place] [address]     Per OT Eval:    Evaluating OT: Shannan Ramirez, OTR/L #[id number]     Referring Provider[de-identified]  Migdalia Sheehan MD                   Specific Provider Orders/Date: OT evaluation and treatment 10/2/22     Diagnosis:  Hypernatremia [E87.0]  Stroke-like episode [R29.90]       Pertinent Medical History:  has a past medical history of Arthritis, Ataxia, Atrophy of muscle, Bipolar 1 disorder, mixed, moderate ([address] Cerebrovascular disease, Coordination problem, Hyperlipidemia, Hypertension, Overactive bladder, Paranoid schizophrenia ([address] and Thyroid disease.       Precautions:  Fall Risk, bed alarm, TAPS, incontinence of urine, L hemiparesis,     Assessment of current deficits   [x] Functional mobility             [x]ADLs           [x] Strength                  [x]Cognition   [x] Functional transfers           [] IADLs         [x] Safety Awareness   [x]Endurance   [x] Fine Coordination              [x] Balance      [] Vision/perception   []Sensation     [x]Gross Motor Coordination  [x] ROM           [] Delirium                   [x] Motor Control      OT PLAN OF CARE   OT POC based on physician orders, patient diagnosis and results of clinical assessment     Frequency/Duration  2-5 days/wk for 2 weeks PRN   Specific OT Treatment to include:   * Instruction/training on adapted ADL techniques and AE recommendations to increase functional independence within precautions       * Functional transfer/mobility training/DME recommendations for increased independence, safety, and fall prevention  * Patient/Family education to increase follow through with safety techniques and functional independence  * Cognitive retraining/development of therapeutic activities to improve problem solving, judgement, memory, and attention for increased safety/participation in ADL/IADL tasks  * Therapeutic exercise to improve motor endurance, ROM, and functional strength for ADLs/functional transfers  * Therapeutic activities to facilitate/challenge dynamic balance, stand tolerance for increased safety and independence with ADLs  * Therapeutic activities to facilitate gross/fine motor skills for increased independence with ADLs  * Neuro-muscular re-education: facilitation of righting/equilibrium reactions, midline orientation, scapular stability/mobility, normalization of muscle tone, and facilitation of volitional active controled movement  * Positioning to improve skin integrity, interaction with environment and functional independence     Modified [place]es Scale   Score     Description  0             No symptoms  1             No significant disability despite symptoms  2             Slight disability; able to look after own affairs  3             Moderate disability; able to ambulate without assist/ requires assist with ADLs  4             Moderate/Severe disability;requires assist to ambulate/assist with ADLs  5             Severe disability;bedridden/incontinent   6               Score:   4     Recommended Adaptive Equipment:  TBD      Home Living:  Pt lives in [address] [place] [address]  Prior Level of Function: questionable historian, Pt reported that she had recent onset of weakness to L side as of about 3 weeks ago. Prior to 3 weeks pt stated she was fairly indep with dressing and toileting   assist  with IADLs; she stated she mostly used the w/c at her facility. She said she was indep with functional transfers.       Pain Level: none     Cognition: A&O: 4/4;  slurred/slow speech, pleasant & cooperative, very motivated   Follows multi step directions: good               Memory:  fair               Sequencing:  fair               Problem solving: fair-              Judgement/safety: fair- Functional Assessment:   AM-PAC Daily Activity Raw Score: 12/24       Initial Eval Status  Date: 10/2/22 Treatment Status  Date:  10/5/22 STG=LTG  Time frame: 10-14 days   Feeding Minimal Assist   Requires upright positioning, spillage of food and beverage SBA  After set up, using R UE, with good understanding to use L UE as able   Modified [place]    Grooming Moderate Assist   Min/Mod A  Simple tasks, seated at EOB washing off face  Supervision    UB Dressing Moderate Assist   Mod A  Doff/georges gown seated at EOB Supervision    LB Dressing Maximal Assist   Pt attempted to georges socks while in bed but needed assist to complete d/t longer toe nails Max A   Donning brief, seated to thread legs then standing to pull over hips, mildly unsteady   Stand by Assist    Bathing Max Assist  Cues for sequencing, assist for limited reach and impaired sitting balance     Max A  Simulated  Minimal Assist    Toileting Dependent   Incontinence of urine,dependent hygiene  Max A  Reports incontinence, recommending BSC Moderate Assist    Bed Mobility  Supine to sit: Moderate Assist   Sit to supine: Moderate Assist   Min A  Supine to sit  Supine to sit: Stand by Assist   Sit to supine: Stand by Assist    Functional Transfers Sit to stand: Moderate Assist   Stand to sit: Moderate Assist   Stand pivot: NT  Min A x 2  Sit < > stand  Mod A x 2  Stand pivot  Stand by Assist    Functional Mobility Moderate Assist  with ww  2-3 side steps  Mod A x 2  Due to instability, swaying side to side due to proprioception, short distance 5' forward & backwards  Ataxia  B LE Stand by Assist  with ww  Short distance   Balance Sitting: Minimal Assist - unsteady seated EOB, flexed posture     Standing:  Moderate Assist with ww, flexed posture  Sitting: Min A  Standing:   Static: Mod  A  Dynamic: Mod x 2 Sitting: Modified [place]       Standing: Stand by Assist    Activity Tolerance Fair  Pt appears motivated Fair  good   Visual/  Perceptual Glasses: yes  Perception: NT, appears grossly intact      WNL      BUE  ROM/Strength/  Fine motor Coordination Hand dominance: R     RUE: ROM WFL     Strength: grossly 3+/5      Strength: fair     Coordination:  fair     LUE: ROM diminished in shoulder, elbow, wrist and fingers, all isolated AROM     Strength: grossly 3-/5      Strength: poor     Coordination: poor   Increase LUE AROM to [facility]  Increase LUE strength 1/3 muscle grade,  Increase LUE coordination to [facility] for automatic use during ADL tasks. Education:  Pt was educated through out treatment regarding proper technique & safety with bed mobility, functional transfers & mobility, ADL compensatory strategies to ease tasks, improve safety & prevent falls to return home safely. Comments: Upon arrival pt was in bed & agreeable for therapy, nsg approved. At end of session pt was seated in chair, nsg aware, all lines and tubes intact, call light within reach. Pt has made Fair progress towards set goals. Continue with current plan of care    Treatment Time In: 9:18            Treatment Time Out: 9:56           Treatment Charges: Mins Units   Ther Ex  07903     Manual Therapy [id number]     Thera Activities 00325 15 1   ADL/Home Mgt [id number] 23 2   Neuro Re-ed 04552     Group Therapy      Orthotic manage/training  [id number]     Non-Billable Time     Total Timed Treatment 38 3       Milla HAIR  [address] [place] [address], [address] [place] [address]

## 2022-10-06 NOTE — DISCHARGE SUMMARY
Discharge Summary    Date: 10/6/2022  Patient Name: Nydia Sheffield    YOB: 1945     Age: 78 y.o. Admit Date: 10/1/2022  Discharge Date: 10/5/2022  Discharge Condition: Stable    Admission Diagnosis  Hypernatremia [E87.0]; Stroke-like episode [R29.90]      Discharge Diagnosis  Principal Problem:    Hypernatremia  Resolved Problems:    * No resolved hospital problems. [facility] AND [facility] Stay  Narrative of Hospital Course:  [facility] patient admitted with dehydration JOSE A had UTI treated with IV fluid ATB responded well stabilized transferred back to NH on oral ATB     Consultants:  IP CONSULT TO HOSPITALIST    Surgeries/procedures Performed:      Treatments:            Discharge Plan/Disposition:  To [facility]/Incidental Findings Requiring Follow Up:    Patient Instructions:    Diet: Regular Diet    Activity:Activity as Tolerated  For number of days (if applicable): Other Instructions:    Provider Follow-Up:   No follow-ups on file. Significant Diagnostic Studies:    Recent Labs:  Admission on 10/01/2022, Discharged on 10/05/2022  No results displayed because visit has over 200 results. ------------    Radiology last 7 days:  CT HEAD WO CONTRAST    Result Date: 10/1/2022  Diffuse atrophy likely age related Findings compatible with small vessel ischemic changes. XR CHEST PORTABLE    Result Date: 10/1/2022  No acute cardiopulmonary disease. CTA NECK W CONTRAST    Result Date: 10/1/2022  1. Estimated stenosis of the proximal right and left internal carotid artery by NASCET criteria is not hemodynamically significant 2. Mild atherosclerotic disease . 3. No large vessel occlusion identified This study was analyzed by the [employer]. ai algorithm. CT BRAIN PERFUSION    Result Date: 10/1/2022  No significant ischemic penumbra identified This study was analyzed by the [employer]. ai algorithm.  Findings were called to the ordering physician     CTA HEAD W CONTRAST    Result Date: 10/1/2022  1. Estimated stenosis of the proximal right and left internal carotid artery by NASCET criteria is not hemodynamically significant 2. Mild atherosclerotic disease . 3. No large vessel occlusion identified This study was analyzed by the V[employer]. ai algorithm. Pending Labs     Order Current Status    Culture, Urine Collected (10/01/22 1550)        Discharge Medications    Discharge Medication List as of 10/5/2022 12:23 PM    START taking these medications    levoFLOXacin (LEVAQUIN) 250 MG tablet  Take 2 tablets by mouth daily for 5 days, Disp-5 tablet, R-0  Normal          Discharge Medication List as of 10/5/2022 12:23 PM    CONTINUE these medications which have CHANGED    divalproex (DEPAKOTE SPRINKLE) 125 MG capsule  Take 2 capsules by mouth every 12 hours for 6 doses, Disp-60 capsule, R-3  Normal          Discharge Medication List as of 10/5/2022 12:23 PM    CONTINUE these medications which have NOT CHANGED    paliperidone palmitate ER (INVEGA SUSTENNA) 156 MG/ML ANAND IM injection  Inject 156 mg into the muscle once Inject 1 dose in the morning starting on the 2nd and ending on the 2nd every month, IntraMUSCular, ONCE, Historical Med    cariprazine hcl (VRAYLAR) 1.5 MG capsule  Take 1.5 mg by mouth at bedtime  Historical Med    levETIRAcetam (KEPPRA) 500 MG tablet  Take 1 tablet by mouth in the morning and 1 tablet before bedtime. , Disp-60 tablet, R-3  Normal    melatonin 5 MG TBDP disintegrating tablet  Take 1 tablet by mouth in the morning for 6 doses. , Disp-6 tablet, R-0  Normal    pantoprazole (PROTONIX) 40 MG tablet  Take 1 tablet by mouth in the morning., Disp-30 tablet, R-3  Normal    rivastigmine (EXELON) 13.3 MG/24HR  Place 1 patch onto the skin nightly  Historical Med    acetaminophen (TYLENOL) 650 MG extended release tablet  Take 650 mg by mouth every 4 hours as needed for Pain DO NOT EXCEED 3 GRAMS MAX TYLENOL PER DAY  Historical Med    aspirin 81 MG EC tablet  Take 1 tablet by mouth 2 times daily, Disp-30 tablet, R-3  Print          Discharge Medication List as of 10/5/2022 12:23 PM    STOP taking these medications    benztropine (COGENTIN) 0.5 MG tablet  Comments:  Reason for Stopping:    sodium chloride (V-R NASAL SPRAY SALINE) 0.65 % nasal spray  Comments:  Reason for Stopping:          Time Spent on Discharge:  30 minutes were spent in patient examination, evaluation, counseling as well as medication reconciliation, prescriptions for required medications, discharge plan, and follow up.     Electronically signed by Jazzy Kellogg MD on 10/6/22 at 11:21 AM EDT

## 2022-10-07 NOTE — ADT AUTH CERT
Utilization Reviews       Systemic or Infectious Condition GRG - Care Day 1 (10/1/2022) by Miles Holt RN       Review Status Review Entered   Completed 10/2/2022 1129       Created By   Miles Holt RN      Criteria Review      Care Day: 1 Care Date: 10/1/2022 Level of Care: Inpatient Floor    Guideline Day 1    Level Of Care    (X) ICU or intermediate care    10/2/2022 11:29 AM EDT by Sajan Silva      telem    Clinical Status    ( ) * Clinical Indications met    Interventions    (X) Inpatient interventions as needed    10/2/2022 11:29 AM EDT by Sajan Silva      Medications:  Asa 81 mg po qd  Lovenox 40 mg sq qd  Depakote sprinkle 250 mg po bid  Keppra 500 mg po bid  D 5 1/2 ns at 100 ml/hr    Orders:  Telem  Reg diet   U cx    * Milestone   Additional Notes   10/1-       presenting to the ED for possible stroke. Last well-known was 1.5 hours prior to arrival.  She was found at her nursing home with dysarthria and left-sided weakness. It was reported that these are new findings for her. She does have history of previous stroke, unclear what her baseline is. Symptoms came on gradually, nothing makes it better or worse, no associated pain. She is on no anticoagulation other than aspirin. EMS reports that she had severe left-sided weakness and expressive aphasia in route. It is much improved since arrival.       Relevant baselines:   Prev cva , ed md notes- Chart reviewed, patient does have some baseline expressive aphasia, she does have broken speech pattern. Na 141 on 8/11, creat 1.6      Vitals: bp 152/63, hr 42, rr 18, t 98.6, sao2 98- ra      Nihss- 2      Ct head shows-    Diffuse atrophy likely age related    Findings compatible with small vessel ischemic changes. Brain perfusion -    No significant ischemic penumbra identified       Cta head/neck -    1.  Estimated stenosis of the proximal right and left internal carotid    artery by NASCET criteria is not hemodynamically significant    2. Mild atherosclerotic disease . 3. No large vessel occlusion identified       Cxr- nap       Labs-    Wbc 7.7, h/h 10/34   Pt 10, inr 1.0    Na 148, k 5.2, bun 27, creat 1.6   Trop 28--- 27   Ua neg   Valproic lvl < 3      ED treatment:   Ivf's, rocephin 1 gm iv       Admission Diagnosis/Op Note: hypernatremia, stroke-like symptoms      Pertinent Medical History:   Cva,  Arthritis, Ataxia, Atrophy of muscle, Bipolar 1 disorder, mixed, moderate ([address] Cerebrovascular disease, Coordination problem, Hyperlipidemia, Hypertension, Overactive bladder, Paranoid schizophrenia ([address] and Thyroid disease      Physical Exam:   Constitutional/General: Alert and oriented x3   Head: Normocephalic and atraumatic   Eyes: PERRL, EOMI, sclera non icteric   Mouth: Oropharynx clear, handling secretions, no trismus, no asymmetry of the posterior oropharynx or uvular edema   Neck: Supple, full ROM, no stridor, no meningeal signs   Respiratory: Lungs clear to auscultation bilaterally, no wheezes, rales, or rhonchi. Not in respiratory distress   Cardiovascular:  Regular rate. Regular rhythm. 2+ distal pulses. Equal extremity pulses. Chest: No chest wall tenderness   GI:  Abdomen Soft, Non tender, Non distended. No rebound, guarding, or rigidity. No pulsatile masses. Musculoskeletal: Moves all extremities x 4. Warm and well perfused, no clubbing, cyanosis, or edema. Capillary refill <3 seconds   Integument: skin warm and dry. No rashes.     Neurologic: GCS 15, see NIH   Psychiatric: Normal Affect      Admit for ongoing trt-       MD Consults/Assessments & Plans:   Arrived late evening, h+p not available      Medications:   Asa 81 mg po qd   Lovenox 40 mg sq qd   Depakote sprinkle 250 mg po bid   Keppra 500 mg po bid   D 5 1/2 ns at 100 ml/hr      Orders:   Telem   Reg diet    U cx      PT/OT/SLP/CM Assessments or Notes: na      Dc plan- return to e[facility]              Systemic or Infectious Condition GRG - Clinical Indications for Admission to Inpatient Care by Regina Guerrero RN       Review Status Review Entered   Completed 10/2/2022 1117       Created By   Regina Guerrero RN      Criteria Review      Clinical Indications for Admission to Inpatient Care    Most Recent : Andrew Duke Most Recent Date: 10/2/2022 11:17 AM EDT     (X) Other Indication: hypernatremia, dysarthria/lue weakness      Entered 10/2/2022 11:17 AM EDT by Andrew Duke     to ed-   Last well-known was 1.5 hours prior to arrival.  She was found at her nursing home with dysarthria and left-sided weakness. It was reported that these are new findings for her. She does have history of previous stroke, unclear what her baseline is.   na 148

## 2022-10-18 ENCOUNTER — OUTSIDE SERVICES (OUTPATIENT)
Dept: PRIMARY CARE CLINIC | Age: 77
End: 2022-10-18

## 2022-10-18 DIAGNOSIS — R47.1 DYSARTHRIA: ICD-10-CM

## 2022-10-18 DIAGNOSIS — R41.0 DELIRIUM: ICD-10-CM

## 2022-10-18 DIAGNOSIS — F20.1 DISORGANIZED SCHIZOPHRENIA (HCC): Primary | ICD-10-CM

## 2022-10-18 PROCEDURE — 99307 SBSQ NF CARE SF MDM 10: CPT | Performed by: INTERNAL MEDICINE

## 2022-11-15 ENCOUNTER — OUTSIDE SERVICES (OUTPATIENT)
Dept: PRIMARY CARE CLINIC | Age: 77
End: 2022-11-15
Payer: MEDICARE

## 2022-11-15 DIAGNOSIS — F20.1 DISORGANIZED SCHIZOPHRENIA (HCC): Primary | ICD-10-CM

## 2022-11-15 DIAGNOSIS — F33.3 SEVERE EPISODE OF RECURRENT MAJOR DEPRESSIVE DISORDER, WITH PSYCHOTIC FEATURES (HCC): ICD-10-CM

## 2022-11-15 DIAGNOSIS — R47.1 DYSARTHRIA: ICD-10-CM

## 2022-11-15 DIAGNOSIS — I10 PRIMARY HYPERTENSION: ICD-10-CM

## 2022-11-15 PROCEDURE — 99307 SBSQ NF CARE SF MDM 10: CPT | Performed by: INTERNAL MEDICINE

## 2022-12-20 ENCOUNTER — OUTSIDE SERVICES (OUTPATIENT)
Dept: PRIMARY CARE CLINIC | Age: 77
End: 2022-12-20

## 2022-12-20 DIAGNOSIS — R47.1 DYSARTHRIA: ICD-10-CM

## 2022-12-20 DIAGNOSIS — F20.1 DISORGANIZED SCHIZOPHRENIA (HCC): ICD-10-CM

## 2022-12-20 DIAGNOSIS — R41.0 DELIRIUM: Primary | ICD-10-CM

## 2022-12-20 DIAGNOSIS — I10 PRIMARY HYPERTENSION: ICD-10-CM

## 2023-01-03 ENCOUNTER — HOSPITAL ENCOUNTER (INPATIENT)
Dept: HOSPITAL 83 - ED | Age: 78
LOS: 7 days | Discharge: NURSING, REHAB OR LONG-TERM CARE | DRG: 885 | End: 2023-01-10
Attending: PSYCHIATRY & NEUROLOGY | Admitting: PSYCHIATRY & NEUROLOGY
Payer: MEDICARE

## 2023-01-03 VITALS — WEIGHT: 180 LBS | HEIGHT: 55 IN

## 2023-01-03 VITALS — DIASTOLIC BLOOD PRESSURE: 69 MMHG

## 2023-01-03 VITALS — DIASTOLIC BLOOD PRESSURE: 74 MMHG

## 2023-01-03 VITALS — DIASTOLIC BLOOD PRESSURE: 77 MMHG

## 2023-01-03 DIAGNOSIS — N32.81: ICD-10-CM

## 2023-01-03 DIAGNOSIS — D64.9: ICD-10-CM

## 2023-01-03 DIAGNOSIS — Z66: ICD-10-CM

## 2023-01-03 DIAGNOSIS — N18.32: ICD-10-CM

## 2023-01-03 DIAGNOSIS — F31.30: Primary | ICD-10-CM

## 2023-01-03 DIAGNOSIS — Z20.822: ICD-10-CM

## 2023-01-03 DIAGNOSIS — F20.9: ICD-10-CM

## 2023-01-03 DIAGNOSIS — E55.9: ICD-10-CM

## 2023-01-03 DIAGNOSIS — Z88.8: ICD-10-CM

## 2023-01-03 DIAGNOSIS — F41.9: ICD-10-CM

## 2023-01-03 DIAGNOSIS — M19.90: ICD-10-CM

## 2023-01-03 DIAGNOSIS — Z86.16: ICD-10-CM

## 2023-01-03 DIAGNOSIS — E87.8: ICD-10-CM

## 2023-01-03 DIAGNOSIS — E21.3: ICD-10-CM

## 2023-01-03 DIAGNOSIS — I12.9: ICD-10-CM

## 2023-01-03 DIAGNOSIS — Z78.9: ICD-10-CM

## 2023-01-03 LAB
ALP SERPL-CCNC: 55 U/L (ref 46–116)
ALT SERPL W P-5'-P-CCNC: < 7 U/L (ref 10–49)
BACTERIA #/AREA URNS HPF: (no result) /[HPF]
BASOPHILS # BLD AUTO: 0 10*3/UL (ref 0–0.1)
BASOPHILS NFR BLD AUTO: 0.6 % (ref 0–1)
BUN SERPL-MCNC: 43 MG/DL (ref 9–23)
CHLORIDE SERPL-SCNC: 111 MMOL/L (ref 98–107)
EOSINOPHIL # BLD AUTO: 0.2 10*3/UL (ref 0–0.4)
EOSINOPHIL # BLD AUTO: 2.4 % (ref 1–4)
EPI CELLS #/AREA URNS HPF: (no result) /[HPF]
ERYTHROCYTE [DISTWIDTH] IN BLOOD BY AUTOMATED COUNT: 15.9 % (ref 0–14.5)
ETHANOL SERPL-MCNC: < 3 MG/DL (ref ?–3)
HCT VFR BLD AUTO: 34.6 % (ref 37–47)
LYMPHOCYTES # BLD AUTO: 2.2 10*3/UL (ref 1.3–4.4)
LYMPHOCYTES NFR BLD AUTO: 34.1 % (ref 27–41)
MCH RBC QN AUTO: 28.4 PG (ref 27–31)
MCHC RBC AUTO-ENTMCNC: 30.6 G/DL (ref 33–37)
MCV RBC AUTO: 92.8 FL (ref 81–99)
MONOCYTES # BLD AUTO: 0.5 10*3/UL (ref 0.1–1)
MONOCYTES NFR BLD MANUAL: 8.6 % (ref 3–9)
NEUT #: 3.4 10*3/UL (ref 2.3–7.9)
NEUT %: 54 % (ref 47–73)
NRBC BLD QL AUTO: 0 % (ref 0–0)
PH UR STRIP: 5 [PH] (ref 4.5–8)
PLATELET # BLD AUTO: 243 10*3/UL (ref 130–400)
PMV BLD AUTO: 11.6 FL (ref 9.6–12.3)
POTASSIUM SERPL-SCNC: 4.8 MMOL/L (ref 3.4–5.1)
PROT SERPL-MCNC: 6.5 GM/DL (ref 6–8)
RBC # BLD AUTO: 3.73 10*6/UL (ref 4.1–5.1)
RBC #/AREA URNS HPF: (no result) RBC/HPF (ref 0–2)
SP GR UR: 1.01 (ref 1–1.03)
UROBILINOGEN UR STRIP-MCNC: 0.2 E.U./DL (ref 0–1)
WBC #/AREA URNS HPF: (no result) WBC/HPF (ref 0–5)
WBC NRBC COR # BLD AUTO: 6.3 10*3/UL (ref 4.8–10.8)

## 2023-01-04 VITALS — DIASTOLIC BLOOD PRESSURE: 64 MMHG | SYSTOLIC BLOOD PRESSURE: 118 MMHG

## 2023-01-04 VITALS — DIASTOLIC BLOOD PRESSURE: 63 MMHG

## 2023-01-04 LAB
25(OH)D3 SERPL-MCNC: 37.5 NG/ML (ref 30–100)
CHOLEST SERPL-MCNC: 138 MG/DL (ref ?–200)
LDLC SERPL DIRECT ASSAY-MCNC: 92 MG/DL (ref 9–159)
TRIGL SERPL-MCNC: 48 MG/DL (ref ?–150)
TSH SERPL DL<=0.005 MIU/L-ACNC: 0.19 UIU/ML (ref 0.55–4.78)
VITAMIN B12: 333 PG/ML (ref 211–911)

## 2023-01-05 VITALS — DIASTOLIC BLOOD PRESSURE: 92 MMHG | SYSTOLIC BLOOD PRESSURE: 130 MMHG

## 2023-01-05 VITALS — DIASTOLIC BLOOD PRESSURE: 68 MMHG

## 2023-01-06 VITALS — DIASTOLIC BLOOD PRESSURE: 54 MMHG

## 2023-01-06 VITALS — SYSTOLIC BLOOD PRESSURE: 138 MMHG | DIASTOLIC BLOOD PRESSURE: 60 MMHG

## 2023-01-07 VITALS — DIASTOLIC BLOOD PRESSURE: 68 MMHG

## 2023-01-07 VITALS — DIASTOLIC BLOOD PRESSURE: 80 MMHG

## 2023-01-08 VITALS — DIASTOLIC BLOOD PRESSURE: 70 MMHG

## 2023-01-08 VITALS — DIASTOLIC BLOOD PRESSURE: 82 MMHG | SYSTOLIC BLOOD PRESSURE: 136 MMHG

## 2023-01-09 VITALS — DIASTOLIC BLOOD PRESSURE: 58 MMHG | SYSTOLIC BLOOD PRESSURE: 123 MMHG

## 2023-01-09 VITALS — DIASTOLIC BLOOD PRESSURE: 54 MMHG | SYSTOLIC BLOOD PRESSURE: 117 MMHG

## 2023-01-10 VITALS — DIASTOLIC BLOOD PRESSURE: 57 MMHG | SYSTOLIC BLOOD PRESSURE: 106 MMHG

## 2023-01-24 ENCOUNTER — OUTSIDE SERVICES (OUTPATIENT)
Dept: PRIMARY CARE CLINIC | Age: 78
End: 2023-01-24

## 2023-01-24 DIAGNOSIS — I10 PRIMARY HYPERTENSION: ICD-10-CM

## 2023-01-24 DIAGNOSIS — R41.0 DELIRIUM: ICD-10-CM

## 2023-01-24 DIAGNOSIS — F33.3 SEVERE EPISODE OF RECURRENT MAJOR DEPRESSIVE DISORDER, WITH PSYCHOTIC FEATURES (HCC): ICD-10-CM

## 2023-01-24 DIAGNOSIS — F20.1 DISORGANIZED SCHIZOPHRENIA (HCC): Primary | ICD-10-CM

## 2023-02-04 ENCOUNTER — APPOINTMENT (OUTPATIENT)
Dept: CT IMAGING | Age: 78
End: 2023-02-04
Payer: MEDICARE

## 2023-02-04 ENCOUNTER — APPOINTMENT (OUTPATIENT)
Dept: GENERAL RADIOLOGY | Age: 78
End: 2023-02-04
Payer: MEDICARE

## 2023-02-04 ENCOUNTER — HOSPITAL ENCOUNTER (INPATIENT)
Age: 78
LOS: 5 days | Discharge: NURSING, REHAB OR LONG-TERM CARE | End: 2023-02-09
Attending: EMERGENCY MEDICINE | Admitting: INTERNAL MEDICINE
Payer: MEDICARE

## 2023-02-04 DIAGNOSIS — N17.9 AKI (ACUTE KIDNEY INJURY) (HCC): Primary | ICD-10-CM

## 2023-02-04 DIAGNOSIS — E87.0 HYPERNATREMIA: ICD-10-CM

## 2023-02-04 LAB
ALBUMIN SERPL-MCNC: 4.3 G/DL (ref 3.5–5.2)
ALP BLD-CCNC: 75 U/L (ref 35–104)
ALT SERPL-CCNC: 9 U/L (ref 0–32)
AMMONIA: <10 UMOL/L (ref 11–51)
ANION GAP SERPL CALCULATED.3IONS-SCNC: 15 MMOL/L (ref 7–16)
AST SERPL-CCNC: 16 U/L (ref 0–31)
BACTERIA: ABNORMAL /HPF
BASOPHILS ABSOLUTE: 0.07 E9/L (ref 0–0.2)
BASOPHILS RELATIVE PERCENT: 0.7 % (ref 0–2)
BILIRUB SERPL-MCNC: 0.2 MG/DL (ref 0–1.2)
BILIRUBIN URINE: NEGATIVE
BLOOD, URINE: NEGATIVE
BUN BLDV-MCNC: 55 MG/DL (ref 6–23)
CALCIUM SERPL-MCNC: 10.7 MG/DL (ref 8.6–10.2)
CHLORIDE BLD-SCNC: 120 MMOL/L (ref 98–107)
CLARITY: CLEAR
CO2: 21 MMOL/L (ref 22–29)
COLOR: YELLOW
CREAT SERPL-MCNC: 2.6 MG/DL (ref 0.5–1)
EKG ATRIAL RATE: 77 BPM
EKG P AXIS: 54 DEGREES
EKG P-R INTERVAL: 132 MS
EKG Q-T INTERVAL: 378 MS
EKG QRS DURATION: 92 MS
EKG QTC CALCULATION (BAZETT): 427 MS
EKG R AXIS: -21 DEGREES
EKG T AXIS: 29 DEGREES
EKG VENTRICULAR RATE: 77 BPM
EOSINOPHILS ABSOLUTE: 0.01 E9/L (ref 0.05–0.5)
EOSINOPHILS RELATIVE PERCENT: 0.1 % (ref 0–6)
GFR SERPL CREATININE-BSD FRML MDRD: 19 ML/MIN/1.73
GLUCOSE BLD-MCNC: 97 MG/DL (ref 74–99)
GLUCOSE URINE: NEGATIVE MG/DL
HCT VFR BLD CALC: 35 % (ref 34–48)
HEMOGLOBIN: 10.7 G/DL (ref 11.5–15.5)
IMMATURE GRANULOCYTES #: 0.08 E9/L
IMMATURE GRANULOCYTES %: 0.8 % (ref 0–5)
KETONES, URINE: ABNORMAL MG/DL
LEUKOCYTE ESTERASE, URINE: ABNORMAL
LYMPHOCYTES ABSOLUTE: 1.66 E9/L (ref 1.5–4)
LYMPHOCYTES RELATIVE PERCENT: 16.8 % (ref 20–42)
MCH RBC QN AUTO: 28.4 PG (ref 26–35)
MCHC RBC AUTO-ENTMCNC: 30.6 % (ref 32–34.5)
MCV RBC AUTO: 92.8 FL (ref 80–99.9)
MONOCYTES ABSOLUTE: 0.68 E9/L (ref 0.1–0.95)
MONOCYTES RELATIVE PERCENT: 6.9 % (ref 2–12)
NEUTROPHILS ABSOLUTE: 7.39 E9/L (ref 1.8–7.3)
NEUTROPHILS RELATIVE PERCENT: 74.7 % (ref 43–80)
NITRITE, URINE: NEGATIVE
PDW BLD-RTO: 16.6 FL (ref 11.5–15)
PH UA: 5.5 (ref 5–9)
PLATELET # BLD: 256 E9/L (ref 130–450)
PMV BLD AUTO: 11.8 FL (ref 7–12)
POTASSIUM REFLEX MAGNESIUM: 5.3 MMOL/L (ref 3.5–5)
PROTEIN UA: 30 MG/DL
RBC # BLD: 3.77 E12/L (ref 3.5–5.5)
RBC UA: ABNORMAL /HPF (ref 0–2)
SODIUM BLD-SCNC: 156 MMOL/L (ref 132–146)
SPECIFIC GRAVITY UA: 1.02 (ref 1–1.03)
TOTAL CK: 82 U/L (ref 20–180)
TOTAL PROTEIN: 7.3 G/DL (ref 6.4–8.3)
TROPONIN, HIGH SENSITIVITY: 35 NG/L (ref 0–9)
UROBILINOGEN, URINE: 0.2 E.U./DL
VALPROIC ACID LEVEL: 3 MCG/ML (ref 50–100)
WBC # BLD: 9.9 E9/L (ref 4.5–11.5)
WBC UA: ABNORMAL /HPF (ref 0–5)

## 2023-02-04 PROCEDURE — 81001 URINALYSIS AUTO W/SCOPE: CPT

## 2023-02-04 PROCEDURE — 2580000003 HC RX 258: Performed by: STUDENT IN AN ORGANIZED HEALTH CARE EDUCATION/TRAINING PROGRAM

## 2023-02-04 PROCEDURE — 70450 CT HEAD/BRAIN W/O DYE: CPT

## 2023-02-04 PROCEDURE — 85025 COMPLETE CBC W/AUTO DIFF WBC: CPT

## 2023-02-04 PROCEDURE — 96360 HYDRATION IV INFUSION INIT: CPT

## 2023-02-04 PROCEDURE — 6370000000 HC RX 637 (ALT 250 FOR IP): Performed by: INTERNAL MEDICINE

## 2023-02-04 PROCEDURE — 82140 ASSAY OF AMMONIA: CPT

## 2023-02-04 PROCEDURE — 36415 COLL VENOUS BLD VENIPUNCTURE: CPT

## 2023-02-04 PROCEDURE — 84484 ASSAY OF TROPONIN QUANT: CPT

## 2023-02-04 PROCEDURE — 72170 X-RAY EXAM OF PELVIS: CPT

## 2023-02-04 PROCEDURE — 93010 ELECTROCARDIOGRAM REPORT: CPT | Performed by: INTERNAL MEDICINE

## 2023-02-04 PROCEDURE — 80053 COMPREHEN METABOLIC PANEL: CPT

## 2023-02-04 PROCEDURE — 2060000000 HC ICU INTERMEDIATE R&B

## 2023-02-04 PROCEDURE — 71045 X-RAY EXAM CHEST 1 VIEW: CPT

## 2023-02-04 PROCEDURE — 80164 ASSAY DIPROPYLACETIC ACD TOT: CPT

## 2023-02-04 PROCEDURE — 2580000003 HC RX 258: Performed by: INTERNAL MEDICINE

## 2023-02-04 PROCEDURE — 82550 ASSAY OF CK (CPK): CPT

## 2023-02-04 PROCEDURE — 72125 CT NECK SPINE W/O DYE: CPT

## 2023-02-04 PROCEDURE — 99285 EMERGENCY DEPT VISIT HI MDM: CPT

## 2023-02-04 PROCEDURE — 93005 ELECTROCARDIOGRAM TRACING: CPT | Performed by: STUDENT IN AN ORGANIZED HEALTH CARE EDUCATION/TRAINING PROGRAM

## 2023-02-04 RX ORDER — ASPIRIN 81 MG/1
81 TABLET ORAL 2 TIMES DAILY
Status: DISCONTINUED | OUTPATIENT
Start: 2023-02-04 | End: 2023-02-09 | Stop reason: HOSPADM

## 2023-02-04 RX ORDER — LEVOTHYROXINE SODIUM 0.03 MG/1
25 TABLET ORAL DAILY
Status: DISCONTINUED | OUTPATIENT
Start: 2023-02-04 | End: 2023-02-09 | Stop reason: HOSPADM

## 2023-02-04 RX ORDER — MIRTAZAPINE 15 MG/1
15 TABLET, FILM COATED ORAL NIGHTLY
Status: DISCONTINUED | OUTPATIENT
Start: 2023-02-04 | End: 2023-02-09 | Stop reason: HOSPADM

## 2023-02-04 RX ORDER — MEMANTINE HYDROCHLORIDE 10 MG/1
10 TABLET ORAL 2 TIMES DAILY
Status: DISCONTINUED | OUTPATIENT
Start: 2023-02-04 | End: 2023-02-09 | Stop reason: HOSPADM

## 2023-02-04 RX ORDER — LEVOTHYROXINE SODIUM 0.03 MG/1
25 TABLET ORAL DAILY
COMMUNITY

## 2023-02-04 RX ORDER — DEXTROSE AND SODIUM CHLORIDE 5; .45 G/100ML; G/100ML
INJECTION, SOLUTION INTRAVENOUS CONTINUOUS
Status: DISCONTINUED | OUTPATIENT
Start: 2023-02-04 | End: 2023-02-06

## 2023-02-04 RX ORDER — HEPARIN SODIUM 10000 [USP'U]/ML
5000 INJECTION, SOLUTION INTRAVENOUS; SUBCUTANEOUS 2 TIMES DAILY
Status: DISCONTINUED | OUTPATIENT
Start: 2023-02-04 | End: 2023-02-09 | Stop reason: HOSPADM

## 2023-02-04 RX ORDER — LEVETIRACETAM 500 MG/1
500 TABLET ORAL 2 TIMES DAILY
Status: DISCONTINUED | OUTPATIENT
Start: 2023-02-04 | End: 2023-02-09 | Stop reason: HOSPADM

## 2023-02-04 RX ORDER — ONDANSETRON 4 MG/1
4 TABLET, FILM COATED ORAL EVERY 8 HOURS PRN
Status: DISCONTINUED | OUTPATIENT
Start: 2023-02-04 | End: 2023-02-09 | Stop reason: HOSPADM

## 2023-02-04 RX ORDER — LANOLIN ALCOHOL/MO/W.PET/CERES
3 CREAM (GRAM) TOPICAL NIGHTLY PRN
Status: DISCONTINUED | OUTPATIENT
Start: 2023-02-04 | End: 2023-02-09 | Stop reason: HOSPADM

## 2023-02-04 RX ORDER — ONDANSETRON 4 MG/1
4 TABLET, FILM COATED ORAL EVERY 8 HOURS PRN
COMMUNITY

## 2023-02-04 RX ORDER — PANTOPRAZOLE SODIUM 40 MG/1
40 TABLET, DELAYED RELEASE ORAL DAILY
Status: DISCONTINUED | OUTPATIENT
Start: 2023-02-04 | End: 2023-02-09 | Stop reason: HOSPADM

## 2023-02-04 RX ORDER — MIRTAZAPINE 15 MG/1
15 TABLET, FILM COATED ORAL NIGHTLY
COMMUNITY

## 2023-02-04 RX ORDER — RIVASTIGMINE 13.3 MG/24H
1 PATCH, EXTENDED RELEASE TRANSDERMAL NIGHTLY
Status: DISCONTINUED | OUTPATIENT
Start: 2023-02-04 | End: 2023-02-09 | Stop reason: HOSPADM

## 2023-02-04 RX ORDER — 0.9 % SODIUM CHLORIDE 0.9 %
1000 INTRAVENOUS SOLUTION INTRAVENOUS ONCE
Status: COMPLETED | OUTPATIENT
Start: 2023-02-04 | End: 2023-02-04

## 2023-02-04 RX ORDER — ACETAMINOPHEN 325 MG/1
650 TABLET ORAL EVERY 4 HOURS PRN
Status: DISCONTINUED | OUTPATIENT
Start: 2023-02-04 | End: 2023-02-09 | Stop reason: HOSPADM

## 2023-02-04 RX ORDER — MEMANTINE HYDROCHLORIDE 10 MG/1
10 TABLET ORAL 2 TIMES DAILY
COMMUNITY

## 2023-02-04 RX ADMIN — DEXTROSE AND SODIUM CHLORIDE: 5; 450 INJECTION, SOLUTION INTRAVENOUS at 13:00

## 2023-02-04 RX ADMIN — SODIUM CHLORIDE 1000 ML: 9 INJECTION, SOLUTION INTRAVENOUS at 08:39

## 2023-02-04 ASSESSMENT — ENCOUNTER SYMPTOMS
NAUSEA: 0
ABDOMINAL PAIN: 0
DIARRHEA: 0
BACK PAIN: 0
VOMITING: 0
COUGH: 0
COLOR CHANGE: 0
SHORTNESS OF BREATH: 0

## 2023-02-04 ASSESSMENT — PAIN - FUNCTIONAL ASSESSMENT: PAIN_FUNCTIONAL_ASSESSMENT: NONE - DENIES PAIN

## 2023-02-04 NOTE — ED NOTES
Nurse to nurse given to [facility], RN and SBAR faxed     Abdi Chowdhury RN  02/04/23 1002

## 2023-02-04 NOTE — ED PROVIDER NOTES
ATTENDING PROVIDER ATTESTATION:     Ronnell Catsillo presented to the emergency department for evaluation of Fall ((Unwitnessed fall found sitting up and sleeping, patient has no complaints))   and was initially evaluated by the Medical Resident. See Original ED Note for H&P and ED course above. I have reviewed and discussed the case, including pertinent history (medical, surgical, family and social) and exam findings with the Medical Resident assigned to Ronnell Castillo. I have personally performed and/or participated in the history, exam, medical decision making, and procedures and agree with all pertinent clinical information and any additional changes or corrections are noted below in my assessment and plan. I have discussed this patient in detail with the resident, and provided the instruction and education,       I have reviewed my findings and recommendations with the assigned Medical Resident, Ronnell Jonathan and members of family present at the time of disposition. I have performed a history and physical examination of this patient and directly supervised the resident caring for this patient              [facility]        Pt Name: Ronnell Castillo  MRN: [id number]  [place] 1945  Date of evaluation: 2/4/2023  Provider: Shani Martínez MD  PCP: Adán Esparza MD  Note Started: 7:02 AM EST 2/4/23    CHIEF COMPLAINT       Chief Complaint   Patient presents with    Fall     (Unwitnessed fall found sitting up and sleeping, patient has no complaints)       HISTORY OF PRESENT ILLNESS: 1 or more Elements     Limitations to history : None    Ronnell Castillo is a 78 y.o. female who presents for follow-up. Unwitnessed fall. She was found sitting on the ground. She told her she was using her computer. She resides at nursing home. Patient reports she is normally alert and oriented x1. On arrival here she is awake alert and oriented. She denies any complaints. There is no signs of trauma. She denies chest pain or shortness of breath. No abdominal pain. She denies extremity injuries. She is moving all extremities. Nursing Notes were all reviewed and agreed with or any disagreements were addressed in the HPI. REVIEW OF EXTERNAL NOTE :       EMS run report and EMS report as well as nursing home report and nursing home call she  EKG from 10/1/22  10/5/22 internal medicine discharge summary    REVIEW OF SYSTEMS :      Positives and Pertinent negatives as per HPI. SURGICAL HISTORY     Past Surgical History:   Procedure Laterality Date    BLADDER SUSPENSION      DILATION AND CURETTAGE OF UTERUS      DILATION AND CURETTAGE OF UTERUS N/A 2/16/2021    endometrial biopsy, HYSTEROSCOPY performed by Tami Sarabia MD at [address]       Current Discharge Medication List        CONTINUE these medications which have NOT CHANGED    Details   memantine (NAMENDA) 10 MG tablet Take 10 mg by mouth 2 times daily      mirtazapine (REMERON) 15 MG tablet Take 15 mg by mouth nightly      ondansetron (ZOFRAN) 4 MG tablet Take 4 mg by mouth every 8 hours as needed for Nausea or Vomiting      levothyroxine (SYNTHROID) 25 MCG tablet Take 25 mcg by mouth Daily      cariprazine hcl (VRAYLAR) 1.5 MG capsule Take 3 mg by mouth at bedtime      levETIRAcetam (KEPPRA) 500 MG tablet Take 1 tablet by mouth in the morning and 1 tablet before bedtime. Qty: 60 tablet, Refills: 3      melatonin 5 MG TBDP disintegrating tablet Take 1 tablet by mouth in the morning for 6 doses. Qty: 6 tablet, Refills: 0      pantoprazole (PROTONIX) 40 MG tablet Take 1 tablet by mouth in the morning.   Qty: 30 tablet, Refills: 3      rivastigmine (EXELON) 13.3 MG/24HR Place 1 patch onto the skin nightly      acetaminophen (TYLENOL) 650 MG extended release tablet Take 650 mg by mouth every 4 hours as needed for Pain DO NOT EXCEED 3 GRAMS MAX TYLENOL PER DAY      aspirin 81 MG EC tablet Take 1 tablet by mouth 2 times daily  Qty: 30 tablet, Refills: 3             ALLERGIES     Mysoline [primidone] and Tofranil [imipramine hcl]    FAMILYHISTORY       Family History   Family history unknown: Yes        SOCIAL HISTORY       Social History     Tobacco Use    Smoking status: Never    Smokeless tobacco: Never   Vaping Use    Vaping Use: Never used   Substance Use Topics    Alcohol use: No    Drug use: No       SCREENINGS        [place] Coma Scale  Eye Opening: Spontaneous  Best Verbal Response: Confused  Best Motor Response: Obeys commands  Mary Coma Scale Score: 14                CIWA Assessment  BP: (!) 153/74  Heart Rate: 74           PHYSICAL EXAM  1 or more Elements     ED Triage Vitals [02/04/23 0659]   BP Temp Temp src Heart Rate Resp SpO2 Height Weight   123/85 97.9 °F (36.6 °C) -- 94 12 98 % 5' 6\" (1.676 m) 173 lb (78.5 kg)                 Constitutional/General: Alert and oriented   Head: Normocephalic and atraumatic  Eyes: PERRL, EOMI, conjunctiva normal, sclera non icteric  ENT:  Oropharynx clear, handling secretions, no trismus, no asymmetry of the posterior oropharynx or uvular edema  Neck: Supple, full ROM, no stridor, no meningeal signs  Respiratory: Lungs clear to auscultation bilaterally, no wheezes, rales, or rhonchi. Not in respiratory distress  Cardiovascular:  Regular rate. Regular rhythm. No murmurs, no gallops, no rubs. 2+ distal pulses. Equal extremity pulses. Chest: No chest wall tenderness  GI:  Abdomen Soft, Non tender, Non distended. No rebound, guarding, or rigidity. No pulsatile masses. Musculoskeletal: Moves all extremities x 4. Warm and well perfused, no clubbing, no cyanosis, no edema. Capillary refill <3 seconds  Integument: skin warm and dry. No rashes.    Neurologic: GCS 15, no focal deficits,  Psychiatric: Normal Affect            DIAGNOSTIC RESULTS   LABS: Interpreted by Tresa Nathalia Patricio MD    Labs Reviewed   CBC WITH AUTO DIFFERENTIAL - Abnormal; Notable for the following components:       Result Value    Hemoglobin 10.7 (*)     MCHC 30.6 (*)     RDW 16.6 (*)     Lymphocytes % 16.8 (*)     Neutrophils Absolute 7.39 (*)     Eosinophils Absolute 0.01 (*)     All other components within normal limits   COMPREHENSIVE METABOLIC PANEL W/ REFLEX TO MG FOR LOW K - Abnormal; Notable for the following components:    Sodium 156 (*)     Potassium reflex Magnesium 5.3 (*)     Chloride 120 (*)     CO2 21 (*)     BUN 55 (*)     Creatinine 2.6 (*)     Calcium 10.7 (*)     All other components within normal limits   TROPONIN - Abnormal; Notable for the following components:    Troponin, High Sensitivity 35 (*)     All other components within normal limits   URINALYSIS WITH MICROSCOPIC - Abnormal; Notable for the following components:    Ketones, Urine TRACE (*)     Protein, UA 30 (*)     Leukocyte Esterase, Urine TRACE (*)     All other components within normal limits   VALPROIC ACID LEVEL, TOTAL - Abnormal; Notable for the following components:    Valproic Acid Lvl 3 (*)     All other components within normal limits   AMMONIA - Abnormal; Notable for the following components:    Ammonia <10.0 (*)     All other components within normal limits   CULTURE, URINE   CK       As interpreted by me, the above displayed labs are abnormal. All other labs obtained during this visit were within normal range or not returned as of this dictation. RADIOLOGY:   Non-plain film images such as CT, Ultrasound and MRI are read by the radiologist. Plain radiographic images are visualized and preliminarily interpreted by the ED Provider with the findings documented in the ED course:    Interpretation per the Radiologist below, if available at the time of this note:    CT HEAD WO CONTRAST   Final Result   No acute intracranial abnormality.   Specifically, there is no acute   intracranial hemorrhage         CT CERVICAL SPINE WO CONTRAST   Final Result   1. There is no acute compression fracture or subluxation of the cervical   spine. 2. Multilevel degenerative disc and degenerative joint disease. XR CHEST PORTABLE   Final Result   No acute process. XR PELVIS (1-2 VIEWS)   Final Result   No acute bony abnormality. No results found. No results found. PROCEDURES   Unless otherwise noted below, none          CRITICAL CARE TIME (.cct)        PAST MEDICAL HISTORY/Chronic Conditions Affecting Care      has a past medical history of Arthritis, Ataxia, Atrophy of muscle, Bipolar 1 disorder, mixed, moderate ([address] Cerebrovascular disease, Coordination problem, Hyperlipidemia, Hypertension, Overactive bladder, Paranoid schizophrenia ([address] and Thyroid disease.      EMERGENCY DEPARTMENT COURSE    Vitals:    Vitals:    02/04/23 0749 02/04/23 0840 02/04/23 0957 02/04/23 1108   BP: 139/86 122/71 132/74 (!) 153/74   Pulse: 78 74 80 74   Resp: 14 16 18 16   Temp:   98.1 °F (36.7 °C) 97.5 °F (36.4 °C)   TempSrc:    Temporal   SpO2: 97% 99% 98% 98%   Weight:       Height:           Patient was given the following medications:  Medications   acetaminophen (TYLENOL) tablet 650 mg (has no administration in time range)   aspirin EC tablet 81 mg (81 mg Oral Not Given 2/4/23 1300)   cariprazine hcl (VRAYLAR) capsule 3 mg (has no administration in time range)   levETIRAcetam (KEPPRA) tablet 500 mg (500 mg Oral Not Given 2/4/23 1300)   levothyroxine (SYNTHROID) tablet 25 mcg (25 mcg Oral Not Given 2/4/23 1300)   memantine (NAMENDA) tablet 10 mg (10 mg Oral Not Given 2/4/23 1300)   mirtazapine (REMERON) tablet 15 mg (has no administration in time range)   ondansetron (ZOFRAN) tablet 4 mg (has no administration in time range)   pantoprazole (PROTONIX) tablet 40 mg (40 mg Oral Not Given 2/4/23 1300)   rivastigmine (EXELON) 13.3 MG/24HR 1 patch (has no administration in time range)   dextrose 5 % and 0.45 % sodium chloride infusion ( IntraVENous New Bag 2/4/23 1300)   heparin (porcine) injection 5,000 Units (5,000 Units SubCUTAneous Not Given 2/4/23 1300)   melatonin tablet 3 mg (has no administration in time range)   0.9 % sodium chloride bolus (0 mLs IntraVENous Stopped 2/4/23 1130)                Medical Decision Making/Differential Diagnosis:    CC/HPI Summary, Social Determinants of health, Records Reviewed, DDx, testing done/not done, ED Course, Reassessment, disposition considerations/shared decision making with patient, consults, disposition:      ED Course as of 02/04/23 1726   Sat Feb 04, 2023   2004 Chart Review performed by me, in October of 2022 patient was admitted under Dr. Ludivina Nguyen hospitalist for hypernatremia and stroke like episode. [FG]   0749 EKG Interpretation  Interpreted by emergency department physician, Dr. Karime Fitzpatrick     2/4/23  Time: 1825    Rhythm: normal sinus   Rate: normal  Axis: left  Conduction: normal  ST Segments: no acute change  T Waves: no acute change    Clinical Impression: Sinus rhythm, no acute ischemic changes    Comparison to Old EKG  Stable from prior EKG       [CD]   0807 The following tests were interpreted by me: CK normal, troponin 35, CMP with sodium 156, potassium 5.3, chloride 120, bicarb 21, creatinine 2.6, BUN 55, calcium 10.7, CBC with hemoglobin 10.7, otherwise normal     [CD]   0822 Patient with calculated free water deficit of 4 L [FG]   6487 I agree with resident interpretation of EKG [CD]   0929 Spoke with Dr. Keith Chappell hospitalist, patient accepted for admission [FG]      ED Course User Index  [CD] Angela Romero MD  [FG] Mellissa Aguilar, DO          Medical Decision Making  Fall, differential includes but not limited to head injury, intracranial hemorrhage, skull fracture, metabolic process, rhabdo to name a few. Fortunately she is awake alert and oriented. She appears to be at her neurologic baseline.   My independent interpretation of the labs show hypernatremia with sodium 156, hyperkalemia with potassium 5.3, hyperchloremia with chloride 120, acute renal failure with creatinine 2.6. Troponin 35, CK 82, CBC with hemoglobin 10.7, ammonia normal, CT head per my wet read shows no intracranial hemorrhage, chest x-ray per my wet read shows no acute pneumothorax. Based on her lab abnormalities and dehydration status consult placed to internal medicine for admission. Problems Addressed:  JOSE A (acute kidney injury) ([facility] [address]): acute illness or injury  Hypernatremia: acute illness or injury    Amount and/or Complexity of Data Reviewed  Independent Historian: EMS  External Data Reviewed: notes. Labs: ordered. Decision-making details documented in ED Course. Radiology: ordered and independent interpretation performed. Decision-making details documented in ED Course. ECG/medicine tests: ordered and independent interpretation performed. Decision-making details documented in ED Course. Discussion of management or test interpretation with external provider(s): Internal medicine    Risk  Prescription drug management. Decision regarding hospitalization. CONSULTS:   IP CONSULT TO INTERNAL MEDICINE  IP CONSULT TO PSYCHIATRY         I am the Primary Clinician of Record. FINAL IMPRESSION      1. JOSE A ([facility]) [facility]    2. Hypernatremia          DISPOSITION/PLAN     DISPOSITION Admitted 02/04/2023 09:30:46 AM      PATIENT REFERRED TO:  No follow-up provider specified.     DISCHARGE MEDICATIONS:  Current Discharge Medication List          DISCONTINUED MEDICATIONS:  Current Discharge Medication List        STOP taking these medications       divalproex (DEPAKOTE SPRINKLE) 125 MG capsule Comments:   Reason for Stopping:         paliperidone palmitate ER (INVEGA SUSTENNA) 156 MG/ML ANAND IM injection Comments:   Reason for Stopping:         benztropine (COGENTIN) 0.5 MG tablet Comments:   Reason for Stopping:         sodium chloride (V-R NASAL SPRAY SALINE) 0.65 % nasal spray Comments:   Reason for Stopping:                      (Please note that portions of this note were completed with a voice recognition program.  Efforts were made to edit the dictations but occasionally words are mis-transcribed.)    Alin Miguel MD (electronically signed)           Paulo Riggins MD  02/04/23 6207

## 2023-02-04 NOTE — LETTER
[place] Department Medicaid  CERTIFICATION OF NECESSITY  FOR NON-EMERGENCY TRANSPORTATION   BY GROUND AMBULANCE      Individual Information   1. Name: Della Dawson 2. [place] Medicaid Billing Number: ***   3. Address: [facility]  [address]     Transportation Provider Information   4. Provider Name:    5. [place] Medicaid Provider Number:  National Provider Identifier (NPI):     Certification  7. Criteria:  During transport, this individual requires:  [] Medical treatment o2r continuous     supervision by an EMT. [x] The administration or regulation of oxygen by another person. [] Supervised protective restraint. 8. Period Beginning Date:    5. Length  [x] Not more than 1 day(s)  [] One Year     Additional Information Relevant to Certification   10. Comments or Explanations, If Necessary or Appropriate     Bipolar 1, schizophrenia, cva     Certifying Practitioner Information   11. Name of Practitioner: Filippo Giron MD   12. [place] Medicaid Provider Number, If Applicable:  [address] Provider Identifier (NPI):      Signature Information   14. Date of Signature:  13. Name of Person Signin. Signature and Professional Designation: ***     [facility] [id number]  Rev. 2015    [address] Encounter Date/Time: 2023 [address] Account: [de-identified]    MRN: [id number]    Patient: Della Dawson    Contact Serial #: [id number]      ENCOUNTER          Patient Class: I Private Enc?   No Unit  [facility] [id number]   Hospital Service: Intermediate   Encounter DX: Hypernatremia [E87.0]   ADM Provider: Filippo Giron MD   Procedure:     ATT Provider: Filippo Giron MD   REF Provider:        Admission DX: Hypernatremia, JOSE A (acute kidney injury) ([facility] [address] and DX codes: E87.0, N17.9      PATIENT                 Name: Della Dawson : 1945 (77 yrs)   Address: [facility]* Sex: Female   City: [place] [address]         Marital Status: Single   Employer: RETIRED         Nondenominational: [employer]   Primary Care Provider: Myron Quinn MD         Primary Phone: [phone]   EMERGENCY CONTACT   Contact Name Legal Guardian? Relationship to Patient Home Phone Work Phone   1. Devin Baileigha  2. Benita Daly    Legal Guardian  Other [phone]              GUARANTOR            Guarantor: Minnie Fountain     : 1945   Address: [address] [place] [address]* Sex: Female     [place] [address]     Relation to Patient: Self       Home Phone: [phone]   Guarantor ID: [id number]       Work Phone:     Guarantor Employer: RETIRED         Status: RETIRED      COVERAGE        PRIMARY INSURANCE   Payor: Denisa Hutchison* Plan: Octav[address] 83-*   Payor Address: [facility] [id number] [place] [address]       Group Number:   Insurance Type: [address] Name: Donna Verduzco : 1945   Subscriber ID: [id number] Pat. Rel. to Sub: Self   SECONDARY INSURANCE   Payor: Adore Villatoro* Plan: Margarito GUTIERREZ*   Payor Address:  [facility] [address] [facility]          [place]: [id number] Type: [place]DEMNITY   Subscriber Name: Donna Verduzco : 1945   Subscriber ID: [id number] Pat.  Rel. to Sub: SELF      CSN: [id number]

## 2023-02-04 NOTE — H&P
Germain Goldberg MD FACP  Internal medicine   History and Physical      CHIEF COMPLAINT: Patient was found on the floor at the nursing facility      HISTORY OF PRESENT ILLNESS:    2/4/2023  Patient was seen in the emergency room at the main [facility] earlier this morning  Spoke with the ER physician  Data reviewed in detail  81-year-old  woman with a prior history of stroke currently living in a nursing facility  She was found on the floor of unknown duration  No musculoskeletal injuries reported  She was brought to emergency room where she was found to be profoundly hyponatremic with a normal CK and elevated creatinine  Patient admits to not eating drinking enough over the last several days without any specific complaints  Past Medical History:    Past Medical History:   Diagnosis Date    Arthritis     hips    Ataxia     Atrophy of muscle     Bipolar 1 disorder, mixed, moderate ([address]     Cerebrovascular disease     Coordination problem     Hyperlipidemia     Hypertension     Overactive bladder     Paranoid schizophrenia ([address]     Thyroid disease        Past Surgical History:    Past Surgical History:   Procedure Laterality Date    BLADDER SUSPENSION      DILATION AND CURETTAGE OF UTERUS      DILATION AND CURETTAGE OF UTERUS N/A 2/16/2021    endometrial biopsy, HYSTEROSCOPY performed by Haydee Sheth MD at [address] [place] [address]         Medications Prior to Admission:    Medications Prior to Admission: memantine (NAMENDA) 10 MG tablet, Take 10 mg by mouth 2 times daily  mirtazapine (REMERON) 15 MG tablet, Take 15 mg by mouth nightly  ondansetron (ZOFRAN) 4 MG tablet, Take 4 mg by mouth every 8 hours as needed for Nausea or Vomiting  levothyroxine (SYNTHROID) 25 MCG tablet, Take 25 mcg by mouth Daily  [DISCONTINUED] divalproex (DEPAKOTE SPRINKLE) 125 MG capsule, Take 2 capsules by mouth every 12 hours for 6 doses  cariprazine hcl (VRAYLAR) 1.5 MG capsule, Take 3 mg by mouth at bedtime  [DISCONTINUED] paliperidone palmitate ER (INVEGA SUSTENNA) 156 MG/ML ANAND IM injection, Inject 156 mg into the muscle once Inject 1 dose in the morning starting on the 2nd and ending on the 2nd every month  levETIRAcetam (KEPPRA) 500 MG tablet, Take 1 tablet by mouth in the morning and 1 tablet before bedtime. melatonin 5 MG TBDP disintegrating tablet, Take 1 tablet by mouth in the morning for 6 doses. pantoprazole (PROTONIX) 40 MG tablet, Take 1 tablet by mouth in the morning. rivastigmine (EXELON) 13.3 MG/24HR, Place 1 patch onto the skin nightly  acetaminophen (TYLENOL) 650 MG extended release tablet, Take 650 mg by mouth every 4 hours as needed for Pain DO NOT EXCEED 3 GRAMS MAX TYLENOL PER DAY  aspirin 81 MG EC tablet, Take 1 tablet by mouth 2 times daily    Allergies:    Mysoline [primidone] and Tofranil [imipramine hcl]    Social History:    reports that she has never smoked. She has never used smokeless tobacco. She reports that she does not drink alcohol and does not use drugs. Family History:   Family history is unknown by patient. REVIEW OF SYSTEMS:  As above in the HPI, otherwise negative    PHYSICAL EXAM:    Vitals:  BP (!) 153/74   Pulse 74   Temp 97.5 °F (36.4 °C) (Temporal)   Resp 16   Ht 5' 6\" (1.676 m)   Wt 173 lb (78.5 kg)   SpO2 98%   BMI 27.92 kg/m²     General:  Awake, alert, oriented X 3. Looks chronically ill  Dysarthric  HEENT:  Normocephalic, atraumatic. Pupils equal, round, reactive to light. No scleral icterus. No conjunctival injection  Oral mucosa is dry no nasal discharge. Neck:  Supple  Heart:  RRR, no murmurs, gallops, rubs  Lungs:  CTA bilaterally, bilat symmetrical expansion, no wheeze, rales, or rhonchi  Abdomen:   Bowel sounds present, soft, nontender, no masses, no organomegaly, no peritoneal signs  Extremities:  No clubbing, cyanosis, or edema  Skin:  Warm and dry, no open lesions or rash  Neuro: Residual left facial weakness and left hemiparesis noted from previous stroke  Breast: deferred  Rectal: deferred  Genitalia:  deferred    LABS:  Data reviewed      ASSESSMENT:      Principal Problem:    JOSE A (acute kidney injury) ([facility] [address]  Volume contraction  Hyper natremia  Residual left hemiparesis from previous stroke  Comorbidities present and past as listed below  Patient Active Problem List   Diagnosis    Schizophrenia ([facility] [address]    Severe episode of recurrent major depressive disorder, with psychotic features (HCC)    NSAID overdose    Metabolic acidosis with increased anion gap and accumulation of organic acids    Suicide attempt ([facility] [address]    Post-menopausal bleeding    Complicated UTI (urinary tract infection)    Dysarthria    Delirium    Hypernatremia    Hypertension    JOSE A (acute kidney injury) ([facility] [address]           PLAN:  Admit to telemetry  Advance diet and activity as tolerated  IV hydration  Monitor electrolytes  Resume home medications  DVT prophylaxis with heparin  Check urine cultures  Questions answered to her satisfaction    Carmenza Bacon MD  3:18 PM  2/4/2023

## 2023-02-04 NOTE — ED NOTES
Per facility patient only A&O to 1. Patient was able give her name, that she was at the hospital (unsure which one), and the year.       Ariel Ovalle RN  02/04/23 0700

## 2023-02-04 NOTE — ED PROVIDER NOTES
HPI   72-year-old female patient presenting to emergency department for evaluation of unwitnessed fall. Patient reports that she was found on the ground sitting up. She states to me that she hit her head but having no complaints. No chest pain, shortness of breath, no nausea, no vomiting or diarrhea. She states that she was on the computer earlier this evening and had fallen on the ground after getting up. She cannot tell me how long she was on the ground for. Nursing home reports that patient A&O x1, here she is alert and oriented x3. Review of Systems   Constitutional:  Negative for chills and fever. Fall   HENT:  Negative for congestion. Respiratory:  Negative for cough and shortness of breath. Cardiovascular:  Negative for chest pain. Gastrointestinal:  Negative for abdominal pain, diarrhea, nausea and vomiting. Genitourinary:  Negative for difficulty urinating, dysuria and hematuria. Musculoskeletal:  Negative for back pain. Skin:  Negative for color change. All other systems reviewed and are negative. Physical Exam  Vitals and nursing note reviewed. Constitutional:       Appearance: Normal appearance. HENT:      Head: Normocephalic and atraumatic. Nose: Nose normal. No congestion. Mouth/Throat:      Mouth: Mucous membranes are moist.      Pharynx: Oropharynx is clear. Eyes:      Conjunctiva/sclera: Conjunctivae normal.      Pupils: Pupils are equal, round, and reactive to light. Cardiovascular:      Rate and Rhythm: Normal rate and regular rhythm. Pulses: Normal pulses. Heart sounds: Normal heart sounds. Pulmonary:      Effort: Pulmonary effort is normal. No respiratory distress. Breath sounds: Normal breath sounds. Abdominal:      General: Bowel sounds are normal. There is no distension. Tenderness: There is no abdominal tenderness. Musculoskeletal:         General: Normal range of motion.       Cervical back: Normal range of motion and neck supple. Right lower leg: No edema. Left lower leg: No edema. Comments: No tenderness to palpation of the C-spine, T-spine, L-spine, no step-offs or crepitus. No wounds on the buttocks. Skin:     General: Skin is warm and dry. Capillary Refill: Capillary refill takes less than 2 seconds. Neurological:      General: No focal deficit present. Mental Status: She is alert. Comments: Patient speaks slowly but she is able to respond to questions, ANO x3 moving all extremities spontaneously. Procedures     MDM  Differential diagnosis includes head injury, electrolyte abnormality, UTI, pneumonia, spine injury. 29-year-old female patient present to emergency department for evaluation of fall. History not completely clear unknown how long she was on the ground for. Basic labs as well as imaging obtained. CT of her head was negative for any acute intracranial abnormality CT cervical spine was negative as well. Chest x-ray was interpreted by me showing obvious pneumonia. Pelvis x-ray as interpreted by radiology was negative. Lab work here concerning for patietn having having acute kidney injury creatinine up to 2.6 from prior of 1.7. She is also very hypernatremic sodium of 156. She has a calculated fluid deficit of 4 L as determined by me. Her CK was normal at 82 she is not in rhabdomyolysis. She is not complaining of any specific pain. She was given IV fluids here. Potassium was also repleted. Spoke with Dr. Estela Stokes hospitalist, patient accepted for [address]. Please refer to ED course for rest of the MDM.     Medications   acetaminophen (TYLENOL) tablet 650 mg (has no administration in time range)   aspirin EC tablet 81 mg (has no administration in time range)   cariprazine hcl (VRAYLAR) capsule 3 mg (has no administration in time range)   levETIRAcetam (KEPPRA) tablet 500 mg (has no administration in time range)   levothyroxine (SYNTHROID) tablet 25 mcg (has no administration in time range)   memantine (NAMENDA) tablet 10 mg (has no administration in time range)   mirtazapine (REMERON) tablet 15 mg (has no administration in time range)   ondansetron (ZOFRAN) tablet 4 mg (has no administration in time range)   pantoprazole (PROTONIX) tablet 40 mg (has no administration in time range)   rivastigmine (EXELON) 13.3 MG/24HR 1 patch (has no administration in time range)   dextrose 5 % and 0.45 % sodium chloride infusion ( IntraVENous New Bag 2/4/23 1300)   heparin (porcine) injection 5,000 Units (has no administration in time range)   melatonin tablet 3 mg (has no administration in time range)   0.9 % sodium chloride bolus (0 mLs IntraVENous Stopped 2/4/23 1130)         ED Course as of 02/04/23 1341   Sat Feb 04, 2023   0649 Chart Review performed by me, in October of 2022 patient was admitted under Dr. Kamlesh Packer hospitalist for hypernatremia and stroke like episode. [FG]   3780 EKG Interpretation  Interpreted by emergency department physician, Dr. Calvin De Jesus     2/4/23  Time: 9547    Rhythm: normal sinus   Rate: normal  Axis: left  Conduction: normal  ST Segments: no acute change  T Waves: no acute change    Clinical Impression: Sinus rhythm, no acute ischemic changes    Comparison to Old EKG  Stable from prior EKG       [CD]   0807 The following tests were interpreted by me: CK normal, troponin 35, CMP with sodium 156, potassium 5.3, chloride 120, bicarb 21, creatinine 2.6, BUN 55, calcium 10.7, CBC with hemoglobin 10.7, otherwise normal     [CD]   0159 Patient with calculated free water deficit of 4 L [FG]   1210 I agree with resident interpretation of EKG [CD]   5335 Spoke with Dr. Zafar Hernandez hospitalist, patient accepted for admission [FG]      ED Course User Index  [CD] Kaitlynn Mitchell MD  [FG] Mini Vyas, DO     --------------------------------------------- PAST HISTORY ---------------------------------------------  Past Medical History:  has a past medical history of Arthritis, Ataxia, Atrophy of muscle, Bipolar 1 disorder, mixed, moderate ([address] Cerebrovascular disease, Coordination problem, Hyperlipidemia, Hypertension, Overactive bladder, Paranoid schizophrenia ([address] and Thyroid disease. Past Surgical History:  has a past surgical history that includes Tonsillectomy and adenoidectomy; Endometrial ablation; Dilation and curettage of uterus; bladder suspension; Induced ; and Dilation and curettage of uterus (N/A, 2021). Social History:  reports that she has never smoked. She has never used smokeless tobacco. She reports that she does not drink alcohol and does not use drugs. Family History: Family history is unknown by patient. The patients home medications have been reviewed.     Allergies: Mysoline [primidone] and Tofranil [imipramine hcl]    -------------------------------------------------- RESULTS -------------------------------------------------    LABS:  Results for orders placed or performed during the hospital encounter of 23   CBC with Auto Differential   Result Value Ref Range    WBC 9.9 4.5 - 11.5 E9/L    RBC 3.77 3.50 - 5.50 E12/L    Hemoglobin 10.7 (L) 11.5 - 15.5 g/dL    Hematocrit 35.0 34.0 - 48.0 %    MCV 92.8 80.0 - 99.9 fL    MCH 28.4 26.0 - 35.0 pg    MCHC 30.6 (L) 32.0 - 34.5 %    RDW 16.6 (H) 11.5 - 15.0 fL    Platelets 992 077 - 783 E9/L    MPV 11.8 7.0 - 12.0 fL    Neutrophils % 74.7 43.0 - 80.0 %    Immature Granulocytes % 0.8 0.0 - 5.0 %    Lymphocytes % 16.8 (L) 20.0 - 42.0 %    Monocytes % 6.9 2.0 - 12.0 %    Eosinophils % 0.1 0.0 - 6.0 %    Basophils % 0.7 0.0 - 2.0 %    Neutrophils Absolute 7.39 (H) 1.80 - 7.30 E9/L    Immature Granulocytes # 0.08 E9/L    Lymphocytes Absolute 1.66 1.50 - 4.00 E9/L    Monocytes Absolute 0.68 0.10 - 0.95 E9/L    Eosinophils Absolute 0.01 (L) 0.05 - 0.50 E9/L    Basophils Absolute 0.07 0.00 - 0.20 E9/L   Comprehensive Metabolic Panel w/ Reflex to MG   Result Value Ref Range    Sodium 156 (H) 132 - 146 mmol/L    Potassium reflex Magnesium 5.3 (H) 3.5 - 5.0 mmol/L    Chloride 120 (H) 98 - 107 mmol/L    CO2 21 (L) 22 - 29 mmol/L    Anion Gap 15 7 - 16 mmol/L    Glucose 97 74 - 99 mg/dL    BUN 55 (H) 6 - 23 mg/dL    Creatinine 2.6 (H) 0.5 - 1.0 mg/dL    Est, Glom Filt Rate 19 >=60 mL/min/1.73    Calcium 10.7 (H) 8.6 - 10.2 mg/dL    Total Protein 7.3 6.4 - 8.3 g/dL    Albumin 4.3 3.5 - 5.2 g/dL    Total Bilirubin 0.2 0.0 - 1.2 mg/dL    Alkaline Phosphatase 75 35 - 104 U/L    ALT 9 0 - 32 U/L    AST 16 0 - 31 U/L   Troponin   Result Value Ref Range    Troponin, High Sensitivity 35 (H) 0 - 9 ng/L   Urinalysis with Microscopic   Result Value Ref Range    Color, UA Yellow Straw/Yellow    Clarity, UA Clear Clear    Glucose, Ur Negative Negative mg/dL    Bilirubin Urine Negative Negative    Ketones, Urine TRACE (A) Negative mg/dL    Specific Gravity, UA 1.020 1.005 - 1.030    Blood, Urine Negative Negative    pH, UA 5.5 5.0 - 9.0    Protein, UA 30 (A) Negative mg/dL    Urobilinogen, Urine 0.2 <2.0 E.U./dL    Nitrite, Urine Negative Negative    Leukocyte Esterase, Urine TRACE (A) Negative    WBC, UA 2-5 0 - 5 /HPF    RBC, UA NONE 0 - 2 /HPF    Bacteria, UA NONE SEEN None Seen /HPF   CK   Result Value Ref Range    Total CK 82 20 - 180 U/L   Valproic Acid Level, Total   Result Value Ref Range    Valproic Acid Lvl 3 (L) 50 - 100 mcg/mL   Ammonia   Result Value Ref Range    Ammonia <10.0 (L) 11.0 - 51.0 umol/L   EKG 12 Lead   Result Value Ref Range    Ventricular Rate 77 BPM    Atrial Rate 77 BPM    P-R Interval 132 ms    QRS Duration 92 ms    Q-T Interval 378 ms    QTc Calculation (Bazett) 427 ms    P Axis 54 degrees    R Axis -21 degrees    T Axis 29 degrees       RADIOLOGY:  CT HEAD WO CONTRAST   Final Result   No acute intracranial abnormality. Specifically, there is no acute   intracranial hemorrhage         CT CERVICAL SPINE WO CONTRAST   Final Result   1.  There is no acute compression fracture or subluxation of the cervical   spine. 2. Multilevel degenerative disc and degenerative joint disease. XR CHEST PORTABLE   Final Result   No acute process. XR PELVIS (1-2 VIEWS)   Final Result   No acute bony abnormality.               ------------------------- NURSING NOTES AND VITALS REVIEWED ---------------------------  Date / Time Roomed:  2/4/2023  6:53 AM  ED Bed Assignment:  [id number]    The nursing notes within the ED encounter and vital signs as below have been reviewed. Patient Vitals for the past 24 hrs:   BP Temp Temp src Pulse Resp SpO2 Height Weight   02/04/23 1108 (!) 153/74 97.5 °F (36.4 °C) Temporal 74 16 98 % -- --   02/04/23 0957 132/74 98.1 °F (36.7 °C) -- 80 18 98 % -- --   02/04/23 0840 122/71 -- -- 74 16 99 % -- --   02/04/23 0749 139/86 -- -- 78 14 97 % -- --   02/04/23 0700 (!) 152/90 -- -- 76 12 98 % -- --   02/04/23 0659 123/85 97.9 °F (36.6 °C) -- 94 12 98 % 5' 6\" (1.676 m) 173 lb (78.5 kg)       Oxygen Saturation Interpretation: Normal    ------------------------------------------ PROGRESS NOTES ------------------------------------------  Counseling:  I have spoken with the patient and discussed todays results, in addition to providing specific details for the plan of care and counseling regarding the diagnosis and prognosis. Their questions are answered at this time and they are agreeable with the plan of admission.    --------------------------------- ADDITIONAL PROVIDER NOTES ---------------------------------  Consultations:  Spoke with Dr. Shane Inman. Discussed case. They will admit the patient. This patient's ED course included: a personal history and physicial examination, re-evaluation prior to disposition, multiple bedside re-evaluations, IV medications, cardiac monitoring, continuous pulse oximetry, and complex medical decision making and emergency management    This patient has remained hemodynamically stable during their ED course. Diagnosis:  1.  JOSE A (acute kidney injury) ([facility] [address]    2. Hypernatremia        Disposition:  Patient's disposition: Admit to telemetry  Patient's condition is stable.            Janine Garcia DO  Resident  02/04/23 5677

## 2023-02-04 NOTE — PROGRESS NOTES
Orders were given to consult psych per Dr. Ho Todd    Psych consult sent to Patricia Carter NP, and added to Dr. Daphne Lambert treatment team.

## 2023-02-04 NOTE — PROGRESS NOTES
While in room with patient she expressed that she has tried to commit suicide at her facility by strangling herself and was not successful. She stated that now that she is at the hospital she could \"wrap her call light around her neck\". Patient expressed that she feels she has caused \"chaos\". Call light and light string removed from patient's bedside. Re-assessed patient suicide screening from original screening data in flowsheets. Notified Dr. Nicky Rabago via perfect serve.

## 2023-02-04 NOTE — PROGRESS NOTES
Called [facility]. to assist in admitting patient. No answer x2. Called 3rd time, spoke with RN who informed this RN that patient has extreme behavioral issues and is being \"approved\" for psychiatric management at [facility] and the patient needs \"[facility]\".  He went over patient's current medication list.

## 2023-02-05 PROBLEM — F25.0 SCHIZOAFFECTIVE DISORDER, BIPOLAR TYPE (HCC): Status: ACTIVE | Noted: 2023-02-05

## 2023-02-05 PROBLEM — F09 COGNITIVE DISORDER: Status: ACTIVE | Noted: 2023-02-05

## 2023-02-05 LAB
ALBUMIN SERPL-MCNC: 3.6 G/DL (ref 3.5–5.2)
ALP BLD-CCNC: 64 U/L (ref 35–104)
ALT SERPL-CCNC: 9 U/L (ref 0–32)
ANION GAP SERPL CALCULATED.3IONS-SCNC: 10 MMOL/L (ref 7–16)
AST SERPL-CCNC: 14 U/L (ref 0–31)
BASOPHILS ABSOLUTE: 0.05 E9/L (ref 0–0.2)
BASOPHILS RELATIVE PERCENT: 0.6 % (ref 0–2)
BILIRUB SERPL-MCNC: 0.2 MG/DL (ref 0–1.2)
BUN BLDV-MCNC: 52 MG/DL (ref 6–23)
CALCIUM SERPL-MCNC: 9.7 MG/DL (ref 8.6–10.2)
CHLORIDE BLD-SCNC: 123 MMOL/L (ref 98–107)
CO2: 22 MMOL/L (ref 22–29)
CREAT SERPL-MCNC: 2.1 MG/DL (ref 0.5–1)
EOSINOPHILS ABSOLUTE: 0.14 E9/L (ref 0.05–0.5)
EOSINOPHILS RELATIVE PERCENT: 1.8 % (ref 0–6)
GFR SERPL CREATININE-BSD FRML MDRD: 24 ML/MIN/1.73
GLUCOSE BLD-MCNC: 123 MG/DL (ref 74–99)
HCT VFR BLD CALC: 31.4 % (ref 34–48)
HEMOGLOBIN: 9.4 G/DL (ref 11.5–15.5)
IMMATURE GRANULOCYTES #: 0.03 E9/L
IMMATURE GRANULOCYTES %: 0.4 % (ref 0–5)
LYMPHOCYTES ABSOLUTE: 2.44 E9/L (ref 1.5–4)
LYMPHOCYTES RELATIVE PERCENT: 31.6 % (ref 20–42)
MCH RBC QN AUTO: 28.7 PG (ref 26–35)
MCHC RBC AUTO-ENTMCNC: 29.9 % (ref 32–34.5)
MCV RBC AUTO: 95.7 FL (ref 80–99.9)
MONOCYTES ABSOLUTE: 0.64 E9/L (ref 0.1–0.95)
MONOCYTES RELATIVE PERCENT: 8.3 % (ref 2–12)
NEUTROPHILS ABSOLUTE: 4.43 E9/L (ref 1.8–7.3)
NEUTROPHILS RELATIVE PERCENT: 57.3 % (ref 43–80)
PDW BLD-RTO: 16.6 FL (ref 11.5–15)
PLATELET # BLD: 213 E9/L (ref 130–450)
PMV BLD AUTO: 11.8 FL (ref 7–12)
POTASSIUM SERPL-SCNC: 4.4 MMOL/L (ref 3.5–5)
RBC # BLD: 3.28 E12/L (ref 3.5–5.5)
SODIUM BLD-SCNC: 155 MMOL/L (ref 132–146)
TOTAL PROTEIN: 5.8 G/DL (ref 6.4–8.3)
WBC # BLD: 7.7 E9/L (ref 4.5–11.5)

## 2023-02-05 PROCEDURE — 2060000000 HC ICU INTERMEDIATE R&B

## 2023-02-05 PROCEDURE — 2580000003 HC RX 258: Performed by: INTERNAL MEDICINE

## 2023-02-05 PROCEDURE — 87088 URINE BACTERIA CULTURE: CPT

## 2023-02-05 PROCEDURE — 36415 COLL VENOUS BLD VENIPUNCTURE: CPT

## 2023-02-05 PROCEDURE — 85025 COMPLETE CBC W/AUTO DIFF WBC: CPT

## 2023-02-05 PROCEDURE — 80053 COMPREHEN METABOLIC PANEL: CPT

## 2023-02-05 PROCEDURE — 87077 CULTURE AEROBIC IDENTIFY: CPT

## 2023-02-05 PROCEDURE — 87186 SC STD MICRODIL/AGAR DIL: CPT

## 2023-02-05 RX ORDER — DIVALPROEX SODIUM 125 MG/1
250 CAPSULE, COATED PELLETS ORAL 2 TIMES DAILY
Status: DISCONTINUED | OUTPATIENT
Start: 2023-02-05 | End: 2023-02-09 | Stop reason: HOSPADM

## 2023-02-05 RX ADMIN — DEXTROSE AND SODIUM CHLORIDE: 5; 450 INJECTION, SOLUTION INTRAVENOUS at 11:20

## 2023-02-05 ASSESSMENT — PAIN SCALES - GENERAL: PAINLEVEL_OUTOF10: 0

## 2023-02-05 NOTE — PLAN OF CARE
Problem: Discharge Planning  Goal: Discharge to home or other facility with appropriate resources  Outcome: Progressing     Problem: Skin/Tissue Integrity  Goal: Absence of new skin breakdown  Description: 1. Monitor for areas of redness and/or skin breakdown  2. Assess vascular access sites hourly  3. Every 4-6 hours minimum:  Change oxygen saturation probe site  4. Every 4-6 hours:  If on nasal continuous positive airway pressure, respiratory therapy assess nares and determine need for appliance change or resting period. Outcome: Progressing     Problem: Safety - Adult  Goal: Free from fall injury  Outcome: Progressing     Problem: Confusion  Goal: Confusion, delirium, dementia, or psychosis is improved or at baseline  Description: INTERVENTIONS:  1. Assess for possible contributors to thought disturbance, including medications, impaired vision or hearing, underlying metabolic abnormalities, dehydration, psychiatric diagnoses, and notify attending LIP  2. [place] high risk fall precautions, as indicated  3. Provide frequent short contacts to provide reality reorientation, refocusing and direction  4. Decrease environmental stimuli, including noise as appropriate  5. Monitor and intervene to maintain adequate nutrition, hydration, elimination, sleep and activity  6. If unable to ensure safety without constant attention obtain sitter and review sitter guidelines with assigned personnel  7. Initiate Psychosocial CNS and Spiritual Care consult, as indicated  Outcome: Progressing     Problem: Self Harm/Suicidality  Goal: Will have no self-injury during hospital stay  Description: INTERVENTIONS:  1. Ensure constant observer at bedside with Q15M safety checks  2. Maintain a safe environment  3. Secure patient belongings  4. Ensure family/visitors adhere to safety recommendations  5. Ensure safety tray has been added to patient's diet order  6.   Every shift and PRN: Re-assess suicidal risk via Frequent Screener    Outcome: Progressing

## 2023-02-05 NOTE — PROGRESS NOTES
Tommie Goyal MD FACP  Internal medicine   History and Physical      CHIEF COMPLAINT: Patient was found on the floor at the nursing facility      HISTORY OF PRESENT ILLNESS:    2/4/2023  Patient was seen in the emergency room at the main [facility] earlier this morning  Spoke with the ER physician  Data reviewed in detail  81-year-old  woman with a prior history of stroke currently living in a nursing facility  She was found on the floor of unknown duration  No musculoskeletal injuries reported  She was brought to emergency room where she was found to be profoundly hyponatremic with a normal CK and elevated creatinine  Patient admits to not eating drinking enough over the last several days without any specific complaints  2/5/2023  Patient was seen on the floor earlier this morning  Oral intake is poor  Past Medical History:    Past Medical History:   Diagnosis Date    Arthritis     hips    Ataxia     Atrophy of muscle     Bipolar 1 disorder, mixed, moderate ([address]     Cerebrovascular disease     Coordination problem     Hyperlipidemia     Hypertension     Overactive bladder     Paranoid schizophrenia ([address]     Thyroid disease        Past Surgical History:    Past Surgical History:   Procedure Laterality Date    BLADDER SUSPENSION      DILATION AND CURETTAGE OF UTERUS      DILATION AND CURETTAGE OF UTERUS N/A 2/16/2021    endometrial biopsy, HYSTEROSCOPY performed by Ronal Carter MD at [address] [place] [address]         Medications Prior to Admission:    Medications Prior to Admission: memantine (NAMENDA) 10 MG tablet, Take 10 mg by mouth 2 times daily  mirtazapine (REMERON) 15 MG tablet, Take 15 mg by mouth nightly  ondansetron (ZOFRAN) 4 MG tablet, Take 4 mg by mouth every 8 hours as needed for Nausea or Vomiting  levothyroxine (SYNTHROID) 25 MCG tablet, Take 25 mcg by mouth Daily  [DISCONTINUED] divalproex (DEPAKOTE SPRINKLE) 125 MG capsule, Take 2 capsules by mouth every 12 hours for 6 doses  cariprazine hcl (VRAYLAR) 1.5 MG capsule, Take 3 mg by mouth at bedtime  [DISCONTINUED] paliperidone palmitate ER (INVEGA SUSTENNA) 156 MG/ML ANAND IM injection, Inject 156 mg into the muscle once Inject 1 dose in the morning starting on the 2nd and ending on the 2nd every month  levETIRAcetam (KEPPRA) 500 MG tablet, Take 1 tablet by mouth in the morning and 1 tablet before bedtime. melatonin 5 MG TBDP disintegrating tablet, Take 1 tablet by mouth in the morning for 6 doses. pantoprazole (PROTONIX) 40 MG tablet, Take 1 tablet by mouth in the morning. rivastigmine (EXELON) 13.3 MG/24HR, Place 1 patch onto the skin nightly  acetaminophen (TYLENOL) 650 MG extended release tablet, Take 650 mg by mouth every 4 hours as needed for Pain DO NOT EXCEED 3 GRAMS MAX TYLENOL PER DAY  aspirin 81 MG EC tablet, Take 1 tablet by mouth 2 times daily    Allergies:    Mysoline [primidone] and Tofranil [imipramine hcl]    Social History:    reports that she has never smoked. She has never used smokeless tobacco. She reports that she does not drink alcohol and does not use drugs. Family History:   Family history is unknown by patient. REVIEW OF SYSTEMS:  As above in the HPI, otherwise negative    PHYSICAL EXAM:    Vitals:  /71   Pulse 69   Temp 97 °F (36.1 °C) (Temporal)   Resp 16   Ht 5' 6\" (1.676 m)   Wt 173 lb (78.5 kg)   SpO2 98%   BMI 27.92 kg/m²     General:  Awake, alert, oriented X 3. Looks chronically ill  Dysarthric  HEENT:  Normocephalic, atraumatic. Pupils equal, round, reactive to light. No scleral icterus. No conjunctival injection  Oral mucosa is dry no nasal discharge. Neck:  Supple  Heart:  RRR, no murmurs, gallops, rubs  Lungs:  CTA bilaterally, bilat symmetrical expansion, no wheeze, rales, or rhonchi  Abdomen:   Bowel sounds present, soft, nontender, no masses, no organomegaly, no peritoneal signs  Extremities:  No clubbing, cyanosis, or edema  Skin:  Warm and dry, no open lesions or rash  Neuro: Residual left facial weakness and left hemiparesis noted from previous stroke  Breast: deferred  Rectal: deferred  Genitalia:  deferred    LABS:  Data reviewed      ASSESSMENT:      Principal Problem:    JOSE A (acute kidney injury) ([facility] [address]  Volume contraction  Hyper natremia  Residual left hemiparesis from previous stroke  Comorbidities present and past as listed below  Patient Active Problem List   Diagnosis    Schizophrenia ([facility] [address]    Severe episode of recurrent major depressive disorder, with psychotic features (HCC)    NSAID overdose    Metabolic acidosis with increased anion gap and accumulation of organic acids    Suicide attempt ([address]    Post-menopausal bleeding    Complicated UTI (urinary tract infection)    Dysarthria    Delirium    Hypernatremia    Hypertension    JOSE A (acute kidney injury) ([address]    Cognitive disorder    Schizoaffective disorder, bipolar type ([address]           PLAN:  Advance diet and activity as tolerated  IV hydration  Monitor electrolytes  Resume home medications  DVT prophylaxis with heparin  Check urine cultures  Questions answered to her satisfaction    Maureen Levy MD  3:27 PM  2/5/2023

## 2023-02-05 NOTE — CONSULTS
PSYCHIATRY ATTENDING CONSULT    REASON FOR CONSULT:  Carmenza Bacon MD    REQUESTING PHYSICIAN:  Suicidal Thought    CHIEF COMPLAINT: \"I was suicidal long time ago, not now\"    HISTORY OF PRESENT ILLNESS:  Minnie Fountain  is a 78 y.o. female with longstanding of with longstanding history of schizoaffective disorder versus schizophrenia and cognitive decline over the years currently lives at a nursing home admitted here with changes in mental status. Her sodium as of this morning 155 BUN 52 creatinine 2.1. She is admitted on the medical floor and kept on one-to-one because patient is almost complete total care. She has a history of seizure disorder and is on Keppra. In the note it is written that patient is found on the floor at the nursing home with changes in mental status. Psychiatry consulted because she reportedly had behavioral issue at the place as well as she was and reported suicidal ideation. I saw the patient this morning with the nurse practitioner and she was able to communicate with us but sometimes very very difficult to understand because of speech impediment. She said she wanted to get out of the door at the places where she is staying with a whole child because she wanted a fresh year and wanted to smoke but the people over there did not allow her to do and then she acted out. She says she was not feeling well. She states she has never expressed any suicidal thoughts. She says she has done it long time ago but she is not been suicidal lately. She says she takes all the medication that is prescribed by the doctor. She repeatedly and vehemently denies any current suicidal thought and also any active hallucination. Insight and judgment fair. Impulse control adequate at this time cognitive function below the baseline. There is no acute symptoms going on at this time for us to address and I reviewed the medication she is on the right medication at this time.   However she may benefit from addition of small dose of Depakote. PAST PSYCHIATRIC HISTORY:    Longstanding history of schizoaffective disorder multiple inpatient psychiatric hospitalization here in 2017 [facility] and other places including generations. She is currently in a nursing home and almost needs total care at this time in the hospital and has strokelike symptoms. Her speech is difficult to understand and she said that she had a stroke and cannot move her leg and and illness is wheelchair-bound.     PAST MEDICAL HISTORY:       Diagnosis Date    Arthritis     hips    Ataxia     Atrophy of muscle     Bipolar 1 disorder, mixed, moderate (HCC)     Cerebrovascular disease     Coordination problem     Hyperlipidemia     Hypertension     Overactive bladder     Paranoid schizophrenia ([facility]     Thyroid disease        MEDICAL ROS: General - negative, neurological - negative, ENT - negative, CV - negative, pulmonary - negative, GI - negative, dermatologic - negative, rheumatologic - negative, musculoskeletal - negative,  - negative, hematologic - negative    PAST SURGICAL HISTORY:       Procedure Laterality Date    BLADDER SUSPENSION      DILATION AND CURETTAGE OF UTERUS      DILATION AND CURETTAGE OF UTERUS N/A 2/16/2021    endometrial biopsy, HYSTEROSCOPY performed by Hiro Velasco MD at [facility]: Current Facility-Administered Medications: acetaminophen (TYLENOL) tablet 650 mg, 650 mg, Oral, Q4H PRN  aspirin EC tablet 81 mg, 81 mg, Oral, BID  cariprazine hcl (VRAYLAR) capsule 3 mg, 3 mg, Oral, Nightly  levETIRAcetam (KEPPRA) tablet 500 mg, 500 mg, Oral, BID  levothyroxine (SYNTHROID) tablet 25 mcg, 25 mcg, Oral, Daily  memantine (NAMENDA) tablet 10 mg, 10 mg, Oral, BID  mirtazapine (REMERON) tablet 15 mg, 15 mg, Oral, Nightly  ondansetron (ZOFRAN) tablet 4 mg, 4 mg, Oral, Q8H PRN  pantoprazole (PROTONIX) tablet 40 mg, 40 mg, Oral, Daily  rivastigmine (EXELON) 13.3 MG/24HR 1 patch, 1 patch, TransDERmal, Nightly  dextrose 5 % and 0.45 % sodium chloride infusion, , IntraVENous, Continuous  heparin (porcine) injection 5,000 Units, 5,000 Units, SubCUTAneous, BID  melatonin tablet 3 mg, 3 mg, Oral, Nightly PRN    ALLERGIES:  Mysoline [primidone] and Tofranil [imipramine hcl]    FAMILY PSYCHIATRIC HISTORY:   Unknown    SOCIAL HISTORY:   She lives at nursing home and found on the floor and changes in mental status. SUBSTANCE ABUSE HISTORY:  reports that she has never smoked. She has never used smokeless tobacco. She reports that she does not drink alcohol and does not use drugs.     VITALS:   Vitals:    02/05/23 0800   BP: 133/71   Pulse: 69   Resp: 16   Temp: 97 °F (36.1 °C)   SpO2: 98%       LABS:   Admission on 02/04/2023   Component Date Value Ref Range Status    WBC 02/04/2023 9.9  4.5 - 11.5 E9/L Final    RBC 02/04/2023 3.77  3.50 - 5.50 E12/L Final    Hemoglobin 02/04/2023 10.7 (A)  11.5 - 15.5 g/dL Final    Hematocrit 02/04/2023 35.0  34.0 - 48.0 % Final    MCV 02/04/2023 92.8  80.0 - 99.9 fL Final    MCH 02/04/2023 28.4  26.0 - 35.0 pg Final    MCHC 02/04/2023 30.6 (A)  32.0 - 34.5 % Final    RDW 02/04/2023 16.6 (A)  11.5 - 15.0 fL Final    Platelets 30/80/6672 256  130 - 450 E9/L Final    MPV 02/04/2023 11.8  7.0 - 12.0 fL Final    Neutrophils % 02/04/2023 74.7  43.0 - 80.0 % Final    Immature Granulocytes % 02/04/2023 0.8  0.0 - 5.0 % Final    Lymphocytes % 02/04/2023 16.8 (A)  20.0 - 42.0 % Final    Monocytes % 02/04/2023 6.9  2.0 - 12.0 % Final    Eosinophils % 02/04/2023 0.1  0.0 - 6.0 % Final    Basophils % 02/04/2023 0.7  0.0 - 2.0 % Final    Neutrophils Absolute 02/04/2023 7.39 (A)  1.80 - 7.30 E9/L Final    Immature Granulocytes # 02/04/2023 0.08  E9/L Final    Lymphocytes Absolute 02/04/2023 1.66  1.50 - 4.00 E9/L Final    Monocytes Absolute 02/04/2023 0.68  0.10 - 0.95 E9/L Final    Eosinophils Absolute 02/04/2023 0.01 (A) 0.05 - 0.50 E9/L Final    Basophils Absolute 02/04/2023 0.07  0.00 - 0.20 E9/L Final    Sodium 02/04/2023 156 (A)  132 - 146 mmol/L Final    Potassium reflex Magnesium 02/04/2023 5.3 (A)  3.5 - 5.0 mmol/L Final    Chloride 02/04/2023 120 (A)  98 - 107 mmol/L Final    CO2 02/04/2023 21 (A)  22 - 29 mmol/L Final    Anion Gap 02/04/2023 15  7 - 16 mmol/L Final    Glucose 02/04/2023 97  74 - 99 mg/dL Final    BUN 02/04/2023 55 (A)  6 - 23 mg/dL Final    Creatinine 02/04/2023 2.6 (A)  0.5 - 1.0 mg/dL Final    Est, Glom Filt Rate 02/04/2023 19  >=60 mL/min/1.73 Final    Comment: Pediatric calculator link  Jose [email or web address].at. org/professionals/kdoqi/gfr_calculatorped  Effective Oct 3, 2022  These results are not intended for use in patients  <25years of age. eGFR results are calculated without  a race factor using the 2021 CKD-EPI equation. Careful  clinical correlation is recommended, particularly when  comparing to results calculated using previous equations. The CKD-EPI equation is less accurate in patients with  extremes of muscle mass, extra-renal metabolism of  creatinine, excessive creatinine ingestion, or following  therapy that affects renal tubular secretion. Calcium 02/04/2023 10.7 (A)  8.6 - 10.2 mg/dL Final    Total Protein 02/04/2023 7.3  6.4 - 8.3 g/dL Final    Albumin 02/04/2023 4.3  3.5 - 5.2 g/dL Final    Total Bilirubin 02/04/2023 0.2  0.0 - 1.2 mg/dL Final    Alkaline Phosphatase 02/04/2023 75  35 - 104 U/L Final    ALT 02/04/2023 9  0 - 32 U/L Final    AST 02/04/2023 16  0 - 31 U/L Final    Troponin, High Sensitivity 02/04/2023 35 (A)  0 - 9 ng/L Final    Comment: High Sensitivity Troponin values cannot be compared with  other Troponin methodologies. Patients with high levels of Biotin oral intake (i.e. >5 mg/day)  may have falsely decreased Troponin levels. Samples collected  within 8 hours of biotin intake may require additional information  for diagnosis.       Ventricular Rate 02/04/2023 77  BPM Final    Atrial Rate 02/04/2023 77  BPM Final    P-R Interval 02/04/2023 132  ms Final    QRS Duration 02/04/2023 92  ms Final    Q-T Interval 02/04/2023 378  ms Final    QTc Calculation (Bazett) 02/04/2023 427  ms Final    P Axis 02/04/2023 54  degrees Final    R Axis 02/04/2023 -21  degrees Final    T Axis 02/04/2023 29  degrees Final    Color, UA 02/04/2023 Yellow  Straw/Yellow Final    Clarity, UA 02/04/2023 Clear  Clear Final    Glucose, Ur 02/04/2023 Negative  Negative mg/dL Final    Bilirubin Urine 02/04/2023 Negative  Negative Final    Ketones, Urine 02/04/2023 TRACE (A)  Negative mg/dL Final    Specific Gravity, UA 02/04/2023 1.020  1.005 - 1.030 Final    Blood, Urine 02/04/2023 Negative  Negative Final    pH, UA 02/04/2023 5.5  5.0 - 9.0 Final    Protein, UA 02/04/2023 30 (A)  Negative mg/dL Final    Urobilinogen, Urine 02/04/2023 0.2  <2.0 E.U./dL Final    Nitrite, Urine 02/04/2023 Negative  Negative Final    Leukocyte Esterase, Urine 02/04/2023 TRACE (A)  Negative Final    WBC, UA 02/04/2023 2-5  0 - 5 /HPF Final    RBC, UA 02/04/2023 NONE  0 - 2 /HPF Final    Bacteria, UA 02/04/2023 NONE SEEN  None Seen /HPF Final    Total CK 02/04/2023 82  20 - 180 U/L Final    Valproic Acid Lvl 02/04/2023 3 (A)  50 - 100 mcg/mL Final    Ammonia 02/04/2023 <10.0 (A)  11.0 - 51.0 umol/L Final    WBC 02/05/2023 7.7  4.5 - 11.5 E9/L Final    RBC 02/05/2023 3.28 (A)  3.50 - 5.50 E12/L Final    Hemoglobin 02/05/2023 9.4 (A)  11.5 - 15.5 g/dL Final    Hematocrit 02/05/2023 31.4 (A)  34.0 - 48.0 % Final    MCV 02/05/2023 95.7  80.0 - 99.9 fL Final    MCH 02/05/2023 28.7  26.0 - 35.0 pg Final    MCHC 02/05/2023 29.9 (A)  32.0 - 34.5 % Final    RDW 02/05/2023 16.6 (A)  11.5 - 15.0 fL Final    Platelets 59/82/0210 213  130 - 450 E9/L Final    MPV 02/05/2023 11.8  7.0 - 12.0 fL Final    Neutrophils % 02/05/2023 57.3  43.0 - 80.0 % Final    Immature Granulocytes % 02/05/2023 0.4  0.0 - 5.0 % Final    Lymphocytes % 02/05/2023 31.6  20.0 - 42.0 % Final    Monocytes % 02/05/2023 8.3  2.0 - 12.0 % Final    Eosinophils % 02/05/2023 1.8  0.0 - 6.0 % Final    Basophils % 02/05/2023 0.6  0.0 - 2.0 % Final    Neutrophils Absolute 02/05/2023 4.43  1.80 - 7.30 E9/L Final    Immature Granulocytes # 02/05/2023 0.03  E9/L Final    Lymphocytes Absolute 02/05/2023 2.44  1.50 - 4.00 E9/L Final    Monocytes Absolute 02/05/2023 0.64  0.10 - 0.95 E9/L Final    Eosinophils Absolute 02/05/2023 0.14  0.05 - 0.50 E9/L Final    Basophils Absolute 02/05/2023 0.05  0.00 - 0.20 E9/L Final    Sodium 02/05/2023 155 (A)  132 - 146 mmol/L Final    Potassium 02/05/2023 4.4  3.5 - 5.0 mmol/L Final    Chloride 02/05/2023 123 (A)  98 - 107 mmol/L Final    CO2 02/05/2023 22  22 - 29 mmol/L Final    Anion Gap 02/05/2023 10  7 - 16 mmol/L Final    Glucose 02/05/2023 123 (A)  74 - 99 mg/dL Final    BUN 02/05/2023 52 (A)  6 - 23 mg/dL Final    Creatinine 02/05/2023 2.1 (A)  0.5 - 1.0 mg/dL Final    Est, Glom Filt Rate 02/05/2023 24  >=60 mL/min/1.73 Final    Comment: Pediatric calculator link  [facility].at. org/professionals/kdoqi/gfr_calculatorped  Effective Oct 3, 2022  These results are not intended for use in patients  <25years of age. eGFR results are calculated without  a race factor using the 2021 CKD-EPI equation. Careful  clinical correlation is recommended, particularly when  comparing to results calculated using previous equations. The CKD-EPI equation is less accurate in patients with  extremes of muscle mass, extra-renal metabolism of  creatinine, excessive creatinine ingestion, or following  therapy that affects renal tubular secretion.       Calcium 02/05/2023 9.7  8.6 - 10.2 mg/dL Final    Total Protein 02/05/2023 5.8 (A)  6.4 - 8.3 g/dL Final    Albumin 02/05/2023 3.6  3.5 - 5.2 g/dL Final    Total Bilirubin 02/05/2023 0.2  0.0 - 1.2 mg/dL Final    Alkaline Phosphatase 02/05/2023 64  35 - 104 U/L Final    ALT 02/05/2023 9  0 - 32 U/L Final AST 02/05/2023 14  0 - 31 U/L Final           IMAGING: Please refer to the medical note    MENTAL STATUS EXAMINATION  Mental status examination revealed a 70-year-old  woman lying in bed unable to move leg and arms and needed almost total care calm cooperative and answered most of the question but sometimes difficult to understand due to speech which is slurred. She states she had a stroke. Eye contact was fair. Mood \"okay\" affect is blunted anxious confused. Thought process little confused. Thought contents without any overt signs of psychosis. Family denies suicidal ideation intent or any plan. Also denies homicidal ideation. Insight and judgment limited due to cognitive decline. Alert oriented to self but struggles with place time. Language seems okay and was able to communicate but had difficulties speaking for slurred speech. I could not carry out a formal mental status exam due to her changes in mental status and cognitive decline    ASSESSMENT:   Cognitive disorder  Schizoaffective disorder bipolar type        PLAN & RECOMMENDATIONS:   Patient is not suicidal homicidal psychotic manic and does not meet the criteria for any inpatient level of psychiatric care. Her medication has been looked at and will recommend adding small dose of Depakote with the current regimen. Patient was rational talking to to us at this time but sometimes the rationality is limited because of the cognitive decline.   If have any question concern please let us know orders otherwise psychiatry will sign off      Reji Reddy MD 2/5/2023 12:07 PM

## 2023-02-06 LAB
ALBUMIN SERPL-MCNC: 3.3 G/DL (ref 3.5–5.2)
ALP BLD-CCNC: 67 U/L (ref 35–104)
ALT SERPL-CCNC: 9 U/L (ref 0–32)
ANION GAP SERPL CALCULATED.3IONS-SCNC: 10 MMOL/L (ref 7–16)
AST SERPL-CCNC: 15 U/L (ref 0–31)
BASOPHILS ABSOLUTE: 0.06 E9/L (ref 0–0.2)
BASOPHILS RELATIVE PERCENT: 0.7 % (ref 0–2)
BILIRUB SERPL-MCNC: 0.2 MG/DL (ref 0–1.2)
BUN BLDV-MCNC: 38 MG/DL (ref 6–23)
CALCIUM SERPL-MCNC: 9.7 MG/DL (ref 8.6–10.2)
CHLORIDE BLD-SCNC: 124 MMOL/L (ref 98–107)
CO2: 22 MMOL/L (ref 22–29)
CREAT SERPL-MCNC: 1.9 MG/DL (ref 0.5–1)
EOSINOPHILS ABSOLUTE: 0.09 E9/L (ref 0.05–0.5)
EOSINOPHILS RELATIVE PERCENT: 1.1 % (ref 0–6)
GFR SERPL CREATININE-BSD FRML MDRD: 27 ML/MIN/1.73
GLUCOSE BLD-MCNC: 129 MG/DL (ref 74–99)
HCT VFR BLD CALC: 31.7 % (ref 34–48)
HEMOGLOBIN: 9.8 G/DL (ref 11.5–15.5)
IMMATURE GRANULOCYTES #: 0.05 E9/L
IMMATURE GRANULOCYTES %: 0.6 % (ref 0–5)
LYMPHOCYTES ABSOLUTE: 2.12 E9/L (ref 1.5–4)
LYMPHOCYTES RELATIVE PERCENT: 25.3 % (ref 20–42)
MCH RBC QN AUTO: 28.3 PG (ref 26–35)
MCHC RBC AUTO-ENTMCNC: 30.9 % (ref 32–34.5)
MCV RBC AUTO: 91.6 FL (ref 80–99.9)
MONOCYTES ABSOLUTE: 0.51 E9/L (ref 0.1–0.95)
MONOCYTES RELATIVE PERCENT: 6.1 % (ref 2–12)
NEUTROPHILS ABSOLUTE: 5.56 E9/L (ref 1.8–7.3)
NEUTROPHILS RELATIVE PERCENT: 66.2 % (ref 43–80)
PDW BLD-RTO: 16.7 FL (ref 11.5–15)
PLATELET # BLD: 217 E9/L (ref 130–450)
PMV BLD AUTO: 12.6 FL (ref 7–12)
POTASSIUM SERPL-SCNC: 3.8 MMOL/L (ref 3.5–5)
RBC # BLD: 3.46 E12/L (ref 3.5–5.5)
SODIUM BLD-SCNC: 156 MMOL/L (ref 132–146)
TOTAL PROTEIN: 5.8 G/DL (ref 6.4–8.3)
WBC # BLD: 8.4 E9/L (ref 4.5–11.5)

## 2023-02-06 PROCEDURE — 2060000000 HC ICU INTERMEDIATE R&B

## 2023-02-06 PROCEDURE — 80053 COMPREHEN METABOLIC PANEL: CPT

## 2023-02-06 PROCEDURE — 2580000003 HC RX 258: Performed by: INTERNAL MEDICINE

## 2023-02-06 PROCEDURE — 85025 COMPLETE CBC W/AUTO DIFF WBC: CPT

## 2023-02-06 PROCEDURE — 36415 COLL VENOUS BLD VENIPUNCTURE: CPT

## 2023-02-06 RX ORDER — DEXTROSE MONOHYDRATE 50 MG/ML
INJECTION, SOLUTION INTRAVENOUS CONTINUOUS
Status: DISCONTINUED | OUTPATIENT
Start: 2023-02-06 | End: 2023-02-09

## 2023-02-06 RX ADMIN — DEXTROSE MONOHYDRATE: 50 INJECTION, SOLUTION INTRAVENOUS at 14:06

## 2023-02-06 ASSESSMENT — PAIN SCALES - GENERAL
PAINLEVEL_OUTOF10: 0
PAINLEVEL_OUTOF10: 0

## 2023-02-06 NOTE — DISCHARGE INSTR - COC
Continuity of Care Form    Patient Name: Camila Sweet   :  1945  MRN:  [id number]    Admit date:  2023  Discharge date:  2023    Code Status Order: Prior   Advance Directives:     Admitting Physician:  Rach Fisher MD  PCP: Loyd Chan MD    Discharging Nurse: [facility] Unit/Room#: 5013/4076-H  Discharging Unit Phone Number: [phone]    Emergency Contact:   Extended Emergency Contact Information  Primary Emergency Contact: Melodie Choudhury  Address: [address] [place] [address]           [address] Phone: [phone]  Relation: Legal Guardian  Secondary Emergency Contact: ColladoCharu  Address: [address] [place], [place] [address]er [place], [place] [address] Phone: [phone]  Relation: Other    Past Surgical History:  Past Surgical History:   Procedure Laterality Date    BLADDER SUSPENSION      DILATION AND CURETTAGE OF UTERUS      DILATION AND CURETTAGE OF UTERUS N/A 2021    endometrial biopsy, HYSTEROSCOPY performed by Adrián Blankenship MD at [facility]         Immunization History:   Immunization History   Administered Date(s) Administered    COVID-19, PFIZER PURPLE top, DILUTE for use, (age 15 y+), 30mcg/0.3mL 2021, 02/10/2021, 2022    Influenza Vaccine, unspecified formulation 10/01/2016       Active Problems:  Patient Active Problem List   Diagnosis Code    Schizophrenia ([address] F20.9    Severe episode of recurrent major depressive disorder, with psychotic features ([address] F33.3    NSAID overdose L52.514J    Metabolic acidosis with increased anion gap and accumulation of organic acids E87.20    Suicide attempt ([address] T14.91XA    Post-menopausal bleeding Y40.1    Complicated UTI (urinary tract infection) N39.0    Dysarthria R47.1    Delirium R41.0    Hypernatremia E87.0    Hypertension I10    JOSE A (acute kidney injury) ([facility] N17.9    Cognitive disorder F09    Schizoaffective disorder, bipolar type ([facility] F25.0       Isolation/Infection:   Isolation            No Isolation          Patient Infection Status       None to display            Nurse Assessment:  Last Vital Signs: /69   Pulse 66   Temp 97.6 °F (36.4 °C)   Resp 18   Ht 5' 6\" (1.676 m)   Wt 173 lb (78.5 kg)   SpO2 96%   BMI 27.92 kg/m²     Last documented pain score (0-10 scale): Pain Level: 0  Last Weight:   Wt Readings from Last 1 Encounters:   02/04/23 173 lb (78.5 kg)     Mental Status:  disoriented and alert    IV Access:  - None    Nursing Mobility/ADLs:  Walking   Dependent  Transfer  Assisted  Bathing  Assisted  Dressing  Assisted  Toileting  Assisted  Feeding  Assisted  Med Admin  Assisted  Med Delivery   whole    Wound Care Documentation and Therapy:  Wound 05/14/17 Other (Comment) Buttocks Right; Inner; Upper (Active)   Number of days: 2093       Incision 02/16/21 Vagina (Active)   Number of days: 720        Elimination:  Continence: Bowel: No  Bladder: No  Urinary Catheter: None   Colostomy/Ileostomy/Ileal Conduit: No       Date of Last BM: ***    Intake/Output Summary (Last 24 hours) at 2/6/2023 1223  Last data filed at 2/5/2023 1500  Gross per 24 hour   Intake 2078.4 ml   Output --   Net 2078.4 ml     I/O last 3 completed shifts:   In: 2078.4 [I.V.:2078.4]  Out: -     Safety Concerns:     [address] Safety Concerns:782936714}    Impairments/Disabilities:      [address] Impairments/Disabilities:332791560}    Nutrition Therapy:  Current Nutrition Therapy:   [address] Diet List:221675122}    Routes of Feeding: {CHP DME Other Feedings:914442099}  Liquids: {Slp liquid thickness:67548}  Daily Fluid Restriction: {CHP DME Yes amt example:189762365}  Last Modified Barium Swallow with Video (Video Swallowing Test): {Done Not Done BAKS:831059006}    Treatments at the Time of Hospital Discharge:   Respiratory Treatments: ***  Oxygen Therapy:  {Therapy; copd oxygen:82342}  Ventilator:    {MH CC Vent PQPW:343327275}    Rehab Therapies: {THERAPEUTIC INTERVENTION:5290017435}  Weight Bearing Status/Restrictions: 50Hilary LOMELI Weight Bearin}  Other Medical Equipment (for information only, NOT a DME order):  {EQUIPMENT:835420398}  Other Treatments: ***    Patient's personal belongings (please select all that are sent with patient):  {CHP DME Belongings:087994283}    RN SIGNATURE:  {Esignature:613679855}    CASE MANAGEMENT/SOCIAL WORK SECTION    Inpatient Status Date: 23    Readmission Risk Assessment Score:  Readmission Risk              Risk of Unplanned Readmission:  22           Discharging to Facility/ Agency   Name: [place] [address] [address]   Phone:[phone]  Fax:    Dialysis Facility (if applicable)   Name:  Address:  Dialysis Schedule:  Phone:  Fax:    / signature: {Esignature:[id number]}Electronically signed by Hao Hernandez RN on 2023 at 12:29 PM      PHYSICIAN SECTION    Prognosis: {Prognosis:1433266387}    Condition at Discharge: 50[address] Choudhury Patient Condition:053170312}    Rehab Potential (if transferring to Rehab): {Prognosis:8380930052}    Recommended Labs or Other Treatments After Discharge: ***    Physician Certification: I certify the above information and transfer of Fritz Paiz  is necessary for the continuing treatment of the diagnosis listed and that she requires {Admit to Appropriate Level of Care:25130} for {GREATER/LESS:194218022} 30 days.      Update Admission H&P: {CHP DME Changes in ZGKBU:386058108}    PHYSICIAN SIGNATURE:  Electronically signed by Vianca Sutton MD on 23 at 2:44 PM EST

## 2023-02-06 NOTE — PLAN OF CARE
Problem: Discharge Planning  Goal: Discharge to home or other facility with appropriate resources  Outcome: Progressing     Problem: Skin/Tissue Integrity  Goal: Absence of new skin breakdown  Description: 1. Monitor for areas of redness and/or skin breakdown  2. Assess vascular access sites hourly  3. Every 4-6 hours minimum:  Change oxygen saturation probe site  4. Every 4-6 hours:  If on nasal continuous positive airway pressure, respiratory therapy assess nares and determine need for appliance change or resting period. Outcome: Progressing     Problem: Safety - Adult  Goal: Free from fall injury  Outcome: Progressing     Problem: Confusion  Goal: Confusion, delirium, dementia, or psychosis is improved or at baseline  Description: INTERVENTIONS:  1. Assess for possible contributors to thought disturbance, including medications, impaired vision or hearing, underlying metabolic abnormalities, dehydration, psychiatric diagnoses, and notify attending LIP  2. [place] high risk fall precautions, as indicated  3. Provide frequent short contacts to provide reality reorientation, refocusing and direction  4. Decrease environmental stimuli, including noise as appropriate  5. Monitor and intervene to maintain adequate nutrition, hydration, elimination, sleep and activity  6. If unable to ensure safety without constant attention obtain sitter and review sitter guidelines with assigned personnel  7. Initiate Psychosocial CNS and Spiritual Care consult, as indicated  Outcome: Progressing     Problem: Self Harm/Suicidality  Goal: Will have no self-injury during hospital stay  Description: INTERVENTIONS:  1. Ensure constant observer at bedside with Q15M safety checks  2. Maintain a safe environment  3. Secure patient belongings  4. Ensure family/visitors adhere to safety recommendations  5. Ensure safety tray has been added to patient's diet order  6.   Every shift and PRN: Re-assess suicidal risk via Frequent Screener    Outcome: Progressing     Problem: Pain  Goal: Verbalizes/displays adequate comfort level or baseline comfort level  Outcome: Progressing

## 2023-02-06 NOTE — CARE COORDINATION
Patient admitted with hypernatremia. Her past medical history includes CVA, HTN, bipolar 1, and paranoid schizophrenia. Per internal medicine note, plan is to advance diet and activity as tolerated. Monitor electrolytes. Resume home medications, and check urine cultures. Patient resides at [facility], and the plan is to return there. Legal Guardian is Tim Cool. Kymberly Mcmanus from [facility] called in regard to returning to facility when medically stable. She stated that patient does not need a covid test, or any PT/OT notes for her return. Electronically signed by Monique Webster RN on 2/6/2023 at 12:11 [phone]

## 2023-02-06 NOTE — PROGRESS NOTES
Miki Huggins MD [address]  Internal medicine   History and Physical      CHIEF COMPLAINT: Patient was found on the floor at the nursing facility      HISTORY OF PRESENT ILLNESS:    2/4/2023  Patient was seen in the emergency room at the main [facility] earlier this morning  Spoke with the ER physician  Data reviewed in detail  68-year-old  woman with a prior history of stroke currently living in a nursing facility  She was found on the floor of unknown duration  No musculoskeletal injuries reported  She was brought to emergency room where she was found to be profoundly hyponatremic with a normal CK and elevated creatinine  Patient admits to not eating drinking enough over the last several days without any specific complaints  2/5/2023  Patient was seen on the floor earlier this morning  Oral intake is poor  2/6/2023  Patient was seen on the floor earlier this morning  Sitter at bedside  Patient was sitting up in bed and feeding herself  Oral intake better  When she feels better  Past Medical History:    Past Medical History:   Diagnosis Date    Arthritis     hips    Ataxia     Atrophy of muscle     Bipolar 1 disorder, mixed, moderate ([address]     Cerebrovascular disease     Coordination problem     Hyperlipidemia     Hypertension     Overactive bladder     Paranoid schizophrenia ([address]     Thyroid disease        Past Surgical History:    Past Surgical History:   Procedure Laterality Date    BLADDER SUSPENSION      DILATION AND CURETTAGE OF UTERUS      DILATION AND CURETTAGE OF UTERUS N/A 2/16/2021    endometrial biopsy, HYSTEROSCOPY performed by Sandra Rodriguez MD at [address] [place] [address]         Medications Prior to Admission:    Medications Prior to Admission: memantine (NAMENDA) 10 MG tablet, Take 10 mg by mouth 2 times daily  mirtazapine (REMERON) 15 MG tablet, Take 15 mg by mouth nightly  ondansetron (ZOFRAN) 4 MG tablet, Take 4 mg by mouth every 8 hours as needed for Nausea or Vomiting  levothyroxine (SYNTHROID) 25 MCG tablet, Take 25 mcg by mouth Daily  [DISCONTINUED] divalproex (DEPAKOTE SPRINKLE) 125 MG capsule, Take 2 capsules by mouth every 12 hours for 6 doses  cariprazine hcl (VRAYLAR) 1.5 MG capsule, Take 3 mg by mouth at bedtime  [DISCONTINUED] paliperidone palmitate ER (INVEGA SUSTENNA) 156 MG/ML ANAND IM injection, Inject 156 mg into the muscle once Inject 1 dose in the morning starting on the 2nd and ending on the 2nd every month  levETIRAcetam (KEPPRA) 500 MG tablet, Take 1 tablet by mouth in the morning and 1 tablet before bedtime. melatonin 5 MG TBDP disintegrating tablet, Take 1 tablet by mouth in the morning for 6 doses. pantoprazole (PROTONIX) 40 MG tablet, Take 1 tablet by mouth in the morning. rivastigmine (EXELON) 13.3 MG/24HR, Place 1 patch onto the skin nightly  acetaminophen (TYLENOL) 650 MG extended release tablet, Take 650 mg by mouth every 4 hours as needed for Pain DO NOT EXCEED 3 GRAMS MAX TYLENOL PER DAY  aspirin 81 MG EC tablet, Take 1 tablet by mouth 2 times daily    Allergies:    Mysoline [primidone] and Tofranil [imipramine hcl]    Social History:    reports that she has never smoked. She has never used smokeless tobacco. She reports that she does not drink alcohol and does not use drugs. Family History:   Family history is unknown by patient. REVIEW OF SYSTEMS:  As above in the HPI, otherwise negative    PHYSICAL EXAM:    Vitals:  /69   Pulse 66   Temp 97.6 °F (36.4 °C)   Resp 18   Ht 5' 6\" (1.676 m)   Wt 173 lb (78.5 kg)   SpO2 96%   BMI 27.92 kg/m²     General:  Awake, alert, oriented X 3. Looks chronically ill  Dysarthric  HEENT:  Normocephalic, atraumatic. Pupils equal, round, reactive to light. No scleral icterus. No conjunctival injection  Oral mucosa is moist no nasal discharge.   Neck:  Supple  Heart:  RRR, no murmurs, gallops, rubs  Lungs:  CTA bilaterally, bilat symmetrical expansion, no wheeze, rales, or rhonchi  Abdomen:   Bowel sounds present, soft, nontender, no masses, no organomegaly, no peritoneal signs  Extremities:  No clubbing, cyanosis, or edema  Skin:  Warm and dry, no open lesions or rash  Neuro: Residual left facial weakness and left hemiparesis noted from previous stroke  Breast: deferred  Rectal: deferred  Genitalia:  deferred    LABS:  Data reviewed      ASSESSMENT:      Principal Problem:    JOSE A (acute kidney injury) ([address]  Volume contraction  Hyper natremia  Residual left hemiparesis from previous stroke  Comorbidities present and past as listed below  Patient Active Problem List   Diagnosis    Schizophrenia ([address]    Severe episode of recurrent major depressive disorder, with psychotic features ([address]    NSAID overdose    Metabolic acidosis with increased anion gap and accumulation of organic acids    Suicide attempt ([address]    Post-menopausal bleeding    Complicated UTI (urinary tract infection)    Dysarthria    Delirium    Hypernatremia    Hypertension    JOSE A (acute kidney injury) ([address]    Cognitive disorder    Schizoaffective disorder, bipolar type ([address]           PLAN:  Advance diet and activity as tolerated  IV hydration/changed to dextrose 5% solution  Monitor electrolytes  Resume home medications  DVT prophylaxis with heparin  Check urine cultures  Questions answered to her satisfaction    Stacey Almanzar MD  11:02 AM  2/6/2023

## 2023-02-07 LAB
ALBUMIN SERPL-MCNC: 3 G/DL (ref 3.5–5.2)
ALP BLD-CCNC: 65 U/L (ref 35–104)
ALT SERPL-CCNC: 9 U/L (ref 0–32)
ANION GAP SERPL CALCULATED.3IONS-SCNC: 10 MMOL/L (ref 7–16)
AST SERPL-CCNC: 14 U/L (ref 0–31)
BASOPHILS ABSOLUTE: 0.04 E9/L (ref 0–0.2)
BASOPHILS RELATIVE PERCENT: 0.4 % (ref 0–2)
BILIRUB SERPL-MCNC: 0.2 MG/DL (ref 0–1.2)
BUN BLDV-MCNC: 32 MG/DL (ref 6–23)
CALCIUM SERPL-MCNC: 8.8 MG/DL (ref 8.6–10.2)
CHLORIDE BLD-SCNC: 119 MMOL/L (ref 98–107)
CO2: 21 MMOL/L (ref 22–29)
CREAT SERPL-MCNC: 1.8 MG/DL (ref 0.5–1)
EOSINOPHILS ABSOLUTE: 0.13 E9/L (ref 0.05–0.5)
EOSINOPHILS RELATIVE PERCENT: 1.5 % (ref 0–6)
GFR SERPL CREATININE-BSD FRML MDRD: 29 ML/MIN/1.73
GLUCOSE BLD-MCNC: 109 MG/DL (ref 74–99)
HCT VFR BLD CALC: 30.6 % (ref 34–48)
HEMOGLOBIN: 9.3 G/DL (ref 11.5–15.5)
IMMATURE GRANULOCYTES #: 0.05 E9/L
IMMATURE GRANULOCYTES %: 0.6 % (ref 0–5)
LYMPHOCYTES ABSOLUTE: 2.74 E9/L (ref 1.5–4)
LYMPHOCYTES RELATIVE PERCENT: 30.6 % (ref 20–42)
MCH RBC QN AUTO: 28.6 PG (ref 26–35)
MCHC RBC AUTO-ENTMCNC: 30.4 % (ref 32–34.5)
MCV RBC AUTO: 94.2 FL (ref 80–99.9)
MONOCYTES ABSOLUTE: 0.51 E9/L (ref 0.1–0.95)
MONOCYTES RELATIVE PERCENT: 5.7 % (ref 2–12)
NEUTROPHILS ABSOLUTE: 5.49 E9/L (ref 1.8–7.3)
NEUTROPHILS RELATIVE PERCENT: 61.2 % (ref 43–80)
PDW BLD-RTO: 16.5 FL (ref 11.5–15)
PLATELET # BLD: 182 E9/L (ref 130–450)
PMV BLD AUTO: 11.9 FL (ref 7–12)
POTASSIUM SERPL-SCNC: 4 MMOL/L (ref 3.5–5)
RBC # BLD: 3.25 E12/L (ref 3.5–5.5)
SODIUM BLD-SCNC: 150 MMOL/L (ref 132–146)
TOTAL PROTEIN: 5.2 G/DL (ref 6.4–8.3)
WBC # BLD: 9 E9/L (ref 4.5–11.5)

## 2023-02-07 PROCEDURE — 6370000000 HC RX 637 (ALT 250 FOR IP): Performed by: PSYCHIATRY & NEUROLOGY

## 2023-02-07 PROCEDURE — 6370000000 HC RX 637 (ALT 250 FOR IP): Performed by: INTERNAL MEDICINE

## 2023-02-07 PROCEDURE — 80053 COMPREHEN METABOLIC PANEL: CPT

## 2023-02-07 PROCEDURE — 6360000002 HC RX W HCPCS: Performed by: INTERNAL MEDICINE

## 2023-02-07 PROCEDURE — 2580000003 HC RX 258: Performed by: INTERNAL MEDICINE

## 2023-02-07 PROCEDURE — 2580000003 HC RX 258

## 2023-02-07 PROCEDURE — 2060000000 HC ICU INTERMEDIATE R&B

## 2023-02-07 PROCEDURE — 36415 COLL VENOUS BLD VENIPUNCTURE: CPT

## 2023-02-07 PROCEDURE — 85025 COMPLETE CBC W/AUTO DIFF WBC: CPT

## 2023-02-07 RX ORDER — SODIUM CHLORIDE 0.9 % (FLUSH) 0.9 %
5-40 SYRINGE (ML) INJECTION 2 TIMES DAILY
Status: DISCONTINUED | OUTPATIENT
Start: 2023-02-07 | End: 2023-02-09 | Stop reason: HOSPADM

## 2023-02-07 RX ORDER — SODIUM CHLORIDE 0.9 % (FLUSH) 0.9 %
SYRINGE (ML) INJECTION
Status: COMPLETED
Start: 2023-02-07 | End: 2023-02-07

## 2023-02-07 RX ADMIN — DEXTROSE MONOHYDRATE: 50 INJECTION, SOLUTION INTRAVENOUS at 00:43

## 2023-02-07 RX ADMIN — Medication 10 ML: at 13:35

## 2023-02-07 RX ADMIN — LEVETIRACETAM 500 MG: 500 TABLET, FILM COATED ORAL at 22:12

## 2023-02-07 RX ADMIN — DIVALPROEX SODIUM 250 MG: 125 CAPSULE, COATED PELLETS ORAL at 22:12

## 2023-02-07 RX ADMIN — CARIPRAZINE 3 MG: 1.5 CAPSULE, GELATIN COATED ORAL at 22:13

## 2023-02-07 RX ADMIN — CEFTRIAXONE 1000 MG: 1 INJECTION, POWDER, FOR SOLUTION INTRAMUSCULAR; INTRAVENOUS at 13:35

## 2023-02-07 RX ADMIN — MEMANTINE 10 MG: 10 TABLET ORAL at 22:13

## 2023-02-07 RX ADMIN — ASPIRIN 81 MG: 81 TABLET, COATED ORAL at 22:13

## 2023-02-07 RX ADMIN — DEXTROSE MONOHYDRATE: 50 INJECTION, SOLUTION INTRAVENOUS at 17:07

## 2023-02-07 RX ADMIN — DEXTROSE MONOHYDRATE: 50 INJECTION, SOLUTION INTRAVENOUS at 10:54

## 2023-02-07 RX ADMIN — MIRTAZAPINE 15 MG: 15 TABLET, FILM COATED ORAL at 22:12

## 2023-02-07 ASSESSMENT — PAIN SCALES - GENERAL
PAINLEVEL_OUTOF10: 0
PAINLEVEL_OUTOF10: 0

## 2023-02-07 NOTE — CARE COORDINATION
Patient resides at [facility], and the plan is to return there. Legal Guardian is Yoon Ludivina. A message was left to discuss discharge planning, and if [facility] is still the plan at discharge when medically stable. Awaiting call back from guardian. No covid test , or any updates required upon return to [facility] per Jes.   Electronically signed by Monika Garner RN on 2/7/2023 at 11:10 AM  [phone]

## 2023-02-07 NOTE — PROGRESS NOTES
Jose Roberto Britt MD FACP  Internal medicine  Progress Notes      CHIEF COMPLAINT: Patient was found on the floor at the nursing facility      HISTORY OF PRESENT ILLNESS:    2/4/2023  Patient was seen in the emergency room at the main [facility] earlier this morning  Spoke with the ER physician  Data reviewed in detail  82-year-old  woman with a prior history of stroke currently living in a nursing facility  She was found on the floor of unknown duration  No musculoskeletal injuries reported  She was brought to emergency room where she was found to be profoundly hyponatremic with a normal CK and elevated creatinine  Patient admits to not eating drinking enough over the last several days without any specific complaints  2/5/2023  Patient was seen on the floor earlier this morning  Oral intake is poor  2/6/2023  Patient was seen on the floor earlier this morning  Sitter at bedside  Patient was sitting up in bed and feeding herself  Oral intake better   she feels better  2/7/2023  Patient was seen on the floor earlier this morning  Oral intake somewhat better  Past Medical History:    Past Medical History:   Diagnosis Date    Arthritis     hips    Ataxia     Atrophy of muscle     Bipolar 1 disorder, mixed, moderate ([address]     Cerebrovascular disease     Coordination problem     Hyperlipidemia     Hypertension     Overactive bladder     Paranoid schizophrenia ([address]     Thyroid disease        Past Surgical History:    Past Surgical History:   Procedure Laterality Date    BLADDER SUSPENSION      DILATION AND CURETTAGE OF UTERUS      DILATION AND CURETTAGE OF UTERUS N/A 2/16/2021    endometrial biopsy, HYSTEROSCOPY performed by Yaw Hdz MD at [address] [place] [address]         Medications Prior to Admission:    Medications Prior to Admission: memantine (NAMENDA) 10 MG tablet, Take 10 mg by mouth 2 times daily  mirtazapine (REMERON) 15 MG tablet, Take 15 mg by mouth nightly  ondansetron (ZOFRAN) 4 MG tablet, Take 4 mg by mouth every 8 hours as needed for Nausea or Vomiting  levothyroxine (SYNTHROID) 25 MCG tablet, Take 25 mcg by mouth Daily  [DISCONTINUED] divalproex (DEPAKOTE SPRINKLE) 125 MG capsule, Take 2 capsules by mouth every 12 hours for 6 doses  cariprazine hcl (VRAYLAR) 1.5 MG capsule, Take 3 mg by mouth at bedtime  [DISCONTINUED] paliperidone palmitate ER (INVEGA SUSTENNA) 156 MG/ML ANAND IM injection, Inject 156 mg into the muscle once Inject 1 dose in the morning starting on the 2nd and ending on the 2nd every month  levETIRAcetam (KEPPRA) 500 MG tablet, Take 1 tablet by mouth in the morning and 1 tablet before bedtime. melatonin 5 MG TBDP disintegrating tablet, Take 1 tablet by mouth in the morning for 6 doses. pantoprazole (PROTONIX) 40 MG tablet, Take 1 tablet by mouth in the morning. rivastigmine (EXELON) 13.3 MG/24HR, Place 1 patch onto the skin nightly  acetaminophen (TYLENOL) 650 MG extended release tablet, Take 650 mg by mouth every 4 hours as needed for Pain DO NOT EXCEED 3 GRAMS MAX TYLENOL PER DAY  aspirin 81 MG EC tablet, Take 1 tablet by mouth 2 times daily    Allergies:    Mysoline [primidone] and Tofranil [imipramine hcl]    Social History:    reports that she has never smoked. She has never used smokeless tobacco. She reports that she does not drink alcohol and does not use drugs. Family History:   Family history is unknown by patient. REVIEW OF SYSTEMS:  As above in the HPI, otherwise negative    PHYSICAL EXAM:    Vitals:  /68   Pulse 77   Temp 97 °F (36.1 °C) (Temporal)   Resp 16   Ht 5' 6\" (1.676 m)   Wt 173 lb (78.5 kg)   SpO2 97%   BMI 27.92 kg/m²     General:  Awake, alert, oriented X 3. Looks chronically ill  Dysarthric  HEENT:  Normocephalic, atraumatic. Pupils equal, round, reactive to light. No scleral icterus.   No conjunctival injection  Oral mucosa is moist no nasal discharge. Neck:  Supple  Heart:  RRR, no murmurs, gallops, rubs  Lungs:  CTA bilaterally, bilat symmetrical expansion, no wheeze, rales, or rhonchi  Abdomen:   Bowel sounds present, soft, nontender, no masses, no organomegaly, no peritoneal signs  Extremities:  No clubbing, cyanosis, or edema  Skin:  Warm and dry, no open lesions or rash  Neuro: Residual left facial weakness and left hemiparesis noted from previous stroke  Breast: deferred  Rectal: deferred  Genitalia:  deferred    LABS:  Data reviewed      ASSESSMENT:      Principal Problem:    JOSE A (acute kidney injury) ([facility]  Volume contraction  Hyper natremia  UTI with E. coli  Residual left hemiparesis from previous stroke  Comorbidities present and past as listed below  Patient Active Problem List   Diagnosis    Schizophrenia ([facility]    Severe episode of recurrent major depressive disorder, with psychotic features ([address]    NSAID overdose    Metabolic acidosis with increased anion gap and accumulation of organic acids    Suicide attempt ([address]    Post-menopausal bleeding    Complicated UTI (urinary tract infection)    Dysarthria    Delirium    Hypernatremia    Hypertension    JOSE A (acute kidney injury) ([address]    Cognitive disorder    Schizoaffective disorder, bipolar type ([address]           PLAN:  Add Rocephin IV UTI with E. coli  Advance diet and activity as tolerated  IV hydration/changed to dextrose 5% solution  Monitor electrolytes  Resume home medications  DVT prophylaxis with heparin  Questions answered to her satisfaction    Mary Ann Sams MD  12:48 PM  2/7/2023

## 2023-02-08 LAB
ALBUMIN SERPL-MCNC: 3 G/DL (ref 3.5–5.2)
ALP BLD-CCNC: 70 U/L (ref 35–104)
ALT SERPL-CCNC: 9 U/L (ref 0–32)
ANION GAP SERPL CALCULATED.3IONS-SCNC: 12 MMOL/L (ref 7–16)
AST SERPL-CCNC: 14 U/L (ref 0–31)
BASOPHILS ABSOLUTE: 0.04 E9/L (ref 0–0.2)
BASOPHILS RELATIVE PERCENT: 0.4 % (ref 0–2)
BILIRUB SERPL-MCNC: <0.2 MG/DL (ref 0–1.2)
BUN BLDV-MCNC: 29 MG/DL (ref 6–23)
CALCIUM SERPL-MCNC: 9.3 MG/DL (ref 8.6–10.2)
CHLORIDE BLD-SCNC: 118 MMOL/L (ref 98–107)
CO2: 20 MMOL/L (ref 22–29)
CREAT SERPL-MCNC: 1.8 MG/DL (ref 0.5–1)
EOSINOPHILS ABSOLUTE: 0.12 E9/L (ref 0.05–0.5)
EOSINOPHILS RELATIVE PERCENT: 1.2 % (ref 0–6)
GFR SERPL CREATININE-BSD FRML MDRD: 29 ML/MIN/1.73
GLUCOSE BLD-MCNC: 99 MG/DL (ref 74–99)
HCT VFR BLD CALC: 32.7 % (ref 34–48)
HEMOGLOBIN: 10.1 G/DL (ref 11.5–15.5)
IMMATURE GRANULOCYTES #: 0.07 E9/L
IMMATURE GRANULOCYTES %: 0.7 % (ref 0–5)
LYMPHOCYTES ABSOLUTE: 2.32 E9/L (ref 1.5–4)
LYMPHOCYTES RELATIVE PERCENT: 23.7 % (ref 20–42)
MCH RBC QN AUTO: 28.9 PG (ref 26–35)
MCHC RBC AUTO-ENTMCNC: 30.9 % (ref 32–34.5)
MCV RBC AUTO: 93.7 FL (ref 80–99.9)
MONOCYTES ABSOLUTE: 0.74 E9/L (ref 0.1–0.95)
MONOCYTES RELATIVE PERCENT: 7.6 % (ref 2–12)
NEUTROPHILS ABSOLUTE: 6.48 E9/L (ref 1.8–7.3)
NEUTROPHILS RELATIVE PERCENT: 66.4 % (ref 43–80)
PDW BLD-RTO: 16.4 FL (ref 11.5–15)
PLATELET # BLD: 185 E9/L (ref 130–450)
PMV BLD AUTO: 12.6 FL (ref 7–12)
POTASSIUM SERPL-SCNC: 4 MMOL/L (ref 3.5–5)
RBC # BLD: 3.49 E12/L (ref 3.5–5.5)
SODIUM BLD-SCNC: 150 MMOL/L (ref 132–146)
TOTAL PROTEIN: 5.5 G/DL (ref 6.4–8.3)
WBC # BLD: 9.8 E9/L (ref 4.5–11.5)

## 2023-02-08 PROCEDURE — 85025 COMPLETE CBC W/AUTO DIFF WBC: CPT

## 2023-02-08 PROCEDURE — 6370000000 HC RX 637 (ALT 250 FOR IP): Performed by: PSYCHIATRY & NEUROLOGY

## 2023-02-08 PROCEDURE — 80053 COMPREHEN METABOLIC PANEL: CPT

## 2023-02-08 PROCEDURE — 6360000002 HC RX W HCPCS: Performed by: INTERNAL MEDICINE

## 2023-02-08 PROCEDURE — 2060000000 HC ICU INTERMEDIATE R&B

## 2023-02-08 PROCEDURE — 36415 COLL VENOUS BLD VENIPUNCTURE: CPT

## 2023-02-08 PROCEDURE — 2580000003 HC RX 258: Performed by: INTERNAL MEDICINE

## 2023-02-08 PROCEDURE — 6370000000 HC RX 637 (ALT 250 FOR IP): Performed by: INTERNAL MEDICINE

## 2023-02-08 RX ADMIN — CEFTRIAXONE 1000 MG: 1 INJECTION, POWDER, FOR SOLUTION INTRAMUSCULAR; INTRAVENOUS at 14:01

## 2023-02-08 RX ADMIN — LEVETIRACETAM 500 MG: 500 TABLET, FILM COATED ORAL at 09:48

## 2023-02-08 RX ADMIN — MEMANTINE 10 MG: 10 TABLET ORAL at 09:53

## 2023-02-08 RX ADMIN — ASPIRIN 81 MG: 81 TABLET, COATED ORAL at 09:49

## 2023-02-08 RX ADMIN — PANTOPRAZOLE SODIUM 40 MG: 40 TABLET, DELAYED RELEASE ORAL at 09:47

## 2023-02-08 ASSESSMENT — PAIN SCALES - GENERAL: PAINLEVEL_OUTOF10: 0

## 2023-02-08 NOTE — CARE COORDINATION
Legal Guardian Esteban Salgado returned call, and was updated on patient and plan of care. She stated that she agrees with returning to [facility] care when medically stable. Per internal medicine, Rocephin IV was added. No covid test , or any updates required upon return to [facility] per Adriana White. Will continue to follow. Case Management Assessment  Initial Evaluation    Date/Time of Evaluation: 2/8/2023 8:25 AM  Assessment Completed by: Ananya Verdugo RN    If patient is discharged prior to next notation, then this note serves as note for discharge by case management. Patient Name: Fartun Medina                   YOB: 1945  Diagnosis: Hypernatremia [E87.0]  JOSE A (acute kidney injury) [facility]) [N17.9]                   Date / Time: 2/4/2023  6:53 AM    Patient Admission Status: Inpatient   Readmission Risk (Low < 19, Mod (19-27), High > 27): Readmission Risk Score: 17.4    Current PCP: Uday Norman MD  PCP verified by CM? Yes    Chart Reviewed: Yes      History Provided by: Medical Record  Patient Orientation: Alert and Oriented    Patient Cognition: Alert    Hospitalization in the last 30 days (Readmission):  No    If yes, Readmission Assessment in CM Navigator will be completed. Advance Directives:      Code Status: Prior   Patient's Primary Decision Maker is:  (guardian)    Primary Decision Maker: [address] [place] [phone]    Discharge Planning:    Patient lives with: Other (Comment) Type of Home: Long-Term Care [facility])  Primary Care Giver: Other (Comment) (assist-)  Patient Support Systems include: Home Care Staff   Current Financial resources:    Current community resources:    Current services prior to admission: Other (Comment)            Current DME:              Type of Home Care services:  None    ADLS  Prior functional level: Other (see comment) (assistance as needed)  Current functional level:  Toileting, Dressing, Bathing    PT AM-PAC:   /24  OT AM-PAC:   /24    Family can provide assistance at DC: No  Would you like Case Management to discuss the discharge plan with any other family members/significant others, and if so, who? No  Plans to Return to Present Housing: Unknown at present  Other Identified Issues/Barriers to RETURNING to current housing  Potential Assistance needed at discharge: Rafiq Luis            Potential DME:    Patient expects to discharge to: Long-term care  Plan for transportation at discharge:      Financial    Payor: [address] [place] [address] Drive / Plan: Kameron Achayra [employer] / Product Type: *No Product type* /     Does insurance require precert for SNF:     Potential assistance Purchasing Medications:    Meds-to-Beds request: No      [address] [phone] [facility] [phone]  [address] [place] [address]  Phone: [phone] Fax: [phone]      Notes:    Factors facilitating achievement of predicted outcomes:     Barriers to discharge: Additional Case Management Notes: The Plan for Transition of Care is related to the following treatment goals of Hypernatremia [E87.0]  JOSE A (acute kidney injury) ([facility] [A78.3]    IF APPLICABLE: The Patient and/or patient representative Penne Goodpasture and her family were provided with a choice of provider and agrees with the discharge plan. Freedom of choice list with basic dialogue that supports the patient's individualized plan of care/goals and shares the quality data associated with the providers was provided to:     Patient Representative Name:       The Patient and/or Patient Representative Agree with the Discharge Plan?       Dominick Harrington RN  Case Management Department

## 2023-02-08 NOTE — ADT AUTH CERT
Patient Demographics    Name Patient ID SSN Gender Identity Birth Date   Tai Elaine [id number]  Female [birth year] (78 yrs)     Address Phone Email Employer    [employer]   [address] [place] [phone] (H) --  [employer] Race Occupation Emp Status    Rafiq Martinez (non-) -- Retired      Reg Status PCP Date Last Verified Next Review Date    Verified [address] [place]  [phone] 23      Admission Date Discharge Date Admitting Provider     23 -- Antonino Huffman MD       Marital Status Nondenominational      Single Synagogue        Emergency Contact 1 Emergency Contact 2   [address]   19 [facility]   500 [facility]   [phone] (H) [address] [facility] (Seamus Fury)   [address] [place] [address] [address]   [phone] (H)     [address] [place] [address] 750 Account [de-identified]  CVG Subscriber Name/Sex/Relation Subscriber  Subscriber Address/Phone Subscriber Emp/Emp Phone   1. Netta Key [id number]-SNP   [id number] Cierra Win - Female   (Self) 1945 [address]   [phone](I) RETIRED    2.  [address]   [id number] Cierra Win - Female   (Self) 1945 [address]   [phone](A) RETIRED      Utilization Reviews       Systemic or Infectious Condition GRG - Care Day 2 (2023) by Efren Garcia RN       Review Status Review Entered   Completed 2023 1011       Created By   Efren Garcia RN      Criteria Review      Care Day: 2 Care Date: 2023 Level of Care: Intermediate Care    Guideline Day 2    Level Of Care    (X) Floor    2023 10:11 AM EST by Lucila Sommer      Intermediate care unit    Clinical Status    ( ) * No ICU or intermediate care needs    Interventions    (X) Inpatient interventions continue    2023 10:11 AM EST by Saintclair Cambridge      IVF  Regular diet  CMP, CBC daily  Sitter @ bedside  Suicide precautions    * Milestone   Additional Notes   DATE: 2/5/23  Day 2    Intermediate care unit         PERTINENT UPDATES:    Poor oral intake, continues on IVF      VITALS: 98.2 - 68 - 18 - 158/77 - 95% on RA          ABNL/PERTINENT LABS/RADIOLOGY/DIAGNOSTIC STUDIES:    Na 155   Cl 123   BUN 52   Cr 2.1   RBC 3.28   Hgb 9.4   Hct 31.4         PHYSICAL EXAM:    Heart:  RRR, no murmurs, gallops, rubs   Lungs:  CTA bilaterally, bilat symmetrical expansion, no wheeze, rales, or rhonchi   Abdomen: Bowel sounds present      MD CONSULTS/ASSESSMENT AND PLAN:      Intermediate care unit   ASSESSMENT:     Principal Problem:     JOSE A (acute kidney injury) ([address]   Volume contraction   Hyper natremia   Residual left hemiparesis from previous stroke   PLAN:   Advance diet and activity as tolerated   IV hydration   Monitor electrolytes   Resume home medications   DVT prophylaxis with heparin   Check urine cultures         Psych consult   ASSESSMENT:    Cognitive disorder   Schizoaffective disorder bipolar type    PLAN & RECOMMENDATIONS:    Patient is not suicidal homicidal psychotic manic and does not meet the criteria for any inpatient level of psychiatric care. Her medication has been looked at and will recommend adding small dose of Depakote with the current regimen. Patient was rational talking to to us at this time but sometimes the rationality is limited because of the cognitive decline.          MEDICATIONS:   D5 & 1/2 NS @ 100 ml/hr         ORDERS:      IVF   Regular diet   CMP, CBC daily   Sitter @ bedside   Suicide precautions                      Systemic or Infectious Condition GRG - Care Day 1 (2/4/2023) by Margarita Roque RN       Review Status Review Entered   Completed 2/6/2023 1003       Created By   Margarita Roque RN      Criteria Review      Care Day: 1 Care Date: 2/4/2023 Level of Care: Intermediate Care    Guideline Day 1    Level Of Care    (X) ICU or intermediate care    2/6/2023 10:03 AM EST by Myles Saunders      Intermediate care unit    Clinical Status    (X) * Clinical Indications met    2/6/2023 10:03 AM EST by Myles Saunders      sent from NH for confusion, had fall unwitnessed    Interventions    (X) Inpatient interventions as needed    2/6/2023 10:03 AM EST by Myles Saunders      Suicide precautions   Regular diet  tele  Psych consult    * Milestone   Additional Notes   DATE: 2/4/23   Day 1         CHIEF COMPLAINT/ED PRESENTATION:    74-year-old female patient presenting to emergency department for evaluation of unwitnessed fall. Patient reports that she was found on the ground sitting up. She states to me that she hit her head but having no complaints. She states that she was on the computer earlier this evening and had fallen on the ground after getting up. She cannot tell me how long she was on the ground for. Nursing home reports that patient A&O x1, here she is alert and oriented x3. RELEVANT BASELINES: (lab values, vitals, o2 amount/delivery, etc.)   No home O2      VITALS: 97.9 - 94 - 14 - 152/90 - 97% on RA          ABNL/PERTINENT LABS/RADIOLOGY/DIAGNOSTIC STUDIES:    WBC 9.9 E9/L      RBC 3.77 E12/L      Hemoglobin 10.7    Sodium 156 mmol/L      Potassium reflex Magnesium 5.3 mmol/L      Chloride 120 mmol/L      CO2 21 mmol/L      Anion Gap 15    BUN 55 mg/dL      Creatinine 2.6 mg/dL      Est, Glom Filt Rate 19 mL/min/1.73      Calcium 10.7    Troponin 35      UA-   Bilirubin Urine Negative      Ketones, Urine TRACE mg/dL      Specific Gravity, UA 1.020     Blood, Urine Negative     pH, UA 5.5     Protein, UA 30 mg/dL      Urobilinogen, Urine 0.2 E.U./dL      Nitrite, Urine Negative     Leukocyte Esterase, Urine TRACE     WBC, UA 2-5 /HPF      RBC, UA NONE       CT HEAD WO CONTRAST   Final Result   No acute intracranial abnormality.   Specifically, there is no acute   intracranial hemorrhage CT CERVICAL SPINE WO CONTRAST   Final Result   1. There is no acute compression fracture or subluxation of the cervical   spine. 2. Multilevel degenerative disc and degenerative joint disease. ED TREATMENT: meds- NS 1L bolus   MDM    79-year-old female patient present to emergency department for evaluation of fall. Lab work here concerning for patietn having having acute kidney injury creatinine up to 2.6 from prior of 1.7. She is also very hypernatremic sodium of 156. She has a calculated fluid deficit of 4 L as determined by me. Her CK was normal at 82 she is not in rhabdomyolysis. She is not complaining of any specific pain. She was given IV fluids here. ADMISSION DIAGNOSIS/OP NOTE:   1. JOSE A (acute kidney injury) ([address]    2. Hypernatremia          PERTINENT MEDICAL HISTORY:   Bipolar 1 disorder, mixed, moderate ([address] Cerebrovascular disease, Coordination problem, Hyperlipidemia, Hypertension      PHYSICAL EXAM:    Cardiovascular:       Rate and Rhythm: Normal rate and regular rhythm. Pulses: Normal pulses. Heart sounds: Normal heart sounds. Pulmonary:       Effort: Pulmonary effort is normal. No respiratory distress. Breath sounds: Normal breath sounds. Abdominal:       General: Bowel sounds are normal. There is no distension. Tenderness: There is no abdominal tenderness. Musculoskeletal:          General: Normal range of motion. Cervical back: Normal range of motion and neck supple. Right lower leg: No edema. Left lower leg: No edema. Comments: No tenderness to palpation of the C-spine, T-spine, L-spine, no step-offs or crepitus. No wounds on the buttocks. Skin:      General: Skin is warm and dry. Capillary Refill: Capillary refill takes less than 2 seconds. Neurological:       General: No focal deficit present. Mental Status: She is alert.        Comments: Patient speaks slowly but she is able to respond to questions, ANO x3 moving all extremities spontaneously. MD CONSULTS/ASSESSMENTS & PLANS:      H&P   ASSESSMENT:     Principal Problem:     JOSE A (acute kidney injury) ([address]   Volume contraction   Hyper natremia   Residual left hemiparesis from previous stroke   PLAN:   Admit to telemetry   Advance diet and activity as tolerated   IV hydration   Monitor electrolytes   Resume home medications   DVT prophylaxis with heparin   Check urine cultures         Nurse note-    While in room with patient she expressed that she has tried to commit suicide at her facility by strangling herself and was not successful. She stated that now that she is at the hospital she could \"wrap her call light around her neck\". Patient expressed that she feels she has caused \"chaos\".           MEDICATIONS:       D5 & 1/2 NS @ 100 ml/hr         ORDERS:   Admit to Intermediate care unit     Suicide precautions    Regular diet   tele   Psych consult                Systemic or Infectious Condition GRG - Clinical Indications for Admission to Inpatient Care by Myles Mitchell RN       Review Status Review Entered   Completed 2/6/2023 1001       Created By   Myles Mitchell RN      Criteria Review      Clinical Indications for Admission to Inpatient Care    Most Recent : Rohit Nuñez Most Recent Date: 2/6/2023 10:01 AM EST    (X) Hospital admission is needed for appropriate care of the patient because of  1 or more  of    the following :       (X) Severe electrolyte abnormalities requiring inpatient care       2/6/2023 10:01 AM EST by Gayla Blanchard         Na 156  K 5.3  per Nursing home pt had confusion, was  A&O x1       Definitions    Severe electrolyte abnormalities requiring inpatient care    (X) Severe electrolyte abnormalities requiring inpatient care, as indicated by  ALL  of the following     (1) (2) (3):       (X) Electrolyte abnormality is not at acceptable patient baseline (eg, treatment effect)       (X) Severe abnormality, as indicated by  1 or more  of the following :          (X) Sodium greater than 150 mEq/L (mmol/L) with severe finding requiring inpatient management          (eg, Altered mental status, seizures)        (X) Other Indication: JOSE A      Entered 2/6/2023 10:01 AM EST by Hernesto Pay     creatinine up to 2.6 from prior of 1.7

## 2023-02-08 NOTE — PROGRESS NOTES
[facility]  [facility]  Progress Notes      CHIEF COMPLAINT: Patient was found on the floor at the nursing facility      HISTORY OF PRESENT ILLNESS:    2/4/2023  Patient was seen in the emergency room at the main campus of [facility] earlier this morning  Spoke with the ER physician  Data reviewed in detail  62-year-old  woman with a prior history of stroke currently living in a nursing facility  She was found on the floor of unknown duration  No musculoskeletal injuries reported  She was brought to emergency room where she was found to be profoundly hyponatremic with a normal CK and elevated creatinine  Patient admits to not eating drinking enough over the last several days without any specific complaints  2/5/2023  Patient was seen on the floor earlier this morning  Oral intake is poor  2/6/2023  Patient was seen on the floor earlier this morning  Sitter at bedside  Patient was sitting up in bed and feeding herself  Oral intake better   she feels better  2/7/2023  Patient was seen on the floor earlier this morning  Oral intake somewhat better  2/8/2023  Patient was seen on the floor earlier this morning  Student nurse at bedside  Oral intake much better  Mental status at her baseline  Past Medical History:    Past Medical History:   Diagnosis Date    Arthritis     hips    Ataxia     Atrophy of muscle     Bipolar 1 disorder, mixed, moderate ([address]     Cerebrovascular disease     Coordination problem     Hyperlipidemia     Hypertension     Overactive bladder     Paranoid schizophrenia ([address]     Thyroid disease        Past Surgical History:    Past Surgical History:   Procedure Laterality Date    BLADDER SUSPENSION      DILATION AND CURETTAGE OF UTERUS      DILATION AND CURETTAGE OF UTERUS N/A 2/16/2021    endometrial biopsy, HYSTEROSCOPY performed by Karla Arrington MD at [address] [place] [address] AND ADENOIDECTOMY         Medications Prior to Admission:    Medications Prior to Admission: memantine (NAMENDA) 10 MG tablet, Take 10 mg by mouth 2 times daily  mirtazapine (REMERON) 15 MG tablet, Take 15 mg by mouth nightly  ondansetron (ZOFRAN) 4 MG tablet, Take 4 mg by mouth every 8 hours as needed for Nausea or Vomiting  levothyroxine (SYNTHROID) 25 MCG tablet, Take 25 mcg by mouth Daily  [DISCONTINUED] divalproex (DEPAKOTE SPRINKLE) 125 MG capsule, Take 2 capsules by mouth every 12 hours for 6 doses  cariprazine hcl (VRAYLAR) 1.5 MG capsule, Take 3 mg by mouth at bedtime  [DISCONTINUED] paliperidone palmitate ER (INVEGA SUSTENNA) 156 MG/ML ANAND IM injection, Inject 156 mg into the muscle once Inject 1 dose in the morning starting on the 2nd and ending on the 2nd every month  levETIRAcetam (KEPPRA) 500 MG tablet, Take 1 tablet by mouth in the morning and 1 tablet before bedtime. melatonin 5 MG TBDP disintegrating tablet, Take 1 tablet by mouth in the morning for 6 doses. pantoprazole (PROTONIX) 40 MG tablet, Take 1 tablet by mouth in the morning. rivastigmine (EXELON) 13.3 MG/24HR, Place 1 patch onto the skin nightly  acetaminophen (TYLENOL) 650 MG extended release tablet, Take 650 mg by mouth every 4 hours as needed for Pain DO NOT EXCEED 3 GRAMS MAX TYLENOL PER DAY  aspirin 81 MG EC tablet, Take 1 tablet by mouth 2 times daily    Allergies:    Mysoline [primidone] and Tofranil [imipramine hcl]    Social History:    reports that she has never smoked. She has never used smokeless tobacco. She reports that she does not drink alcohol and does not use drugs. Family History:   Family history is unknown by patient. REVIEW OF SYSTEMS:  As above in the HPI, otherwise negative    PHYSICAL EXAM:    Vitals:  /69   Pulse 72   Temp 97.4 °F (36.3 °C) (Oral)   Resp 18   Ht 5' 6\" (1.676 m)   Wt 173 lb (78.5 kg)   SpO2 99%   BMI 27.92 kg/m²     General:  Awake, alert, oriented X 3.   Looks chronically ill  Dysarthric  HEENT:  Normocephalic, atraumatic. Pupils equal, round, reactive to light. No scleral icterus. No conjunctival injection  Oral mucosa is moist no nasal discharge. Neck:  Supple  Heart:  RRR, no murmurs, gallops, rubs  Lungs:  CTA bilaterally, bilat symmetrical expansion, no wheeze, rales, or rhonchi  Abdomen:   Bowel sounds present, soft, nontender, no masses, no organomegaly, no peritoneal signs  Extremities:  No clubbing, cyanosis, or edema  Skin:  Warm and dry, no open lesions or rash  Neuro: Residual left facial weakness and left hemiparesis noted from previous stroke  Breast: deferred  Rectal: deferred  Genitalia:  deferred    LABS:  Data reviewed      ASSESSMENT:      Principal Problem:    JOSE A (acute kidney injury) ([facility]  Volume contraction  Hyper natremia  UTI with E. coli  Residual left hemiparesis from previous stroke  Comorbidities present and past as listed below  Patient Active Problem List   Diagnosis    Schizophrenia ([facility]    Severe episode of recurrent major depressive disorder, with psychotic features ([address]    NSAID overdose    Metabolic acidosis with increased anion gap and accumulation of organic acids    Suicide attempt ([address]    Post-menopausal bleeding    Complicated UTI (urinary tract infection)    Dysarthria    Delirium    Hypernatremia    Hypertension    JOSE A (acute kidney injury) ([address]    Cognitive disorder    Schizoaffective disorder, bipolar type ([address]           PLAN:  Added Rocephin IV UTI with E. coli  Advance diet and activity as tolerated  IV hydration/changed to dextrose 5% solution  Monitor electrolytes  Resume home medications  DVT prophylaxis with heparin  Questions answered to her satisfaction    Nikita Goodman MD  3:21 PM  2/8/2023

## 2023-02-09 VITALS
DIASTOLIC BLOOD PRESSURE: 64 MMHG | BODY MASS INDEX: 27.8 KG/M2 | HEIGHT: 66 IN | OXYGEN SATURATION: 95 % | WEIGHT: 173 LBS | RESPIRATION RATE: 14 BRPM | HEART RATE: 94 BPM | SYSTOLIC BLOOD PRESSURE: 114 MMHG | TEMPERATURE: 96.3 F

## 2023-02-09 LAB
ALBUMIN SERPL-MCNC: 3.2 G/DL (ref 3.5–5.2)
ALP BLD-CCNC: 72 U/L (ref 35–104)
ALT SERPL-CCNC: 9 U/L (ref 0–32)
ANION GAP SERPL CALCULATED.3IONS-SCNC: 9 MMOL/L (ref 7–16)
AST SERPL-CCNC: 14 U/L (ref 0–31)
BASOPHILS ABSOLUTE: 0.05 E9/L (ref 0–0.2)
BASOPHILS RELATIVE PERCENT: 0.6 % (ref 0–2)
BILIRUB SERPL-MCNC: <0.2 MG/DL (ref 0–1.2)
BUN BLDV-MCNC: 29 MG/DL (ref 6–23)
CALCIUM SERPL-MCNC: 9.1 MG/DL (ref 8.6–10.2)
CHLORIDE BLD-SCNC: 114 MMOL/L (ref 98–107)
CO2: 21 MMOL/L (ref 22–29)
CREAT SERPL-MCNC: 1.5 MG/DL (ref 0.5–1)
EOSINOPHILS ABSOLUTE: 0.23 E9/L (ref 0.05–0.5)
EOSINOPHILS RELATIVE PERCENT: 2.7 % (ref 0–6)
GFR SERPL CREATININE-BSD FRML MDRD: 36 ML/MIN/1.73
GLUCOSE BLD-MCNC: 122 MG/DL (ref 74–99)
HCT VFR BLD CALC: 30.5 % (ref 34–48)
HEMOGLOBIN: 9.9 G/DL (ref 11.5–15.5)
IMMATURE GRANULOCYTES #: 0.07 E9/L
IMMATURE GRANULOCYTES %: 0.8 % (ref 0–5)
LYMPHOCYTES ABSOLUTE: 2.48 E9/L (ref 1.5–4)
LYMPHOCYTES RELATIVE PERCENT: 29.1 % (ref 20–42)
MCH RBC QN AUTO: 28.7 PG (ref 26–35)
MCHC RBC AUTO-ENTMCNC: 32.5 % (ref 32–34.5)
MCV RBC AUTO: 88.4 FL (ref 80–99.9)
MONOCYTES ABSOLUTE: 0.62 E9/L (ref 0.1–0.95)
MONOCYTES RELATIVE PERCENT: 7.3 % (ref 2–12)
NEUTROPHILS ABSOLUTE: 5.08 E9/L (ref 1.8–7.3)
NEUTROPHILS RELATIVE PERCENT: 59.5 % (ref 43–80)
ORGANISM: ABNORMAL
PDW BLD-RTO: 16.4 FL (ref 11.5–15)
PLATELET # BLD: 183 E9/L (ref 130–450)
PMV BLD AUTO: 12.2 FL (ref 7–12)
POTASSIUM SERPL-SCNC: 4.2 MMOL/L (ref 3.5–5)
RBC # BLD: 3.45 E12/L (ref 3.5–5.5)
SODIUM BLD-SCNC: 144 MMOL/L (ref 132–146)
TOTAL PROTEIN: 5.7 G/DL (ref 6.4–8.3)
URINE CULTURE, ROUTINE: ABNORMAL
WBC # BLD: 8.5 E9/L (ref 4.5–11.5)

## 2023-02-09 PROCEDURE — 6360000002 HC RX W HCPCS: Performed by: INTERNAL MEDICINE

## 2023-02-09 PROCEDURE — 80053 COMPREHEN METABOLIC PANEL: CPT

## 2023-02-09 PROCEDURE — 85025 COMPLETE CBC W/AUTO DIFF WBC: CPT

## 2023-02-09 PROCEDURE — 2580000003 HC RX 258: Performed by: INTERNAL MEDICINE

## 2023-02-09 PROCEDURE — 36415 COLL VENOUS BLD VENIPUNCTURE: CPT

## 2023-02-09 PROCEDURE — 6370000000 HC RX 637 (ALT 250 FOR IP): Performed by: INTERNAL MEDICINE

## 2023-02-09 PROCEDURE — 6370000000 HC RX 637 (ALT 250 FOR IP): Performed by: PSYCHIATRY & NEUROLOGY

## 2023-02-09 RX ORDER — LEVOFLOXACIN 250 MG/1
250 TABLET ORAL DAILY
Qty: 10 TABLET | Refills: 0 | DISCHARGE
Start: 2023-02-09 | End: 2023-02-19

## 2023-02-09 RX ORDER — DIVALPROEX SODIUM 125 MG/1
250 CAPSULE, COATED PELLETS ORAL 2 TIMES DAILY
Qty: 60 CAPSULE | Refills: 3 | DISCHARGE
Start: 2023-02-09

## 2023-02-09 RX ADMIN — ASPIRIN 81 MG: 81 TABLET, COATED ORAL at 09:29

## 2023-02-09 RX ADMIN — CEFTRIAXONE 1000 MG: 1 INJECTION, POWDER, FOR SOLUTION INTRAMUSCULAR; INTRAVENOUS at 11:00

## 2023-02-09 RX ADMIN — DIVALPROEX SODIUM 250 MG: 125 CAPSULE, COATED PELLETS ORAL at 09:37

## 2023-02-09 RX ADMIN — LEVETIRACETAM 500 MG: 500 TABLET, FILM COATED ORAL at 09:29

## 2023-02-09 RX ADMIN — MEMANTINE 10 MG: 10 TABLET ORAL at 09:39

## 2023-02-09 NOTE — CARE COORDINATION
Sodium is 144. Sent message to Dr Coco Garcia by perfect cassius, regarding discharge. [facility]. No covid tests or updates needed per Adriana White [address]. Ambulance form in soft chart and needs signed at discharge. Electronically signed by Ananya Verdugo RN on 2/9/2023 at 2:33 -3435    Addendum: Discharge orders received. Rn notified, no covid test needed. [facility] notified of discharge, as well as Adriana White at [place] [address].   Electronically signed by Ananya Verdugo RN on 2/9/2023 at 3:03 PM

## 2023-02-09 NOTE — PROGRESS NOTES
Nurse to nurse called to [facility]. IV removed. Heart monitor removed cleaned and placed in storage.

## 2023-02-10 NOTE — ADT AUTH CERT
Patient Demographics    Name Patient ID SSN Gender Identity Birth Date   Tom Hayes [id number]  Female [birth year] (78 yrs)     Address Phone Email Employer    [employer]   [address] [phone] (H) -- [address] [facility] Race Occupation Emp Status    [address] [place] [address] White (non-) -- Retired      Reg Status PCP Date Last Verified Next Review Date    Verified [address] [place], [place]  [phone] 02/01/23 03/03/23      Admission Date Discharge Date Admitting Provider     02/04/23 02/09/23 Marylen Breslow, MD       Marital Status Temple      Single Temple        Emergency Contact 1 Emergency Contact [address]   [phone] [facility] [address] [facility] (O)   [address]   [phone] (H)

## 2023-03-21 ENCOUNTER — OUTSIDE SERVICES (OUTPATIENT)
Dept: PRIMARY CARE CLINIC | Age: 78
End: 2023-03-21

## 2023-03-21 DIAGNOSIS — F25.0 SCHIZOAFFECTIVE DISORDER, BIPOLAR TYPE (HCC): Primary | ICD-10-CM

## 2023-03-21 DIAGNOSIS — R47.1 DYSARTHRIA: ICD-10-CM

## 2023-03-21 DIAGNOSIS — I10 PRIMARY HYPERTENSION: ICD-10-CM

## 2023-03-21 DIAGNOSIS — F33.3 SEVERE EPISODE OF RECURRENT MAJOR DEPRESSIVE DISORDER, WITH PSYCHOTIC FEATURES (HCC): ICD-10-CM

## 2023-04-19 ENCOUNTER — OUTSIDE SERVICES (OUTPATIENT)
Dept: PRIMARY CARE CLINIC | Age: 78
End: 2023-04-19

## 2023-04-19 DIAGNOSIS — R47.1 DYSARTHRIA: ICD-10-CM

## 2023-04-19 DIAGNOSIS — F20.1 DISORGANIZED SCHIZOPHRENIA (HCC): Primary | ICD-10-CM

## 2023-04-19 DIAGNOSIS — I10 PRIMARY HYPERTENSION: ICD-10-CM

## 2023-04-19 DIAGNOSIS — F33.3 SEVERE EPISODE OF RECURRENT MAJOR DEPRESSIVE DISORDER, WITH PSYCHOTIC FEATURES (HCC): ICD-10-CM

## 2023-04-19 NOTE — PROGRESS NOTES
Metabolic acidosis with increased anion gap and accumulation of organic acids    Suicide attempt ([address]    Post-menopausal bleeding    Complicated UTI (urinary tract infection)    Dysarthria    Delirium    Hypernatremia    Hypertension    JOSE A (acute kidney injury) ([facility]    Cognitive disorder    Schizoaffective disorder, bipolar type ([address]       Diagnosis/Orders  1. Disorganized schizophrenia ([address]  2. Severe episode of recurrent major depressive disorder, with psychotic features ([facility]  3. Primary hypertension  4. Dysarthria    Again told need to take her medication daily as ordered ,to follow with psych. No follow-ups on file.       Laurence Knox MD

## 2023-05-24 ENCOUNTER — OUTSIDE SERVICES (OUTPATIENT)
Dept: PRIMARY CARE CLINIC | Age: 78
End: 2023-05-24

## 2023-05-24 DIAGNOSIS — R47.1 DYSARTHRIA: Primary | ICD-10-CM

## 2023-05-24 DIAGNOSIS — R41.0 DELIRIUM: ICD-10-CM

## 2023-05-24 DIAGNOSIS — F20.1 DISORGANIZED SCHIZOPHRENIA (HCC): ICD-10-CM

## 2023-05-24 DIAGNOSIS — F33.3 SEVERE EPISODE OF RECURRENT MAJOR DEPRESSIVE DISORDER, WITH PSYCHOTIC FEATURES (HCC): ICD-10-CM

## 2023-05-24 NOTE — PROGRESS NOTES
Admit date: 10/5/22  Date of Service:  5/24/23      Vernelle Cockayne  1945      HPI   She is a 78 y.o. female resident who is being seen today feels fine still refusing to take her medication . Review of Systems  Respiratory: negative for cough and hemoptysis  Cardiovascular: negative for chest pain and dyspnea  Gastrointestinal: negative for abdominal pain, diarrhea, nausea and vomiting  Genitourinary:negative for dysuria and hematuria  Derm: negative for rash and skin lesion(s)  Neurological: negative for seizures and tremors  Endocrine: negative for diabetic symptoms including polydipsia and polyuria  Current Outpatient Medications   Medication Sig Dispense Refill    divalproex (DEPAKOTE SPRINKLE) 125 MG DR capsule Take 2 capsules by mouth in the morning and at bedtime 60 capsule 3    memantine (NAMENDA) 10 MG tablet Take 10 mg by mouth 2 times daily      mirtazapine (REMERON) 15 MG tablet Take 15 mg by mouth nightly      ondansetron (ZOFRAN) 4 MG tablet Take 4 mg by mouth every 8 hours as needed for Nausea or Vomiting      levothyroxine (SYNTHROID) 25 MCG tablet Take 25 mcg by mouth Daily      cariprazine hcl (VRAYLAR) 1.5 MG capsule Take 3 mg by mouth at bedtime      levETIRAcetam (KEPPRA) 500 MG tablet Take 1 tablet by mouth in the morning and 1 tablet before bedtime. 60 tablet 3    melatonin 5 MG TBDP disintegrating tablet Take 1 tablet by mouth in the morning for 6 doses. 6 tablet 0    pantoprazole (PROTONIX) 40 MG tablet Take 1 tablet by mouth in the morning. 30 tablet 3    rivastigmine (EXELON) 13.3 MG/24HR Place 1 patch onto the skin nightly      acetaminophen (TYLENOL) 650 MG extended release tablet Take 650 mg by mouth every 4 hours as needed for Pain DO NOT EXCEED 3 GRAMS MAX TYLENOL PER DAY      aspirin 81 MG EC tablet Take 1 tablet by mouth 2 times daily 30 tablet 3     No current facility-administered medications for this visit.         Objective     Vital Signs: /80 P 66 R

## 2023-06-21 ENCOUNTER — OUTSIDE SERVICES (OUTPATIENT)
Dept: PRIMARY CARE CLINIC | Age: 78
End: 2023-06-21

## 2023-06-21 DIAGNOSIS — R41.0 DELIRIUM: ICD-10-CM

## 2023-06-21 DIAGNOSIS — F20.1 DISORGANIZED SCHIZOPHRENIA (HCC): ICD-10-CM

## 2023-06-21 DIAGNOSIS — R47.1 DYSARTHRIA: Primary | ICD-10-CM

## 2023-06-21 NOTE — PROGRESS NOTES
Admit date: 10/5/22  Date of Service:  6/21/23      Adolfo Bell  1945      HPI   She is a 78 y.o. female resident who is being seen today for follow up still refusing her medication . Review of Systems  Respiratory: negative for cough and hemoptysis  Cardiovascular: negative for chest pain and dyspnea  Gastrointestinal: negative for abdominal pain, diarrhea, nausea and vomiting  Genitourinary:negative for dysuria and hematuria  Derm: negative for rash and skin lesion(s)  Neurological: negative for seizures and tremors  Endocrine: negative for diabetic symptoms including polydipsia and polyuria  Current Outpatient Medications   Medication Sig Dispense Refill    divalproex (DEPAKOTE SPRINKLE) 125 MG DR capsule Take 2 capsules by mouth in the morning and at bedtime 60 capsule 3    memantine (NAMENDA) 10 MG tablet Take 10 mg by mouth 2 times daily      mirtazapine (REMERON) 15 MG tablet Take 15 mg by mouth nightly      ondansetron (ZOFRAN) 4 MG tablet Take 4 mg by mouth every 8 hours as needed for Nausea or Vomiting      levothyroxine (SYNTHROID) 25 MCG tablet Take 25 mcg by mouth Daily      cariprazine hcl (VRAYLAR) 1.5 MG capsule Take 3 mg by mouth at bedtime      levETIRAcetam (KEPPRA) 500 MG tablet Take 1 tablet by mouth in the morning and 1 tablet before bedtime. 60 tablet 3    melatonin 5 MG TBDP disintegrating tablet Take 1 tablet by mouth in the morning for 6 doses. 6 tablet 0    pantoprazole (PROTONIX) 40 MG tablet Take 1 tablet by mouth in the morning. 30 tablet 3    rivastigmine (EXELON) 13.3 MG/24HR Place 1 patch onto the skin nightly      acetaminophen (TYLENOL) 650 MG extended release tablet Take 650 mg by mouth every 4 hours as needed for Pain DO NOT EXCEED 3 GRAMS MAX TYLENOL PER DAY      aspirin 81 MG EC tablet Take 1 tablet by mouth 2 times daily 30 tablet 3     No current facility-administered medications for this visit.         Objective     Vital Signs: /82 P 72 R 18

## 2023-07-26 ENCOUNTER — OUTSIDE SERVICES (OUTPATIENT)
Dept: PRIMARY CARE CLINIC | Age: 78
End: 2023-07-26

## 2023-07-26 DIAGNOSIS — E07.9 THYROID DISEASE: ICD-10-CM

## 2023-07-26 DIAGNOSIS — I10 PRIMARY HYPERTENSION: ICD-10-CM

## 2023-07-26 DIAGNOSIS — F20.1 DISORGANIZED SCHIZOPHRENIA (HCC): Primary | ICD-10-CM

## 2023-07-26 DIAGNOSIS — R41.0 DELIRIUM: ICD-10-CM

## 2023-07-26 NOTE — PROGRESS NOTES
dry.  No rash or bruises  HEENT:  PERRLA, EOMI  Neck:  No JVD, No thyromegaly, No carotid bruit  Cardiac:  RRR, No gallop or murmur  Lungs:  CTA, Normal percussion  Abdomen: Normal bowel sounds, no HSM, non-tender  Extremities:  No clubbing, edema or cyanosis  Neurological:  Moves all extremities. Diagnoses and all orders for this visit:    Disorganized schizophrenia ([address])    Primary hypertension    Delirium    Thyroid disease    Stable discussed medication effect and need to start taking them befor ending up in hospital         [facility] MD BONNY            *Note was creating using voice recognition software.   The document was reviewed however grammatical errors may exist.

## 2023-08-16 ENCOUNTER — OUTSIDE SERVICES (OUTPATIENT)
Dept: PRIMARY CARE CLINIC | Age: 78
End: 2023-08-16

## 2023-08-16 DIAGNOSIS — F20.1 DISORGANIZED SCHIZOPHRENIA (HCC): Primary | ICD-10-CM

## 2023-08-16 DIAGNOSIS — R47.1 DYSARTHRIA: ICD-10-CM

## 2023-08-16 DIAGNOSIS — R41.0 DELIRIUM: ICD-10-CM

## 2023-08-16 DIAGNOSIS — I10 PRIMARY HYPERTENSION: ICD-10-CM

## 2023-08-16 NOTE — PROGRESS NOTES
Admit date: 10/5/22  Date of Service:  8/16/23      Keith Mcghee  1945      HPI   She is a 78 y.o. female resident who is being seen today for follow up of Schizophrenia, HTN, Delirium. Staff refer she has been  awake, comfortable. Still refusing to take her medication . Review of Systems  Respiratory: negative for cough and hemoptysis  Cardiovascular: negative for chest pain and dyspnea  Gastrointestinal: negative for abdominal pain, diarrhea, nausea and vomiting  Genitourinary:negative for dysuria and hematuria  Derm: negative for rash and skin lesion(s)  Neurological: negative for seizures and tremors  Endocrine: negative for diabetic symptoms including polydipsia and polyuria  Current Outpatient Medications   Medication Sig Dispense Refill    divalproex (DEPAKOTE SPRINKLE) 125 MG DR capsule Take 2 capsules by mouth in the morning and at bedtime 60 capsule 3    memantine (NAMENDA) 10 MG tablet Take 10 mg by mouth 2 times daily      mirtazapine (REMERON) 15 MG tablet Take 15 mg by mouth nightly      ondansetron (ZOFRAN) 4 MG tablet Take 4 mg by mouth every 8 hours as needed for Nausea or Vomiting      levothyroxine (SYNTHROID) 25 MCG tablet Take 25 mcg by mouth Daily      cariprazine hcl (VRAYLAR) 1.5 MG capsule Take 3 mg by mouth at bedtime      levETIRAcetam (KEPPRA) 500 MG tablet Take 1 tablet by mouth in the morning and 1 tablet before bedtime. 60 tablet 3    melatonin 5 MG TBDP disintegrating tablet Take 1 tablet by mouth in the morning for 6 doses. 6 tablet 0    pantoprazole (PROTONIX) 40 MG tablet Take 1 tablet by mouth in the morning.  30 tablet 3    rivastigmine (EXELON) 13.3 MG/24HR Place 1 patch onto the skin nightly      acetaminophen (TYLENOL) 650 MG extended release tablet Take 650 mg by mouth every 4 hours as needed for Pain DO NOT EXCEED 3 GRAMS MAX TYLENOL PER DAY      aspirin 81 MG EC tablet Take 1 tablet by mouth 2 times daily 30 tablet 3       Vital Signs: /70 P 68 R

## 2023-08-25 ENCOUNTER — HOSPITAL ENCOUNTER (EMERGENCY)
Age: 78
Discharge: HOME | End: 2023-08-25
Payer: COMMERCIAL

## 2023-08-25 VITALS
RESPIRATION RATE: 17 BRPM | BODY MASS INDEX: 27.92 KG/M2 | DIASTOLIC BLOOD PRESSURE: 75 MMHG | HEIGHT: 66 IN | TEMPERATURE: 97.8 F | OXYGEN SATURATION: 97 % | HEART RATE: 69 BPM | SYSTOLIC BLOOD PRESSURE: 137 MMHG

## 2023-08-25 DIAGNOSIS — L25.9 CONTACT DERMATITIS, UNSPECIFIED CONTACT DERMATITIS TYPE, UNSPECIFIED TRIGGER: Primary | ICD-10-CM

## 2023-08-25 PROCEDURE — 96372 THER/PROPH/DIAG INJ SC/IM: CPT

## 2023-08-25 PROCEDURE — 6360000002 HC RX W HCPCS: Performed by: NURSE PRACTITIONER

## 2023-08-25 PROCEDURE — 99284 EMERGENCY DEPT VISIT MOD MDM: CPT

## 2023-08-25 PROCEDURE — 6370000000 HC RX 637 (ALT 250 FOR IP): Performed by: NURSE PRACTITIONER

## 2023-08-25 RX ORDER — TRIAMCINOLONE ACETONIDE 1 MG/G
CREAM TOPICAL ONCE
Status: COMPLETED | OUTPATIENT
Start: 2023-08-25 | End: 2023-08-25

## 2023-08-25 RX ORDER — HYDROXYZINE PAMOATE 25 MG/1
25 CAPSULE ORAL 3 TIMES DAILY PRN
COMMUNITY

## 2023-08-25 RX ORDER — DIPHENHYDRAMINE HCL 25 MG
25 CAPSULE ORAL EVERY 8 HOURS
COMMUNITY

## 2023-08-25 RX ORDER — DEXAMETHASONE SODIUM PHOSPHATE 10 MG/ML
10 INJECTION INTRAMUSCULAR; INTRAVENOUS ONCE
Status: COMPLETED | OUTPATIENT
Start: 2023-08-25 | End: 2023-08-25

## 2023-08-25 RX ORDER — HYDROXYZINE HYDROCHLORIDE 50 MG/ML
25 INJECTION, SOLUTION INTRAMUSCULAR ONCE
Status: COMPLETED | OUTPATIENT
Start: 2023-08-25 | End: 2023-08-25

## 2023-08-25 RX ORDER — TRIAMCINOLONE ACETONIDE 5 MG/G
CREAM TOPICAL
Qty: 45 G | Refills: 0 | Status: SHIPPED | OUTPATIENT
Start: 2023-08-25 | End: 2023-09-01

## 2023-08-25 RX ORDER — MAGNESIUM HYDROXIDE/ALUMINUM HYDROXICE/SIMETHICONE 120; 1200; 1200 MG/30ML; MG/30ML; MG/30ML
5 SUSPENSION ORAL EVERY 6 HOURS PRN
COMMUNITY

## 2023-08-25 RX ADMIN — HYDROXYZINE HYDROCHLORIDE 25 MG: 50 INJECTION, SOLUTION INTRAMUSCULAR at 03:18

## 2023-08-25 RX ADMIN — TRIAMCINOLONE ACETONIDE: 1 CREAM TOPICAL at 03:28

## 2023-08-25 RX ADMIN — DEXAMETHASONE SODIUM PHOSPHATE 10 MG: 10 INJECTION INTRAMUSCULAR; INTRAVENOUS at 03:15

## 2023-08-25 ASSESSMENT — PAIN - FUNCTIONAL ASSESSMENT: PAIN_FUNCTIONAL_ASSESSMENT: NONE - DENIES PAIN

## 2023-08-25 NOTE — DISCHARGE INSTRUCTIONS
Today she was provided with Decadron 10 mg IM injection, which will last up to 2 weeks, continue taking the Benadryl or the Vistaril tablets and also place Kenalog cream to all of the hotspots.

## 2023-08-25 NOTE — ED NOTES
Report called to nurse at [facility].      Amanda Pacheco RN  08/25/23 0092

## 2023-08-25 NOTE — ED NOTES
Contacted [facility] to arrange transportation for patient to return to [facility]. ETA 0800.      Kennedy Arias RN  08/25/23 0394

## 2023-09-16 ENCOUNTER — HOSPITAL ENCOUNTER (EMERGENCY)
Age: 78
Discharge: HOME | End: 2023-09-17
Attending: STUDENT IN AN ORGANIZED HEALTH CARE EDUCATION/TRAINING PROGRAM
Payer: MEDICARE

## 2023-09-16 DIAGNOSIS — L25.9 CONTACT DERMATITIS, UNSPECIFIED CONTACT DERMATITIS TYPE, UNSPECIFIED TRIGGER: Primary | ICD-10-CM

## 2023-09-16 DIAGNOSIS — N18.9 CHRONIC KIDNEY DISEASE, UNSPECIFIED CKD STAGE: ICD-10-CM

## 2023-09-16 DIAGNOSIS — E87.5 HYPERKALEMIA: ICD-10-CM

## 2023-09-16 DIAGNOSIS — R11.0 NAUSEA: ICD-10-CM

## 2023-09-16 LAB
ALBUMIN SERPL-MCNC: 3.5 G/DL (ref 3.5–5.2)
ALP SERPL-CCNC: 67 U/L (ref 35–104)
ALT SERPL-CCNC: 7 U/L (ref 0–32)
ANION GAP SERPL CALCULATED.3IONS-SCNC: 9 MMOL/L (ref 7–16)
AST SERPL-CCNC: 11 U/L (ref 0–31)
BASOPHILS # BLD: 0.04 K/UL (ref 0–0.2)
BASOPHILS NFR BLD: 1 % (ref 0–2)
BILIRUB SERPL-MCNC: <0.2 MG/DL (ref 0–1.2)
BUN SERPL-MCNC: 53 MG/DL (ref 6–23)
CALCIUM SERPL-MCNC: 9.4 MG/DL (ref 8.6–10.2)
CHLORIDE SERPL-SCNC: 113 MMOL/L (ref 98–107)
CO2 SERPL-SCNC: 23 MMOL/L (ref 22–29)
CREAT SERPL-MCNC: 1.8 MG/DL (ref 0.5–1)
EOSINOPHIL # BLD: 0.74 K/UL (ref 0.05–0.5)
EOSINOPHILS RELATIVE PERCENT: 9 % (ref 0–6)
ERYTHROCYTE [DISTWIDTH] IN BLOOD BY AUTOMATED COUNT: 15.8 % (ref 11.5–15)
GFR SERPL CREATININE-BSD FRML MDRD: 29 ML/MIN/1.73M2
GLUCOSE SERPL-MCNC: 91 MG/DL (ref 74–99)
HCT VFR BLD AUTO: 30.6 % (ref 34–48)
HGB BLD-MCNC: 9.5 G/DL (ref 11.5–15.5)
IMM GRANULOCYTES # BLD AUTO: 0.04 K/UL (ref 0–0.58)
IMM GRANULOCYTES NFR BLD: 1 % (ref 0–5)
LYMPHOCYTES NFR BLD: 1.66 K/UL (ref 1.5–4)
LYMPHOCYTES RELATIVE PERCENT: 21 % (ref 20–42)
MCH RBC QN AUTO: 29 PG (ref 26–35)
MCHC RBC AUTO-ENTMCNC: 31 G/DL (ref 32–34.5)
MCV RBC AUTO: 93.3 FL (ref 80–99.9)
MONOCYTES NFR BLD: 0.54 K/UL (ref 0.1–0.95)
MONOCYTES NFR BLD: 7 % (ref 2–12)
NEUTROPHILS NFR BLD: 62 % (ref 43–80)
NEUTS SEG NFR BLD: 4.87 K/UL (ref 1.8–7.3)
PLATELET # BLD AUTO: 185 K/UL (ref 130–450)
PMV BLD AUTO: 12.2 FL (ref 7–12)
POTASSIUM SERPL-SCNC: 5.2 MMOL/L (ref 3.5–5)
PROT SERPL-MCNC: 6.1 G/DL (ref 6.4–8.3)
RBC # BLD AUTO: 3.28 M/UL (ref 3.5–5.5)
SODIUM SERPL-SCNC: 145 MMOL/L (ref 132–146)
WBC OTHER # BLD: 7.9 K/UL (ref 4.5–11.5)

## 2023-09-16 PROCEDURE — 80053 COMPREHEN METABOLIC PANEL: CPT

## 2023-09-16 PROCEDURE — 96374 THER/PROPH/DIAG INJ IV PUSH: CPT

## 2023-09-16 PROCEDURE — 6370000000 HC RX 637 (ALT 250 FOR IP)

## 2023-09-16 PROCEDURE — 85025 COMPLETE CBC W/AUTO DIFF WBC: CPT

## 2023-09-16 PROCEDURE — 6360000002 HC RX W HCPCS

## 2023-09-16 PROCEDURE — 96375 TX/PRO/DX INJ NEW DRUG ADDON: CPT

## 2023-09-16 PROCEDURE — 99284 EMERGENCY DEPT VISIT MOD MDM: CPT

## 2023-09-16 RX ORDER — 0.9 % SODIUM CHLORIDE 0.9 %
1000 INTRAVENOUS SOLUTION INTRAVENOUS ONCE
Status: COMPLETED | OUTPATIENT
Start: 2023-09-17 | End: 2023-09-17

## 2023-09-16 RX ORDER — DIPHENHYDRAMINE HYDROCHLORIDE 50 MG/ML
25 INJECTION INTRAMUSCULAR; INTRAVENOUS ONCE
Status: DISCONTINUED | OUTPATIENT
Start: 2023-09-16 | End: 2023-09-16

## 2023-09-16 RX ORDER — DEXAMETHASONE SODIUM PHOSPHATE 10 MG/ML
10 INJECTION INTRAMUSCULAR; INTRAVENOUS ONCE
Status: COMPLETED | OUTPATIENT
Start: 2023-09-16 | End: 2023-09-16

## 2023-09-16 RX ORDER — HYDROXYZINE PAMOATE 25 MG/1
25 CAPSULE ORAL ONCE
Status: COMPLETED | OUTPATIENT
Start: 2023-09-16 | End: 2023-09-16

## 2023-09-16 RX ORDER — ONDANSETRON 2 MG/ML
4 INJECTION INTRAMUSCULAR; INTRAVENOUS EVERY 6 HOURS PRN
Status: DISCONTINUED | OUTPATIENT
Start: 2023-09-16 | End: 2023-09-17 | Stop reason: HOSPADM

## 2023-09-16 RX ADMIN — DEXAMETHASONE SODIUM PHOSPHATE 10 MG: 10 INJECTION INTRAMUSCULAR; INTRAVENOUS at 23:05

## 2023-09-16 RX ADMIN — HYDROXYZINE PAMOATE 25 MG: 25 CAPSULE ORAL at 23:53

## 2023-09-16 RX ADMIN — ONDANSETRON 4 MG: 2 INJECTION INTRAMUSCULAR; INTRAVENOUS at 23:05

## 2023-09-16 ASSESSMENT — LIFESTYLE VARIABLES
HOW OFTEN DO YOU HAVE A DRINK CONTAINING ALCOHOL: NEVER
HOW MANY STANDARD DRINKS CONTAINING ALCOHOL DO YOU HAVE ON A TYPICAL DAY: PATIENT DOES NOT DRINK

## 2023-09-17 VITALS
HEIGHT: 65 IN | RESPIRATION RATE: 16 BRPM | WEIGHT: 170 LBS | BODY MASS INDEX: 28.32 KG/M2 | DIASTOLIC BLOOD PRESSURE: 94 MMHG | OXYGEN SATURATION: 96 % | HEART RATE: 55 BPM | SYSTOLIC BLOOD PRESSURE: 110 MMHG | TEMPERATURE: 98.4 F

## 2023-09-17 LAB
EKG ATRIAL RATE: 55 BPM
EKG P AXIS: 22 DEGREES
EKG P-R INTERVAL: 146 MS
EKG Q-T INTERVAL: 428 MS
EKG QRS DURATION: 108 MS
EKG QTC CALCULATION (BAZETT): 409 MS
EKG R AXIS: 90 DEGREES
EKG T AXIS: 7 DEGREES
EKG VENTRICULAR RATE: 55 BPM
POTASSIUM SERPL-SCNC: 5 MMOL/L (ref 3.5–5)
POTASSIUM SERPL-SCNC: 5.4 MMOL/L (ref 3.5–5)

## 2023-09-17 PROCEDURE — 93010 ELECTROCARDIOGRAM REPORT: CPT | Performed by: INTERNAL MEDICINE

## 2023-09-17 PROCEDURE — 6370000000 HC RX 637 (ALT 250 FOR IP): Performed by: STUDENT IN AN ORGANIZED HEALTH CARE EDUCATION/TRAINING PROGRAM

## 2023-09-17 PROCEDURE — 2580000003 HC RX 258: Performed by: STUDENT IN AN ORGANIZED HEALTH CARE EDUCATION/TRAINING PROGRAM

## 2023-09-17 PROCEDURE — 84132 ASSAY OF SERUM POTASSIUM: CPT

## 2023-09-17 PROCEDURE — 93005 ELECTROCARDIOGRAM TRACING: CPT

## 2023-09-17 PROCEDURE — 96375 TX/PRO/DX INJ NEW DRUG ADDON: CPT

## 2023-09-17 RX ORDER — HYDROXYZINE PAMOATE 25 MG/1
50 CAPSULE ORAL ONCE
Status: COMPLETED | OUTPATIENT
Start: 2023-09-17 | End: 2023-09-17

## 2023-09-17 RX ORDER — 0.9 % SODIUM CHLORIDE 0.9 %
1000 INTRAVENOUS SOLUTION INTRAVENOUS ONCE
Status: COMPLETED | OUTPATIENT
Start: 2023-09-17 | End: 2023-09-17

## 2023-09-17 RX ORDER — HYDROXYZINE PAMOATE 25 MG/1
25 CAPSULE ORAL EVERY 8 HOURS PRN
Qty: 15 CAPSULE | Refills: 0 | Status: SHIPPED | OUTPATIENT
Start: 2023-09-17

## 2023-09-17 RX ORDER — PREDNISONE 20 MG/1
20 TABLET ORAL 2 TIMES DAILY
Qty: 10 TABLET | Refills: 0 | Status: SHIPPED | OUTPATIENT
Start: 2023-09-17 | End: 2023-09-22

## 2023-09-17 RX ORDER — DEXTROSE MONOHYDRATE 100 MG/ML
INJECTION, SOLUTION INTRAVENOUS CONTINUOUS PRN
Status: DISCONTINUED | OUTPATIENT
Start: 2023-09-17 | End: 2023-09-17 | Stop reason: HOSPADM

## 2023-09-17 RX ADMIN — SODIUM CHLORIDE 1000 ML: 9 INJECTION, SOLUTION INTRAVENOUS at 04:30

## 2023-09-17 RX ADMIN — INSULIN HUMAN 5 UNITS: 100 INJECTION, SOLUTION PARENTERAL at 04:32

## 2023-09-17 RX ADMIN — DEXTROSE MONOHYDRATE 250 ML: 100 INJECTION, SOLUTION INTRAVENOUS at 04:32

## 2023-09-17 RX ADMIN — HYDROXYZINE PAMOATE 50 MG: 25 CAPSULE ORAL at 06:54

## 2023-09-17 RX ADMIN — SODIUM CHLORIDE 1000 ML: 9 INJECTION, SOLUTION INTRAVENOUS at 00:29

## 2023-09-17 NOTE — ED NOTES
Patient resting in bed comfortably with eyes closed. Respirations easy and unlabored. Call light in reach. Updated patient on plan of care. No stated problems or concerns at this time.       Cleo Blake RN  09/17/23 4120

## 2023-09-17 NOTE — ED PROVIDER NOTES
Department of Emergency Medicine     Written by: Edith Perez DO  Patient Name: Mary Jo Romero Date: 2023 10:13 PM  MRN: [id number]                   : 1945      HPI  Chief Complaint   Patient presents with    Rash     Rash for one month, states it's all over     Nausea     This is a 72-year-old female with past medical history of ataxia, CVA, hypertension, hyperlipidemia who presents to the emergency department with complaints of emesis. Patient is a resident at a nursing facility where she has been having a bothersome itchy rash over her bilateral upper extremities, lower extremities, and torso for approximately the past month. She does not know any inciting factor to this rash. She denies any contact with bedbugs or other insects. She has been getting Benadryl at the facility and states this has not helped her discomfort and that it \"makes it worse\". Nursing facility attempted to give patient a medication today for her rash which is unknown and after administration of medication, patient had 2 episodes of vomiting so they sent her in to the emergency department to be evaluated for potential allergic reaction of this medication. Patient denies any lightheadedness or dizziness, fever or chills, nausea or vomiting, chest pain, shortness of breath, abdominal pain, hematuria or dysuria, constipation or diarrhea. Nursing notes were all reviewed and agreed with or any disagreements were addressed in the HPI. Review of systems:    Pertinent positives and negatives mentioned in the HPI/MDM. Physical Exam  Constitutional:       General: She is not in acute distress. HENT:      Head: Normocephalic and atraumatic. Eyes:      Extraocular Movements: Extraocular movements intact. Pupils: Pupils are equal, round, and reactive to light. Cardiovascular:      Rate and Rhythm: Normal rate and regular rhythm.    Pulmonary:      Effort: Pulmonary effort is normal.      Breath sounds:

## 2023-09-17 NOTE — DISCHARGE INSTRUCTIONS
Please return to the ER for any new or worsening symptoms including but not limited to Fever or Change in/worsening of your RASH  If prescribed, please be sure to  your prescriptions from the pharmacy  Please follow-up with Primary care provider as instructed

## 2023-09-17 NOTE — ED NOTES
.lThe following labs were labeled with appropriate pt sticker and tubed to lab:     [] Blue     [] Lavender   [] on ice  [x] Green/yellow  [] Green/black [] on ice  [] Dewitte Wilbert  [] on ice  [] Yellow  [] Red  [] Pink  [] Type/ Screen  [] ABG  [] VBG    [] COVID-19 swab    [] Rapid  [] PCR  [] Flu swab  [] Peds Viral Panel     [] Urine Sample  [] Fecal Sample  [] Pelvic Cultures  [] Blood Cultures  [] X 2  [] STREP Cultures      Cleo Verdining, ARCHIE  09/17/23 7691

## 2023-09-17 NOTE — ED NOTES
Discharge instructions given and pt verbalized understanding. Gait steady. No distress noted.       Narayan Perez RN  09/17/23 7035

## 2023-09-27 ENCOUNTER — OUTSIDE SERVICES (OUTPATIENT)
Dept: PRIMARY CARE CLINIC | Age: 78
End: 2023-09-27

## 2023-09-27 DIAGNOSIS — E07.9 THYROID DISEASE: ICD-10-CM

## 2023-09-27 DIAGNOSIS — R47.1 DYSARTHRIA: Primary | ICD-10-CM

## 2023-09-27 DIAGNOSIS — F20.1 DISORGANIZED SCHIZOPHRENIA (HCC): ICD-10-CM

## 2023-09-27 DIAGNOSIS — I10 PRIMARY HYPERTENSION: ICD-10-CM

## 2023-09-27 NOTE — PROGRESS NOTES
Admit date: 10/5/22  Date of Service:  9/27/23      Roger Rose  1945      HPI   She is a 78 y.o. female resident who is being seen today for follow up ,still not compliant with taking her medication        Review of Systems  Respiratory: negative for cough and hemoptysis  Cardiovascular: negative for chest pain and dyspnea  Gastrointestinal: negative for abdominal pain, diarrhea, nausea and vomiting  Genitourinary:negative for dysuria and hematuria  Derm: negative for rash and skin lesion(s)  Neurological: negative for seizures and tremors  Endocrine: negative for diabetic symptoms including polydipsia and polyuria  Current Outpatient Medications   Medication Sig Dispense Refill    hydrOXYzine pamoate (VISTARIL) 25 MG capsule Take 1 capsule by mouth every 8 hours as needed for Itching 15 capsule 0    aluminum & magnesium hydroxide-simethicone (MAALOX) 200-200-20 MG/5ML SUSP suspension Take 5 mLs by mouth every 6 hours as needed for Indigestion      diphenhydrAMINE (BENADRYL) 25 MG capsule Take 1 capsule by mouth in the morning and 1 capsule at noon and 1 capsule in the evening. For 14 days.       magnesium hydroxide (MILK OF MAGNESIA) 400 MG/5ML suspension Take 30 mLs by mouth every 12 hours as needed for Constipation      divalproex (DEPAKOTE SPRINKLE) 125 MG DR capsule Take 2 capsules by mouth in the morning and at bedtime 60 capsule 3    memantine (NAMENDA) 10 MG tablet Take 10 mg by mouth 2 times daily (Patient not taking: Reported on 8/25/2023)      mirtazapine (REMERON) 15 MG tablet Take 15 mg by mouth nightly (Patient not taking: Reported on 8/25/2023)      ondansetron (ZOFRAN) 4 MG tablet Take 1 tablet by mouth every 8 hours as needed for Nausea or Vomiting      levothyroxine (SYNTHROID) 25 MCG tablet Take 1 tablet by mouth Daily      cariprazine hcl (VRAYLAR) 1.5 MG capsule Take 3 mg by mouth at bedtime (Patient not taking: Reported on 8/25/2023)      levETIRAcetam (KEPPRA) 500 MG tablet

## 2023-10-22 ENCOUNTER — APPOINTMENT (OUTPATIENT)
Dept: GENERAL RADIOLOGY | Age: 78
DRG: 682 | End: 2023-10-22
Payer: MEDICARE

## 2023-10-22 ENCOUNTER — HOSPITAL ENCOUNTER (INPATIENT)
Age: 78
LOS: 10 days | Discharge: NURSING, REHAB OR LONG-TERM CARE | DRG: 682 | End: 2023-11-02
Attending: STUDENT IN AN ORGANIZED HEALTH CARE EDUCATION/TRAINING PROGRAM | Admitting: INTERNAL MEDICINE
Payer: MEDICARE

## 2023-10-22 ENCOUNTER — APPOINTMENT (OUTPATIENT)
Dept: CT IMAGING | Age: 78
DRG: 682 | End: 2023-10-22
Payer: MEDICARE

## 2023-10-22 DIAGNOSIS — R41.82 ALTERED MENTAL STATUS, UNSPECIFIED ALTERED MENTAL STATUS TYPE: Primary | ICD-10-CM

## 2023-10-22 DIAGNOSIS — Z86.19 HISTORY OF INFECTION DUE TO DRUG-RESISTANT ORGANISM: ICD-10-CM

## 2023-10-22 DIAGNOSIS — N39.0 URINARY TRACT INFECTION WITHOUT HEMATURIA, SITE UNSPECIFIED: ICD-10-CM

## 2023-10-22 LAB
ALBUMIN SERPL-MCNC: 4.1 G/DL (ref 3.5–5.2)
ALP SERPL-CCNC: 95 U/L (ref 35–104)
ALT SERPL-CCNC: 19 U/L (ref 0–32)
ANION GAP SERPL CALCULATED.3IONS-SCNC: 16 MMOL/L (ref 7–16)
APAP SERPL-MCNC: <5 UG/ML (ref 10–30)
AST SERPL-CCNC: 25 U/L (ref 0–31)
BASOPHILS # BLD: 0.03 K/UL (ref 0–0.2)
BASOPHILS NFR BLD: 0 % (ref 0–2)
BILIRUB SERPL-MCNC: 0.2 MG/DL (ref 0–1.2)
BUN SERPL-MCNC: 48 MG/DL (ref 6–23)
CALCIUM SERPL-MCNC: 10.5 MG/DL (ref 8.6–10.2)
CHLORIDE SERPL-SCNC: 114 MMOL/L (ref 98–107)
CO2 SERPL-SCNC: 20 MMOL/L (ref 22–29)
CREAT SERPL-MCNC: 1.9 MG/DL (ref 0.5–1)
DATE LAST DOSE: ABNORMAL
EOSINOPHIL # BLD: 0.26 K/UL (ref 0.05–0.5)
EOSINOPHILS RELATIVE PERCENT: 3 % (ref 0–6)
ERYTHROCYTE [DISTWIDTH] IN BLOOD BY AUTOMATED COUNT: 16.7 % (ref 11.5–15)
ETHANOLAMINE SERPL-MCNC: <10 MG/DL
GFR SERPL CREATININE-BSD FRML MDRD: 27 ML/MIN/1.73M2
GLUCOSE BLD-MCNC: 84 MG/DL (ref 74–99)
GLUCOSE SERPL-MCNC: 85 MG/DL (ref 74–99)
HCT VFR BLD AUTO: 33.9 % (ref 34–48)
HGB BLD-MCNC: 10.3 G/DL (ref 11.5–15.5)
IMM GRANULOCYTES # BLD AUTO: 0.05 K/UL (ref 0–0.58)
IMM GRANULOCYTES NFR BLD: 1 % (ref 0–5)
INR PPP: 1
LYMPHOCYTES NFR BLD: 1.15 K/UL (ref 1.5–4)
LYMPHOCYTES RELATIVE PERCENT: 12 % (ref 20–42)
MCH RBC QN AUTO: 28.4 PG (ref 26–35)
MCHC RBC AUTO-ENTMCNC: 30.4 G/DL (ref 32–34.5)
MCV RBC AUTO: 93.4 FL (ref 80–99.9)
MONOCYTES NFR BLD: 0.89 K/UL (ref 0.1–0.95)
MONOCYTES NFR BLD: 9 % (ref 2–12)
NEUTROPHILS NFR BLD: 76 % (ref 43–80)
NEUTS SEG NFR BLD: 7.66 K/UL (ref 1.8–7.3)
PARTIAL THROMBOPLASTIN TIME: 38.1 SEC (ref 24.5–35.1)
PLATELET # BLD AUTO: 159 K/UL (ref 130–450)
PMV BLD AUTO: 13.3 FL (ref 7–12)
POTASSIUM SERPL-SCNC: 5.5 MMOL/L (ref 3.5–5)
PROT SERPL-MCNC: 7.4 G/DL (ref 6.4–8.3)
PROTHROMBIN TIME: 10.9 SEC (ref 9.3–12.4)
RBC # BLD AUTO: 3.63 M/UL (ref 3.5–5.5)
SALICYLATES SERPL-MCNC: <0.3 MG/DL (ref 0–30)
SODIUM SERPL-SCNC: 150 MMOL/L (ref 132–146)
TME LAST DOSE: ABNORMAL H
TOXIC TRICYCLIC SC,BLOOD: NEGATIVE
TROPONIN I SERPL HS-MCNC: 32 NG/L (ref 0–9)
TROPONIN I SERPL HS-MCNC: 33 NG/L (ref 0–9)
VALPROATE SERPL-MCNC: 24 UG/ML (ref 50–100)
VANCOMYCIN DOSE: ABNORMAL MG
WBC OTHER # BLD: 10 K/UL (ref 4.5–11.5)

## 2023-10-22 PROCEDURE — 80307 DRUG TEST PRSMV CHEM ANLYZR: CPT

## 2023-10-22 PROCEDURE — 80143 DRUG ASSAY ACETAMINOPHEN: CPT

## 2023-10-22 PROCEDURE — 93005 ELECTROCARDIOGRAM TRACING: CPT

## 2023-10-22 PROCEDURE — 80053 COMPREHEN METABOLIC PANEL: CPT

## 2023-10-22 PROCEDURE — 70450 CT HEAD/BRAIN W/O DYE: CPT

## 2023-10-22 PROCEDURE — G0480 DRUG TEST DEF 1-7 CLASSES: HCPCS

## 2023-10-22 PROCEDURE — 99285 EMERGENCY DEPT VISIT HI MDM: CPT

## 2023-10-22 PROCEDURE — 85025 COMPLETE CBC W/AUTO DIFF WBC: CPT

## 2023-10-22 PROCEDURE — 85610 PROTHROMBIN TIME: CPT

## 2023-10-22 PROCEDURE — 80164 ASSAY DIPROPYLACETIC ACD TOT: CPT

## 2023-10-22 PROCEDURE — 80179 DRUG ASSAY SALICYLATE: CPT

## 2023-10-22 PROCEDURE — 84484 ASSAY OF TROPONIN QUANT: CPT

## 2023-10-22 PROCEDURE — 80177 DRUG SCRN QUAN LEVETIRACETAM: CPT

## 2023-10-22 PROCEDURE — 71045 X-RAY EXAM CHEST 1 VIEW: CPT

## 2023-10-22 PROCEDURE — 85730 THROMBOPLASTIN TIME PARTIAL: CPT

## 2023-10-22 PROCEDURE — 81001 URINALYSIS AUTO W/SCOPE: CPT

## 2023-10-22 PROCEDURE — 82962 GLUCOSE BLOOD TEST: CPT

## 2023-10-22 PROCEDURE — 96374 THER/PROPH/DIAG INJ IV PUSH: CPT

## 2023-10-22 ASSESSMENT — PATIENT HEALTH QUESTIONNAIRE - PHQ9
SUM OF ALL RESPONSES TO PHQ9 QUESTIONS 1 & 2: 0
SUM OF ALL RESPONSES TO PHQ QUESTIONS 1-9: 0
SUM OF ALL RESPONSES TO PHQ QUESTIONS 1-9: 0
2. FEELING DOWN, DEPRESSED OR HOPELESS: 0
SUM OF ALL RESPONSES TO PHQ QUESTIONS 1-9: 0
SUM OF ALL RESPONSES TO PHQ QUESTIONS 1-9: 0
1. LITTLE INTEREST OR PLEASURE IN DOING THINGS: 0

## 2023-10-22 ASSESSMENT — PAIN - FUNCTIONAL ASSESSMENT: PAIN_FUNCTIONAL_ASSESSMENT: NONE - DENIES PAIN

## 2023-10-22 ASSESSMENT — LIFESTYLE VARIABLES: HOW OFTEN DO YOU HAVE A DRINK CONTAINING ALCOHOL: NEVER

## 2023-10-23 PROBLEM — R41.82 ALTERED MENTAL STATE: Status: ACTIVE | Noted: 2023-10-23

## 2023-10-23 LAB
AMPHET UR QL SCN: NEGATIVE
BACTERIA URNS QL MICRO: ABNORMAL
BARBITURATES UR QL SCN: NEGATIVE
BENZODIAZ UR QL: NEGATIVE
BILIRUB UR QL STRIP: NEGATIVE
BUPRENORPHINE UR QL: NEGATIVE
CANNABINOIDS UR QL SCN: NEGATIVE
CLARITY UR: ABNORMAL
COCAINE UR QL SCN: NEGATIVE
COLOR UR: YELLOW
EPI CELLS #/AREA URNS HPF: ABNORMAL /HPF
FENTANYL UR QL: NEGATIVE
GLUCOSE BLD-MCNC: 102 MG/DL (ref 74–99)
GLUCOSE UR STRIP-MCNC: NEGATIVE MG/DL
HGB UR QL STRIP.AUTO: ABNORMAL
KETONES UR STRIP-MCNC: 15 MG/DL
LACTATE BLDV-SCNC: 0.7 MMOL/L (ref 0.5–1.9)
LACTATE BLDV-SCNC: 2.1 MMOL/L (ref 0.5–1.9)
LEUKOCYTE ESTERASE UR QL STRIP: ABNORMAL
METHADONE UR QL: NEGATIVE
NITRITE UR QL STRIP: POSITIVE
OPIATES UR QL SCN: NEGATIVE
OXYCODONE UR QL SCN: NEGATIVE
PCP UR QL SCN: NEGATIVE
PH UR STRIP: 6 [PH] (ref 5–9)
PROT UR STRIP-MCNC: 100 MG/DL
RBC #/AREA URNS HPF: ABNORMAL /HPF
SP GR UR STRIP: 1.02 (ref 1–1.03)
TEST INFORMATION: NORMAL
UROBILINOGEN UR STRIP-ACNC: 0.2 EU/DL (ref 0–1)
WBC #/AREA URNS HPF: ABNORMAL /HPF

## 2023-10-23 PROCEDURE — 87040 BLOOD CULTURE FOR BACTERIA: CPT

## 2023-10-23 PROCEDURE — 6360000002 HC RX W HCPCS: Performed by: EMERGENCY MEDICINE

## 2023-10-23 PROCEDURE — 2580000003 HC RX 258

## 2023-10-23 PROCEDURE — 2580000003 HC RX 258: Performed by: INTERNAL MEDICINE

## 2023-10-23 PROCEDURE — 6370000000 HC RX 637 (ALT 250 FOR IP): Performed by: INTERNAL MEDICINE

## 2023-10-23 PROCEDURE — 6360000002 HC RX W HCPCS: Performed by: INTERNAL MEDICINE

## 2023-10-23 PROCEDURE — 2060000000 HC ICU INTERMEDIATE R&B

## 2023-10-23 PROCEDURE — 83605 ASSAY OF LACTIC ACID: CPT

## 2023-10-23 PROCEDURE — 87086 URINE CULTURE/COLONY COUNT: CPT

## 2023-10-23 PROCEDURE — 82962 GLUCOSE BLOOD TEST: CPT

## 2023-10-23 PROCEDURE — 87088 URINE BACTERIA CULTURE: CPT

## 2023-10-23 RX ORDER — CALCIUM GLUCONATE 10 MG/ML
1000 INJECTION, SOLUTION INTRAVENOUS ONCE
Status: COMPLETED | OUTPATIENT
Start: 2023-10-23 | End: 2023-10-23

## 2023-10-23 RX ORDER — TRIAMCINOLONE ACETONIDE 5 MG/G
CREAM TOPICAL 3 TIMES DAILY
Status: ON HOLD | COMMUNITY

## 2023-10-23 RX ORDER — ASPIRIN 81 MG/1
81 TABLET, CHEWABLE ORAL DAILY
Status: ON HOLD | COMMUNITY

## 2023-10-23 RX ORDER — DIVALPROEX SODIUM 125 MG/1
250 TABLET, DELAYED RELEASE ORAL 2 TIMES DAILY
Status: ON HOLD | COMMUNITY

## 2023-10-23 RX ORDER — LANOLIN ALCOHOL/MO/W.PET/CERES
3 CREAM (GRAM) TOPICAL NIGHTLY PRN
Status: DISCONTINUED | OUTPATIENT
Start: 2023-10-23 | End: 2023-11-02 | Stop reason: HOSPADM

## 2023-10-23 RX ORDER — LEVOTHYROXINE SODIUM 0.03 MG/1
25 TABLET ORAL DAILY
Status: DISCONTINUED | OUTPATIENT
Start: 2023-10-23 | End: 2023-10-23

## 2023-10-23 RX ORDER — ONDANSETRON 4 MG/1
4 TABLET, ORALLY DISINTEGRATING ORAL EVERY 8 HOURS PRN
Status: ON HOLD | COMMUNITY

## 2023-10-23 RX ORDER — 0.9 % SODIUM CHLORIDE 0.9 %
1000 INTRAVENOUS SOLUTION INTRAVENOUS ONCE
Status: COMPLETED | OUTPATIENT
Start: 2023-10-23 | End: 2023-10-23

## 2023-10-23 RX ORDER — ACETAMINOPHEN 325 MG/1
650 TABLET ORAL EVERY 4 HOURS PRN
Status: DISCONTINUED | OUTPATIENT
Start: 2023-10-23 | End: 2023-11-02 | Stop reason: HOSPADM

## 2023-10-23 RX ORDER — HYDROXYZINE PAMOATE 25 MG/1
25 CAPSULE ORAL EVERY 8 HOURS PRN
Status: DISCONTINUED | OUTPATIENT
Start: 2023-10-23 | End: 2023-10-23

## 2023-10-23 RX ORDER — ASPIRIN 81 MG/1
81 TABLET ORAL DAILY
Status: DISCONTINUED | OUTPATIENT
Start: 2023-10-23 | End: 2023-10-27 | Stop reason: SDUPTHER

## 2023-10-23 RX ORDER — PROCHLORPERAZINE EDISYLATE 5 MG/ML
10 INJECTION INTRAMUSCULAR; INTRAVENOUS EVERY 6 HOURS PRN
Status: DISCONTINUED | OUTPATIENT
Start: 2023-10-23 | End: 2023-11-02 | Stop reason: HOSPADM

## 2023-10-23 RX ORDER — ASPIRIN 81 MG/1
81 TABLET ORAL 2 TIMES DAILY
Status: DISCONTINUED | OUTPATIENT
Start: 2023-10-23 | End: 2023-10-23

## 2023-10-23 RX ORDER — MAGNESIUM HYDROXIDE/ALUMINUM HYDROXICE/SIMETHICONE 120; 1200; 1200 MG/30ML; MG/30ML; MG/30ML
5 SUSPENSION ORAL EVERY 6 HOURS PRN
Status: DISCONTINUED | OUTPATIENT
Start: 2023-10-23 | End: 2023-11-02 | Stop reason: HOSPADM

## 2023-10-23 RX ORDER — LEVOFLOXACIN 5 MG/ML
500 INJECTION, SOLUTION INTRAVENOUS ONCE
Status: COMPLETED | OUTPATIENT
Start: 2023-10-23 | End: 2023-10-23

## 2023-10-23 RX ORDER — LEVETIRACETAM 500 MG/1
500 TABLET ORAL 2 TIMES DAILY
Status: DISCONTINUED | OUTPATIENT
Start: 2023-10-23 | End: 2023-11-02 | Stop reason: HOSPADM

## 2023-10-23 RX ORDER — DIVALPROEX SODIUM 125 MG/1
250 CAPSULE, COATED PELLETS ORAL 2 TIMES DAILY
Status: DISCONTINUED | OUTPATIENT
Start: 2023-10-23 | End: 2023-10-27 | Stop reason: SDUPTHER

## 2023-10-23 RX ORDER — PALIPERIDONE 3 MG/1
3 TABLET, EXTENDED RELEASE ORAL EVERY MORNING
Status: ON HOLD | COMMUNITY

## 2023-10-23 RX ORDER — ACETAMINOPHEN 325 MG/1
650 TABLET ORAL EVERY 4 HOURS PRN
Status: ON HOLD | COMMUNITY

## 2023-10-23 RX ORDER — HEPARIN SODIUM 10000 [USP'U]/ML
5000 INJECTION, SOLUTION INTRAVENOUS; SUBCUTANEOUS 2 TIMES DAILY
Status: DISCONTINUED | OUTPATIENT
Start: 2023-10-23 | End: 2023-11-02 | Stop reason: HOSPADM

## 2023-10-23 RX ORDER — DEXTROSE AND SODIUM CHLORIDE 5; .45 G/100ML; G/100ML
INJECTION, SOLUTION INTRAVENOUS CONTINUOUS
Status: DISCONTINUED | OUTPATIENT
Start: 2023-10-23 | End: 2023-10-29

## 2023-10-23 RX ADMIN — SODIUM CHLORIDE 1000 ML: 9 INJECTION, SOLUTION INTRAVENOUS at 00:45

## 2023-10-23 RX ADMIN — DIVALPROEX SODIUM 250 MG: 125 CAPSULE, COATED PELLETS ORAL at 20:54

## 2023-10-23 RX ADMIN — HEPARIN SODIUM 5000 UNITS: 10000 INJECTION INTRAVENOUS; SUBCUTANEOUS at 20:54

## 2023-10-23 RX ADMIN — LEVETIRACETAM 500 MG: 500 TABLET, FILM COATED ORAL at 16:52

## 2023-10-23 RX ADMIN — LEVETIRACETAM 500 MG: 500 TABLET, FILM COATED ORAL at 20:54

## 2023-10-23 RX ADMIN — HEPARIN SODIUM 5000 UNITS: 10000 INJECTION INTRAVENOUS; SUBCUTANEOUS at 16:54

## 2023-10-23 RX ADMIN — MELATONIN 3 MG ORAL TABLET 3 MG: 3 TABLET ORAL at 20:54

## 2023-10-23 RX ADMIN — DEXTROSE AND SODIUM CHLORIDE: 5; 450 INJECTION, SOLUTION INTRAVENOUS at 18:25

## 2023-10-23 RX ADMIN — CALCIUM GLUCONATE 1000 MG: 10 INJECTION, SOLUTION INTRAVENOUS at 01:20

## 2023-10-23 RX ADMIN — DEXTROSE AND SODIUM CHLORIDE: 5; 450 INJECTION, SOLUTION INTRAVENOUS at 06:48

## 2023-10-23 RX ADMIN — LEVOFLOXACIN 500 MG: 5 INJECTION, SOLUTION INTRAVENOUS at 03:15

## 2023-10-23 RX ADMIN — DIVALPROEX SODIUM 250 MG: 125 CAPSULE, COATED PELLETS ORAL at 16:51

## 2023-10-23 NOTE — ED NOTES
Pt unable to tolerate general diet. Pt needs thickened foods/liquids.  Pt able to eat pudding and drink thickened water without incident      Renata Mendoza RN  10/23/23 5990

## 2023-10-23 NOTE — ED PROVIDER NOTES
SEPSIS   LEVETIRACETAM LEVEL   POCT GLUCOSE   POCT GLUCOSE       When ordered only abnormal lab results are displayed. All other labs were within normal range or not returned as of this dictation. EKG:    EKG: This EKG is signed by emergency department physician. Rate: 56  Rhythm: sinus bradycardia  Interpretation: no acute ST or T wave abnormalities, Qtc 418  Comparison: stable as compared to most recent EKG        RADIOLOGY:   Non-plain film images such as CT, Ultrasound and MRI are read by the radiologist. Plain radiographic images are visualized and preliminarily interpreted by the ED Provider with the below findings:    CT HEAD WO CONTRAST   Final Result   No acute intracranial abnormality. XR CHEST PORTABLE   Final Result   No acute process. Interpretation per the Radiologist below, if available at the time of this note:        PROCEDURES   Unless otherwise noted below, none     Procedures      PAST MEDICAL HISTORY      has a past medical history of Arthritis, Ataxia, Atrophy of muscle, Bipolar 1 disorder, mixed, moderate ([address]), Cerebrovascular disease, Coordination problem, Hyperlipidemia, Hypertension, Overactive bladder, Paranoid schizophrenia ([address]), and Thyroid disease. EMERGENCY DEPARTMENT COURSE and DIFFERENTIAL DIAGNOSIS/MDM:   Vitals:    Vitals:    10/23/23 0403 10/23/23 0433 10/23/23 0540 10/23/23 0602   BP: (!) 144/62 138/87 (!) 168/91 (!) 150/74   Pulse: 79 82 64 72   Resp: 10 14 (!) 9 18   Temp:       SpO2:   95% 94%   Weight:             ED Course as of 10/23/23 1421   Mon Oct 23, 2023   0302 Spoke with Dr. Shyam Banks (Medicine). Discussed case. They will admit this patient   [ME]      ED Course User Index  [ME] Alina Antes, DO          MDM  82-year-old female arriving after nursing home reported some increased difficulty with speech. On arrival patient appears in no acute distress. Vitals include hypertensive blood pressure otherwise stable.   Last known

## 2023-10-23 NOTE — ED NOTES
Nurse to Nurse called to Zuleika Moody, [address] [facility], Sana Aguilera, RN  10/23/23 3977

## 2023-10-24 LAB
ALBUMIN SERPL-MCNC: 3.3 G/DL (ref 3.5–5.2)
ALP SERPL-CCNC: 78 U/L (ref 35–104)
ALT SERPL-CCNC: 16 U/L (ref 0–32)
ANION GAP SERPL CALCULATED.3IONS-SCNC: 10 MMOL/L (ref 7–16)
AST SERPL-CCNC: 21 U/L (ref 0–31)
BASOPHILS # BLD: 0.03 K/UL (ref 0–0.2)
BASOPHILS NFR BLD: 0 % (ref 0–2)
BILIRUB SERPL-MCNC: 0.2 MG/DL (ref 0–1.2)
BUN SERPL-MCNC: 36 MG/DL (ref 6–23)
CALCIUM SERPL-MCNC: 9.4 MG/DL (ref 8.6–10.2)
CHLORIDE SERPL-SCNC: 122 MMOL/L (ref 98–107)
CO2 SERPL-SCNC: 20 MMOL/L (ref 22–29)
CREAT SERPL-MCNC: 1.8 MG/DL (ref 0.5–1)
EKG ATRIAL RATE: 56 BPM
EKG P AXIS: 33 DEGREES
EKG P-R INTERVAL: 122 MS
EKG Q-T INTERVAL: 434 MS
EKG QRS DURATION: 96 MS
EKG QTC CALCULATION (BAZETT): 418 MS
EKG R AXIS: -33 DEGREES
EKG T AXIS: 41 DEGREES
EKG VENTRICULAR RATE: 56 BPM
EOSINOPHIL # BLD: 0.94 K/UL (ref 0.05–0.5)
EOSINOPHILS RELATIVE PERCENT: 9 % (ref 0–6)
ERYTHROCYTE [DISTWIDTH] IN BLOOD BY AUTOMATED COUNT: 17 % (ref 11.5–15)
GFR SERPL CREATININE-BSD FRML MDRD: 28 ML/MIN/1.73M2
GLUCOSE SERPL-MCNC: 119 MG/DL (ref 74–99)
HCT VFR BLD AUTO: 31.2 % (ref 34–48)
HGB BLD-MCNC: 9.6 G/DL (ref 11.5–15.5)
IMM GRANULOCYTES # BLD AUTO: 0.04 K/UL (ref 0–0.58)
IMM GRANULOCYTES NFR BLD: 0 % (ref 0–5)
LEVETIRACETAM SERPL-MCNC: 26 UG/ML
LYMPHOCYTES NFR BLD: 1.18 K/UL (ref 1.5–4)
LYMPHOCYTES RELATIVE PERCENT: 12 % (ref 20–42)
MCH RBC QN AUTO: 28.8 PG (ref 26–35)
MCHC RBC AUTO-ENTMCNC: 30.8 G/DL (ref 32–34.5)
MCV RBC AUTO: 93.7 FL (ref 80–99.9)
MONOCYTES NFR BLD: 0.72 K/UL (ref 0.1–0.95)
MONOCYTES NFR BLD: 7 % (ref 2–12)
NEUTROPHILS NFR BLD: 71 % (ref 43–80)
NEUTS SEG NFR BLD: 7.05 K/UL (ref 1.8–7.3)
PLATELET # BLD AUTO: 150 K/UL (ref 130–450)
PMV BLD AUTO: 13 FL (ref 7–12)
POTASSIUM SERPL-SCNC: 4.6 MMOL/L (ref 3.5–5)
PROT SERPL-MCNC: 5.8 G/DL (ref 6.4–8.3)
RBC # BLD AUTO: 3.33 M/UL (ref 3.5–5.5)
SODIUM SERPL-SCNC: 152 MMOL/L (ref 132–146)
WBC OTHER # BLD: 10 K/UL (ref 4.5–11.5)

## 2023-10-24 PROCEDURE — 93010 ELECTROCARDIOGRAM REPORT: CPT | Performed by: INTERNAL MEDICINE

## 2023-10-24 PROCEDURE — 80053 COMPREHEN METABOLIC PANEL: CPT

## 2023-10-24 PROCEDURE — 36415 COLL VENOUS BLD VENIPUNCTURE: CPT

## 2023-10-24 PROCEDURE — 85025 COMPLETE CBC W/AUTO DIFF WBC: CPT

## 2023-10-24 PROCEDURE — 2580000003 HC RX 258: Performed by: INTERNAL MEDICINE

## 2023-10-24 PROCEDURE — 6360000002 HC RX W HCPCS: Performed by: INTERNAL MEDICINE

## 2023-10-24 PROCEDURE — 6370000000 HC RX 637 (ALT 250 FOR IP): Performed by: INTERNAL MEDICINE

## 2023-10-24 PROCEDURE — 2060000000 HC ICU INTERMEDIATE R&B

## 2023-10-24 RX ORDER — OLANZAPINE 10 MG/2ML
2.5 INJECTION, POWDER, FOR SOLUTION INTRAMUSCULAR ONCE
Status: COMPLETED | OUTPATIENT
Start: 2023-10-24 | End: 2023-10-29

## 2023-10-24 RX ADMIN — DEXTROSE AND SODIUM CHLORIDE: 5; 450 INJECTION, SOLUTION INTRAVENOUS at 05:05

## 2023-10-24 RX ADMIN — LEVETIRACETAM 500 MG: 500 TABLET, FILM COATED ORAL at 20:19

## 2023-10-24 RX ADMIN — DIVALPROEX SODIUM 250 MG: 125 CAPSULE, COATED PELLETS ORAL at 09:20

## 2023-10-24 RX ADMIN — ASPIRIN 81 MG: 81 TABLET, COATED ORAL at 09:20

## 2023-10-24 RX ADMIN — ACETAMINOPHEN 650 MG: 325 TABLET ORAL at 20:19

## 2023-10-24 RX ADMIN — LEVETIRACETAM 500 MG: 500 TABLET, FILM COATED ORAL at 09:21

## 2023-10-24 RX ADMIN — MELATONIN 3 MG ORAL TABLET 3 MG: 3 TABLET ORAL at 20:19

## 2023-10-24 RX ADMIN — DIVALPROEX SODIUM 250 MG: 125 CAPSULE, COATED PELLETS ORAL at 20:19

## 2023-10-24 RX ADMIN — WATER 1000 MG: 1 INJECTION INTRAMUSCULAR; INTRAVENOUS; SUBCUTANEOUS at 15:14

## 2023-10-24 RX ADMIN — HEPARIN SODIUM 5000 UNITS: 10000 INJECTION INTRAVENOUS; SUBCUTANEOUS at 09:21

## 2023-10-24 RX ADMIN — HEPARIN SODIUM 5000 UNITS: 10000 INJECTION INTRAVENOUS; SUBCUTANEOUS at 20:19

## 2023-10-24 ASSESSMENT — PAIN DESCRIPTION - LOCATION: LOCATION: BACK

## 2023-10-24 ASSESSMENT — PAIN SCALES - GENERAL
PAINLEVEL_OUTOF10: 6
PAINLEVEL_OUTOF10: 6

## 2023-10-24 NOTE — ACP (ADVANCE CARE PLANNING)
Advance Care Planning   Healthcare Decision Maker:    Primary Decision Maker: 2020 Belinda Jarrell, Legal Guardian - [phone]    Click here to complete Healthcare Decision Makers including selection of the Healthcare Decision Maker Relationship (ie \"Primary\").

## 2023-10-24 NOTE — CARE COORDINATION
Patient from [facility], she is LTC there. Spoke with liaison, will not need precert to return. Message left with guardian requesting call back if she has concerns about patient returning to [facility]. Ambulance on soft chart. Guardian called back, provided updates. She plans for patient to return to [facility] when released. Case Management Assessment  Initial Evaluation    Date/Time of Evaluation: 10/24/2023 11:08 AM  Assessment Completed by: Tray Astudillo, [address] [place] [address]    If patient is discharged prior to next notation, then this note serves as note for discharge by case management. 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 Yes    Chart Reviewed: Yes      History Provided by: Medical Record, Other (see comment) (facility)  Patient Orientation: Person    Patient Cognition: Severely Impaired    Hospitalization in the last 30 days (Readmission):  No    If yes, Readmission Assessment in CM Navigator will be completed. Advance Directives:      Code Status: Prior   Patient's Primary Decision Maker is: Named in [address]    Primary Decision Maker: [facility], [facility] - [phone]    Discharge Planning:    Patient lives with: Alone Type of Home: [address] [facility]  Primary Care Giver:  Other (Comment) ([facility] [place], [facility]  Patient Support Systems include: /   Current Financial resources:    Current community resources:

## 2023-10-25 LAB
ALBUMIN SERPL-MCNC: 3.3 G/DL (ref 3.5–5.2)
ALP SERPL-CCNC: 74 U/L (ref 35–104)
ALT SERPL-CCNC: 16 U/L (ref 0–32)
ANION GAP SERPL CALCULATED.3IONS-SCNC: 10 MMOL/L (ref 7–16)
AST SERPL-CCNC: 20 U/L (ref 0–31)
BASOPHILS # BLD: 0.03 K/UL (ref 0–0.2)
BASOPHILS NFR BLD: 0 % (ref 0–2)
BILIRUB SERPL-MCNC: <0.2 MG/DL (ref 0–1.2)
BUN SERPL-MCNC: 30 MG/DL (ref 6–23)
CALCIUM SERPL-MCNC: 9.1 MG/DL (ref 8.6–10.2)
CHLORIDE SERPL-SCNC: 119 MMOL/L (ref 98–107)
CO2 SERPL-SCNC: 20 MMOL/L (ref 22–29)
CREAT SERPL-MCNC: 1.7 MG/DL (ref 0.5–1)
EOSINOPHIL # BLD: 1.08 K/UL (ref 0.05–0.5)
EOSINOPHILS RELATIVE PERCENT: 16 % (ref 0–6)
ERYTHROCYTE [DISTWIDTH] IN BLOOD BY AUTOMATED COUNT: 17.1 % (ref 11.5–15)
GFR SERPL CREATININE-BSD FRML MDRD: 31 ML/MIN/1.73M2
GLUCOSE SERPL-MCNC: 102 MG/DL (ref 74–99)
HCT VFR BLD AUTO: 33.4 % (ref 34–48)
HGB BLD-MCNC: 10.2 G/DL (ref 11.5–15.5)
IMM GRANULOCYTES # BLD AUTO: 0.03 K/UL (ref 0–0.58)
IMM GRANULOCYTES NFR BLD: 0 % (ref 0–5)
LYMPHOCYTES NFR BLD: 1.46 K/UL (ref 1.5–4)
LYMPHOCYTES RELATIVE PERCENT: 21 % (ref 20–42)
MCH RBC QN AUTO: 28.7 PG (ref 26–35)
MCHC RBC AUTO-ENTMCNC: 30.5 G/DL (ref 32–34.5)
MCV RBC AUTO: 94.1 FL (ref 80–99.9)
MICROORGANISM SPEC CULT: ABNORMAL
MONOCYTES NFR BLD: 0.45 K/UL (ref 0.1–0.95)
MONOCYTES NFR BLD: 7 % (ref 2–12)
NEUTROPHILS NFR BLD: 56 % (ref 43–80)
NEUTS SEG NFR BLD: 3.81 K/UL (ref 1.8–7.3)
PLATELET, FLUORESCENCE: 142 K/UL (ref 130–450)
PMV BLD AUTO: 13.7 FL (ref 7–12)
POTASSIUM SERPL-SCNC: 4.4 MMOL/L (ref 3.5–5)
PROT SERPL-MCNC: 5.8 G/DL (ref 6.4–8.3)
RBC # BLD AUTO: 3.55 M/UL (ref 3.5–5.5)
SODIUM SERPL-SCNC: 149 MMOL/L (ref 132–146)
SPECIMEN DESCRIPTION: ABNORMAL
WBC OTHER # BLD: 6.9 K/UL (ref 4.5–11.5)

## 2023-10-25 PROCEDURE — 2060000000 HC ICU INTERMEDIATE R&B

## 2023-10-25 PROCEDURE — 6360000002 HC RX W HCPCS: Performed by: INTERNAL MEDICINE

## 2023-10-25 PROCEDURE — 85025 COMPLETE CBC W/AUTO DIFF WBC: CPT

## 2023-10-25 PROCEDURE — 6370000000 HC RX 637 (ALT 250 FOR IP): Performed by: INTERNAL MEDICINE

## 2023-10-25 PROCEDURE — 80053 COMPREHEN METABOLIC PANEL: CPT

## 2023-10-25 PROCEDURE — 2580000003 HC RX 258: Performed by: INTERNAL MEDICINE

## 2023-10-25 PROCEDURE — 36415 COLL VENOUS BLD VENIPUNCTURE: CPT

## 2023-10-25 RX ORDER — HYDRALAZINE HYDROCHLORIDE 20 MG/ML
10 INJECTION INTRAMUSCULAR; INTRAVENOUS EVERY 4 HOURS PRN
Status: DISCONTINUED | OUTPATIENT
Start: 2023-10-25 | End: 2023-11-02 | Stop reason: HOSPADM

## 2023-10-25 RX ADMIN — ASPIRIN 81 MG: 81 TABLET, COATED ORAL at 08:56

## 2023-10-25 RX ADMIN — WATER 1000 MG: 1 INJECTION INTRAMUSCULAR; INTRAVENOUS; SUBCUTANEOUS at 13:11

## 2023-10-25 RX ADMIN — DIVALPROEX SODIUM 250 MG: 125 CAPSULE, COATED PELLETS ORAL at 08:56

## 2023-10-25 RX ADMIN — DEXTROSE AND SODIUM CHLORIDE: 5; 450 INJECTION, SOLUTION INTRAVENOUS at 10:22

## 2023-10-25 RX ADMIN — LEVETIRACETAM 500 MG: 500 TABLET, FILM COATED ORAL at 08:57

## 2023-10-25 RX ADMIN — LEVETIRACETAM 500 MG: 500 TABLET, FILM COATED ORAL at 20:19

## 2023-10-25 RX ADMIN — DIVALPROEX SODIUM 250 MG: 125 CAPSULE, COATED PELLETS ORAL at 20:19

## 2023-10-25 NOTE — CARE COORDINATION
IV rocephin continues. [facility] following, patient is LTC there and can return with no precert needed when stable for discharge. For questions I can be reached at [phone].  Hilario Neal

## 2023-10-25 NOTE — PLAN OF CARE
Problem: Discharge Planning  Goal: Discharge to home or other facility with appropriate resources  Outcome: Progressing  Flowsheets (Taken 10/24/2023 2154)  Discharge to home or other facility with appropriate resources: Identify barriers to discharge with patient and caregiver     Problem: Skin/Tissue Integrity  Goal: Absence of new skin breakdown  Description: 1. Monitor for areas of redness and/or skin breakdown  2. Assess vascular access sites hourly  3. Every 4-6 hours minimum:  Change oxygen saturation probe site  4. Every 4-6 hours:  If on nasal continuous positive airway pressure, respiratory therapy assess nares and determine need for appliance change or resting period.   Outcome: Progressing     Problem: Safety - Adult  Goal: Free from fall injury  Outcome: Progressing

## 2023-10-26 LAB
ALBUMIN SERPL-MCNC: 2.8 G/DL (ref 3.5–5.2)
ALP SERPL-CCNC: 79 U/L (ref 35–104)
ALT SERPL-CCNC: 18 U/L (ref 0–32)
ANION GAP SERPL CALCULATED.3IONS-SCNC: 8 MMOL/L (ref 7–16)
AST SERPL-CCNC: 23 U/L (ref 0–31)
BASOPHILS # BLD: 0.03 K/UL (ref 0–0.2)
BASOPHILS NFR BLD: 0 % (ref 0–2)
BILIRUB SERPL-MCNC: <0.2 MG/DL (ref 0–1.2)
BUN SERPL-MCNC: 29 MG/DL (ref 6–23)
CALCIUM SERPL-MCNC: 8.9 MG/DL (ref 8.6–10.2)
CHLORIDE SERPL-SCNC: 120 MMOL/L (ref 98–107)
CO2 SERPL-SCNC: 20 MMOL/L (ref 22–29)
CREAT SERPL-MCNC: 1.6 MG/DL (ref 0.5–1)
EOSINOPHIL # BLD: 0.85 K/UL (ref 0.05–0.5)
EOSINOPHILS RELATIVE PERCENT: 12 % (ref 0–6)
ERYTHROCYTE [DISTWIDTH] IN BLOOD BY AUTOMATED COUNT: 17 % (ref 11.5–15)
GFR SERPL CREATININE-BSD FRML MDRD: 34 ML/MIN/1.73M2
GLUCOSE SERPL-MCNC: 104 MG/DL (ref 74–99)
HCT VFR BLD AUTO: 34.9 % (ref 34–48)
HGB BLD-MCNC: 10.5 G/DL (ref 11.5–15.5)
IMM GRANULOCYTES # BLD AUTO: 0.05 K/UL (ref 0–0.58)
IMM GRANULOCYTES NFR BLD: 1 % (ref 0–5)
LYMPHOCYTES NFR BLD: 1.09 K/UL (ref 1.5–4)
LYMPHOCYTES RELATIVE PERCENT: 15 % (ref 20–42)
MCH RBC QN AUTO: 28.7 PG (ref 26–35)
MCHC RBC AUTO-ENTMCNC: 30.1 G/DL (ref 32–34.5)
MCV RBC AUTO: 95.4 FL (ref 80–99.9)
MONOCYTES NFR BLD: 0.57 K/UL (ref 0.1–0.95)
MONOCYTES NFR BLD: 8 % (ref 2–12)
NEUTROPHILS NFR BLD: 64 % (ref 43–80)
NEUTS SEG NFR BLD: 4.62 K/UL (ref 1.8–7.3)
PLATELET CONFIRMATION: NORMAL
PLATELET, FLUORESCENCE: 122 K/UL (ref 130–450)
PMV BLD AUTO: ABNORMAL FL (ref 7–12)
POTASSIUM SERPL-SCNC: 5 MMOL/L (ref 3.5–5)
PROT SERPL-MCNC: 5.6 G/DL (ref 6.4–8.3)
RBC # BLD AUTO: 3.66 M/UL (ref 3.5–5.5)
SODIUM SERPL-SCNC: 148 MMOL/L (ref 132–146)
WBC OTHER # BLD: 7.2 K/UL (ref 4.5–11.5)

## 2023-10-26 PROCEDURE — 2580000003 HC RX 258: Performed by: INTERNAL MEDICINE

## 2023-10-26 PROCEDURE — 6370000000 HC RX 637 (ALT 250 FOR IP): Performed by: INTERNAL MEDICINE

## 2023-10-26 PROCEDURE — 36415 COLL VENOUS BLD VENIPUNCTURE: CPT

## 2023-10-26 PROCEDURE — 85025 COMPLETE CBC W/AUTO DIFF WBC: CPT

## 2023-10-26 PROCEDURE — 6360000002 HC RX W HCPCS: Performed by: INTERNAL MEDICINE

## 2023-10-26 PROCEDURE — 2060000000 HC ICU INTERMEDIATE R&B

## 2023-10-26 PROCEDURE — 80053 COMPREHEN METABOLIC PANEL: CPT

## 2023-10-26 RX ORDER — DIPHENHYDRAMINE HCL 25 MG
25 TABLET ORAL EVERY 6 HOURS PRN
Status: DISCONTINUED | OUTPATIENT
Start: 2023-10-26 | End: 2023-10-26

## 2023-10-26 RX ORDER — DIPHENHYDRAMINE HYDROCHLORIDE 50 MG/ML
25 INJECTION INTRAMUSCULAR; INTRAVENOUS EVERY 6 HOURS PRN
Status: DISCONTINUED | OUTPATIENT
Start: 2023-10-26 | End: 2023-11-02 | Stop reason: HOSPADM

## 2023-10-26 RX ADMIN — DIPHENHYDRAMINE HYDROCHLORIDE 25 MG: 50 INJECTION INTRAMUSCULAR; INTRAVENOUS at 15:57

## 2023-10-26 RX ADMIN — HEPARIN SODIUM 5000 UNITS: 10000 INJECTION INTRAVENOUS; SUBCUTANEOUS at 10:20

## 2023-10-26 RX ADMIN — DEXTROSE AND SODIUM CHLORIDE: 5; 450 INJECTION, SOLUTION INTRAVENOUS at 23:51

## 2023-10-26 NOTE — CARE COORDINATION
CM update: Patient is from [facility]. She has a Guardian, Elodia Kat, and is agreeable per previous note, to return back to facility. Patient is LTC, and can return without a precert when medically stable. Continues on IV rocephin Q 24 hours. Per IDR today, following abnormal electrolytes. Continues with hypernatremia, with Na+ at 148. D5 1/2 NS @ 100 running. Urine showing E coli. Ambulance form in envelope on soft chart.   Electronically signed by Deandre Salcido RN on 10/26/2023 at 10:12 AM

## 2023-10-27 LAB
GLUCOSE BLD-MCNC: 111 MG/DL (ref 74–99)
GLUCOSE BLD-MCNC: 81 MG/DL (ref 74–99)
GLUCOSE BLD-MCNC: 86 MG/DL (ref 74–99)

## 2023-10-27 PROCEDURE — 6360000002 HC RX W HCPCS: Performed by: INTERNAL MEDICINE

## 2023-10-27 PROCEDURE — 6370000000 HC RX 637 (ALT 250 FOR IP): Performed by: INTERNAL MEDICINE

## 2023-10-27 PROCEDURE — 82962 GLUCOSE BLOOD TEST: CPT

## 2023-10-27 PROCEDURE — 2580000003 HC RX 258: Performed by: INTERNAL MEDICINE

## 2023-10-27 PROCEDURE — 2060000000 HC ICU INTERMEDIATE R&B

## 2023-10-27 RX ORDER — DIVALPROEX SODIUM 250 MG/1
250 TABLET, DELAYED RELEASE ORAL 2 TIMES DAILY
Status: DISCONTINUED | OUTPATIENT
Start: 2023-10-27 | End: 2023-11-02 | Stop reason: HOSPADM

## 2023-10-27 RX ORDER — ASPIRIN 81 MG/1
81 TABLET, CHEWABLE ORAL DAILY
Status: DISCONTINUED | OUTPATIENT
Start: 2023-10-28 | End: 2023-11-02 | Stop reason: HOSPADM

## 2023-10-27 RX ORDER — PALIPERIDONE 3 MG/1
3 TABLET, EXTENDED RELEASE ORAL EVERY MORNING
Status: DISCONTINUED | OUTPATIENT
Start: 2023-10-27 | End: 2023-11-02 | Stop reason: HOSPADM

## 2023-10-27 RX ADMIN — PETROLATUM: 420 OINTMENT TOPICAL at 15:33

## 2023-10-27 RX ADMIN — PETROLATUM: 420 OINTMENT TOPICAL at 21:44

## 2023-10-27 RX ADMIN — HEPARIN SODIUM 5000 UNITS: 10000 INJECTION INTRAVENOUS; SUBCUTANEOUS at 21:43

## 2023-10-27 RX ADMIN — DEXTROSE AND SODIUM CHLORIDE: 5; 450 INJECTION, SOLUTION INTRAVENOUS at 12:34

## 2023-10-27 RX ADMIN — HYDRALAZINE HYDROCHLORIDE 10 MG: 20 INJECTION, SOLUTION INTRAMUSCULAR; INTRAVENOUS at 12:11

## 2023-10-27 RX ADMIN — WATER 1000 MG: 1 INJECTION INTRAMUSCULAR; INTRAVENOUS; SUBCUTANEOUS at 12:12

## 2023-10-27 RX ADMIN — DIPHENHYDRAMINE HYDROCHLORIDE 25 MG: 50 INJECTION INTRAMUSCULAR; INTRAVENOUS at 21:43

## 2023-10-27 RX ADMIN — LEVETIRACETAM 500 MG: 500 TABLET, FILM COATED ORAL at 21:43

## 2023-10-27 RX ADMIN — HEPARIN SODIUM 5000 UNITS: 10000 INJECTION INTRAVENOUS; SUBCUTANEOUS at 10:24

## 2023-10-27 RX ADMIN — LEVETIRACETAM 500 MG: 500 TABLET, FILM COATED ORAL at 10:25

## 2023-10-27 RX ADMIN — PALIPERIDONE 3 MG: 3 TABLET, EXTENDED RELEASE ORAL at 15:30

## 2023-10-27 ASSESSMENT — PAIN SCALES - WONG BAKER: WONGBAKER_NUMERICALRESPONSE: 0

## 2023-10-27 NOTE — CARE COORDINATION
IV rocephin continues. Plan is return to [facility] when released. Will not require a precert to return. Ambulance on soft chart. For questions I can be reached at [phone].  Hilario Ying

## 2023-10-27 NOTE — FLOWSHEET NOTE
Inpatient Wound Care (Initial consult) 4513    Admit Date: 10/22/2023  9:35 PM    Reason for consult:  Buttock    Significant history:  Per H&P    CHIEF COMPLAINT: Altered mental status        HISTORY OF PRESENT ILLNESS:   Patient was seen in the emergency room at the [facility] earlier this morning  Data reviewed in detail with the ER physician at the time of admission  82-year-old woman from nursing home well-known to me from previous hospitalization  She was sent in due to altered mental status and poor oral intake  Patient is a poor historian and was unable to provide any good history  She is being admitted for presumed UTI  IV Levaquin 1 dose was given in ER  She was found to be hypernatremic as well with the dehydration       Findings:     10/27/23 1339   Skin Integumentary    Skin Integrity Ecchymosis   Location BUE, BLE   Skin Integrity Site 2   Skin Integrity Location 2 Rash;Redness   Location 2 Full body rash   Wound 10/27/23 Vaginal fold   Date First Assessed/Time First Assessed: 10/27/23 1339   Present on Original Admission: No  Wound Description (Comments): Vaginal fold   Wound Image    Dressing/Treatment Open to air   Wound Length (cm) 4 cm   Wound Width (cm) 2 cm   Wound Depth (cm) 0.1 cm   Wound Surface Area (cm^2) 8 cm^2   Wound Volume (cm^3) 0.8 cm^3   Wound Assessment   (purple)   Drainage Amount None (dry)   Odor None   Ludivina-wound Assessment Intact   Wound Thickness Description not for Pressure Injury Partial thickness       **Informed Consent**     photos taken of wound and inserted into their chart as part of their permanent medical record for purposes of documentation, treatment management and/or medical review. All Images taken on 10/27/23 of patient name: Tito Garcia were transmitted and stored on Lyst located within [employer] by a registered [employer] Mobile Application Device.       Impression:  Vaginal fold; partial thickness    Plan:

## 2023-10-28 LAB
ALBUMIN SERPL-MCNC: 3.2 G/DL (ref 3.5–5.2)
ALP SERPL-CCNC: 82 U/L (ref 35–104)
ALT SERPL-CCNC: 19 U/L (ref 0–32)
ANION GAP SERPL CALCULATED.3IONS-SCNC: 14 MMOL/L (ref 7–16)
AST SERPL-CCNC: 20 U/L (ref 0–31)
BASOPHILS # BLD: 0.03 K/UL (ref 0–0.2)
BASOPHILS NFR BLD: 0 % (ref 0–2)
BILIRUB SERPL-MCNC: <0.2 MG/DL (ref 0–1.2)
BUN SERPL-MCNC: 27 MG/DL (ref 6–23)
CALCIUM SERPL-MCNC: 9.4 MG/DL (ref 8.6–10.2)
CHLORIDE SERPL-SCNC: 119 MMOL/L (ref 98–107)
CO2 SERPL-SCNC: 18 MMOL/L (ref 22–29)
CREAT SERPL-MCNC: 1.5 MG/DL (ref 0.5–1)
EOSINOPHIL # BLD: 0.78 K/UL (ref 0.05–0.5)
EOSINOPHILS RELATIVE PERCENT: 9 % (ref 0–6)
ERYTHROCYTE [DISTWIDTH] IN BLOOD BY AUTOMATED COUNT: 17.2 % (ref 11.5–15)
GFR SERPL CREATININE-BSD FRML MDRD: 35 ML/MIN/1.73M2
GLUCOSE SERPL-MCNC: 125 MG/DL (ref 74–99)
HCT VFR BLD AUTO: 33.6 % (ref 34–48)
HGB BLD-MCNC: 10.6 G/DL (ref 11.5–15.5)
IMM GRANULOCYTES # BLD AUTO: 0.04 K/UL (ref 0–0.58)
IMM GRANULOCYTES NFR BLD: 1 % (ref 0–5)
LYMPHOCYTES NFR BLD: 1.04 K/UL (ref 1.5–4)
LYMPHOCYTES RELATIVE PERCENT: 12 % (ref 20–42)
MCH RBC QN AUTO: 28.8 PG (ref 26–35)
MCHC RBC AUTO-ENTMCNC: 31.5 G/DL (ref 32–34.5)
MCV RBC AUTO: 91.3 FL (ref 80–99.9)
MICROORGANISM SPEC CULT: NORMAL
MICROORGANISM SPEC CULT: NORMAL
MONOCYTES NFR BLD: 0.5 K/UL (ref 0.1–0.95)
MONOCYTES NFR BLD: 6 % (ref 2–12)
NEUTROPHILS NFR BLD: 71 % (ref 43–80)
NEUTS SEG NFR BLD: 5.99 K/UL (ref 1.8–7.3)
PLATELET # BLD AUTO: 171 K/UL (ref 130–450)
PMV BLD AUTO: 12.7 FL (ref 7–12)
POTASSIUM SERPL-SCNC: 4.5 MMOL/L (ref 3.5–5)
PROT SERPL-MCNC: 5.4 G/DL (ref 6.4–8.3)
RBC # BLD AUTO: 3.68 M/UL (ref 3.5–5.5)
SERVICE CMNT-IMP: NORMAL
SERVICE CMNT-IMP: NORMAL
SODIUM SERPL-SCNC: 151 MMOL/L (ref 132–146)
SPECIMEN DESCRIPTION: NORMAL
SPECIMEN DESCRIPTION: NORMAL
WBC OTHER # BLD: 8.4 K/UL (ref 4.5–11.5)

## 2023-10-28 PROCEDURE — 6360000002 HC RX W HCPCS: Performed by: INTERNAL MEDICINE

## 2023-10-28 PROCEDURE — 2060000000 HC ICU INTERMEDIATE R&B

## 2023-10-28 PROCEDURE — 36415 COLL VENOUS BLD VENIPUNCTURE: CPT

## 2023-10-28 PROCEDURE — 2580000003 HC RX 258: Performed by: INTERNAL MEDICINE

## 2023-10-28 PROCEDURE — 6370000000 HC RX 637 (ALT 250 FOR IP): Performed by: INTERNAL MEDICINE

## 2023-10-28 PROCEDURE — 80053 COMPREHEN METABOLIC PANEL: CPT

## 2023-10-28 PROCEDURE — 85025 COMPLETE CBC W/AUTO DIFF WBC: CPT

## 2023-10-28 RX ADMIN — DEXTROSE AND SODIUM CHLORIDE: 5; 450 INJECTION, SOLUTION INTRAVENOUS at 00:41

## 2023-10-28 RX ADMIN — DIPHENHYDRAMINE HYDROCHLORIDE 25 MG: 50 INJECTION INTRAMUSCULAR; INTRAVENOUS at 17:04

## 2023-10-28 RX ADMIN — LEVETIRACETAM 500 MG: 500 TABLET, FILM COATED ORAL at 09:04

## 2023-10-28 RX ADMIN — PALIPERIDONE 3 MG: 3 TABLET, EXTENDED RELEASE ORAL at 09:04

## 2023-10-28 RX ADMIN — HYDRALAZINE HYDROCHLORIDE 10 MG: 20 INJECTION, SOLUTION INTRAMUSCULAR; INTRAVENOUS at 00:46

## 2023-10-28 RX ADMIN — HEPARIN SODIUM 5000 UNITS: 10000 INJECTION INTRAVENOUS; SUBCUTANEOUS at 21:29

## 2023-10-28 RX ADMIN — ASPIRIN 81 MG: 81 TABLET, CHEWABLE ORAL at 09:04

## 2023-10-28 RX ADMIN — HEPARIN SODIUM 5000 UNITS: 10000 INJECTION INTRAVENOUS; SUBCUTANEOUS at 09:03

## 2023-10-28 RX ADMIN — PETROLATUM: 420 OINTMENT TOPICAL at 21:29

## 2023-10-28 RX ADMIN — DEXTROSE AND SODIUM CHLORIDE: 5; 450 INJECTION, SOLUTION INTRAVENOUS at 11:35

## 2023-10-28 RX ADMIN — DIVALPROEX SODIUM 250 MG: 250 TABLET, DELAYED RELEASE ORAL at 09:04

## 2023-10-28 RX ADMIN — PETROLATUM: 420 OINTMENT TOPICAL at 09:04

## 2023-10-28 RX ADMIN — WATER 1000 MG: 1 INJECTION INTRAMUSCULAR; INTRAVENOUS; SUBCUTANEOUS at 13:00

## 2023-10-28 ASSESSMENT — PAIN SCALES - WONG BAKER
WONGBAKER_NUMERICALRESPONSE: 0

## 2023-10-28 ASSESSMENT — PAIN SCALES - GENERAL
PAINLEVEL_OUTOF10: 0

## 2023-10-29 LAB
ALBUMIN SERPL-MCNC: 2.8 G/DL (ref 3.5–5.2)
ALP SERPL-CCNC: 76 U/L (ref 35–104)
ALT SERPL-CCNC: 19 U/L (ref 0–32)
ANION GAP SERPL CALCULATED.3IONS-SCNC: 9 MMOL/L (ref 7–16)
AST SERPL-CCNC: 21 U/L (ref 0–31)
BASOPHILS # BLD: 0.03 K/UL (ref 0–0.2)
BASOPHILS NFR BLD: 0 % (ref 0–2)
BILIRUB SERPL-MCNC: <0.2 MG/DL (ref 0–1.2)
BUN SERPL-MCNC: 27 MG/DL (ref 6–23)
CALCIUM SERPL-MCNC: 9.3 MG/DL (ref 8.6–10.2)
CHLORIDE SERPL-SCNC: 123 MMOL/L (ref 98–107)
CO2 SERPL-SCNC: 22 MMOL/L (ref 22–29)
CREAT SERPL-MCNC: 1.6 MG/DL (ref 0.5–1)
EOSINOPHIL # BLD: 0.97 K/UL (ref 0.05–0.5)
EOSINOPHILS RELATIVE PERCENT: 12 % (ref 0–6)
ERYTHROCYTE [DISTWIDTH] IN BLOOD BY AUTOMATED COUNT: 17.2 % (ref 11.5–15)
GFR SERPL CREATININE-BSD FRML MDRD: 33 ML/MIN/1.73M2
GLUCOSE SERPL-MCNC: 117 MG/DL (ref 74–99)
HCT VFR BLD AUTO: 31.4 % (ref 34–48)
HGB BLD-MCNC: 9.8 G/DL (ref 11.5–15.5)
IMM GRANULOCYTES # BLD AUTO: 0.05 K/UL (ref 0–0.58)
IMM GRANULOCYTES NFR BLD: 1 % (ref 0–5)
LYMPHOCYTES NFR BLD: 1.06 K/UL (ref 1.5–4)
LYMPHOCYTES RELATIVE PERCENT: 13 % (ref 20–42)
MCH RBC QN AUTO: 29.3 PG (ref 26–35)
MCHC RBC AUTO-ENTMCNC: 31.2 G/DL (ref 32–34.5)
MCV RBC AUTO: 93.7 FL (ref 80–99.9)
MONOCYTES NFR BLD: 0.5 K/UL (ref 0.1–0.95)
MONOCYTES NFR BLD: 6 % (ref 2–12)
NEUTROPHILS NFR BLD: 69 % (ref 43–80)
NEUTS SEG NFR BLD: 5.74 K/UL (ref 1.8–7.3)
PLATELET # BLD AUTO: 159 K/UL (ref 130–450)
PMV BLD AUTO: 13 FL (ref 7–12)
POTASSIUM SERPL-SCNC: 4.7 MMOL/L (ref 3.5–5)
PROT SERPL-MCNC: 5.4 G/DL (ref 6.4–8.3)
RBC # BLD AUTO: 3.35 M/UL (ref 3.5–5.5)
SODIUM SERPL-SCNC: 154 MMOL/L (ref 132–146)
WBC OTHER # BLD: 8.4 K/UL (ref 4.5–11.5)

## 2023-10-29 PROCEDURE — 2580000003 HC RX 258: Performed by: INTERNAL MEDICINE

## 2023-10-29 PROCEDURE — 6370000000 HC RX 637 (ALT 250 FOR IP): Performed by: INTERNAL MEDICINE

## 2023-10-29 PROCEDURE — 85025 COMPLETE CBC W/AUTO DIFF WBC: CPT

## 2023-10-29 PROCEDURE — 2060000000 HC ICU INTERMEDIATE R&B

## 2023-10-29 PROCEDURE — 36415 COLL VENOUS BLD VENIPUNCTURE: CPT

## 2023-10-29 PROCEDURE — 80053 COMPREHEN METABOLIC PANEL: CPT

## 2023-10-29 PROCEDURE — 6360000002 HC RX W HCPCS: Performed by: INTERNAL MEDICINE

## 2023-10-29 RX ORDER — DEXTROSE MONOHYDRATE 50 MG/ML
INJECTION, SOLUTION INTRAVENOUS CONTINUOUS
Status: DISCONTINUED | OUTPATIENT
Start: 2023-10-29 | End: 2023-11-02 | Stop reason: HOSPADM

## 2023-10-29 RX ADMIN — ASPIRIN 81 MG: 81 TABLET, CHEWABLE ORAL at 08:40

## 2023-10-29 RX ADMIN — OLANZAPINE 2.5 MG: 10 INJECTION, POWDER, FOR SOLUTION INTRAMUSCULAR at 20:45

## 2023-10-29 RX ADMIN — DEXTROSE AND SODIUM CHLORIDE: 5; 450 INJECTION, SOLUTION INTRAVENOUS at 12:05

## 2023-10-29 RX ADMIN — LEVETIRACETAM 500 MG: 500 TABLET, FILM COATED ORAL at 08:40

## 2023-10-29 RX ADMIN — WATER 1000 MG: 1 INJECTION INTRAMUSCULAR; INTRAVENOUS; SUBCUTANEOUS at 16:58

## 2023-10-29 RX ADMIN — PETROLATUM: 420 OINTMENT TOPICAL at 08:41

## 2023-10-29 RX ADMIN — HEPARIN SODIUM 5000 UNITS: 10000 INJECTION INTRAVENOUS; SUBCUTANEOUS at 08:47

## 2023-10-29 RX ADMIN — DIPHENHYDRAMINE HYDROCHLORIDE 25 MG: 50 INJECTION INTRAMUSCULAR; INTRAVENOUS at 18:15

## 2023-10-29 RX ADMIN — DIVALPROEX SODIUM 250 MG: 250 TABLET, DELAYED RELEASE ORAL at 08:40

## 2023-10-29 RX ADMIN — DEXTROSE MONOHYDRATE: 50 INJECTION, SOLUTION INTRAVENOUS at 16:58

## 2023-10-29 RX ADMIN — PALIPERIDONE 3 MG: 3 TABLET, EXTENDED RELEASE ORAL at 08:40

## 2023-10-29 ASSESSMENT — PAIN SCALES - GENERAL
PAINLEVEL_OUTOF10: 0

## 2023-10-29 ASSESSMENT — PAIN SCALES - WONG BAKER
WONGBAKER_NUMERICALRESPONSE: 0

## 2023-10-30 LAB
ALBUMIN SERPL-MCNC: 2.9 G/DL (ref 3.5–5.2)
ALP SERPL-CCNC: 77 U/L (ref 35–104)
ALT SERPL-CCNC: 20 U/L (ref 0–32)
ANION GAP SERPL CALCULATED.3IONS-SCNC: 10 MMOL/L (ref 7–16)
AST SERPL-CCNC: 22 U/L (ref 0–31)
BASOPHILS # BLD: 0.03 K/UL (ref 0–0.2)
BASOPHILS NFR BLD: 0 % (ref 0–2)
BILIRUB SERPL-MCNC: <0.2 MG/DL (ref 0–1.2)
BUN SERPL-MCNC: 27 MG/DL (ref 6–23)
CALCIUM SERPL-MCNC: 9.2 MG/DL (ref 8.6–10.2)
CHLORIDE SERPL-SCNC: 118 MMOL/L (ref 98–107)
CO2 SERPL-SCNC: 21 MMOL/L (ref 22–29)
CREAT SERPL-MCNC: 1.5 MG/DL (ref 0.5–1)
EOSINOPHIL # BLD: 1.04 K/UL (ref 0.05–0.5)
EOSINOPHILS RELATIVE PERCENT: 8 % (ref 0–6)
ERYTHROCYTE [DISTWIDTH] IN BLOOD BY AUTOMATED COUNT: 17.2 % (ref 11.5–15)
GFR SERPL CREATININE-BSD FRML MDRD: 35 ML/MIN/1.73M2
GLUCOSE SERPL-MCNC: 112 MG/DL (ref 74–99)
HCT VFR BLD AUTO: 33.9 % (ref 34–48)
HGB BLD-MCNC: 10.3 G/DL (ref 11.5–15.5)
IMM GRANULOCYTES # BLD AUTO: 0.05 K/UL (ref 0–0.58)
IMM GRANULOCYTES NFR BLD: 0 % (ref 0–5)
LYMPHOCYTES NFR BLD: 0.79 K/UL (ref 1.5–4)
LYMPHOCYTES RELATIVE PERCENT: 6 % (ref 20–42)
MCH RBC QN AUTO: 28.5 PG (ref 26–35)
MCHC RBC AUTO-ENTMCNC: 30.4 G/DL (ref 32–34.5)
MCV RBC AUTO: 93.6 FL (ref 80–99.9)
MONOCYTES NFR BLD: 0.6 K/UL (ref 0.1–0.95)
MONOCYTES NFR BLD: 5 % (ref 2–12)
NEUTROPHILS NFR BLD: 80 % (ref 43–80)
NEUTS SEG NFR BLD: 9.89 K/UL (ref 1.8–7.3)
PLATELET # BLD AUTO: 169 K/UL (ref 130–450)
PMV BLD AUTO: 12.8 FL (ref 7–12)
POTASSIUM SERPL-SCNC: 4.5 MMOL/L (ref 3.5–5)
PROT SERPL-MCNC: 5.5 G/DL (ref 6.4–8.3)
RBC # BLD AUTO: 3.62 M/UL (ref 3.5–5.5)
SODIUM SERPL-SCNC: 149 MMOL/L (ref 132–146)
WBC OTHER # BLD: 12.4 K/UL (ref 4.5–11.5)

## 2023-10-30 PROCEDURE — 1200000000 HC SEMI PRIVATE

## 2023-10-30 PROCEDURE — 6360000002 HC RX W HCPCS: Performed by: INTERNAL MEDICINE

## 2023-10-30 PROCEDURE — 80053 COMPREHEN METABOLIC PANEL: CPT

## 2023-10-30 PROCEDURE — 36415 COLL VENOUS BLD VENIPUNCTURE: CPT

## 2023-10-30 PROCEDURE — 2580000003 HC RX 258: Performed by: INTERNAL MEDICINE

## 2023-10-30 PROCEDURE — 85025 COMPLETE CBC W/AUTO DIFF WBC: CPT

## 2023-10-30 RX ORDER — SODIUM CHLORIDE 0.9 % (FLUSH) 0.9 %
5-40 SYRINGE (ML) INJECTION PRN
Status: DISCONTINUED | OUTPATIENT
Start: 2023-10-30 | End: 2023-11-02 | Stop reason: HOSPADM

## 2023-10-30 RX ORDER — SODIUM CHLORIDE 0.9 % (FLUSH) 0.9 %
5-40 SYRINGE (ML) INJECTION 2 TIMES DAILY
Status: DISCONTINUED | OUTPATIENT
Start: 2023-10-30 | End: 2023-11-02 | Stop reason: HOSPADM

## 2023-10-30 RX ADMIN — HEPARIN SODIUM 5000 UNITS: 10000 INJECTION INTRAVENOUS; SUBCUTANEOUS at 08:00

## 2023-10-30 RX ADMIN — DEXTROSE MONOHYDRATE: 50 INJECTION, SOLUTION INTRAVENOUS at 22:10

## 2023-10-30 RX ADMIN — DEXTROSE MONOHYDRATE: 50 INJECTION, SOLUTION INTRAVENOUS at 04:15

## 2023-10-30 RX ADMIN — DEXTROSE MONOHYDRATE: 50 INJECTION, SOLUTION INTRAVENOUS at 14:27

## 2023-10-30 RX ADMIN — PETROLATUM: 420 OINTMENT TOPICAL at 08:06

## 2023-10-30 RX ADMIN — HEPARIN SODIUM 5000 UNITS: 10000 INJECTION INTRAVENOUS; SUBCUTANEOUS at 22:11

## 2023-10-30 RX ADMIN — WATER 1000 MG: 1 INJECTION INTRAMUSCULAR; INTRAVENOUS; SUBCUTANEOUS at 16:47

## 2023-10-30 ASSESSMENT — PAIN SCALES - GENERAL: PAINLEVEL_OUTOF10: 0

## 2023-10-30 NOTE — PLAN OF CARE
Problem: Discharge Planning  Goal: Discharge to home or other facility with appropriate resources  Outcome: Progressing     Problem: Skin/Tissue Integrity  Goal: Absence of new skin breakdown  Description: 1. Monitor for areas of redness and/or skin breakdown  2. Assess vascular access sites hourly  3. Every 4-6 hours minimum:  Change oxygen saturation probe site  4. Every 4-6 hours:  If on nasal continuous positive airway pressure, respiratory therapy assess nares and determine need for appliance change or resting period.   Outcome: Progressing     Problem: Safety - Adult  Goal: Free from fall injury  Outcome: Progressing     Problem: Pain  Goal: Verbalizes/displays adequate comfort level or baseline comfort level  Outcome: Progressing     Problem: Neurosensory - Adult  Goal: Achieves stable or improved neurological status  Outcome: Progressing     Problem: Respiratory - Adult  Goal: Achieves optimal ventilation and oxygenation  Outcome: Progressing     Problem: Skin/Tissue Integrity - Adult  Goal: Skin integrity remains intact  Outcome: Progressing     Problem: Musculoskeletal - Adult  Goal: Return mobility to safest level of function  Outcome: Progressing     Problem: Genitourinary - Adult  Goal: Absence of urinary retention  Outcome: Progressing

## 2023-10-30 NOTE — CARE COORDINATION
Spoke with dipesh Sullivan for [facility] today. Following sodium 787>133>825, needs to improve further prior to discharge. Patient from [facility]., she is LTC there, can return when stable. Ambulance on soft chart. For questions I can be reached at [phone].  Letty Trujillo, [place]

## 2023-10-31 LAB
ALBUMIN SERPL-MCNC: 2.8 G/DL (ref 3.5–5.2)
ALP SERPL-CCNC: 78 U/L (ref 35–104)
ALT SERPL-CCNC: 21 U/L (ref 0–32)
ANION GAP SERPL CALCULATED.3IONS-SCNC: 12 MMOL/L (ref 7–16)
AST SERPL-CCNC: 21 U/L (ref 0–31)
BASOPHILS # BLD: 0.02 K/UL (ref 0–0.2)
BASOPHILS NFR BLD: 0 % (ref 0–2)
BILIRUB SERPL-MCNC: <0.2 MG/DL (ref 0–1.2)
BUN SERPL-MCNC: 28 MG/DL (ref 6–23)
CALCIUM SERPL-MCNC: 9.6 MG/DL (ref 8.6–10.2)
CHLORIDE SERPL-SCNC: 116 MMOL/L (ref 98–107)
CO2 SERPL-SCNC: 19 MMOL/L (ref 22–29)
CREAT SERPL-MCNC: 1.7 MG/DL (ref 0.5–1)
EOSINOPHIL # BLD: 1.44 K/UL (ref 0.05–0.5)
EOSINOPHILS RELATIVE PERCENT: 17 % (ref 0–6)
ERYTHROCYTE [DISTWIDTH] IN BLOOD BY AUTOMATED COUNT: 17.2 % (ref 11.5–15)
GFR SERPL CREATININE-BSD FRML MDRD: 31 ML/MIN/1.73M2
GLUCOSE SERPL-MCNC: 101 MG/DL (ref 74–99)
HCT VFR BLD AUTO: 32.8 % (ref 34–48)
HGB BLD-MCNC: 10.1 G/DL (ref 11.5–15.5)
IMM GRANULOCYTES # BLD AUTO: 0.03 K/UL (ref 0–0.58)
IMM GRANULOCYTES NFR BLD: 0 % (ref 0–5)
LYMPHOCYTES NFR BLD: 1.15 K/UL (ref 1.5–4)
LYMPHOCYTES RELATIVE PERCENT: 14 % (ref 20–42)
MCH RBC QN AUTO: 28.4 PG (ref 26–35)
MCHC RBC AUTO-ENTMCNC: 30.8 G/DL (ref 32–34.5)
MCV RBC AUTO: 92.1 FL (ref 80–99.9)
MONOCYTES NFR BLD: 0.55 K/UL (ref 0.1–0.95)
MONOCYTES NFR BLD: 7 % (ref 2–12)
NEUTROPHILS NFR BLD: 62 % (ref 43–80)
NEUTS SEG NFR BLD: 5.17 K/UL (ref 1.8–7.3)
PLATELET # BLD AUTO: 166 K/UL (ref 130–450)
PMV BLD AUTO: 12.1 FL (ref 7–12)
POTASSIUM SERPL-SCNC: 4.3 MMOL/L (ref 3.5–5)
PROT SERPL-MCNC: 5.5 G/DL (ref 6.4–8.3)
RBC # BLD AUTO: 3.56 M/UL (ref 3.5–5.5)
SODIUM SERPL-SCNC: 147 MMOL/L (ref 132–146)
WBC OTHER # BLD: 8.4 K/UL (ref 4.5–11.5)

## 2023-10-31 PROCEDURE — 85025 COMPLETE CBC W/AUTO DIFF WBC: CPT

## 2023-10-31 PROCEDURE — 2580000003 HC RX 258: Performed by: INTERNAL MEDICINE

## 2023-10-31 PROCEDURE — 6370000000 HC RX 637 (ALT 250 FOR IP): Performed by: INTERNAL MEDICINE

## 2023-10-31 PROCEDURE — 36415 COLL VENOUS BLD VENIPUNCTURE: CPT

## 2023-10-31 PROCEDURE — 80053 COMPREHEN METABOLIC PANEL: CPT

## 2023-10-31 PROCEDURE — 1200000000 HC SEMI PRIVATE

## 2023-10-31 PROCEDURE — 6360000002 HC RX W HCPCS: Performed by: INTERNAL MEDICINE

## 2023-10-31 RX ADMIN — HEPARIN SODIUM 5000 UNITS: 10000 INJECTION INTRAVENOUS; SUBCUTANEOUS at 08:20

## 2023-10-31 RX ADMIN — DIVALPROEX SODIUM 250 MG: 250 TABLET, DELAYED RELEASE ORAL at 08:17

## 2023-10-31 RX ADMIN — LEVETIRACETAM 500 MG: 500 TABLET, FILM COATED ORAL at 22:05

## 2023-10-31 RX ADMIN — DEXTROSE MONOHYDRATE: 50 INJECTION, SOLUTION INTRAVENOUS at 21:58

## 2023-10-31 RX ADMIN — HEPARIN SODIUM 5000 UNITS: 10000 INJECTION INTRAVENOUS; SUBCUTANEOUS at 22:02

## 2023-10-31 RX ADMIN — PETROLATUM: 420 OINTMENT TOPICAL at 08:32

## 2023-10-31 RX ADMIN — PETROLATUM: 420 OINTMENT TOPICAL at 22:06

## 2023-10-31 RX ADMIN — WATER 60 MG: 1 INJECTION INTRAMUSCULAR; INTRAVENOUS; SUBCUTANEOUS at 12:54

## 2023-10-31 RX ADMIN — SODIUM CHLORIDE, PRESERVATIVE FREE 10 ML: 5 INJECTION INTRAVENOUS at 08:19

## 2023-10-31 RX ADMIN — DIVALPROEX SODIUM 250 MG: 250 TABLET, DELAYED RELEASE ORAL at 22:05

## 2023-10-31 ASSESSMENT — PAIN SCALES - GENERAL: PAINLEVEL_OUTOF10: 0

## 2023-10-31 NOTE — PLAN OF CARE
Problem: Discharge Planning  Goal: Discharge to home or other facility with appropriate resources  10/31/2023 0037 by Lavonne Chand RN  Outcome: Not Progressing  10/30/2023 1115 by Benigno Smith RN  Outcome: Progressing     Problem: Skin/Tissue Integrity  Goal: Absence of new skin breakdown  Description: 1. Monitor for areas of redness and/or skin breakdown  2. Assess vascular access sites hourly  3. Every 4-6 hours minimum:  Change oxygen saturation probe site  4. Every 4-6 hours:  If on nasal continuous positive airway pressure, respiratory therapy assess nares and determine need for appliance change or resting period.   10/31/2023 0037 by Lavonne Chand RN  Outcome: Progressing  10/30/2023 1115 by Benigno Smith RN  Outcome: Progressing     Problem: Safety - Adult  Goal: Free from fall injury  10/31/2023 0037 by Lavonne Chand RN  Outcome: Progressing  10/30/2023 1115 by Benigno Smith RN  Outcome: Progressing     Problem: Pain  Goal: Verbalizes/displays adequate comfort level or baseline comfort level  10/31/2023 0037 by Lavonne Chand RN  Outcome: Progressing  10/30/2023 1115 by Benigno Smith RN  Outcome: Progressing     Problem: Neurosensory - Adult  Goal: Achieves stable or improved neurological status  10/31/2023 0037 by Lavonne Chand RN  Outcome: Not Progressing  10/30/2023 1115 by Benigno Smith RN  Outcome: Progressing  Goal: Achieves maximal functionality and self care  Outcome: Not Progressing     Problem: Respiratory - Adult  Goal: Achieves optimal ventilation and oxygenation  10/31/2023 0037 by Lavonne Chand RN  Outcome: Progressing  10/30/2023 1115 by Benigno Smith RN  Outcome: Progressing     Problem: Skin/Tissue Integrity - Adult  Goal: Skin integrity remains intact  10/31/2023 0037 by Lavonne Chand RN  Outcome: Not Progressing  10/30/2023 1115 by Benigno Smith RN  Outcome: Progressing     Problem: Musculoskeletal - Adult  Goal: Return

## 2023-10-31 NOTE — CARE COORDINATION
10/31. Day 3 IV Rocephin. Discharge plan is to return to [facility] when medically stable. LTC bedhold at [facility].  Kassidy Huertas RN

## 2023-11-01 LAB
ALBUMIN SERPL-MCNC: 3.3 G/DL (ref 3.5–5.2)
ALP SERPL-CCNC: 91 U/L (ref 35–104)
ALT SERPL-CCNC: 25 U/L (ref 0–32)
ANION GAP SERPL CALCULATED.3IONS-SCNC: 14 MMOL/L (ref 7–16)
AST SERPL-CCNC: 24 U/L (ref 0–31)
BASOPHILS # BLD: 0 K/UL (ref 0–0.2)
BASOPHILS NFR BLD: 0 % (ref 0–2)
BILIRUB SERPL-MCNC: <0.2 MG/DL (ref 0–1.2)
BUN SERPL-MCNC: 36 MG/DL (ref 6–23)
CALCIUM SERPL-MCNC: 10 MG/DL (ref 8.6–10.2)
CHLORIDE SERPL-SCNC: 116 MMOL/L (ref 98–107)
CO2 SERPL-SCNC: 18 MMOL/L (ref 22–29)
CREAT SERPL-MCNC: 1.5 MG/DL (ref 0.5–1)
EOSINOPHIL # BLD: 0 K/UL (ref 0.05–0.5)
EOSINOPHILS RELATIVE PERCENT: 0 % (ref 0–6)
ERYTHROCYTE [DISTWIDTH] IN BLOOD BY AUTOMATED COUNT: 17.2 % (ref 11.5–15)
GFR SERPL CREATININE-BSD FRML MDRD: 35 ML/MIN/1.73M2
GLUCOSE SERPL-MCNC: 146 MG/DL (ref 74–99)
HCT VFR BLD AUTO: 41.2 % (ref 34–48)
HGB BLD-MCNC: 12.8 G/DL (ref 11.5–15.5)
LYMPHOCYTES NFR BLD: 0.87 K/UL (ref 1.5–4)
LYMPHOCYTES RELATIVE PERCENT: 5 % (ref 20–42)
MCH RBC QN AUTO: 29 PG (ref 26–35)
MCHC RBC AUTO-ENTMCNC: 31.1 G/DL (ref 32–34.5)
MCV RBC AUTO: 93.2 FL (ref 80–99.9)
MONOCYTES NFR BLD: 0.29 K/UL (ref 0.1–0.95)
MONOCYTES NFR BLD: 2 % (ref 2–12)
NEUTROPHILS NFR BLD: 93 % (ref 43–80)
NEUTS SEG NFR BLD: 15.54 K/UL (ref 1.8–7.3)
NUCLEATED RED BLOOD CELLS: 1 PER 100 WBC
PLATELET # BLD AUTO: 191 K/UL (ref 130–450)
PMV BLD AUTO: 12.9 FL (ref 7–12)
POTASSIUM SERPL-SCNC: 4.6 MMOL/L (ref 3.5–5)
PROT SERPL-MCNC: 6.4 G/DL (ref 6.4–8.3)
RBC # BLD AUTO: 4.42 M/UL (ref 3.5–5.5)
RBC # BLD: ABNORMAL 10*6/UL
SODIUM SERPL-SCNC: 148 MMOL/L (ref 132–146)
WBC OTHER # BLD: 16.7 K/UL (ref 4.5–11.5)

## 2023-11-01 PROCEDURE — 85025 COMPLETE CBC W/AUTO DIFF WBC: CPT

## 2023-11-01 PROCEDURE — 80053 COMPREHEN METABOLIC PANEL: CPT

## 2023-11-01 PROCEDURE — 6360000002 HC RX W HCPCS: Performed by: INTERNAL MEDICINE

## 2023-11-01 PROCEDURE — 2580000003 HC RX 258: Performed by: INTERNAL MEDICINE

## 2023-11-01 PROCEDURE — 1200000000 HC SEMI PRIVATE

## 2023-11-01 PROCEDURE — 36415 COLL VENOUS BLD VENIPUNCTURE: CPT

## 2023-11-01 PROCEDURE — 6370000000 HC RX 637 (ALT 250 FOR IP): Performed by: INTERNAL MEDICINE

## 2023-11-01 RX ADMIN — LEVETIRACETAM 500 MG: 500 TABLET, FILM COATED ORAL at 20:11

## 2023-11-01 RX ADMIN — SODIUM CHLORIDE, PRESERVATIVE FREE 10 ML: 5 INJECTION INTRAVENOUS at 20:12

## 2023-11-01 RX ADMIN — HEPARIN SODIUM 5000 UNITS: 10000 INJECTION INTRAVENOUS; SUBCUTANEOUS at 08:28

## 2023-11-01 RX ADMIN — DIVALPROEX SODIUM 250 MG: 250 TABLET, DELAYED RELEASE ORAL at 20:12

## 2023-11-01 RX ADMIN — SODIUM CHLORIDE, PRESERVATIVE FREE 10 ML: 5 INJECTION INTRAVENOUS at 08:28

## 2023-11-01 RX ADMIN — HEPARIN SODIUM 5000 UNITS: 10000 INJECTION INTRAVENOUS; SUBCUTANEOUS at 20:17

## 2023-11-01 RX ADMIN — PETROLATUM: 420 OINTMENT TOPICAL at 20:13

## 2023-11-01 NOTE — CARE COORDINATION
Patient remains hospitalized for AMS. Patient from [facility], she is LTC there, can return when stable. Ambulance on soft chart.  CM/SW to follow

## 2023-11-01 NOTE — PLAN OF CARE
Problem: Discharge Planning  Goal: Discharge to home or other facility with appropriate resources  Outcome: Not Progressing     Problem: Skin/Tissue Integrity  Goal: Absence of new skin breakdown  Description: 1. Monitor for areas of redness and/or skin breakdown  2. Assess vascular access sites hourly  3. Every 4-6 hours minimum:  Change oxygen saturation probe site  4. Every 4-6 hours:  If on nasal continuous positive airway pressure, respiratory therapy assess nares and determine need for appliance change or resting period.   Outcome: Progressing     Problem: Safety - Adult  Goal: Free from fall injury  Outcome: Progressing     Problem: Pain  Goal: Verbalizes/displays adequate comfort level or baseline comfort level  Outcome: Adequate for Discharge     Problem: Neurosensory - Adult  Goal: Achieves stable or improved neurological status  Outcome: Progressing  Goal: Achieves maximal functionality and self care  Outcome: Not Progressing     Problem: Respiratory - Adult  Goal: Achieves optimal ventilation and oxygenation  Outcome: Progressing     Problem: Skin/Tissue Integrity - Adult  Goal: Skin integrity remains intact  Outcome: Progressing     Problem: Musculoskeletal - Adult  Goal: Return mobility to safest level of function  Outcome: Not Progressing  Goal: Return ADL status to a safe level of function  Outcome: Not Progressing     Problem: Genitourinary - Adult  Goal: Absence of urinary retention  Outcome: Progressing     Problem: Infection - Adult  Goal: Absence of infection at discharge  Outcome: Progressing     Problem: Metabolic/Fluid and Electrolytes - Adult  Goal: Electrolytes maintained within normal limits  Outcome: Progressing     Problem: Nutrition Deficit:  Goal: Optimize nutritional status  Outcome: Progressing     Problem: ABCDS Injury Assessment  Goal: Absence of physical injury  Outcome: Progressing     Problem: Behavior  Goal: Pt/Family maintain appropriate behavior and adhere to behavioral

## 2023-11-02 ENCOUNTER — APPOINTMENT (OUTPATIENT)
Dept: GENERAL RADIOLOGY | Age: 78
DRG: 641 | End: 2023-11-02
Payer: MEDICARE

## 2023-11-02 ENCOUNTER — HOSPITAL ENCOUNTER (INPATIENT)
Age: 78
LOS: 12 days | Discharge: OTHER INSTITUTION | DRG: 641 | End: 2023-11-15
Attending: EMERGENCY MEDICINE | Admitting: INTERNAL MEDICINE
Payer: MEDICARE

## 2023-11-02 VITALS
HEIGHT: 65 IN | SYSTOLIC BLOOD PRESSURE: 112 MMHG | RESPIRATION RATE: 16 BRPM | OXYGEN SATURATION: 96 % | TEMPERATURE: 97.4 F | HEART RATE: 83 BPM | BODY MASS INDEX: 28.32 KG/M2 | DIASTOLIC BLOOD PRESSURE: 63 MMHG | WEIGHT: 170 LBS

## 2023-11-02 DIAGNOSIS — R21 RASH IN ADULT: ICD-10-CM

## 2023-11-02 DIAGNOSIS — N17.9 AKI (ACUTE KIDNEY INJURY) (HCC): ICD-10-CM

## 2023-11-02 DIAGNOSIS — R53.1 GENERAL WEAKNESS: ICD-10-CM

## 2023-11-02 DIAGNOSIS — R41.82 ALTERED MENTAL STATUS, UNSPECIFIED ALTERED MENTAL STATUS TYPE: Primary | ICD-10-CM

## 2023-11-02 LAB
ANION GAP SERPL CALCULATED.3IONS-SCNC: 11 MMOL/L (ref 7–16)
BUN SERPL-MCNC: 47 MG/DL (ref 6–23)
CALCIUM SERPL-MCNC: 9.7 MG/DL (ref 8.6–10.2)
CHLORIDE SERPL-SCNC: 114 MMOL/L (ref 98–107)
CO2 SERPL-SCNC: 21 MMOL/L (ref 22–29)
CREAT SERPL-MCNC: 1.6 MG/DL (ref 0.5–1)
GFR SERPL CREATININE-BSD FRML MDRD: 32 ML/MIN/1.73M2
GLUCOSE SERPL-MCNC: 132 MG/DL (ref 74–99)
POTASSIUM SERPL-SCNC: 4 MMOL/L (ref 3.5–5)
SODIUM SERPL-SCNC: 146 MMOL/L (ref 132–146)

## 2023-11-02 PROCEDURE — 2580000003 HC RX 258: Performed by: INTERNAL MEDICINE

## 2023-11-02 PROCEDURE — 71045 X-RAY EXAM CHEST 1 VIEW: CPT

## 2023-11-02 PROCEDURE — 93005 ELECTROCARDIOGRAM TRACING: CPT | Performed by: EMERGENCY MEDICINE

## 2023-11-02 PROCEDURE — 6370000000 HC RX 637 (ALT 250 FOR IP): Performed by: INTERNAL MEDICINE

## 2023-11-02 PROCEDURE — 81001 URINALYSIS AUTO W/SCOPE: CPT

## 2023-11-02 PROCEDURE — 85025 COMPLETE CBC W/AUTO DIFF WBC: CPT

## 2023-11-02 PROCEDURE — 36415 COLL VENOUS BLD VENIPUNCTURE: CPT

## 2023-11-02 PROCEDURE — 80053 COMPREHEN METABOLIC PANEL: CPT

## 2023-11-02 PROCEDURE — 6360000002 HC RX W HCPCS: Performed by: INTERNAL MEDICINE

## 2023-11-02 PROCEDURE — 99285 EMERGENCY DEPT VISIT HI MDM: CPT

## 2023-11-02 PROCEDURE — 84484 ASSAY OF TROPONIN QUANT: CPT

## 2023-11-02 PROCEDURE — 80048 BASIC METABOLIC PNL TOTAL CA: CPT

## 2023-11-02 RX ADMIN — HEPARIN SODIUM 5000 UNITS: 10000 INJECTION INTRAVENOUS; SUBCUTANEOUS at 20:10

## 2023-11-02 RX ADMIN — LEVETIRACETAM 500 MG: 500 TABLET, FILM COATED ORAL at 08:40

## 2023-11-02 RX ADMIN — DEXTROSE MONOHYDRATE: 50 INJECTION, SOLUTION INTRAVENOUS at 05:52

## 2023-11-02 RX ADMIN — DIVALPROEX SODIUM 250 MG: 250 TABLET, DELAYED RELEASE ORAL at 20:10

## 2023-11-02 RX ADMIN — PALIPERIDONE 3 MG: 3 TABLET, EXTENDED RELEASE ORAL at 08:46

## 2023-11-02 RX ADMIN — PETROLATUM: 420 OINTMENT TOPICAL at 08:52

## 2023-11-02 RX ADMIN — HEPARIN SODIUM 5000 UNITS: 10000 INJECTION INTRAVENOUS; SUBCUTANEOUS at 08:41

## 2023-11-02 RX ADMIN — PETROLATUM: 420 OINTMENT TOPICAL at 21:15

## 2023-11-02 RX ADMIN — ASPIRIN 81 MG: 81 TABLET, CHEWABLE ORAL at 08:40

## 2023-11-02 RX ADMIN — LEVETIRACETAM 500 MG: 500 TABLET, FILM COATED ORAL at 20:10

## 2023-11-02 RX ADMIN — SODIUM CHLORIDE, PRESERVATIVE FREE 10 ML: 5 INJECTION INTRAVENOUS at 20:11

## 2023-11-02 RX ADMIN — DIVALPROEX SODIUM 250 MG: 250 TABLET, DELAYED RELEASE ORAL at 08:40

## 2023-11-02 ASSESSMENT — PAIN - FUNCTIONAL ASSESSMENT: PAIN_FUNCTIONAL_ASSESSMENT: NONE - DENIES PAIN

## 2023-11-02 NOTE — DISCHARGE INSTR - COC
Continuity of Care Form    Patient Name: Sanjuanita Woodard   :  1945  MRN:  [id number]    Admit date:  10/22/2023  Discharge date:  ***    Code Status Order: Prior   Advance Directives:     Admitting Physician:  Anthony Cali MD  PCP: Lubna Garsia MD    Discharging Nurse: [facility] Unit/Room#: 3368/3366-J  Discharging Unit Phone Number: ***    Emergency Contact:   Extended Emergency Contact Information  Primary Emergency Contact: Chioma Bai  Address: [address]           [address] [facility], [address] [place] [address] [place] Phone: [phone]  Relation: Legal Guardian  Secondary Emergency Contact: TobiasCharu  Address: [facility], [address] [place] [address] [place] Phone: [phone]  Relation: Other    Past Surgical History:  Past Surgical History:   Procedure Laterality Date    BLADDER SUSPENSION      DILATION AND CURETTAGE OF UTERUS      DILATION AND CURETTAGE OF UTERUS N/A 2021    endometrial biopsy, HYSTEROSCOPY performed by Francheska Naranjo MD at [address] [place] [address]         Immunization History:   Immunization History   Administered Date(s) Administered    COVID-19, PFIZER PURPLE top, DILUTE for use, (age 15 y+), 30mcg/0.3mL 2021, 02/10/2021, 2022    Influenza Vaccine, unspecified formulation 10/01/2016       Active Problems:  Patient Active Problem List   Diagnosis Code    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

## 2023-11-02 NOTE — PLAN OF CARE
Problem: Safety - Adult  Goal: Free from fall injury  Outcome: Progressing     Problem: Respiratory - Adult  Goal: Achieves optimal ventilation and oxygenation  Outcome: Progressing     Problem: Musculoskeletal - Adult  Goal: Return ADL status to a safe level of function  Outcome: Not Progressing     Problem: Self Care Deficit  Goal: Return ADL status to a safe level of function  Description: INTERVENTIONS:  1. Administer medication as ordered  2. Assess ADL deficits and provide assistive devices as needed  3. Obtain PT/OT consults as needed  4.  Assist and instruct patient to increase activity and self care as tolerated  Outcome: Not Progressing

## 2023-11-02 NOTE — CARE COORDINATION
11/2. Na-148 Cl -116 BUN-36 Cr-1.5 GFR -35. Discharge plan is [facility] when medically stable.  Vilma Coronado RN

## 2023-11-03 ENCOUNTER — APPOINTMENT (OUTPATIENT)
Dept: CT IMAGING | Age: 78
DRG: 641 | End: 2023-11-03
Payer: MEDICARE

## 2023-11-03 PROBLEM — R41.82 ALTERED MENTAL STATUS: Status: ACTIVE | Noted: 2023-11-03

## 2023-11-03 LAB
ALBUMIN SERPL-MCNC: 3.3 G/DL (ref 3.5–5.2)
ALP SERPL-CCNC: 83 U/L (ref 35–104)
ALT SERPL-CCNC: 24 U/L (ref 0–32)
ANION GAP SERPL CALCULATED.3IONS-SCNC: 9 MMOL/L (ref 7–16)
AST SERPL-CCNC: 23 U/L (ref 0–31)
BACTERIA URNS QL MICRO: ABNORMAL
BASOPHILS # BLD: 0.04 K/UL (ref 0–0.2)
BASOPHILS NFR BLD: 0 % (ref 0–2)
BILIRUB SERPL-MCNC: <0.2 MG/DL (ref 0–1.2)
BILIRUB UR QL STRIP: NEGATIVE
BUN SERPL-MCNC: 52 MG/DL (ref 6–23)
CALCIUM SERPL-MCNC: 10 MG/DL (ref 8.6–10.2)
CHLORIDE SERPL-SCNC: 114 MMOL/L (ref 98–107)
CLARITY UR: CLEAR
CO2 SERPL-SCNC: 24 MMOL/L (ref 22–29)
COLOR UR: YELLOW
CREAT SERPL-MCNC: 1.9 MG/DL (ref 0.5–1)
DATE LAST DOSE: ABNORMAL
EKG ATRIAL RATE: 92 BPM
EKG P AXIS: 39 DEGREES
EKG P-R INTERVAL: 140 MS
EKG Q-T INTERVAL: 368 MS
EKG QRS DURATION: 96 MS
EKG QTC CALCULATION (BAZETT): 455 MS
EKG R AXIS: -41 DEGREES
EKG T AXIS: 86 DEGREES
EKG VENTRICULAR RATE: 92 BPM
EOSINOPHIL # BLD: 1.8 K/UL (ref 0.05–0.5)
EOSINOPHILS RELATIVE PERCENT: 11 % (ref 0–6)
EPI CELLS #/AREA URNS HPF: ABNORMAL /HPF
ERYTHROCYTE [DISTWIDTH] IN BLOOD BY AUTOMATED COUNT: 17.4 % (ref 11.5–15)
GFR SERPL CREATININE-BSD FRML MDRD: 28 ML/MIN/1.73M2
GLUCOSE SERPL-MCNC: 101 MG/DL (ref 74–99)
GLUCOSE UR STRIP-MCNC: NEGATIVE MG/DL
HCT VFR BLD AUTO: 34.1 % (ref 34–48)
HGB BLD-MCNC: 10.6 G/DL (ref 11.5–15.5)
HGB UR QL STRIP.AUTO: NEGATIVE
IMM GRANULOCYTES # BLD AUTO: 0.11 K/UL (ref 0–0.58)
IMM GRANULOCYTES NFR BLD: 1 % (ref 0–5)
KETONES UR STRIP-MCNC: NEGATIVE MG/DL
LEUKOCYTE ESTERASE UR QL STRIP: ABNORMAL
LYMPHOCYTES NFR BLD: 1.07 K/UL (ref 1.5–4)
LYMPHOCYTES RELATIVE PERCENT: 7 % (ref 20–42)
MCH RBC QN AUTO: 28.7 PG (ref 26–35)
MCHC RBC AUTO-ENTMCNC: 31.1 G/DL (ref 32–34.5)
MCV RBC AUTO: 92.4 FL (ref 80–99.9)
MONOCYTES NFR BLD: 0.82 K/UL (ref 0.1–0.95)
MONOCYTES NFR BLD: 5 % (ref 2–12)
NEUTROPHILS NFR BLD: 76 % (ref 43–80)
NEUTS SEG NFR BLD: 12.2 K/UL (ref 1.8–7.3)
NITRITE UR QL STRIP: NEGATIVE
PH UR STRIP: 6 [PH] (ref 5–9)
PLATELET, FLUORESCENCE: 171 K/UL (ref 130–450)
PMV BLD AUTO: 12.5 FL (ref 7–12)
POTASSIUM SERPL-SCNC: 5 MMOL/L (ref 3.5–5)
PROT SERPL-MCNC: 5.8 G/DL (ref 6.4–8.3)
PROT UR STRIP-MCNC: 30 MG/DL
RBC # BLD AUTO: 3.69 M/UL (ref 3.5–5.5)
RBC #/AREA URNS HPF: ABNORMAL /HPF
SODIUM SERPL-SCNC: 147 MMOL/L (ref 132–146)
SP GR UR STRIP: 1.01 (ref 1–1.03)
TME LAST DOSE: ABNORMAL H
TROPONIN I SERPL HS-MCNC: 28 NG/L (ref 0–9)
UROBILINOGEN UR STRIP-ACNC: 0.2 EU/DL (ref 0–1)
VALPROATE SERPL-MCNC: 24 UG/ML (ref 50–100)
VANCOMYCIN DOSE: ABNORMAL MG
WBC #/AREA URNS HPF: ABNORMAL /HPF
WBC OTHER # BLD: 16 K/UL (ref 4.5–11.5)

## 2023-11-03 PROCEDURE — 70450 CT HEAD/BRAIN W/O DYE: CPT

## 2023-11-03 PROCEDURE — 1200000000 HC SEMI PRIVATE

## 2023-11-03 PROCEDURE — 6370000000 HC RX 637 (ALT 250 FOR IP): Performed by: INTERNAL MEDICINE

## 2023-11-03 PROCEDURE — 93010 ELECTROCARDIOGRAM REPORT: CPT | Performed by: INTERNAL MEDICINE

## 2023-11-03 PROCEDURE — 80164 ASSAY DIPROPYLACETIC ACD TOT: CPT

## 2023-11-03 PROCEDURE — 2580000003 HC RX 258: Performed by: INTERNAL MEDICINE

## 2023-11-03 RX ORDER — ONDANSETRON 4 MG/1
4 TABLET, ORALLY DISINTEGRATING ORAL EVERY 8 HOURS PRN
Status: DISCONTINUED | OUTPATIENT
Start: 2023-11-03 | End: 2023-11-15 | Stop reason: HOSPADM

## 2023-11-03 RX ORDER — SODIUM CHLORIDE 0.9 % (FLUSH) 0.9 %
5-40 SYRINGE (ML) INJECTION 2 TIMES DAILY
Status: DISCONTINUED | OUTPATIENT
Start: 2023-11-03 | End: 2023-11-15 | Stop reason: HOSPADM

## 2023-11-03 RX ORDER — ACETAMINOPHEN 325 MG/1
650 TABLET ORAL EVERY 4 HOURS PRN
Status: DISCONTINUED | OUTPATIENT
Start: 2023-11-03 | End: 2023-11-15 | Stop reason: HOSPADM

## 2023-11-03 RX ORDER — MAGNESIUM HYDROXIDE/ALUMINUM HYDROXICE/SIMETHICONE 120; 1200; 1200 MG/30ML; MG/30ML; MG/30ML
30 SUSPENSION ORAL EVERY 4 HOURS PRN
Status: DISCONTINUED | OUTPATIENT
Start: 2023-11-03 | End: 2023-11-15 | Stop reason: HOSPADM

## 2023-11-03 RX ORDER — DIVALPROEX SODIUM 250 MG/1
250 TABLET, DELAYED RELEASE ORAL 2 TIMES DAILY
Status: DISCONTINUED | OUTPATIENT
Start: 2023-11-03 | End: 2023-11-15 | Stop reason: HOSPADM

## 2023-11-03 RX ORDER — LEVETIRACETAM 500 MG/1
500 TABLET ORAL 2 TIMES DAILY
Status: DISCONTINUED | OUTPATIENT
Start: 2023-11-03 | End: 2023-11-15 | Stop reason: HOSPADM

## 2023-11-03 RX ORDER — PALIPERIDONE 3 MG/1
3 TABLET, EXTENDED RELEASE ORAL EVERY MORNING
Status: DISCONTINUED | OUTPATIENT
Start: 2023-11-03 | End: 2023-11-15 | Stop reason: HOSPADM

## 2023-11-03 RX ORDER — ASPIRIN 81 MG/1
81 TABLET, CHEWABLE ORAL DAILY
Status: DISCONTINUED | OUTPATIENT
Start: 2023-11-03 | End: 2023-11-15 | Stop reason: HOSPADM

## 2023-11-03 RX ADMIN — ASPIRIN 81 MG: 81 TABLET, CHEWABLE ORAL at 12:46

## 2023-11-03 RX ADMIN — LEVETIRACETAM 500 MG: 500 TABLET, FILM COATED ORAL at 21:52

## 2023-11-03 RX ADMIN — DIVALPROEX SODIUM 250 MG: 250 TABLET, DELAYED RELEASE ORAL at 21:52

## 2023-11-03 RX ADMIN — LEVETIRACETAM 500 MG: 500 TABLET, FILM COATED ORAL at 12:47

## 2023-11-03 RX ADMIN — PALIPERIDONE 3 MG: 3 TABLET, EXTENDED RELEASE ORAL at 14:16

## 2023-11-03 RX ADMIN — DIVALPROEX SODIUM 250 MG: 250 TABLET, DELAYED RELEASE ORAL at 12:47

## 2023-11-03 RX ADMIN — Medication 10 ML: at 12:54

## 2023-11-03 NOTE — CARE COORDINATION
11/3. The pt was discharged from [facility] yesterday and returned to the hospital. She is a LTC pt at [facility]. She will return when medically stable. Ric Ruelas RN  Envelope and ambulance form completed.  Ric Ruelas RN

## 2023-11-03 NOTE — H&P
Jelani Oleary MD [address] [place] [address]  Internal medicine   History and Physical      CHIEF COMPLAINT: Failure to thrive      HISTORY OF PRESENT ILLNESS:    11/3/2023  Patient was seen on the floor earlier this morning at the main campus of [facility] with the ER physician at the time of admission  I discharged this patient to the nursing home where she is a long-term resident after being treated for UTI and dehydration  Patient was refused to to be readmitted to the nursing facility stating that her mental status is not at her baseline  She was sent back to emergency room and is readmitted  Past Medical History:    Past Medical History:   Diagnosis Date    Arthritis     hips    Ataxia     Atrophy of muscle     Bipolar 1 disorder, mixed, moderate ([address])     Cerebrovascular disease     Coordination problem     Hyperlipidemia     Hypertension     Overactive bladder     Paranoid schizophrenia ([address])     Thyroid disease        Past Surgical History:    Past Surgical History:   Procedure Laterality Date    BLADDER SUSPENSION      DILATION AND CURETTAGE OF UTERUS      DILATION AND CURETTAGE OF UTERUS N/A 2/16/2021    endometrial biopsy, HYSTEROSCOPY performed by Austin Stark MD at [facility]         Medications Prior to Admission:    Medications Prior to Admission: acetaminophen (TYLENOL) 325 MG tablet, Take 2 tablets by mouth every 4 hours as needed for Pain or Fever  aspirin 81 MG chewable tablet, Take 1 tablet by mouth daily  divalproex (DEPAKOTE) 125 MG DR tablet, Take 2 tablets by mouth 2 times daily  paliperidone (INVEGA) 3 MG extended release tablet, Take 1 tablet by mouth every morning  ondansetron (ZOFRAN-ODT) 4 MG disintegrating tablet, Take 1 tablet by mouth every 8 hours as needed for Nausea or Vomiting  triamcinolone (ARISTOCORT) 0.5 % cream, Apply topically 3 times daily Apply to arms,neck,back,chest  aluminum & magnesium

## 2023-11-04 LAB
ALBUMIN SERPL-MCNC: 3.1 G/DL (ref 3.5–5.2)
ALP SERPL-CCNC: 84 U/L (ref 35–104)
ALT SERPL-CCNC: 21 U/L (ref 0–32)
ANION GAP SERPL CALCULATED.3IONS-SCNC: 14 MMOL/L (ref 7–16)
AST SERPL-CCNC: 17 U/L (ref 0–31)
BASOPHILS # BLD: 0.03 K/UL (ref 0–0.2)
BASOPHILS NFR BLD: 0 % (ref 0–2)
BILIRUB SERPL-MCNC: <0.2 MG/DL (ref 0–1.2)
BUN SERPL-MCNC: 57 MG/DL (ref 6–23)
CALCIUM SERPL-MCNC: 10.2 MG/DL (ref 8.6–10.2)
CHLORIDE SERPL-SCNC: 120 MMOL/L (ref 98–107)
CO2 SERPL-SCNC: 19 MMOL/L (ref 22–29)
CREAT SERPL-MCNC: 1.9 MG/DL (ref 0.5–1)
EOSINOPHIL # BLD: 1.31 K/UL (ref 0.05–0.5)
EOSINOPHILS RELATIVE PERCENT: 15 % (ref 0–6)
ERYTHROCYTE [DISTWIDTH] IN BLOOD BY AUTOMATED COUNT: 17.6 % (ref 11.5–15)
GFR SERPL CREATININE-BSD FRML MDRD: 28 ML/MIN/1.73M2
GLUCOSE SERPL-MCNC: 82 MG/DL (ref 74–99)
HCT VFR BLD AUTO: 33.5 % (ref 34–48)
HGB BLD-MCNC: 10.4 G/DL (ref 11.5–15.5)
IMM GRANULOCYTES # BLD AUTO: 0.05 K/UL (ref 0–0.58)
IMM GRANULOCYTES NFR BLD: 1 % (ref 0–5)
LYMPHOCYTES NFR BLD: 1.36 K/UL (ref 1.5–4)
LYMPHOCYTES RELATIVE PERCENT: 16 % (ref 20–42)
MCH RBC QN AUTO: 28.7 PG (ref 26–35)
MCHC RBC AUTO-ENTMCNC: 31 G/DL (ref 32–34.5)
MCV RBC AUTO: 92.5 FL (ref 80–99.9)
MONOCYTES NFR BLD: 0.43 K/UL (ref 0.1–0.95)
MONOCYTES NFR BLD: 5 % (ref 2–12)
NEUTROPHILS NFR BLD: 63 % (ref 43–80)
NEUTS SEG NFR BLD: 5.47 K/UL (ref 1.8–7.3)
PLATELET # BLD AUTO: 165 K/UL (ref 130–450)
PMV BLD AUTO: 12.7 FL (ref 7–12)
POTASSIUM SERPL-SCNC: 5 MMOL/L (ref 3.5–5)
PROT SERPL-MCNC: 6.1 G/DL (ref 6.4–8.3)
RBC # BLD AUTO: 3.62 M/UL (ref 3.5–5.5)
SODIUM SERPL-SCNC: 153 MMOL/L (ref 132–146)
WBC OTHER # BLD: 8.7 K/UL (ref 4.5–11.5)

## 2023-11-04 PROCEDURE — 85025 COMPLETE CBC W/AUTO DIFF WBC: CPT

## 2023-11-04 PROCEDURE — 36415 COLL VENOUS BLD VENIPUNCTURE: CPT

## 2023-11-04 PROCEDURE — 1200000000 HC SEMI PRIVATE

## 2023-11-04 PROCEDURE — 2580000003 HC RX 258: Performed by: INTERNAL MEDICINE

## 2023-11-04 PROCEDURE — 6370000000 HC RX 637 (ALT 250 FOR IP): Performed by: INTERNAL MEDICINE

## 2023-11-04 PROCEDURE — 80053 COMPREHEN METABOLIC PANEL: CPT

## 2023-11-04 RX ORDER — DEXTROSE MONOHYDRATE 50 MG/ML
INJECTION, SOLUTION INTRAVENOUS CONTINUOUS
Status: DISCONTINUED | OUTPATIENT
Start: 2023-11-04 | End: 2023-11-12

## 2023-11-04 RX ADMIN — LEVETIRACETAM 500 MG: 500 TABLET, FILM COATED ORAL at 21:21

## 2023-11-04 RX ADMIN — DIVALPROEX SODIUM 250 MG: 250 TABLET, DELAYED RELEASE ORAL at 21:21

## 2023-11-04 RX ADMIN — Medication 10 ML: at 21:21

## 2023-11-04 RX ADMIN — DEXTROSE MONOHYDRATE: 50 INJECTION, SOLUTION INTRAVENOUS at 21:21

## 2023-11-04 ASSESSMENT — PAIN SCALES - GENERAL
PAINLEVEL_OUTOF10: 0
PAINLEVEL_OUTOF10: 0

## 2023-11-04 ASSESSMENT — PAIN SCALES - WONG BAKER
WONGBAKER_NUMERICALRESPONSE: 0
WONGBAKER_NUMERICALRESPONSE: 0

## 2023-11-05 LAB
ALBUMIN SERPL-MCNC: 3.2 G/DL (ref 3.5–5.2)
ALP SERPL-CCNC: 88 U/L (ref 35–104)
ALT SERPL-CCNC: 25 U/L (ref 0–32)
ANION GAP SERPL CALCULATED.3IONS-SCNC: 15 MMOL/L (ref 7–16)
AST SERPL-CCNC: 23 U/L (ref 0–31)
BASOPHILS # BLD: 0.02 K/UL (ref 0–0.2)
BASOPHILS NFR BLD: 0 % (ref 0–2)
BILIRUB SERPL-MCNC: <0.2 MG/DL (ref 0–1.2)
BUN SERPL-MCNC: 61 MG/DL (ref 6–23)
CALCIUM SERPL-MCNC: 10.1 MG/DL (ref 8.6–10.2)
CHLORIDE SERPL-SCNC: 121 MMOL/L (ref 98–107)
CO2 SERPL-SCNC: 18 MMOL/L (ref 22–29)
CREAT SERPL-MCNC: 2.2 MG/DL (ref 0.5–1)
EOSINOPHIL # BLD: 0.96 K/UL (ref 0.05–0.5)
EOSINOPHILS RELATIVE PERCENT: 10 % (ref 0–6)
ERYTHROCYTE [DISTWIDTH] IN BLOOD BY AUTOMATED COUNT: 18 % (ref 11.5–15)
GFR SERPL CREATININE-BSD FRML MDRD: 23 ML/MIN/1.73M2
GLUCOSE SERPL-MCNC: 91 MG/DL (ref 74–99)
HCT VFR BLD AUTO: 32.4 % (ref 34–48)
HGB BLD-MCNC: 9.8 G/DL (ref 11.5–15.5)
IMM GRANULOCYTES # BLD AUTO: 0.09 K/UL (ref 0–0.58)
IMM GRANULOCYTES NFR BLD: 1 % (ref 0–5)
LYMPHOCYTES NFR BLD: 0.93 K/UL (ref 1.5–4)
LYMPHOCYTES RELATIVE PERCENT: 10 % (ref 20–42)
MCH RBC QN AUTO: 28.4 PG (ref 26–35)
MCHC RBC AUTO-ENTMCNC: 30.2 G/DL (ref 32–34.5)
MCV RBC AUTO: 93.9 FL (ref 80–99.9)
MONOCYTES NFR BLD: 0.63 K/UL (ref 0.1–0.95)
MONOCYTES NFR BLD: 7 % (ref 2–12)
NEUTROPHILS NFR BLD: 72 % (ref 43–80)
NEUTS SEG NFR BLD: 6.75 K/UL (ref 1.8–7.3)
PLATELET # BLD AUTO: 166 K/UL (ref 130–450)
PMV BLD AUTO: 12.9 FL (ref 7–12)
POTASSIUM SERPL-SCNC: 4.9 MMOL/L (ref 3.5–5)
PROT SERPL-MCNC: 6 G/DL (ref 6.4–8.3)
RBC # BLD AUTO: 3.45 M/UL (ref 3.5–5.5)
SODIUM SERPL-SCNC: 154 MMOL/L (ref 132–146)
WBC OTHER # BLD: 9.4 K/UL (ref 4.5–11.5)

## 2023-11-05 PROCEDURE — 1200000000 HC SEMI PRIVATE

## 2023-11-05 PROCEDURE — 2580000003 HC RX 258: Performed by: INTERNAL MEDICINE

## 2023-11-05 PROCEDURE — 36415 COLL VENOUS BLD VENIPUNCTURE: CPT

## 2023-11-05 PROCEDURE — 80053 COMPREHEN METABOLIC PANEL: CPT

## 2023-11-05 PROCEDURE — 85025 COMPLETE CBC W/AUTO DIFF WBC: CPT

## 2023-11-05 RX ADMIN — Medication 10 ML: at 20:45

## 2023-11-05 ASSESSMENT — PAIN SCALES - WONG BAKER: WONGBAKER_NUMERICALRESPONSE: 0

## 2023-11-05 ASSESSMENT — PAIN SCALES - GENERAL
PAINLEVEL_OUTOF10: 0
PAINLEVEL_OUTOF10: 0

## 2023-11-05 NOTE — PLAN OF CARE
Problem: Discharge Planning  Goal: Discharge to home or other facility with appropriate resources  Outcome: Not Progressing     Problem: Safety - Adult  Goal: Free from fall injury  Outcome: Progressing     Problem: Skin/Tissue Integrity  Goal: Absence of new skin breakdown  Description: 1. Monitor for areas of redness and/or skin breakdown  2. Assess vascular access sites hourly  3. Every 4-6 hours minimum:  Change oxygen saturation probe site  4. Every 4-6 hours:  If on nasal continuous positive airway pressure, respiratory therapy assess nares and determine need for appliance change or resting period.   Outcome: Progressing     Problem: Pain  Goal: Verbalizes/displays adequate comfort level or baseline comfort level  Outcome: Adequate for Discharge     Problem: Neurosensory - Adult  Goal: Achieves stable or improved neurological status  Outcome: Progressing  Goal: Achieves maximal functionality and self care  Outcome: Not Progressing     Problem: Skin/Tissue Integrity - Adult  Goal: Skin integrity remains intact  Outcome: Progressing     Problem: Gastrointestinal - Adult  Goal: Maintains adequate nutritional intake  Outcome: Progressing     Problem: Genitourinary - Adult  Goal: Absence of urinary retention  Outcome: Progressing     Problem: Infection - Adult  Goal: Absence of infection at discharge  Outcome: Progressing     Problem: Chronic Conditions and Co-morbidities  Goal: Patient's chronic conditions and co-morbidity symptoms are monitored and maintained or improved  Outcome: Not Progressing

## 2023-11-05 NOTE — H&P
Timoteo Newell MD FACP  Internal medicine  Progress Notes      CHIEF COMPLAINT: Failure to thrive      HISTORY OF PRESENT ILLNESS:    11/3/2023  Patient was seen on the floor earlier this morning at the main campus of [facility] with the ER physician at the time of admission  I discharged this patient to the nursing home where she is a long-term resident after being treated for UTI and dehydration  Patient was refused to to be readmitted to the nursing facility stating that her mental status is not at her baseline  She was sent back to emergency room and is readmitted  11/4/2023  Patient was seen on the floor earlier this morning  Oral intake remains poor  Mental status unchanged  11/5/2023  Patient was seen on the floor earlier this morning  Oral intake remains poor  No acute events overnight  Past Medical History:    Past Medical History:   Diagnosis Date    Arthritis     hips    Ataxia     Atrophy of muscle     Bipolar 1 disorder, mixed, moderate ([address])     Cerebrovascular disease     Coordination problem     Hyperlipidemia     Hypertension     Overactive bladder     Paranoid schizophrenia ([address])     Thyroid disease        Past Surgical History:    Past Surgical History:   Procedure Laterality Date    BLADDER SUSPENSION      DILATION AND CURETTAGE OF UTERUS      DILATION AND CURETTAGE OF UTERUS N/A 2/16/2021    endometrial biopsy, HYSTEROSCOPY performed by Ailyn Najera MD at [address] [facility]         Medications Prior to Admission:    Medications Prior to Admission: acetaminophen (TYLENOL) 325 MG tablet, Take 2 tablets by mouth every 4 hours as needed for Pain or Fever  aspirin 81 MG chewable tablet, Take 1 tablet by mouth daily  divalproex (DEPAKOTE) 125 MG DR tablet, Take 2 tablets by mouth 2 times daily  paliperidone (INVEGA) 3 MG extended release tablet, Take 1 tablet by mouth every morning  ondansetron (ZOFRAN-ODT) 4 MG

## 2023-11-06 LAB
ALBUMIN SERPL-MCNC: 3 G/DL (ref 3.5–5.2)
ALP SERPL-CCNC: 85 U/L (ref 35–104)
ALT SERPL-CCNC: 24 U/L (ref 0–32)
ANION GAP SERPL CALCULATED.3IONS-SCNC: 10 MMOL/L (ref 7–16)
AST SERPL-CCNC: 22 U/L (ref 0–31)
BASOPHILS # BLD: 0.04 K/UL (ref 0–0.2)
BASOPHILS NFR BLD: 0 % (ref 0–2)
BILIRUB SERPL-MCNC: <0.2 MG/DL (ref 0–1.2)
BUN SERPL-MCNC: 57 MG/DL (ref 6–23)
CALCIUM SERPL-MCNC: 9.6 MG/DL (ref 8.6–10.2)
CHLORIDE SERPL-SCNC: 121 MMOL/L (ref 98–107)
CO2 SERPL-SCNC: 20 MMOL/L (ref 22–29)
CREAT SERPL-MCNC: 2.1 MG/DL (ref 0.5–1)
EOSINOPHIL # BLD: 1.56 K/UL (ref 0.05–0.5)
EOSINOPHILS RELATIVE PERCENT: 14 % (ref 0–6)
ERYTHROCYTE [DISTWIDTH] IN BLOOD BY AUTOMATED COUNT: 18 % (ref 11.5–15)
GFR SERPL CREATININE-BSD FRML MDRD: 24 ML/MIN/1.73M2
GLUCOSE SERPL-MCNC: 81 MG/DL (ref 74–99)
HCT VFR BLD AUTO: 33.5 % (ref 34–48)
HGB BLD-MCNC: 10.5 G/DL (ref 11.5–15.5)
IMM GRANULOCYTES # BLD AUTO: 0.19 K/UL (ref 0–0.58)
IMM GRANULOCYTES NFR BLD: 2 % (ref 0–5)
LYMPHOCYTES NFR BLD: 1.19 K/UL (ref 1.5–4)
LYMPHOCYTES RELATIVE PERCENT: 11 % (ref 20–42)
MCH RBC QN AUTO: 29 PG (ref 26–35)
MCHC RBC AUTO-ENTMCNC: 31.3 G/DL (ref 32–34.5)
MCV RBC AUTO: 92.5 FL (ref 80–99.9)
MONOCYTES NFR BLD: 0.7 K/UL (ref 0.1–0.95)
MONOCYTES NFR BLD: 6 % (ref 2–12)
NEUTROPHILS NFR BLD: 67 % (ref 43–80)
NEUTS SEG NFR BLD: 7.57 K/UL (ref 1.8–7.3)
PLATELET, FLUORESCENCE: 160 K/UL (ref 130–450)
PMV BLD AUTO: 12.4 FL (ref 7–12)
POTASSIUM SERPL-SCNC: 5.4 MMOL/L (ref 3.5–5)
PROT SERPL-MCNC: 5.8 G/DL (ref 6.4–8.3)
RBC # BLD AUTO: 3.62 M/UL (ref 3.5–5.5)
SODIUM SERPL-SCNC: 151 MMOL/L (ref 132–146)
WBC OTHER # BLD: 11.3 K/UL (ref 4.5–11.5)

## 2023-11-06 PROCEDURE — 2580000003 HC RX 258: Performed by: INTERNAL MEDICINE

## 2023-11-06 PROCEDURE — 85025 COMPLETE CBC W/AUTO DIFF WBC: CPT

## 2023-11-06 PROCEDURE — 6370000000 HC RX 637 (ALT 250 FOR IP): Performed by: INTERNAL MEDICINE

## 2023-11-06 PROCEDURE — 80053 COMPREHEN METABOLIC PANEL: CPT

## 2023-11-06 PROCEDURE — 1200000000 HC SEMI PRIVATE

## 2023-11-06 PROCEDURE — 36415 COLL VENOUS BLD VENIPUNCTURE: CPT

## 2023-11-06 RX ADMIN — ASPIRIN 81 MG: 81 TABLET, CHEWABLE ORAL at 09:02

## 2023-11-06 RX ADMIN — DIVALPROEX SODIUM 250 MG: 250 TABLET, DELAYED RELEASE ORAL at 20:53

## 2023-11-06 RX ADMIN — LEVETIRACETAM 500 MG: 500 TABLET, FILM COATED ORAL at 09:02

## 2023-11-06 RX ADMIN — Medication 10 ML: at 09:02

## 2023-11-06 RX ADMIN — DIVALPROEX SODIUM 250 MG: 250 TABLET, DELAYED RELEASE ORAL at 09:02

## 2023-11-06 RX ADMIN — PALIPERIDONE 3 MG: 3 TABLET, EXTENDED RELEASE ORAL at 09:02

## 2023-11-06 RX ADMIN — LEVETIRACETAM 500 MG: 500 TABLET, FILM COATED ORAL at 20:53

## 2023-11-06 RX ADMIN — Medication 10 ML: at 20:54

## 2023-11-06 ASSESSMENT — PAIN SCALES - GENERAL
PAINLEVEL_OUTOF10: 0
PAINLEVEL_OUTOF10: 0

## 2023-11-06 NOTE — CARE COORDINATION
11/6. The pt was sent back to [facility] for continued altered mental status. NA+ 151  K 5.4    BUN 57 cr 2.1. Plan is to return to [facility] when medically stable.  Vilma Coronado RN

## 2023-11-07 LAB
ALBUMIN SERPL-MCNC: 3 G/DL (ref 3.5–5.2)
ALP SERPL-CCNC: 86 U/L (ref 35–104)
ALT SERPL-CCNC: 24 U/L (ref 0–32)
ANION GAP SERPL CALCULATED.3IONS-SCNC: 13 MMOL/L (ref 7–16)
AST SERPL-CCNC: 21 U/L (ref 0–31)
BASOPHILS # BLD: 0.04 K/UL (ref 0–0.2)
BASOPHILS NFR BLD: 0 % (ref 0–2)
BILIRUB SERPL-MCNC: <0.2 MG/DL (ref 0–1.2)
BUN SERPL-MCNC: 63 MG/DL (ref 6–23)
CALCIUM SERPL-MCNC: 9.6 MG/DL (ref 8.6–10.2)
CHLORIDE SERPL-SCNC: 120 MMOL/L (ref 98–107)
CO2 SERPL-SCNC: 18 MMOL/L (ref 22–29)
CREAT SERPL-MCNC: 2.3 MG/DL (ref 0.5–1)
EOSINOPHIL # BLD: 1.7 K/UL (ref 0.05–0.5)
EOSINOPHILS RELATIVE PERCENT: 14 % (ref 0–6)
ERYTHROCYTE [DISTWIDTH] IN BLOOD BY AUTOMATED COUNT: 18 % (ref 11.5–15)
GFR SERPL CREATININE-BSD FRML MDRD: 22 ML/MIN/1.73M2
GLUCOSE SERPL-MCNC: 77 MG/DL (ref 74–99)
HCT VFR BLD AUTO: 33.4 % (ref 34–48)
HGB BLD-MCNC: 10.2 G/DL (ref 11.5–15.5)
IMM GRANULOCYTES # BLD AUTO: 0.15 K/UL (ref 0–0.58)
IMM GRANULOCYTES NFR BLD: 1 % (ref 0–5)
LYMPHOCYTES NFR BLD: 1.68 K/UL (ref 1.5–4)
LYMPHOCYTES RELATIVE PERCENT: 14 % (ref 20–42)
MCH RBC QN AUTO: 28.6 PG (ref 26–35)
MCHC RBC AUTO-ENTMCNC: 30.5 G/DL (ref 32–34.5)
MCV RBC AUTO: 93.6 FL (ref 80–99.9)
MONOCYTES NFR BLD: 0.62 K/UL (ref 0.1–0.95)
MONOCYTES NFR BLD: 5 % (ref 2–12)
NEUTROPHILS NFR BLD: 64 % (ref 43–80)
NEUTS SEG NFR BLD: 7.59 K/UL (ref 1.8–7.3)
PLATELET # BLD AUTO: 154 K/UL (ref 130–450)
PMV BLD AUTO: 13 FL (ref 7–12)
POTASSIUM SERPL-SCNC: 5.7 MMOL/L (ref 3.5–5)
PROT SERPL-MCNC: 5.7 G/DL (ref 6.4–8.3)
RBC # BLD AUTO: 3.57 M/UL (ref 3.5–5.5)
SODIUM SERPL-SCNC: 151 MMOL/L (ref 132–146)
WBC OTHER # BLD: 11.8 K/UL (ref 4.5–11.5)

## 2023-11-07 PROCEDURE — 6370000000 HC RX 637 (ALT 250 FOR IP): Performed by: INTERNAL MEDICINE

## 2023-11-07 PROCEDURE — 80053 COMPREHEN METABOLIC PANEL: CPT

## 2023-11-07 PROCEDURE — 2580000003 HC RX 258: Performed by: INTERNAL MEDICINE

## 2023-11-07 PROCEDURE — 1200000000 HC SEMI PRIVATE

## 2023-11-07 PROCEDURE — 85025 COMPLETE CBC W/AUTO DIFF WBC: CPT

## 2023-11-07 PROCEDURE — 6360000002 HC RX W HCPCS: Performed by: INTERNAL MEDICINE

## 2023-11-07 PROCEDURE — 36415 COLL VENOUS BLD VENIPUNCTURE: CPT

## 2023-11-07 RX ADMIN — LEVETIRACETAM 500 MG: 500 TABLET, FILM COATED ORAL at 20:37

## 2023-11-07 RX ADMIN — DIVALPROEX SODIUM 250 MG: 250 TABLET, DELAYED RELEASE ORAL at 09:17

## 2023-11-07 RX ADMIN — DEXTROSE MONOHYDRATE: 50 INJECTION, SOLUTION INTRAVENOUS at 17:39

## 2023-11-07 RX ADMIN — DIVALPROEX SODIUM 250 MG: 250 TABLET, DELAYED RELEASE ORAL at 20:37

## 2023-11-07 RX ADMIN — METHYLPREDNISOLONE SODIUM SUCCINATE 40 MG: 40 INJECTION, POWDER, FOR SOLUTION INTRAMUSCULAR; INTRAVENOUS at 14:03

## 2023-11-07 RX ADMIN — Medication 10 ML: at 09:16

## 2023-11-07 RX ADMIN — LEVETIRACETAM 500 MG: 500 TABLET, FILM COATED ORAL at 09:15

## 2023-11-07 RX ADMIN — METHYLPREDNISOLONE SODIUM SUCCINATE 40 MG: 40 INJECTION, POWDER, FOR SOLUTION INTRAMUSCULAR; INTRAVENOUS at 23:44

## 2023-11-07 RX ADMIN — ASPIRIN 81 MG: 81 TABLET, CHEWABLE ORAL at 09:14

## 2023-11-07 NOTE — CARE COORDINATION
11/7. AM labs abnormal. Po intake remains poor. Discharge plan is to return to [facility], where she is a long-term pt. She may return. No insurance precert needed.  Ric Ruelas RN     Latest Reference Range & Units 11/07/23 04:13   Sodium 132 - 146 mmol/L 151 (H)   Potassium 3.5 - 5.0 mmol/L 5.7 (H)   Chloride 98 - 107 mmol/L 120 (H)   CO2 22 - 29 mmol/L 18 (L)   BUN,BUNPL 6 - 23 mg/dL 63 (H)   Creatinine 0.50 - 1.00 mg/dL 2.3 (H)   (H): Data is abnormally high  (L): Data is abnormally low

## 2023-11-07 NOTE — PLAN OF CARE
Problem: Discharge Planning  Goal: Discharge to home or other facility with appropriate resources  Outcome: Progressing     Problem: Safety - Adult  Goal: Free from fall injury  Outcome: Progressing     Problem: Skin/Tissue Integrity  Goal: Absence of new skin breakdown  Description: 1. Monitor for areas of redness and/or skin breakdown  2. Assess vascular access sites hourly  3. Every 4-6 hours minimum:  Change oxygen saturation probe site  4. Every 4-6 hours:  If on nasal continuous positive airway pressure, respiratory therapy assess nares and determine need for appliance change or resting period.   Outcome: Progressing     Problem: Pain  Goal: Verbalizes/displays adequate comfort level or baseline comfort level  Outcome: Progressing     Problem: Neurosensory - Adult  Goal: Achieves stable or improved neurological status  Outcome: Progressing  Goal: Achieves maximal functionality and self care  Outcome: Progressing

## 2023-11-08 LAB
ALBUMIN SERPL-MCNC: 3.3 G/DL (ref 3.5–5.2)
ALP SERPL-CCNC: 93 U/L (ref 35–104)
ALT SERPL-CCNC: 24 U/L (ref 0–32)
ANION GAP SERPL CALCULATED.3IONS-SCNC: 12 MMOL/L (ref 7–16)
AST SERPL-CCNC: 20 U/L (ref 0–31)
BASOPHILS # BLD: 0.04 K/UL (ref 0–0.2)
BASOPHILS NFR BLD: 0 % (ref 0–2)
BILIRUB SERPL-MCNC: <0.2 MG/DL (ref 0–1.2)
BUN SERPL-MCNC: 66 MG/DL (ref 6–23)
CALCIUM SERPL-MCNC: 9.8 MG/DL (ref 8.6–10.2)
CHLORIDE SERPL-SCNC: 117 MMOL/L (ref 98–107)
CO2 SERPL-SCNC: 18 MMOL/L (ref 22–29)
CREAT SERPL-MCNC: 2.3 MG/DL (ref 0.5–1)
EOSINOPHIL # BLD: 0.03 K/UL (ref 0.05–0.5)
EOSINOPHILS RELATIVE PERCENT: 0 % (ref 0–6)
ERYTHROCYTE [DISTWIDTH] IN BLOOD BY AUTOMATED COUNT: 17.9 % (ref 11.5–15)
GFR SERPL CREATININE-BSD FRML MDRD: 22 ML/MIN/1.73M2
GLUCOSE BLD-MCNC: 191 MG/DL (ref 74–99)
GLUCOSE SERPL-MCNC: 108 MG/DL (ref 74–99)
HCT VFR BLD AUTO: 32.6 % (ref 34–48)
HGB BLD-MCNC: 10.2 G/DL (ref 11.5–15.5)
IMM GRANULOCYTES # BLD AUTO: 0.27 K/UL (ref 0–0.58)
IMM GRANULOCYTES NFR BLD: 2 % (ref 0–5)
LYMPHOCYTES NFR BLD: 1.05 K/UL (ref 1.5–4)
LYMPHOCYTES RELATIVE PERCENT: 7 % (ref 20–42)
MCH RBC QN AUTO: 28.7 PG (ref 26–35)
MCHC RBC AUTO-ENTMCNC: 31.3 G/DL (ref 32–34.5)
MCV RBC AUTO: 91.8 FL (ref 80–99.9)
MONOCYTES NFR BLD: 0.3 K/UL (ref 0.1–0.95)
MONOCYTES NFR BLD: 2 % (ref 2–12)
NEUTROPHILS NFR BLD: 88 % (ref 43–80)
NEUTS SEG NFR BLD: 12.74 K/UL (ref 1.8–7.3)
PLATELET # BLD AUTO: 163 K/UL (ref 130–450)
PMV BLD AUTO: 12.6 FL (ref 7–12)
POTASSIUM SERPL-SCNC: 5.6 MMOL/L (ref 3.5–5)
PROT SERPL-MCNC: 6.1 G/DL (ref 6.4–8.3)
RBC # BLD AUTO: 3.55 M/UL (ref 3.5–5.5)
SODIUM SERPL-SCNC: 147 MMOL/L (ref 132–146)
WBC OTHER # BLD: 14.4 K/UL (ref 4.5–11.5)

## 2023-11-08 PROCEDURE — 85025 COMPLETE CBC W/AUTO DIFF WBC: CPT

## 2023-11-08 PROCEDURE — 80053 COMPREHEN METABOLIC PANEL: CPT

## 2023-11-08 PROCEDURE — 97535 SELF CARE MNGMENT TRAINING: CPT

## 2023-11-08 PROCEDURE — 82962 GLUCOSE BLOOD TEST: CPT

## 2023-11-08 PROCEDURE — 2580000003 HC RX 258: Performed by: INTERNAL MEDICINE

## 2023-11-08 PROCEDURE — 1200000000 HC SEMI PRIVATE

## 2023-11-08 PROCEDURE — 6360000002 HC RX W HCPCS: Performed by: INTERNAL MEDICINE

## 2023-11-08 PROCEDURE — 36415 COLL VENOUS BLD VENIPUNCTURE: CPT

## 2023-11-08 PROCEDURE — 97165 OT EVAL LOW COMPLEX 30 MIN: CPT

## 2023-11-08 PROCEDURE — 6370000000 HC RX 637 (ALT 250 FOR IP): Performed by: INTERNAL MEDICINE

## 2023-11-08 RX ORDER — PREDNISONE 20 MG/1
20 TABLET ORAL DAILY
Status: DISCONTINUED | OUTPATIENT
Start: 2023-11-08 | End: 2023-11-10

## 2023-11-08 RX ADMIN — PALIPERIDONE 3 MG: 3 TABLET, EXTENDED RELEASE ORAL at 10:13

## 2023-11-08 RX ADMIN — Medication 10 ML: at 21:20

## 2023-11-08 RX ADMIN — ASPIRIN 81 MG: 81 TABLET, CHEWABLE ORAL at 10:13

## 2023-11-08 RX ADMIN — METHYLPREDNISOLONE SODIUM SUCCINATE 40 MG: 40 INJECTION, POWDER, FOR SOLUTION INTRAMUSCULAR; INTRAVENOUS at 12:00

## 2023-11-08 RX ADMIN — DIVALPROEX SODIUM 250 MG: 250 TABLET, DELAYED RELEASE ORAL at 10:13

## 2023-11-08 RX ADMIN — DIVALPROEX SODIUM 250 MG: 250 TABLET, DELAYED RELEASE ORAL at 21:20

## 2023-11-08 RX ADMIN — LEVETIRACETAM 500 MG: 500 TABLET, FILM COATED ORAL at 10:14

## 2023-11-08 RX ADMIN — LEVETIRACETAM 500 MG: 500 TABLET, FILM COATED ORAL at 21:19

## 2023-11-08 ASSESSMENT — PAIN SCALES - WONG BAKER: WONGBAKER_NUMERICALRESPONSE: 0

## 2023-11-08 ASSESSMENT — PAIN SCALES - GENERAL: PAINLEVEL_OUTOF10: 0

## 2023-11-08 NOTE — CONSULTS
[facility] CONSULT NOTE  Reason for Consult:  rule out scabies  Requesting Physician: Dr Schuyler Nava   Patient presents with    Altered Mental Status     (Patient just discharge from upstairs to [facility], EMS stated that [facility] refused patient due to patient not being at [facility] status baseline\", patient according to [facility] is altered and refused to accept patient back)       History Obtained From:  chart    HISTORY OFPRESENT ILLNESS              The patient is a 70 y.o. female with significant past medical history as below. She has a hx of CVA. She was discharged recently to NH and was apparently sent here for AMS.  ID asked to see her for rash   she is on steroids      Past Medical History:   Diagnosis Date    Arthritis     hips    Ataxia     Atrophy of muscle     Bipolar 1 disorder, mixed, moderate (HCC)     Cerebrovascular disease     Coordination problem     Hyperlipidemia     Hypertension     Overactive bladder     Paranoid schizophrenia ([address])     Thyroid disease        Past Surgical History:   Procedure Laterality Date    BLADDER SUSPENSION      DILATION AND CURETTAGE OF UTERUS      DILATION AND CURETTAGE OF UTERUS N/A 2/16/2021    endometrial biopsy, HYSTEROSCOPY performed by Aquiles Boyle MD at [address] [facility]         Current Facility-Administered Medications   Medication Dose Route Frequency Provider Last Rate Last Admin    methylPREDNISolone sodium (PF) (SOLU-MEDROL PF) injection 40 mg  40 mg IntraVENous Q12H Lyn Mireles MD   40 mg at 11/07/23 2344    dextrose 5 % solution   IntraVENous Continuous Lyn Mireles  mL/hr at 11/07/23 1739 New Bag at 11/07/23 1739    acetaminophen (TYLENOL) tablet 650 mg  650 mg Oral Q4H PRN Lyn Mireles MD        aluminum & magnesium hydroxide-simethicone (MAALOX) 442-498-51 MG/5ML suspension 30 mL  30 mL Oral Q4H PRN Lyn Mireles MD        aspirin

## 2023-11-08 NOTE — CARE COORDINATION
11/8. Electrolytes remain abnormal. PO intake has improved. On IV steroids for rash. Discharge plan is to return to [facility], where she is a LTC resident. She may return with no precert needed.  Joe Das RN

## 2023-11-09 LAB
ALBUMIN SERPL-MCNC: 3 G/DL (ref 3.5–5.2)
ALP SERPL-CCNC: 78 U/L (ref 35–104)
ALT SERPL-CCNC: 21 U/L (ref 0–32)
ANION GAP SERPL CALCULATED.3IONS-SCNC: 11 MMOL/L (ref 7–16)
AST SERPL-CCNC: 16 U/L (ref 0–31)
BASOPHILS # BLD: 0.02 K/UL (ref 0–0.2)
BASOPHILS NFR BLD: 0 % (ref 0–2)
BILIRUB SERPL-MCNC: <0.2 MG/DL (ref 0–1.2)
BUN SERPL-MCNC: 65 MG/DL (ref 6–23)
CALCIUM SERPL-MCNC: 9.3 MG/DL (ref 8.6–10.2)
CHLORIDE SERPL-SCNC: 116 MMOL/L (ref 98–107)
CO2 SERPL-SCNC: 19 MMOL/L (ref 22–29)
CREAT SERPL-MCNC: 2.3 MG/DL (ref 0.5–1)
EOSINOPHIL # BLD: 0.6 K/UL (ref 0.05–0.5)
EOSINOPHILS RELATIVE PERCENT: 4 % (ref 0–6)
ERYTHROCYTE [DISTWIDTH] IN BLOOD BY AUTOMATED COUNT: 17.9 % (ref 11.5–15)
GFR SERPL CREATININE-BSD FRML MDRD: 22 ML/MIN/1.73M2
GLUCOSE BLD-MCNC: 84 MG/DL (ref 74–99)
GLUCOSE SERPL-MCNC: 117 MG/DL (ref 74–99)
HCT VFR BLD AUTO: 28.5 % (ref 34–48)
HGB BLD-MCNC: 8.6 G/DL (ref 11.5–15.5)
IMM GRANULOCYTES # BLD AUTO: 0.21 K/UL (ref 0–0.58)
IMM GRANULOCYTES NFR BLD: 1 % (ref 0–5)
LYMPHOCYTES NFR BLD: 1.56 K/UL (ref 1.5–4)
LYMPHOCYTES RELATIVE PERCENT: 10 % (ref 20–42)
MCH RBC QN AUTO: 28.2 PG (ref 26–35)
MCHC RBC AUTO-ENTMCNC: 30.2 G/DL (ref 32–34.5)
MCV RBC AUTO: 93.4 FL (ref 80–99.9)
MONOCYTES NFR BLD: 0.67 K/UL (ref 0.1–0.95)
MONOCYTES NFR BLD: 4 % (ref 2–12)
NEUTROPHILS NFR BLD: 80 % (ref 43–80)
NEUTS SEG NFR BLD: 12.17 K/UL (ref 1.8–7.3)
PLATELET, FLUORESCENCE: 151 K/UL (ref 130–450)
PMV BLD AUTO: 13.4 FL (ref 7–12)
POTASSIUM SERPL-SCNC: 5.4 MMOL/L (ref 3.5–5)
PROT SERPL-MCNC: 5.3 G/DL (ref 6.4–8.3)
RBC # BLD AUTO: 3.05 M/UL (ref 3.5–5.5)
SODIUM SERPL-SCNC: 146 MMOL/L (ref 132–146)
WBC OTHER # BLD: 15.2 K/UL (ref 4.5–11.5)

## 2023-11-09 PROCEDURE — 36415 COLL VENOUS BLD VENIPUNCTURE: CPT

## 2023-11-09 PROCEDURE — 82962 GLUCOSE BLOOD TEST: CPT

## 2023-11-09 PROCEDURE — 85025 COMPLETE CBC W/AUTO DIFF WBC: CPT

## 2023-11-09 PROCEDURE — 6370000000 HC RX 637 (ALT 250 FOR IP): Performed by: INTERNAL MEDICINE

## 2023-11-09 PROCEDURE — 2580000003 HC RX 258: Performed by: INTERNAL MEDICINE

## 2023-11-09 PROCEDURE — 80053 COMPREHEN METABOLIC PANEL: CPT

## 2023-11-09 PROCEDURE — 1200000000 HC SEMI PRIVATE

## 2023-11-09 RX ADMIN — PALIPERIDONE 3 MG: 3 TABLET, EXTENDED RELEASE ORAL at 10:22

## 2023-11-09 RX ADMIN — LEVETIRACETAM 500 MG: 500 TABLET, FILM COATED ORAL at 22:49

## 2023-11-09 RX ADMIN — PREDNISONE 20 MG: 20 TABLET ORAL at 10:22

## 2023-11-09 RX ADMIN — DEXTROSE MONOHYDRATE: 50 INJECTION, SOLUTION INTRAVENOUS at 08:36

## 2023-11-09 RX ADMIN — DIVALPROEX SODIUM 250 MG: 250 TABLET, DELAYED RELEASE ORAL at 22:50

## 2023-11-09 RX ADMIN — DIVALPROEX SODIUM 250 MG: 250 TABLET, DELAYED RELEASE ORAL at 10:22

## 2023-11-09 RX ADMIN — LEVETIRACETAM 500 MG: 500 TABLET, FILM COATED ORAL at 10:21

## 2023-11-09 RX ADMIN — ASPIRIN 81 MG: 81 TABLET, CHEWABLE ORAL at 10:21

## 2023-11-09 NOTE — PLAN OF CARE
Problem: Discharge Planning  Goal: Discharge to home or other facility with appropriate resources  Outcome: Progressing  Flowsheets (Taken 11/8/2023 2015)  Discharge to home or other facility with appropriate resources: Identify barriers to discharge with patient and caregiver     Problem: Safety - Adult  Goal: Free from fall injury  Outcome: Progressing  Flowsheets (Taken 11/8/2023 2015)  Free From Fall Injury: Based on caregiver fall risk screen, instruct family/caregiver to ask for assistance with transferring infant if caregiver noted to have fall risk factors     Problem: Skin/Tissue Integrity  Goal: Absence of new skin breakdown  Description: 1. Monitor for areas of redness and/or skin breakdown  2. Assess vascular access sites hourly  3. Every 4-6 hours minimum:  Change oxygen saturation probe site  4. Every 4-6 hours:  If on nasal continuous positive airway pressure, respiratory therapy assess nares and determine need for appliance change or resting period.   Outcome: Progressing     Problem: Pain  Goal: Verbalizes/displays adequate comfort level or baseline comfort level  Outcome: Progressing     Problem: Neurosensory - Adult  Goal: Achieves stable or improved neurological status  Outcome: Progressing  Flowsheets (Taken 11/8/2023 2015)  Achieves stable or improved neurological status: Assess for and report changes in neurological status  Goal: Achieves maximal functionality and self care  Outcome: Progressing     Problem: Skin/Tissue Integrity - Adult  Goal: Skin integrity remains intact  Outcome: Progressing  Flowsheets (Taken 11/8/2023 2015)  Skin Integrity Remains Intact: Monitor for areas of redness and/or skin breakdown     Problem: Gastrointestinal - Adult  Goal: Maintains adequate nutritional intake  Outcome: Progressing  Flowsheets (Taken 11/8/2023 2015)  Maintains adequate nutritional intake: Assist with meals as needed     Problem: Genitourinary - Adult  Goal: Absence of urinary

## 2023-11-09 NOTE — CARE COORDINATION
CM update. Patient changed to po steroids. Per internal med note today, ID consult for rash. Electrolytes abnormal. To monitor kidney functions. Started dextrose 5% infusion. Not eating well, may need G-tube placement. She is a LTC resident at [facility], and will discharge back there when medically stable. No precert needed to return. Ambulance form on soft chart.    Electronically signed by Aida Dugan RN on 11/9/2023 at 9:47 AM

## 2023-11-10 LAB
ALBUMIN SERPL-MCNC: 3 G/DL (ref 3.5–5.2)
ALP SERPL-CCNC: 83 U/L (ref 35–104)
ALT SERPL-CCNC: 21 U/L (ref 0–32)
ANION GAP SERPL CALCULATED.3IONS-SCNC: 13 MMOL/L (ref 7–16)
AST SERPL-CCNC: 16 U/L (ref 0–31)
BASOPHILS # BLD: 0.04 K/UL (ref 0–0.2)
BASOPHILS NFR BLD: 0 % (ref 0–2)
BILIRUB SERPL-MCNC: <0.2 MG/DL (ref 0–1.2)
BUN SERPL-MCNC: 62 MG/DL (ref 6–23)
CALCIUM SERPL-MCNC: 9.8 MG/DL (ref 8.6–10.2)
CHLORIDE SERPL-SCNC: 118 MMOL/L (ref 98–107)
CO2 SERPL-SCNC: 17 MMOL/L (ref 22–29)
CREAT SERPL-MCNC: 2.2 MG/DL (ref 0.5–1)
EOSINOPHIL # BLD: 0.23 K/UL (ref 0.05–0.5)
EOSINOPHILS RELATIVE PERCENT: 2 % (ref 0–6)
ERYTHROCYTE [DISTWIDTH] IN BLOOD BY AUTOMATED COUNT: 18.2 % (ref 11.5–15)
GFR SERPL CREATININE-BSD FRML MDRD: 23 ML/MIN/1.73M2
GLUCOSE SERPL-MCNC: 92 MG/DL (ref 74–99)
HCT VFR BLD AUTO: 32.3 % (ref 34–48)
HGB BLD-MCNC: 10.1 G/DL (ref 11.5–15.5)
IMM GRANULOCYTES # BLD AUTO: 0.21 K/UL (ref 0–0.58)
IMM GRANULOCYTES NFR BLD: 2 % (ref 0–5)
LYMPHOCYTES NFR BLD: 1.21 K/UL (ref 1.5–4)
LYMPHOCYTES RELATIVE PERCENT: 10 % (ref 20–42)
MCH RBC QN AUTO: 29 PG (ref 26–35)
MCHC RBC AUTO-ENTMCNC: 31.3 G/DL (ref 32–34.5)
MCV RBC AUTO: 92.8 FL (ref 80–99.9)
MONOCYTES NFR BLD: 0.4 K/UL (ref 0.1–0.95)
MONOCYTES NFR BLD: 3 % (ref 2–12)
NEUTROPHILS NFR BLD: 83 % (ref 43–80)
NEUTS SEG NFR BLD: 10.31 K/UL (ref 1.8–7.3)
PLATELET, FLUORESCENCE: 157 K/UL (ref 130–450)
PMV BLD AUTO: 12.5 FL (ref 7–12)
POTASSIUM SERPL-SCNC: 5.3 MMOL/L (ref 3.5–5)
PROT SERPL-MCNC: 5.8 G/DL (ref 6.4–8.3)
RBC # BLD AUTO: 3.48 M/UL (ref 3.5–5.5)
SODIUM SERPL-SCNC: 148 MMOL/L (ref 132–146)
WBC OTHER # BLD: 12.4 K/UL (ref 4.5–11.5)

## 2023-11-10 PROCEDURE — 1200000000 HC SEMI PRIVATE

## 2023-11-10 PROCEDURE — 2580000003 HC RX 258: Performed by: INTERNAL MEDICINE

## 2023-11-10 PROCEDURE — 36415 COLL VENOUS BLD VENIPUNCTURE: CPT

## 2023-11-10 PROCEDURE — 6370000000 HC RX 637 (ALT 250 FOR IP): Performed by: INTERNAL MEDICINE

## 2023-11-10 PROCEDURE — 85025 COMPLETE CBC W/AUTO DIFF WBC: CPT

## 2023-11-10 PROCEDURE — 80053 COMPREHEN METABOLIC PANEL: CPT

## 2023-11-10 RX ORDER — PERMETHRIN 50 MG/G
CREAM TOPICAL ONCE
Status: COMPLETED | OUTPATIENT
Start: 2023-11-10 | End: 2023-11-10

## 2023-11-10 RX ORDER — PERMETHRIN 50 MG/G
CREAM TOPICAL ONCE
Status: DISCONTINUED | OUTPATIENT
Start: 2023-11-10 | End: 2023-11-10

## 2023-11-10 RX ADMIN — PALIPERIDONE 3 MG: 3 TABLET, EXTENDED RELEASE ORAL at 09:34

## 2023-11-10 RX ADMIN — DEXTROSE MONOHYDRATE: 50 INJECTION, SOLUTION INTRAVENOUS at 21:57

## 2023-11-10 RX ADMIN — ASPIRIN 81 MG: 81 TABLET, CHEWABLE ORAL at 09:34

## 2023-11-10 RX ADMIN — LEVETIRACETAM 500 MG: 500 TABLET, FILM COATED ORAL at 09:34

## 2023-11-10 RX ADMIN — PERMETHRIN: 50 CREAM TOPICAL at 21:16

## 2023-11-10 RX ADMIN — DIVALPROEX SODIUM 250 MG: 250 TABLET, DELAYED RELEASE ORAL at 21:16

## 2023-11-10 RX ADMIN — Medication 10 ML: at 21:16

## 2023-11-10 RX ADMIN — PREDNISONE 20 MG: 20 TABLET ORAL at 09:35

## 2023-11-10 RX ADMIN — LEVETIRACETAM 500 MG: 500 TABLET, FILM COATED ORAL at 21:16

## 2023-11-10 RX ADMIN — DIVALPROEX SODIUM 250 MG: 250 TABLET, DELAYED RELEASE ORAL at 09:34

## 2023-11-10 ASSESSMENT — PAIN SCALES - GENERAL
PAINLEVEL_OUTOF10: 0
PAINLEVEL_OUTOF10: 0

## 2023-11-10 ASSESSMENT — PAIN SCALES - WONG BAKER: WONGBAKER_NUMERICALRESPONSE: 0

## 2023-11-10 NOTE — CARE COORDINATION
11/10. Electrolytes remain abnormal. Discharge plan is to return to [facility].  Gasper Licea RN

## 2023-11-10 NOTE — PLAN OF CARE
Problem: Discharge Planning  Goal: Discharge to home or other facility with appropriate resources  Outcome: Progressing     Problem: Safety - Adult  Goal: Free from fall injury  Outcome: Progressing     Problem: Skin/Tissue Integrity  Goal: Absence of new skin breakdown  Description: 1. Monitor for areas of redness and/or skin breakdown  2. Assess vascular access sites hourly  3. Every 4-6 hours minimum:  Change oxygen saturation probe site  4. Every 4-6 hours:  If on nasal continuous positive airway pressure, respiratory therapy assess nares and determine need for appliance change or resting period.   Outcome: Progressing     Problem: Pain  Goal: Verbalizes/displays adequate comfort level or baseline comfort level  Outcome: Progressing     Problem: Neurosensory - Adult  Goal: Achieves stable or improved neurological status  Outcome: Progressing     Problem: Neurosensory - Adult  Goal: Achieves maximal functionality and self care  Outcome: Progressing     Problem: Skin/Tissue Integrity - Adult  Goal: Skin integrity remains intact  Outcome: Progressing     Problem: Gastrointestinal - Adult  Goal: Maintains adequate nutritional intake  Outcome: Progressing     Problem: Genitourinary - Adult  Goal: Absence of urinary retention  Outcome: Progressing     Problem: Infection - Adult  Goal: Absence of infection at discharge  Outcome: Progressing     Problem: Chronic Conditions and Co-morbidities  Goal: Patient's chronic conditions and co-morbidity symptoms are monitored and maintained or improved  Outcome: Progressing     Problem: Nutrition Deficit:  Goal: Optimize nutritional status  Outcome: Progressing  Flowsheets (Taken 11/10/2023 2060 by Edgar Pate RD, LD)  Nutrient intake appropriate for improving, restoring, or maintaining nutritional needs: Assess nutritional status and recommend course of action

## 2023-11-11 LAB
ALBUMIN SERPL-MCNC: 3.1 G/DL (ref 3.5–5.2)
ALP SERPL-CCNC: 84 U/L (ref 35–104)
ALT SERPL-CCNC: 20 U/L (ref 0–32)
ANION GAP SERPL CALCULATED.3IONS-SCNC: 14 MMOL/L (ref 7–16)
AST SERPL-CCNC: 16 U/L (ref 0–31)
BASOPHILS # BLD: 0.05 K/UL (ref 0–0.2)
BASOPHILS NFR BLD: 0 % (ref 0–2)
BILIRUB SERPL-MCNC: <0.2 MG/DL (ref 0–1.2)
BUN SERPL-MCNC: 61 MG/DL (ref 6–23)
CALCIUM SERPL-MCNC: 9.9 MG/DL (ref 8.6–10.2)
CHLORIDE SERPL-SCNC: 117 MMOL/L (ref 98–107)
CO2 SERPL-SCNC: 18 MMOL/L (ref 22–29)
CREAT SERPL-MCNC: 2 MG/DL (ref 0.5–1)
EOSINOPHIL # BLD: 0.47 K/UL (ref 0.05–0.5)
EOSINOPHILS RELATIVE PERCENT: 2 % (ref 0–6)
ERYTHROCYTE [DISTWIDTH] IN BLOOD BY AUTOMATED COUNT: 18 % (ref 11.5–15)
GFR SERPL CREATININE-BSD FRML MDRD: 25 ML/MIN/1.73M2
GLUCOSE SERPL-MCNC: 87 MG/DL (ref 74–99)
HCT VFR BLD AUTO: 32 % (ref 34–48)
HGB BLD-MCNC: 9.7 G/DL (ref 11.5–15.5)
IMM GRANULOCYTES # BLD AUTO: 0.23 K/UL (ref 0–0.58)
IMM GRANULOCYTES NFR BLD: 1 % (ref 0–5)
LYMPHOCYTES NFR BLD: 1.14 K/UL (ref 1.5–4)
LYMPHOCYTES RELATIVE PERCENT: 6 % (ref 20–42)
MCH RBC QN AUTO: 28 PG (ref 26–35)
MCHC RBC AUTO-ENTMCNC: 30.3 G/DL (ref 32–34.5)
MCV RBC AUTO: 92.5 FL (ref 80–99.9)
MONOCYTES NFR BLD: 0.9 K/UL (ref 0.1–0.95)
MONOCYTES NFR BLD: 5 % (ref 2–12)
NEUTROPHILS NFR BLD: 86 % (ref 43–80)
NEUTS SEG NFR BLD: 16.93 K/UL (ref 1.8–7.3)
PLATELET # BLD AUTO: 166 K/UL (ref 130–450)
PMV BLD AUTO: 12.9 FL (ref 7–12)
POTASSIUM SERPL-SCNC: 5.1 MMOL/L (ref 3.5–5)
PROT SERPL-MCNC: 5.8 G/DL (ref 6.4–8.3)
RBC # BLD AUTO: 3.46 M/UL (ref 3.5–5.5)
SODIUM SERPL-SCNC: 149 MMOL/L (ref 132–146)
WBC OTHER # BLD: 19.7 K/UL (ref 4.5–11.5)

## 2023-11-11 PROCEDURE — 6370000000 HC RX 637 (ALT 250 FOR IP): Performed by: INTERNAL MEDICINE

## 2023-11-11 PROCEDURE — 2580000003 HC RX 258: Performed by: INTERNAL MEDICINE

## 2023-11-11 PROCEDURE — 1200000000 HC SEMI PRIVATE

## 2023-11-11 PROCEDURE — 85025 COMPLETE CBC W/AUTO DIFF WBC: CPT

## 2023-11-11 PROCEDURE — 36415 COLL VENOUS BLD VENIPUNCTURE: CPT

## 2023-11-11 PROCEDURE — 80053 COMPREHEN METABOLIC PANEL: CPT

## 2023-11-11 RX ADMIN — LEVETIRACETAM 500 MG: 500 TABLET, FILM COATED ORAL at 08:51

## 2023-11-11 RX ADMIN — ASPIRIN 81 MG: 81 TABLET, CHEWABLE ORAL at 08:51

## 2023-11-11 RX ADMIN — DIVALPROEX SODIUM 250 MG: 250 TABLET, DELAYED RELEASE ORAL at 21:35

## 2023-11-11 RX ADMIN — Medication 10 ML: at 21:48

## 2023-11-11 RX ADMIN — DIVALPROEX SODIUM 250 MG: 250 TABLET, DELAYED RELEASE ORAL at 08:51

## 2023-11-11 RX ADMIN — LEVETIRACETAM 500 MG: 500 TABLET, FILM COATED ORAL at 21:35

## 2023-11-11 RX ADMIN — PALIPERIDONE 3 MG: 3 TABLET, EXTENDED RELEASE ORAL at 08:56

## 2023-11-11 ASSESSMENT — PAIN SCALES - GENERAL
PAINLEVEL_OUTOF10: 0

## 2023-11-11 ASSESSMENT — PAIN SCALES - WONG BAKER: WONGBAKER_NUMERICALRESPONSE: 0

## 2023-11-11 NOTE — PLAN OF CARE
Problem: Discharge Planning  Goal: Discharge to home or other facility with appropriate resources  11/11/2023 1054 by Mee Archibald RN  Outcome: Progressing     Problem: Safety - Adult  Goal: Free from fall injury  11/11/2023 1054 by Mee Archibald RN  Outcome: Progressing     Problem: Skin/Tissue Integrity  Goal: Absence of new skin breakdown  Description: 1. Monitor for areas of redness and/or skin breakdown  2. Assess vascular access sites hourly  3. Every 4-6 hours minimum:  Change oxygen saturation probe site  4. Every 4-6 hours:  If on nasal continuous positive airway pressure, respiratory therapy assess nares and determine need for appliance change or resting period.   11/11/2023 1054 by Summer Victoria RN  Outcome: Progressing     Problem: Pain  Goal: Verbalizes/displays adequate comfort level or baseline comfort level  11/11/2023 1054 by Mee Archibald RN  Outcome: Progressing     Problem: Neurosensory - Adult  Goal: Achieves stable or improved neurological status  Outcome: Progressing     Problem: Neurosensory - Adult  Goal: Achieves maximal functionality and self care  Outcome: Progressing     Problem: Skin/Tissue Integrity - Adult  Goal: Skin integrity remains intact  Outcome: Progressing     Problem: Gastrointestinal - Adult  Goal: Maintains adequate nutritional intake  Outcome: Progressing     Problem: Genitourinary - Adult  Goal: Absence of urinary retention  Outcome: Progressing     Problem: Infection - Adult  Goal: Absence of infection at discharge  Outcome: Progressing     Problem: Chronic Conditions and Co-morbidities  Goal: Patient's chronic conditions and co-morbidity symptoms are monitored and maintained or improved  Outcome: Progressing     Problem: Nutrition Deficit:  Goal: Optimize nutritional status  Outcome: Progressing

## 2023-11-12 LAB
ALBUMIN SERPL-MCNC: 2.7 G/DL (ref 3.5–5.2)
ALP SERPL-CCNC: 82 U/L (ref 35–104)
ALT SERPL-CCNC: 20 U/L (ref 0–32)
ANION GAP SERPL CALCULATED.3IONS-SCNC: 12 MMOL/L (ref 7–16)
AST SERPL-CCNC: 18 U/L (ref 0–31)
BASOPHILS # BLD: 0 K/UL (ref 0–0.2)
BASOPHILS NFR BLD: 0 % (ref 0–2)
BILIRUB SERPL-MCNC: <0.2 MG/DL (ref 0–1.2)
BUN SERPL-MCNC: 60 MG/DL (ref 6–23)
CALCIUM SERPL-MCNC: 9.1 MG/DL (ref 8.6–10.2)
CHLORIDE SERPL-SCNC: 116 MMOL/L (ref 98–107)
CO2 SERPL-SCNC: 18 MMOL/L (ref 22–29)
CREAT SERPL-MCNC: 2 MG/DL (ref 0.5–1)
EOSINOPHIL # BLD: 3.05 K/UL (ref 0.05–0.5)
EOSINOPHILS RELATIVE PERCENT: 24 % (ref 0–6)
ERYTHROCYTE [DISTWIDTH] IN BLOOD BY AUTOMATED COUNT: 17.8 % (ref 11.5–15)
GFR SERPL CREATININE-BSD FRML MDRD: 25 ML/MIN/1.73M2
GLUCOSE SERPL-MCNC: 85 MG/DL (ref 74–99)
HCT VFR BLD AUTO: 31.7 % (ref 34–48)
HGB BLD-MCNC: 9.7 G/DL (ref 11.5–15.5)
LYMPHOCYTES NFR BLD: 0.68 K/UL (ref 1.5–4)
LYMPHOCYTES RELATIVE PERCENT: 5 % (ref 20–42)
MCH RBC QN AUTO: 28.4 PG (ref 26–35)
MCHC RBC AUTO-ENTMCNC: 30.6 G/DL (ref 32–34.5)
MCV RBC AUTO: 92.7 FL (ref 80–99.9)
MONOCYTES NFR BLD: 0.45 K/UL (ref 0.1–0.95)
MONOCYTES NFR BLD: 4 % (ref 2–12)
NEUTROPHILS NFR BLD: 68 % (ref 43–80)
NEUTS SEG NFR BLD: 8.82 K/UL (ref 1.8–7.3)
PLATELET # BLD AUTO: 160 K/UL (ref 130–450)
PMV BLD AUTO: 12.7 FL (ref 7–12)
POTASSIUM SERPL-SCNC: 5.7 MMOL/L (ref 3.5–5)
PROT SERPL-MCNC: 5.3 G/DL (ref 6.4–8.3)
RBC # BLD AUTO: 3.42 M/UL (ref 3.5–5.5)
RBC # BLD: ABNORMAL 10*6/UL
SODIUM SERPL-SCNC: 146 MMOL/L (ref 132–146)
WBC OTHER # BLD: 13 K/UL (ref 4.5–11.5)

## 2023-11-12 PROCEDURE — 6370000000 HC RX 637 (ALT 250 FOR IP): Performed by: INTERNAL MEDICINE

## 2023-11-12 PROCEDURE — 85025 COMPLETE CBC W/AUTO DIFF WBC: CPT

## 2023-11-12 PROCEDURE — 1200000000 HC SEMI PRIVATE

## 2023-11-12 PROCEDURE — 36415 COLL VENOUS BLD VENIPUNCTURE: CPT

## 2023-11-12 PROCEDURE — 2580000003 HC RX 258: Performed by: INTERNAL MEDICINE

## 2023-11-12 PROCEDURE — 80053 COMPREHEN METABOLIC PANEL: CPT

## 2023-11-12 RX ADMIN — Medication 10 ML: at 08:32

## 2023-11-12 RX ADMIN — LEVETIRACETAM 500 MG: 500 TABLET, FILM COATED ORAL at 20:47

## 2023-11-12 RX ADMIN — PALIPERIDONE 3 MG: 3 TABLET, EXTENDED RELEASE ORAL at 08:32

## 2023-11-12 RX ADMIN — Medication 10 ML: at 20:47

## 2023-11-12 RX ADMIN — DIVALPROEX SODIUM 250 MG: 250 TABLET, DELAYED RELEASE ORAL at 20:47

## 2023-11-12 RX ADMIN — ASPIRIN 81 MG: 81 TABLET, CHEWABLE ORAL at 08:32

## 2023-11-12 RX ADMIN — LEVETIRACETAM 500 MG: 500 TABLET, FILM COATED ORAL at 08:32

## 2023-11-12 RX ADMIN — DIVALPROEX SODIUM 250 MG: 250 TABLET, DELAYED RELEASE ORAL at 08:32

## 2023-11-12 ASSESSMENT — PAIN SCALES - GENERAL: PAINLEVEL_OUTOF10: 0

## 2023-11-12 ASSESSMENT — PAIN SCALES - WONG BAKER: WONGBAKER_NUMERICALRESPONSE: 0

## 2023-11-13 LAB
ALBUMIN SERPL-MCNC: 2.8 G/DL (ref 3.5–5.2)
ALP SERPL-CCNC: 79 U/L (ref 35–104)
ALT SERPL-CCNC: 19 U/L (ref 0–32)
ANION GAP SERPL CALCULATED.3IONS-SCNC: 10 MMOL/L (ref 7–16)
AST SERPL-CCNC: 16 U/L (ref 0–31)
BASOPHILS # BLD: 0.04 K/UL (ref 0–0.2)
BASOPHILS NFR BLD: 0 % (ref 0–2)
BILIRUB SERPL-MCNC: <0.2 MG/DL (ref 0–1.2)
BUN SERPL-MCNC: 67 MG/DL (ref 6–23)
CALCIUM SERPL-MCNC: 9.2 MG/DL (ref 8.6–10.2)
CHLORIDE SERPL-SCNC: 117 MMOL/L (ref 98–107)
CO2 SERPL-SCNC: 21 MMOL/L (ref 22–29)
CREAT SERPL-MCNC: 2 MG/DL (ref 0.5–1)
EOSINOPHIL # BLD: 3.35 K/UL (ref 0.05–0.5)
EOSINOPHILS RELATIVE PERCENT: 28 % (ref 0–6)
ERYTHROCYTE [DISTWIDTH] IN BLOOD BY AUTOMATED COUNT: 17.8 % (ref 11.5–15)
GFR SERPL CREATININE-BSD FRML MDRD: 25 ML/MIN/1.73M2
GLUCOSE SERPL-MCNC: 94 MG/DL (ref 74–99)
HCT VFR BLD AUTO: 30.5 % (ref 34–48)
HGB BLD-MCNC: 9.3 G/DL (ref 11.5–15.5)
IMM GRANULOCYTES # BLD AUTO: 0.23 K/UL (ref 0–0.58)
IMM GRANULOCYTES NFR BLD: 2 % (ref 0–5)
LYMPHOCYTES NFR BLD: 1.59 K/UL (ref 1.5–4)
LYMPHOCYTES RELATIVE PERCENT: 13 % (ref 20–42)
MCH RBC QN AUTO: 28.6 PG (ref 26–35)
MCHC RBC AUTO-ENTMCNC: 30.5 G/DL (ref 32–34.5)
MCV RBC AUTO: 93.8 FL (ref 80–99.9)
MONOCYTES NFR BLD: 0.58 K/UL (ref 0.1–0.95)
MONOCYTES NFR BLD: 5 % (ref 2–12)
NEUTROPHILS NFR BLD: 52 % (ref 43–80)
NEUTS SEG NFR BLD: 6.32 K/UL (ref 1.8–7.3)
PLATELET # BLD AUTO: 173 K/UL (ref 130–450)
PMV BLD AUTO: 12.9 FL (ref 7–12)
POTASSIUM SERPL-SCNC: 5.8 MMOL/L (ref 3.5–5)
PROT SERPL-MCNC: 5.3 G/DL (ref 6.4–8.3)
RBC # BLD AUTO: 3.25 M/UL (ref 3.5–5.5)
RBC # BLD: ABNORMAL 10*6/UL
SODIUM SERPL-SCNC: 148 MMOL/L (ref 132–146)
WBC OTHER # BLD: 12.1 K/UL (ref 4.5–11.5)

## 2023-11-13 PROCEDURE — 85025 COMPLETE CBC W/AUTO DIFF WBC: CPT

## 2023-11-13 PROCEDURE — 80053 COMPREHEN METABOLIC PANEL: CPT

## 2023-11-13 PROCEDURE — 1200000000 HC SEMI PRIVATE

## 2023-11-13 PROCEDURE — 2580000003 HC RX 258: Performed by: INTERNAL MEDICINE

## 2023-11-13 PROCEDURE — 36415 COLL VENOUS BLD VENIPUNCTURE: CPT

## 2023-11-13 PROCEDURE — 6370000000 HC RX 637 (ALT 250 FOR IP): Performed by: INTERNAL MEDICINE

## 2023-11-13 RX ADMIN — DIVALPROEX SODIUM 250 MG: 250 TABLET, DELAYED RELEASE ORAL at 20:49

## 2023-11-13 RX ADMIN — LEVETIRACETAM 500 MG: 500 TABLET, FILM COATED ORAL at 20:48

## 2023-11-13 RX ADMIN — DIVALPROEX SODIUM 250 MG: 250 TABLET, DELAYED RELEASE ORAL at 09:57

## 2023-11-13 RX ADMIN — ASPIRIN 81 MG: 81 TABLET, CHEWABLE ORAL at 09:56

## 2023-11-13 RX ADMIN — LEVETIRACETAM 500 MG: 500 TABLET, FILM COATED ORAL at 09:57

## 2023-11-13 RX ADMIN — Medication 10 ML: at 20:49

## 2023-11-13 RX ADMIN — PALIPERIDONE 3 MG: 3 TABLET, EXTENDED RELEASE ORAL at 09:57

## 2023-11-13 RX ADMIN — SODIUM ZIRCONIUM CYCLOSILICATE 5 G: 5 POWDER, FOR SUSPENSION ORAL at 10:00

## 2023-11-13 RX ADMIN — Medication 10 ML: at 09:57

## 2023-11-13 ASSESSMENT — PAIN SCALES - WONG BAKER: WONGBAKER_NUMERICALRESPONSE: 0

## 2023-11-13 ASSESSMENT — PAIN SCALES - GENERAL: PAINLEVEL_OUTOF10: 0

## 2023-11-13 NOTE — CARE COORDINATION
11/13/2023Social work transition of care planning  Pt plan is to return to [address] [place] [address] medically stable.   Electronically signed by EVA Gallardo on 11/13/2023 at 8:02 AM'

## 2023-11-13 NOTE — PLAN OF CARE
Problem: Discharge Planning  Goal: Discharge to home or other facility with appropriate resources  Outcome: Progressing  Flowsheets (Taken 11/12/2023 1045)  Discharge to home or other facility with appropriate resources: Identify barriers to discharge with patient and caregiver     Problem: Safety - Adult  Goal: Free from fall injury  Outcome: Progressing     Problem: Skin/Tissue Integrity  Goal: Absence of new skin breakdown  Description: 1. Monitor for areas of redness and/or skin breakdown  2. Assess vascular access sites hourly  3. Every 4-6 hours minimum:  Change oxygen saturation probe site  4. Every 4-6 hours:  If on nasal continuous positive airway pressure, respiratory therapy assess nares and determine need for appliance change or resting period.   Outcome: Progressing     Problem: Pain  Goal: Verbalizes/displays adequate comfort level or baseline comfort level  Outcome: Progressing     Problem: Neurosensory - Adult  Goal: Achieves stable or improved neurological status  Outcome: Progressing  Goal: Achieves maximal functionality and self care  Outcome: Progressing     Problem: Skin/Tissue Integrity - Adult  Goal: Skin integrity remains intact  Outcome: Progressing  Flowsheets (Taken 11/12/2023 1045)  Skin Integrity Remains Intact: Monitor for areas of redness and/or skin breakdown     Problem: Gastrointestinal - Adult  Goal: Maintains adequate nutritional intake  Outcome: Progressing     Problem: Genitourinary - Adult  Goal: Absence of urinary retention  Outcome: Progressing     Problem: Infection - Adult  Goal: Absence of infection at discharge  Outcome: Progressing     Problem: Chronic Conditions and Co-morbidities  Goal: Patient's chronic conditions and co-morbidity symptoms are monitored and maintained or improved  Outcome: Progressing  Flowsheets (Taken 11/12/2023 1045)  Care Plan - Patient's Chronic Conditions and Co-Morbidity Symptoms are Monitored and Maintained or Improved: Monitor and assess

## 2023-11-14 VITALS
TEMPERATURE: 96.6 F | HEART RATE: 79 BPM | HEIGHT: 64 IN | BODY MASS INDEX: 29.02 KG/M2 | SYSTOLIC BLOOD PRESSURE: 132 MMHG | WEIGHT: 170 LBS | DIASTOLIC BLOOD PRESSURE: 75 MMHG | OXYGEN SATURATION: 100 % | RESPIRATION RATE: 16 BRPM

## 2023-11-14 LAB
ALBUMIN SERPL-MCNC: 2.8 G/DL (ref 3.5–5.2)
ALP SERPL-CCNC: 75 U/L (ref 35–104)
ALT SERPL-CCNC: 20 U/L (ref 0–32)
ANION GAP SERPL CALCULATED.3IONS-SCNC: 9 MMOL/L (ref 7–16)
AST SERPL-CCNC: 19 U/L (ref 0–31)
BASOPHILS # BLD: 0.02 K/UL (ref 0–0.2)
BASOPHILS NFR BLD: 0 % (ref 0–2)
BILIRUB SERPL-MCNC: <0.2 MG/DL (ref 0–1.2)
BUN SERPL-MCNC: 58 MG/DL (ref 6–23)
CALCIUM SERPL-MCNC: 8.9 MG/DL (ref 8.6–10.2)
CHLORIDE SERPL-SCNC: 118 MMOL/L (ref 98–107)
CO2 SERPL-SCNC: 22 MMOL/L (ref 22–29)
CREAT SERPL-MCNC: 1.9 MG/DL (ref 0.5–1)
EOSINOPHIL # BLD: 2.78 K/UL (ref 0.05–0.5)
EOSINOPHILS RELATIVE PERCENT: 26 % (ref 0–6)
ERYTHROCYTE [DISTWIDTH] IN BLOOD BY AUTOMATED COUNT: 18.1 % (ref 11.5–15)
GFR SERPL CREATININE-BSD FRML MDRD: 27 ML/MIN/1.73M2
GLUCOSE SERPL-MCNC: 69 MG/DL (ref 74–99)
HCT VFR BLD AUTO: 28.8 % (ref 34–48)
HGB BLD-MCNC: 8.7 G/DL (ref 11.5–15.5)
IMM GRANULOCYTES # BLD AUTO: 0.12 K/UL (ref 0–0.58)
IMM GRANULOCYTES NFR BLD: 1 % (ref 0–5)
LYMPHOCYTES NFR BLD: 1.1 K/UL (ref 1.5–4)
LYMPHOCYTES RELATIVE PERCENT: 10 % (ref 20–42)
MCH RBC QN AUTO: 28.8 PG (ref 26–35)
MCHC RBC AUTO-ENTMCNC: 30.2 G/DL (ref 32–34.5)
MCV RBC AUTO: 95.4 FL (ref 80–99.9)
MONOCYTES NFR BLD: 0.44 K/UL (ref 0.1–0.95)
MONOCYTES NFR BLD: 4 % (ref 2–12)
NEUTROPHILS NFR BLD: 59 % (ref 43–80)
NEUTS SEG NFR BLD: 6.45 K/UL (ref 1.8–7.3)
PLATELET # BLD AUTO: 159 K/UL (ref 130–450)
PMV BLD AUTO: 13 FL (ref 7–12)
POTASSIUM SERPL-SCNC: 5.3 MMOL/L (ref 3.5–5)
PROT SERPL-MCNC: 5 G/DL (ref 6.4–8.3)
RBC # BLD AUTO: 3.02 M/UL (ref 3.5–5.5)
RBC # BLD: ABNORMAL 10*6/UL
SODIUM SERPL-SCNC: 149 MMOL/L (ref 132–146)
WBC OTHER # BLD: 10.9 K/UL (ref 4.5–11.5)

## 2023-11-14 PROCEDURE — 1200000000 HC SEMI PRIVATE

## 2023-11-14 PROCEDURE — 80053 COMPREHEN METABOLIC PANEL: CPT

## 2023-11-14 PROCEDURE — 6370000000 HC RX 637 (ALT 250 FOR IP): Performed by: INTERNAL MEDICINE

## 2023-11-14 PROCEDURE — 36415 COLL VENOUS BLD VENIPUNCTURE: CPT

## 2023-11-14 PROCEDURE — 85025 COMPLETE CBC W/AUTO DIFF WBC: CPT

## 2023-11-14 PROCEDURE — 2580000003 HC RX 258: Performed by: INTERNAL MEDICINE

## 2023-11-14 RX ADMIN — SODIUM ZIRCONIUM CYCLOSILICATE 5 G: 5 POWDER, FOR SUSPENSION ORAL at 09:25

## 2023-11-14 RX ADMIN — DIVALPROEX SODIUM 250 MG: 250 TABLET, DELAYED RELEASE ORAL at 09:25

## 2023-11-14 RX ADMIN — LEVETIRACETAM 500 MG: 500 TABLET, FILM COATED ORAL at 20:04

## 2023-11-14 RX ADMIN — DIVALPROEX SODIUM 250 MG: 250 TABLET, DELAYED RELEASE ORAL at 20:04

## 2023-11-14 RX ADMIN — Medication 10 ML: at 09:30

## 2023-11-14 RX ADMIN — PALIPERIDONE 3 MG: 3 TABLET, EXTENDED RELEASE ORAL at 09:25

## 2023-11-14 RX ADMIN — ASPIRIN 81 MG: 81 TABLET, CHEWABLE ORAL at 09:25

## 2023-11-14 RX ADMIN — LEVETIRACETAM 500 MG: 500 TABLET, FILM COATED ORAL at 09:25

## 2023-11-14 ASSESSMENT — PAIN SCALES - GENERAL: PAINLEVEL_OUTOF10: 0

## 2023-11-14 NOTE — DISCHARGE INSTR - COC
to Appropriate Level of Care:85968} for {GREATER/LESS:654028117} 30 days.      Update Admission H&P: {CHP DME Changes in LEMPN:186712524}    PHYSICIAN SIGNATURE:  Electronically signed by Gary Phelps MD on 11/14/23 at 11:26 AM EST

## 2023-11-14 NOTE — CARE COORDINATION
11/14/2023social work transition of care planning  Pt plan is to return to [address] [facility] medically stable. Electronically signed by EVA Rodriguez on 11/14/2023 at 8:46 AM    Addendum:Discharge order noted. Sw checking with facility for transport. Sw will follow. Updated Guardian face to face. Electronically signed by EVA Rodriguez on 11/14/2023 at 11:44 AM    Addendum:Sw set up [facility] ambulette for 6:30 pm back to [facility]. Sw updated RN and facility liaison.   Electronically signed by EVA Rodriguez on 11/14/2023 at 2:08 PM

## 2023-11-15 ENCOUNTER — OUTSIDE SERVICES (OUTPATIENT)
Dept: PRIMARY CARE CLINIC | Age: 78
End: 2023-11-15
Payer: MEDICARE

## 2023-11-15 DIAGNOSIS — F20.1 DISORGANIZED SCHIZOPHRENIA (HCC): ICD-10-CM

## 2023-11-15 DIAGNOSIS — R47.1 DYSARTHRIA: Primary | ICD-10-CM

## 2023-11-15 DIAGNOSIS — I10 PRIMARY HYPERTENSION: ICD-10-CM

## 2023-11-15 DIAGNOSIS — E07.9 THYROID DISEASE: ICD-10-CM

## 2023-11-15 DIAGNOSIS — B86 SCABIES: ICD-10-CM

## 2023-11-15 DIAGNOSIS — N17.9 AKI (ACUTE KIDNEY INJURY) (HCC): ICD-10-CM

## 2023-11-15 PROCEDURE — 99308 SBSQ NF CARE LOW MDM 20: CPT | Performed by: INTERNAL MEDICINE

## 2023-11-15 NOTE — DISCHARGE SUMMARY
Physician Discharge Summary     Patient ID:  Namita Fam  [id number]  43 y.o.  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[address])    Severe episode of recurrent major depressive disorder, with psychotic features ([address])    NSAID overdose    Metabolic acidosis with increased anion gap and accumulation of organic acids    Suicide attempt ([address])    Post-menopausal bleeding    Complicated UTI (urinary tract infection)    Dysarthria    Delirium    Hypernatremia    Primary hypertension    JOSE A (acute kidney injury) ([address])    Cognitive disorder    Schizoaffective disorder, bipolar type ([address])    Thyroid disease    Altered mental state    Altered mental status       Consults: ID    Procedures:     Hospital Course:   56-year-old woman readmitted from nursing home due to altered mental status and poor oral intake  Hypernatremia was noted with acute kidney injury which responded to dextrose solution  Her new baseline creatinine is 2.0 or below now  Hyperkalemia was treated with [place]  Scabies was suspected and was given topical permethrin  Rash improved somewhat  Outpatient dermatology referral was made  She remained stable during the rest of the hospital course  Discharge Exam:  Patient was seen on the floor prior to discharge  Oral intake better  She was able to feed herself  She was able to follow commands  Mental status was at her baseline  Skin rash was better    Disposition: ECF  Stable at the time of discharge  Patient Instructions:   Discharge Medication List as of 11/14/2023  5:20 PM        START taking these medications    Details   sodium zirconium cyclosilicate

## 2023-11-15 NOTE — PROGRESS NOTES
96.7    Physical Exam   Skin:  Warm and dry. Fading diffuse  erythematous rash   HEENT:  PERRLA, EOMI  Neck:  No JVD, No thyromegaly, No carotid bruit  Cardiac:  RRR, No gallop or murmur  Lungs:  CTA, Normal percussion  Abdomen: Normal bowel sounds, no HSM, non-tender  Extremities:  No clubbing, edema or cyanosis  Neurological:  Moves all extremities. Diagnoses and all orders for this visit:    Dysarthria,unchanged chronic     Primary hypertension,controlled off medication to monitor     Disorganized schizophrenia (HCC),continue Invega,Depakote    Thyroid disease,stable continue Synthroid     JOSE A (acute kidney injury) (HCC),resolved,to monitor     Scabies,was treated with permethrin ,follow with dermatology             [facility] MD BONNY            *Note was creating using voice recognition software.   The document was reviewed however grammatical errors may exist.

## 2023-11-16 NOTE — ADT AUTH CERT
H&P by Wilman Trevino MD at 11/5/2023  4:29 PM    Author: Wilman Trevino MD Specialty: Internal Medicine Author Type: Physician   Filed: 11/5/2023  4:31 PM Date of Service: 11/5/2023  4:29 PM Status: Addendum   : Wilman Trevino MD (Physician)   Related Notes: Original Note by Wilman Trevino MD (Physician) filed at 11/5/2023  4:30 PM   Expand All Collapse All  Wilman Trevino  [facility]  Internal medicine  Progress Notes        CHIEF COMPLAINT: Failure to thrive        HISTORY OF PRESENT ILLNESS:    11/3/2023  Patient was seen on the floor earlier this morning at the main campus of [facility] with the ER physician at the time of admission  I discharged this patient to the nursing home where she is a long-term resident after being treated for UTI and dehydration  Patient was refused to to be readmitted to the nursing facility stating that her mental status is not at her baseline  She was sent back to emergency room and is readmitted  11/4/2023  Patient was seen on the floor earlier this morning  Oral intake remains poor  Mental status unchanged  11/5/2023  Patient was seen on the floor earlier this morning  Oral intake remains poor  No acute events overnight  Past Medical History:    Past Medical History        Past Medical History:   Diagnosis Date    Arthritis       hips    Ataxia      Atrophy of muscle      Bipolar 1 disorder, mixed, moderate ([address])      Cerebrovascular disease      Coordination problem      Hyperlipidemia      Hypertension      Overactive bladder      Paranoid schizophrenia ([address])      Thyroid disease              Past Surgical History:    Past Surgical History         Past Surgical History:   Procedure Laterality Date    BLADDER SUSPENSION        DILATION AND CURETTAGE OF UTERUS        DILATION AND CURETTAGE OF UTERUS N/A 2/16/2021     endometrial biopsy, HYSTEROSCOPY performed by Katelyn Kohli MD at [address] [place] [address]

## 2023-12-02 ENCOUNTER — APPOINTMENT (OUTPATIENT)
Dept: GENERAL RADIOLOGY | Age: 78
End: 2023-12-02
Payer: MEDICARE

## 2023-12-02 ENCOUNTER — HOSPITAL ENCOUNTER (INPATIENT)
Age: 78
LOS: 6 days | Discharge: HOSPICE | End: 2023-12-09
Attending: EMERGENCY MEDICINE | Admitting: STUDENT IN AN ORGANIZED HEALTH CARE EDUCATION/TRAINING PROGRAM
Payer: MEDICARE

## 2023-12-02 ENCOUNTER — APPOINTMENT (OUTPATIENT)
Dept: CT IMAGING | Age: 78
End: 2023-12-02
Payer: MEDICARE

## 2023-12-02 DIAGNOSIS — E87.5 HYPERKALEMIA: ICD-10-CM

## 2023-12-02 DIAGNOSIS — N39.0 URINARY TRACT INFECTION IN FEMALE: ICD-10-CM

## 2023-12-02 DIAGNOSIS — R41.82 ALTERED MENTAL STATUS, UNSPECIFIED ALTERED MENTAL STATUS TYPE: ICD-10-CM

## 2023-12-02 DIAGNOSIS — J18.9 PNEUMONIA OF LEFT LUNG DUE TO INFECTIOUS ORGANISM, UNSPECIFIED PART OF LUNG: ICD-10-CM

## 2023-12-02 DIAGNOSIS — E87.0 HYPERNATREMIA: Primary | ICD-10-CM

## 2023-12-02 LAB
ALBUMIN SERPL-MCNC: 3.2 G/DL (ref 3.5–5.2)
ALP SERPL-CCNC: 85 U/L (ref 35–104)
ALT SERPL-CCNC: 35 U/L (ref 0–32)
ANION GAP SERPL CALCULATED.3IONS-SCNC: 9 MMOL/L (ref 7–16)
AST SERPL-CCNC: 18 U/L (ref 0–31)
BILIRUB SERPL-MCNC: 0.2 MG/DL (ref 0–1.2)
BUN BLD-MCNC: 39 MG/DL (ref 6–23)
BUN SERPL-MCNC: 33 MG/DL (ref 6–23)
CALCIUM SERPL-MCNC: 9.7 MG/DL (ref 8.6–10.2)
CHLORIDE BLD-SCNC: 127 MMOL/L (ref 100–108)
CHLORIDE SERPL-SCNC: 125 MMOL/L (ref 98–107)
CO2 BLD CALC-SCNC: 22 MMOL/L (ref 22–29)
CO2 SERPL-SCNC: 23 MMOL/L (ref 22–29)
CREAT BLD-MCNC: 1.8 MG/DL (ref 0.5–1)
CREAT SERPL-MCNC: 1.8 MG/DL (ref 0.5–1)
EGFR, POC: 29 ML/MIN/1.73M2
ERYTHROCYTE [DISTWIDTH] IN BLOOD BY AUTOMATED COUNT: 18.9 % (ref 11.5–15)
FLUAV RNA RESP QL NAA+PROBE: NOT DETECTED
FLUBV RNA RESP QL NAA+PROBE: NOT DETECTED
GFR SERPL CREATININE-BSD FRML MDRD: 29 ML/MIN/1.73M2
GLUCOSE BLD-MCNC: 93 MG/DL (ref 74–99)
GLUCOSE SERPL-MCNC: 107 MG/DL (ref 74–99)
HCT VFR BLD AUTO: 23.6 % (ref 34–48)
HGB BLD-MCNC: 7 G/DL (ref 11.5–15.5)
LACTATE BLDV-SCNC: 0.8 MMOL/L (ref 0.5–2.2)
MCH RBC QN AUTO: 28.3 PG (ref 26–35)
MCHC RBC AUTO-ENTMCNC: 29.7 G/DL (ref 32–34.5)
MCV RBC AUTO: 95.5 FL (ref 80–99.9)
PLATELET # BLD AUTO: 101 K/UL (ref 130–450)
PMV BLD AUTO: 11.9 FL (ref 7–12)
POC ANION GAP: 8 MMOL/L (ref 7–16)
POTASSIUM BLD-SCNC: 5.7 MMOL/L (ref 3.5–5)
POTASSIUM SERPL-SCNC: 5.6 MMOL/L (ref 3.5–5)
PROT SERPL-MCNC: 5.6 G/DL (ref 6.4–8.3)
RBC # BLD AUTO: 2.47 M/UL (ref 3.5–5.5)
SARS-COV-2 RNA RESP QL NAA+PROBE: NOT DETECTED
SODIUM BLD-SCNC: 157 MMOL/L (ref 132–146)
SODIUM SERPL-SCNC: 157 MMOL/L (ref 132–146)
SOURCE: NORMAL
SPECIMEN DESCRIPTION: NORMAL
TROPONIN I SERPL HS-MCNC: 35 NG/L (ref 0–9)
WBC OTHER # BLD: 10 K/UL (ref 4.5–11.5)

## 2023-12-02 PROCEDURE — 80307 DRUG TEST PRSMV CHEM ANLYZR: CPT

## 2023-12-02 PROCEDURE — 81001 URINALYSIS AUTO W/SCOPE: CPT

## 2023-12-02 PROCEDURE — 70450 CT HEAD/BRAIN W/O DYE: CPT

## 2023-12-02 PROCEDURE — 85027 COMPLETE CBC AUTOMATED: CPT

## 2023-12-02 PROCEDURE — 6360000002 HC RX W HCPCS

## 2023-12-02 PROCEDURE — 82565 ASSAY OF CREATININE: CPT

## 2023-12-02 PROCEDURE — 71045 X-RAY EXAM CHEST 1 VIEW: CPT

## 2023-12-02 PROCEDURE — 84520 ASSAY OF UREA NITROGEN: CPT

## 2023-12-02 PROCEDURE — 96375 TX/PRO/DX INJ NEW DRUG ADDON: CPT

## 2023-12-02 PROCEDURE — 87636 SARSCOV2 & INF A&B AMP PRB: CPT

## 2023-12-02 PROCEDURE — 80053 COMPREHEN METABOLIC PANEL: CPT

## 2023-12-02 PROCEDURE — 6370000000 HC RX 637 (ALT 250 FOR IP)

## 2023-12-02 PROCEDURE — 93005 ELECTROCARDIOGRAM TRACING: CPT

## 2023-12-02 PROCEDURE — 80051 ELECTROLYTE PANEL: CPT

## 2023-12-02 PROCEDURE — 2580000003 HC RX 258: Performed by: EMERGENCY MEDICINE

## 2023-12-02 PROCEDURE — 82947 ASSAY GLUCOSE BLOOD QUANT: CPT

## 2023-12-02 PROCEDURE — 84484 ASSAY OF TROPONIN QUANT: CPT

## 2023-12-02 PROCEDURE — 83605 ASSAY OF LACTIC ACID: CPT

## 2023-12-02 PROCEDURE — 2580000003 HC RX 258

## 2023-12-02 PROCEDURE — 99285 EMERGENCY DEPT VISIT HI MDM: CPT

## 2023-12-02 PROCEDURE — 84443 ASSAY THYROID STIM HORMONE: CPT

## 2023-12-02 PROCEDURE — 96365 THER/PROPH/DIAG IV INF INIT: CPT

## 2023-12-02 RX ORDER — DEXTROSE AND SODIUM CHLORIDE 5; .45 G/100ML; G/100ML
INJECTION, SOLUTION INTRAVENOUS CONTINUOUS
Status: DISCONTINUED | OUTPATIENT
Start: 2023-12-02 | End: 2023-12-04

## 2023-12-02 RX ORDER — DEXTROSE MONOHYDRATE 100 MG/ML
INJECTION, SOLUTION INTRAVENOUS CONTINUOUS PRN
Status: DISCONTINUED | OUTPATIENT
Start: 2023-12-02 | End: 2023-12-03 | Stop reason: SDUPTHER

## 2023-12-02 RX ORDER — CALCIUM GLUCONATE 94 MG/ML
1000 INJECTION, SOLUTION INTRAVENOUS ONCE
Status: COMPLETED | OUTPATIENT
Start: 2023-12-02 | End: 2023-12-02

## 2023-12-02 RX ADMIN — INSULIN HUMAN 10 UNITS: 100 INJECTION, SOLUTION PARENTERAL at 21:54

## 2023-12-02 RX ADMIN — DEXTROSE MONOHYDRATE 250 ML: 100 INJECTION, SOLUTION INTRAVENOUS at 21:58

## 2023-12-02 RX ADMIN — DEXTROSE AND SODIUM CHLORIDE: 5; 450 INJECTION, SOLUTION INTRAVENOUS at 22:54

## 2023-12-02 RX ADMIN — CALCIUM GLUCONATE 1000 MG: 98 INJECTION, SOLUTION INTRAVENOUS at 22:49

## 2023-12-02 ASSESSMENT — PAIN - FUNCTIONAL ASSESSMENT: PAIN_FUNCTIONAL_ASSESSMENT: NONE - DENIES PAIN

## 2023-12-03 ENCOUNTER — APPOINTMENT (OUTPATIENT)
Dept: GENERAL RADIOLOGY | Age: 78
End: 2023-12-03
Payer: MEDICARE

## 2023-12-03 ENCOUNTER — APPOINTMENT (OUTPATIENT)
Dept: CT IMAGING | Age: 78
End: 2023-12-03
Payer: MEDICARE

## 2023-12-03 LAB
ALBUMIN SERPL-MCNC: 2.7 G/DL (ref 3.5–5.2)
ALP SERPL-CCNC: 74 U/L (ref 35–104)
ALT SERPL-CCNC: 35 U/L (ref 0–32)
AMPHET UR QL SCN: NEGATIVE
ANION GAP SERPL CALCULATED.3IONS-SCNC: 11 MMOL/L (ref 7–16)
ANION GAP SERPL CALCULATED.3IONS-SCNC: 11 MMOL/L (ref 7–16)
ANION GAP SERPL CALCULATED.3IONS-SCNC: 8 MMOL/L (ref 7–16)
AST SERPL-CCNC: 35 U/L (ref 0–31)
BACTERIA URNS QL MICRO: ABNORMAL
BARBITURATES UR QL SCN: NEGATIVE
BASOPHILS # BLD: 0.01 K/UL (ref 0–0.2)
BASOPHILS NFR BLD: 0 % (ref 0–2)
BENZODIAZ UR QL: NEGATIVE
BILIRUB SERPL-MCNC: <0.2 MG/DL (ref 0–1.2)
BILIRUB UR QL STRIP: NEGATIVE
BUN SERPL-MCNC: 30 MG/DL (ref 6–23)
BUN SERPL-MCNC: 33 MG/DL (ref 6–23)
BUN SERPL-MCNC: 33 MG/DL (ref 6–23)
BUPRENORPHINE UR QL: NEGATIVE
CALCIUM SERPL-MCNC: 8.6 MG/DL (ref 8.6–10.2)
CALCIUM SERPL-MCNC: 9 MG/DL (ref 8.6–10.2)
CALCIUM SERPL-MCNC: 9.3 MG/DL (ref 8.6–10.2)
CANNABINOIDS UR QL SCN: NEGATIVE
CHLORIDE SERPL-SCNC: 122 MMOL/L (ref 98–107)
CHLORIDE SERPL-SCNC: 123 MMOL/L (ref 98–107)
CHLORIDE SERPL-SCNC: 124 MMOL/L (ref 98–107)
CLARITY UR: CLEAR
CO2 SERPL-SCNC: 17 MMOL/L (ref 22–29)
CO2 SERPL-SCNC: 18 MMOL/L (ref 22–29)
CO2 SERPL-SCNC: 19 MMOL/L (ref 22–29)
COCAINE UR QL SCN: NEGATIVE
COLOR UR: YELLOW
CREAT SERPL-MCNC: 1.7 MG/DL (ref 0.5–1)
CREAT SERPL-MCNC: 1.8 MG/DL (ref 0.5–1)
CREAT SERPL-MCNC: 1.9 MG/DL (ref 0.5–1)
CREAT UR-MCNC: 37.8 MG/DL (ref 29–226)
EOSINOPHIL # BLD: 0.15 K/UL (ref 0.05–0.5)
EOSINOPHILS RELATIVE PERCENT: 2 % (ref 0–6)
EPI CELLS #/AREA URNS HPF: ABNORMAL /HPF
ERYTHROCYTE [DISTWIDTH] IN BLOOD BY AUTOMATED COUNT: 19.1 % (ref 11.5–15)
FENTANYL UR QL: NEGATIVE
GFR SERPL CREATININE-BSD FRML MDRD: 27 ML/MIN/1.73M2
GFR SERPL CREATININE-BSD FRML MDRD: 29 ML/MIN/1.73M2
GFR SERPL CREATININE-BSD FRML MDRD: 32 ML/MIN/1.73M2
GLUCOSE BLD-MCNC: 121 MG/DL (ref 74–99)
GLUCOSE BLD-MCNC: 126 MG/DL (ref 74–99)
GLUCOSE BLD-MCNC: 155 MG/DL (ref 74–99)
GLUCOSE BLD-MCNC: 167 MG/DL (ref 74–99)
GLUCOSE BLD-MCNC: 44 MG/DL (ref 74–99)
GLUCOSE BLD-MCNC: 64 MG/DL (ref 74–99)
GLUCOSE BLD-MCNC: 86 MG/DL (ref 74–99)
GLUCOSE BLD-MCNC: 96 MG/DL (ref 74–99)
GLUCOSE SERPL-MCNC: 131 MG/DL (ref 74–99)
GLUCOSE SERPL-MCNC: 183 MG/DL (ref 74–99)
GLUCOSE SERPL-MCNC: 68 MG/DL (ref 74–99)
GLUCOSE UR STRIP-MCNC: NEGATIVE MG/DL
HCT VFR BLD AUTO: 23.1 % (ref 34–48)
HCT VFR BLD AUTO: 23.8 % (ref 34–48)
HGB BLD-MCNC: 6.9 G/DL (ref 11.5–15.5)
HGB BLD-MCNC: 7.4 G/DL (ref 11.5–15.5)
HGB UR QL STRIP.AUTO: NEGATIVE
IMM GRANULOCYTES # BLD AUTO: 0.04 K/UL (ref 0–0.58)
IMM GRANULOCYTES NFR BLD: 1 % (ref 0–5)
KETONES UR STRIP-MCNC: ABNORMAL MG/DL
LACTATE BLDV-SCNC: 1.1 MMOL/L (ref 0.5–2.2)
LEUKOCYTE ESTERASE UR QL STRIP: NEGATIVE
LYMPHOCYTES NFR BLD: 0.66 K/UL (ref 1.5–4)
LYMPHOCYTES RELATIVE PERCENT: 8 % (ref 20–42)
MCH RBC QN AUTO: 28.9 PG (ref 26–35)
MCHC RBC AUTO-ENTMCNC: 29.9 G/DL (ref 32–34.5)
MCV RBC AUTO: 96.7 FL (ref 80–99.9)
METHADONE UR QL: NEGATIVE
MICROALBUMIN UR-MCNC: 612 MG/L (ref 0–19)
MICROALBUMIN/CREAT UR-RTO: 1619 MCG/MG CREAT (ref 0–30)
MONOCYTES NFR BLD: 0.29 K/UL (ref 0.1–0.95)
MONOCYTES NFR BLD: 3 % (ref 2–12)
NEUTROPHILS NFR BLD: 87 % (ref 43–80)
NEUTS SEG NFR BLD: 7.49 K/UL (ref 1.8–7.3)
NITRITE UR QL STRIP: POSITIVE
OPIATES UR QL SCN: NEGATIVE
OSMOLALITY UR: 367 MOSM/KG (ref 300–900)
OXYCODONE UR QL SCN: NEGATIVE
PCP UR QL SCN: NEGATIVE
PH UR STRIP: 6 [PH] (ref 5–9)
PLATELET CONFIRMATION: NORMAL
PLATELET, FLUORESCENCE: 92 K/UL (ref 130–450)
PMV BLD AUTO: 12.9 FL (ref 7–12)
POTASSIUM SERPL-SCNC: 4.7 MMOL/L (ref 3.5–5)
POTASSIUM SERPL-SCNC: 5.3 MMOL/L (ref 3.5–5)
POTASSIUM SERPL-SCNC: 5.8 MMOL/L (ref 3.5–5)
POTASSIUM, UR: 12.8 MMOL/L
PROT SERPL-MCNC: 5.4 G/DL (ref 6.4–8.3)
PROT UR STRIP-MCNC: 100 MG/DL
RBC # BLD AUTO: 2.39 M/UL (ref 3.5–5.5)
RBC # BLD: ABNORMAL 10*6/UL
RBC #/AREA URNS HPF: ABNORMAL /HPF
SODIUM SERPL-SCNC: 150 MMOL/L (ref 132–146)
SODIUM SERPL-SCNC: 150 MMOL/L (ref 132–146)
SODIUM SERPL-SCNC: 153 MMOL/L (ref 132–146)
SODIUM UR-SCNC: 107 MMOL/L
SP GR UR STRIP: 1.02 (ref 1–1.03)
TEST INFORMATION: NORMAL
TROPONIN I SERPL HS-MCNC: 36 NG/L (ref 0–9)
TSH SERPL DL<=0.05 MIU/L-ACNC: 1.39 UIU/ML (ref 0.27–4.2)
UROBILINOGEN UR STRIP-ACNC: 0.2 EU/DL (ref 0–1)
UUN UR-MCNC: 320 MG/DL (ref 800–1666)
WBC #/AREA URNS HPF: ABNORMAL /HPF
WBC OTHER # BLD: 8.6 K/UL (ref 4.5–11.5)

## 2023-12-03 PROCEDURE — 2580000003 HC RX 258: Performed by: STUDENT IN AN ORGANIZED HEALTH CARE EDUCATION/TRAINING PROGRAM

## 2023-12-03 PROCEDURE — 82962 GLUCOSE BLOOD TEST: CPT

## 2023-12-03 PROCEDURE — 84540 ASSAY OF URINE/UREA-N: CPT

## 2023-12-03 PROCEDURE — 99221 1ST HOSP IP/OBS SF/LOW 40: CPT | Performed by: STUDENT IN AN ORGANIZED HEALTH CARE EDUCATION/TRAINING PROGRAM

## 2023-12-03 PROCEDURE — 30233N1 TRANSFUSION OF NONAUTOLOGOUS RED BLOOD CELLS INTO PERIPHERAL VEIN, PERCUTANEOUS APPROACH: ICD-10-PCS | Performed by: STUDENT IN AN ORGANIZED HEALTH CARE EDUCATION/TRAINING PROGRAM

## 2023-12-03 PROCEDURE — 36430 TRANSFUSION BLD/BLD COMPNT: CPT

## 2023-12-03 PROCEDURE — 51702 INSERT TEMP BLADDER CATH: CPT

## 2023-12-03 PROCEDURE — 70450 CT HEAD/BRAIN W/O DYE: CPT

## 2023-12-03 PROCEDURE — 84300 ASSAY OF URINE SODIUM: CPT

## 2023-12-03 PROCEDURE — 83935 ASSAY OF URINE OSMOLALITY: CPT

## 2023-12-03 PROCEDURE — 82043 UR ALBUMIN QUANTITATIVE: CPT

## 2023-12-03 PROCEDURE — 80053 COMPREHEN METABOLIC PANEL: CPT

## 2023-12-03 PROCEDURE — 86900 BLOOD TYPING SEROLOGIC ABO: CPT

## 2023-12-03 PROCEDURE — 84133 ASSAY OF URINE POTASSIUM: CPT

## 2023-12-03 PROCEDURE — 86850 RBC ANTIBODY SCREEN: CPT

## 2023-12-03 PROCEDURE — 86901 BLOOD TYPING SEROLOGIC RH(D): CPT

## 2023-12-03 PROCEDURE — 85014 HEMATOCRIT: CPT

## 2023-12-03 PROCEDURE — 74018 RADEX ABDOMEN 1 VIEW: CPT

## 2023-12-03 PROCEDURE — 80048 BASIC METABOLIC PNL TOTAL CA: CPT

## 2023-12-03 PROCEDURE — 2000000000 HC ICU R&B

## 2023-12-03 PROCEDURE — 71250 CT THORAX DX C-: CPT

## 2023-12-03 PROCEDURE — 85025 COMPLETE CBC W/AUTO DIFF WBC: CPT

## 2023-12-03 PROCEDURE — 6360000002 HC RX W HCPCS

## 2023-12-03 PROCEDURE — 2500000003 HC RX 250 WO HCPCS: Performed by: STUDENT IN AN ORGANIZED HEALTH CARE EDUCATION/TRAINING PROGRAM

## 2023-12-03 PROCEDURE — 99255 IP/OBS CONSLTJ NEW/EST HI 80: CPT | Performed by: INTERNAL MEDICINE

## 2023-12-03 PROCEDURE — 6360000002 HC RX W HCPCS: Performed by: STUDENT IN AN ORGANIZED HEALTH CARE EDUCATION/TRAINING PROGRAM

## 2023-12-03 PROCEDURE — 82570 ASSAY OF URINE CREATININE: CPT

## 2023-12-03 PROCEDURE — 86923 COMPATIBILITY TEST ELECTRIC: CPT

## 2023-12-03 PROCEDURE — 6370000000 HC RX 637 (ALT 250 FOR IP): Performed by: STUDENT IN AN ORGANIZED HEALTH CARE EDUCATION/TRAINING PROGRAM

## 2023-12-03 PROCEDURE — 83605 ASSAY OF LACTIC ACID: CPT

## 2023-12-03 PROCEDURE — 36415 COLL VENOUS BLD VENIPUNCTURE: CPT

## 2023-12-03 PROCEDURE — 6360000002 HC RX W HCPCS: Performed by: INTERNAL MEDICINE

## 2023-12-03 PROCEDURE — 99223 1ST HOSP IP/OBS HIGH 75: CPT | Performed by: INTERNAL MEDICINE

## 2023-12-03 PROCEDURE — 93005 ELECTROCARDIOGRAM TRACING: CPT | Performed by: INTERNAL MEDICINE

## 2023-12-03 PROCEDURE — 2580000003 HC RX 258: Performed by: EMERGENCY MEDICINE

## 2023-12-03 PROCEDURE — 96366 THER/PROPH/DIAG IV INF ADDON: CPT

## 2023-12-03 PROCEDURE — P9016 RBC LEUKOCYTES REDUCED: HCPCS

## 2023-12-03 PROCEDURE — 85018 HEMOGLOBIN: CPT

## 2023-12-03 RX ORDER — DIVALPROEX SODIUM 250 MG/1
250 TABLET, DELAYED RELEASE ORAL 2 TIMES DAILY
Status: DISCONTINUED | OUTPATIENT
Start: 2023-12-03 | End: 2023-12-04

## 2023-12-03 RX ORDER — ACETAMINOPHEN 650 MG/1
650 SUPPOSITORY RECTAL EVERY 6 HOURS PRN
Status: DISCONTINUED | OUTPATIENT
Start: 2023-12-03 | End: 2023-12-08

## 2023-12-03 RX ORDER — DEXTROSE MONOHYDRATE 100 MG/ML
INJECTION, SOLUTION INTRAVENOUS CONTINUOUS PRN
Status: DISCONTINUED | OUTPATIENT
Start: 2023-12-03 | End: 2023-12-08

## 2023-12-03 RX ORDER — ACETAMINOPHEN 325 MG/1
650 TABLET ORAL EVERY 6 HOURS PRN
Status: DISCONTINUED | OUTPATIENT
Start: 2023-12-03 | End: 2023-12-08

## 2023-12-03 RX ORDER — MIDAZOLAM HYDROCHLORIDE 2 MG/2ML
1 INJECTION, SOLUTION INTRAMUSCULAR; INTRAVENOUS ONCE
Status: COMPLETED | OUTPATIENT
Start: 2023-12-03 | End: 2023-12-03

## 2023-12-03 RX ORDER — FUROSEMIDE 10 MG/ML
80 INJECTION INTRAMUSCULAR; INTRAVENOUS ONCE
Status: COMPLETED | OUTPATIENT
Start: 2023-12-03 | End: 2023-12-03

## 2023-12-03 RX ORDER — LEVETIRACETAM 500 MG/1
500 TABLET ORAL 2 TIMES DAILY
Status: DISCONTINUED | OUTPATIENT
Start: 2023-12-03 | End: 2023-12-04

## 2023-12-03 RX ORDER — MIDAZOLAM HYDROCHLORIDE 1 MG/ML
INJECTION INTRAMUSCULAR; INTRAVENOUS
Status: COMPLETED
Start: 2023-12-03 | End: 2023-12-03

## 2023-12-03 RX ORDER — SODIUM CHLORIDE 0.9 % (FLUSH) 0.9 %
5-40 SYRINGE (ML) INJECTION PRN
Status: DISCONTINUED | OUTPATIENT
Start: 2023-12-03 | End: 2023-12-08

## 2023-12-03 RX ORDER — MIDAZOLAM HYDROCHLORIDE 2 MG/2ML
2 INJECTION, SOLUTION INTRAMUSCULAR; INTRAVENOUS ONCE
Status: COMPLETED | OUTPATIENT
Start: 2023-12-03 | End: 2023-12-03

## 2023-12-03 RX ORDER — POLYETHYLENE GLYCOL 3350 17 G/17G
17 POWDER, FOR SOLUTION ORAL DAILY PRN
Status: DISCONTINUED | OUTPATIENT
Start: 2023-12-03 | End: 2023-12-08

## 2023-12-03 RX ORDER — SODIUM CHLORIDE 9 MG/ML
INJECTION, SOLUTION INTRAVENOUS PRN
Status: DISCONTINUED | OUTPATIENT
Start: 2023-12-03 | End: 2023-12-08

## 2023-12-03 RX ORDER — SODIUM CHLORIDE 9 MG/ML
INJECTION, SOLUTION INTRAVENOUS PRN
Status: DISCONTINUED | OUTPATIENT
Start: 2023-12-03 | End: 2023-12-04

## 2023-12-03 RX ORDER — FOLIC ACID 5 MG/ML
1 INJECTION, SOLUTION INTRAMUSCULAR; INTRAVENOUS; SUBCUTANEOUS DAILY
Status: DISCONTINUED | OUTPATIENT
Start: 2023-12-03 | End: 2023-12-08

## 2023-12-03 RX ORDER — ONDANSETRON 4 MG/1
4 TABLET, ORALLY DISINTEGRATING ORAL EVERY 8 HOURS PRN
Status: DISCONTINUED | OUTPATIENT
Start: 2023-12-03 | End: 2023-12-08

## 2023-12-03 RX ORDER — ONDANSETRON 2 MG/ML
4 INJECTION INTRAMUSCULAR; INTRAVENOUS EVERY 6 HOURS PRN
Status: DISCONTINUED | OUTPATIENT
Start: 2023-12-03 | End: 2023-12-08

## 2023-12-03 RX ORDER — SODIUM CHLORIDE 0.9 % (FLUSH) 0.9 %
5-40 SYRINGE (ML) INJECTION EVERY 12 HOURS SCHEDULED
Status: DISCONTINUED | OUTPATIENT
Start: 2023-12-03 | End: 2023-12-08

## 2023-12-03 RX ADMIN — DEXTROSE AND SODIUM CHLORIDE: 5; 450 INJECTION, SOLUTION INTRAVENOUS at 16:00

## 2023-12-03 RX ADMIN — WATER 1000 MG: 1 INJECTION INTRAMUSCULAR; INTRAVENOUS; SUBCUTANEOUS at 05:24

## 2023-12-03 RX ADMIN — INSULIN HUMAN 10 UNITS: 100 INJECTION, SOLUTION PARENTERAL at 12:51

## 2023-12-03 RX ADMIN — MIDAZOLAM 1 MG: 1 INJECTION INTRAMUSCULAR; INTRAVENOUS at 06:44

## 2023-12-03 RX ADMIN — FUROSEMIDE 80 MG: 10 INJECTION, SOLUTION INTRAMUSCULAR; INTRAVENOUS at 16:44

## 2023-12-03 RX ADMIN — SODIUM CHLORIDE, PRESERVATIVE FREE 10 ML: 5 INJECTION INTRAVENOUS at 20:53

## 2023-12-03 RX ADMIN — FOLIC ACID 1 MG: 5 INJECTION, SOLUTION INTRAMUSCULAR; INTRAVENOUS; SUBCUTANEOUS at 14:00

## 2023-12-03 RX ADMIN — SODIUM CHLORIDE, PRESERVATIVE FREE 10 ML: 5 INJECTION INTRAVENOUS at 10:36

## 2023-12-03 RX ADMIN — DIVALPROEX SODIUM 250 MG: 250 TABLET, DELAYED RELEASE ORAL at 20:52

## 2023-12-03 RX ADMIN — SODIUM ZIRCONIUM CYCLOSILICATE 10 G: 10 POWDER, FOR SUSPENSION ORAL at 14:00

## 2023-12-03 RX ADMIN — DEXTROSE MONOHYDRATE 250 ML: 100 INJECTION, SOLUTION INTRAVENOUS at 14:00

## 2023-12-03 RX ADMIN — MIDAZOLAM 2 MG: 1 INJECTION INTRAMUSCULAR; INTRAVENOUS at 05:17

## 2023-12-03 RX ADMIN — DEXTROSE MONOHYDRATE 125 ML: 100 INJECTION, SOLUTION INTRAVENOUS at 16:39

## 2023-12-03 RX ADMIN — DEXTROSE MONOHYDRATE 250 ML: 100 INJECTION, SOLUTION INTRAVENOUS at 15:02

## 2023-12-03 RX ADMIN — LEVETIRACETAM 500 MG: 500 TABLET, FILM COATED ORAL at 20:52

## 2023-12-03 RX ADMIN — DEXTROSE AND SODIUM CHLORIDE: 5; 450 INJECTION, SOLUTION INTRAVENOUS at 22:09

## 2023-12-03 RX ADMIN — DOXYCYCLINE 100 MG: 100 INJECTION, POWDER, LYOPHILIZED, FOR SOLUTION INTRAVENOUS at 16:58

## 2023-12-03 RX ADMIN — DOXYCYCLINE 100 MG: 100 INJECTION, POWDER, LYOPHILIZED, FOR SOLUTION INTRAVENOUS at 05:39

## 2023-12-03 RX ADMIN — MIDAZOLAM HYDROCHLORIDE 2 MG: 2 INJECTION, SOLUTION INTRAMUSCULAR; INTRAVENOUS at 05:17

## 2023-12-03 ASSESSMENT — PAIN SCALES - GENERAL: PAINLEVEL_OUTOF10: 0

## 2023-12-03 NOTE — PROGRESS NOTES
Patient arrived to the MICU from ED with rojas catheter intact and patent. Securing device applied. Rojas bag is hanging below the level of the bladder, safety clip attached to the bed sheet, tamper seal is intact, drainage bag is not on the floor, lack of dependent loop in tubing, and the drainage bag is less than half full.

## 2023-12-03 NOTE — FLOWSHEET NOTE
When restraints released, patients reaches for critical lines/tubes, despite verbal reorientation and education. Patient unable to be redirected. Patient in bilateral soft wrist restraints for safety.

## 2023-12-03 NOTE — PROGRESS NOTES
Brief internal medicine progress note:     Patient seen and examined, H&P and billing done on this calendar day by admitting service. I did also seen and examined patient. Admitted with altered mental status, hyponatremia/hyperkalemia, dehydration, pneumonia. Remains very altered and unable to give any history.     Exam:  Vitals:    12/03/23 1000   BP: (!) 141/90   Pulse: 64   Resp:    Temp:    SpO2: 100%         Assessment:  Acute metabolic encephalopathy 2/2 hypernatremia versus pneumonia  Hypernatremia 2/2 dehydration  CKD, creatinine at baseline  Concern for community-acquired pneumonia -chest x-ray on admission showed patchy opacities in lungs  Normocytic anemia, r/o GI bleed? - hemoglobin on admission 7.0, baseline hemoglobin 8-9's  History of CVA and seizure disorder  HTN      Plan:  Case discussed with MICU team  Continue Rocephin and doxycycline for now, recommend obtaining cultures  Nephrology consulted, appreciate recommendations  Continue D5 half NS at 150 cc/h for now, [facility]  Neurology consulted for altered mental status, appreciate recommendations  Continue home Keppra and Depakote  Keep patient n.p.o. for now given mentation        Electronically signed by Oneyda Howell MD on 12/3/2023 at 10:28 AM

## 2023-12-03 NOTE — H&P
[facility] History and Physical      CHIEF COMPLAINT: Altered mental status    History of Present Illness: 81-year-old gentleman with past medical history significant for hypertension thyroid disease hyperlipidemia CVA ataxia and arthritis presented to ED with altered mental status of unknown duration. He does have a history of CVA s/p weakness on the right. Patient is a poor historian. History was taken from the ED staff. On workup he had abnormal labs with sodium of 157, and potassium high at 5.6. Nephrology was consulted and plan was made to admit patient in ICU for further management. Informant(s) for H&P: ED staff    REVIEW OF SYSTEMS:  A comprehensive review of systems was negative except for: what is in the HPI      PMH:  Past Medical History:   Diagnosis Date    Arthritis     hips    Ataxia     Atrophy of muscle     Bipolar 1 disorder, mixed, moderate (HCC)     Cerebrovascular disease     Coordination problem     Hyperlipidemia     Hypertension     Overactive bladder     Paranoid schizophrenia ([address])     Thyroid disease        Surgical History:  Past Surgical History:   Procedure Laterality Date    BLADDER SUSPENSION      DILATION AND CURETTAGE OF UTERUS      DILATION AND CURETTAGE OF UTERUS N/A 2/16/2021    endometrial biopsy, HYSTEROSCOPY performed by Lennie Crigler, MD at [address] [facility]         Medications Prior to Admission:    Prior to Admission medications    Medication Sig Start Date End Date Taking?  Authorizing Provider   sodium zirconium cyclosilicate (LOKELMA) 5 g PACK oral suspension Take 1 packet by mouth daily 11/15/23   Kathie Cespedes MD   acetaminophen (TYLENOL) 325 MG tablet Take 2 tablets by mouth every 4 hours as needed for Pain or Fever    ProviderDeneen MD   aspirin 81 MG chewable tablet Take 1 tablet by mouth daily    ProviderDeneen MD   divalproex (DEPAKOTE) 125

## 2023-12-03 NOTE — ED PROVIDER NOTES
[address]        Pt Name: Sanjunaita Woodard  MRN: [id number]  [address] [facility] 1945  Date of evaluation: 12/2/2023  Provider: Radha Toney DO  PCP: Aldair Medellin MD  Note Started: 12:12 AM EST 12/3/23    CHIEF COMPLAINT       Chief Complaint   Patient presents with    Altered Mental Status     (Not able to form words, normally talkative per facility) Hx of previous CVA       HISTORY OF PRESENT ILLNESS: 1 or more Elements   History From: EMS      Sanjuanita Woodard is a 70 y.o. female who presents to the emergency department for evaluation of altered mental status. There is no last known well. Patient has a history of a CVA with weakness on the right. Patient is unable to write any history. Patient's eyes are both equal on evaluation. Patient is unable to follow any commands. Patient has no focal deficits. Patient has no last known well. 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 Review of Systems unable to be obtained due to patient condition      Nursing Notes were all reviewed and agreed with or any disagreements were addressed in the HPI. REVIEW OF SYSTEMS :      Positives and Pertinent negatives as per HPI.      SURGICAL HISTORY     Past Surgical History:   Procedure Laterality Date    BLADDER SUSPENSION      DILATION AND CURETTAGE OF UTERUS      DILATION AND CURETTAGE OF UTERUS N/A 2/16/2021    endometrial biopsy, HYSTEROSCOPY performed by Francheska Naranjo MD at [address]       Previous Medications    ACETAMINOPHEN (TYLENOL) 325 MG TABLET    Take 2 tablets by mouth every 4 hours as needed for Pain or Fever    ALUMINUM & MAGNESIUM HYDROXIDE-SIMETHICONE (MAALOX) 200-200-20 MG/5ML SUSP SUSPENSION    Take 30 mLs by mouth every 4 hours as needed for 10 % injection 1,000 mg (1,000 mg IntraVENous Given 12/2/23 2249)   insulin regular (HUMULIN R;NOVOLIN R) injection 10 Units (10 Units IntraVENous Given 12/2/23 2154)     And   dextrose bolus 10% 250 mL (0 mLs IntraVENous Stopped 12/2/23 2214)   midazolam PF (VERSED) injection 2 mg (2 mg IntraVENous Given 12/3/23 0517)   midazolam PF (VERSED) injection 1 mg (1 mg IntraVENous Given 12/3/23 0657)       Medical Decision Making/Differential Diagnosis:  CC/HPI Summary, Social Determinants of health, Records Reviewed, DDx, testing done/not done, ED Course, Reassessment, disposition considerations/shared decision making with patient, consults, disposition:        CC/HPI Summary, DDx, ED Course, Reassessment, Tests Considered, Patient expectation:   Patient presents emergency department for evaluation for altered mental status. Patient was moving all extremities. Patient had no last known well. CT of the head shows no evidence of any intracranial bleeding. There is no evidence of any edema. There is no epidural or subdural hematoma. Patient was found to be profoundly hyponatremic. Nephrology was consulted. Recommended started patient on D5 half-normal saline. Patient is most likely altered secondary to her hyponatremia. Patient was also hyperkalemic. Patient was given hyperkalemia treatment including albuterol, insulin, and fluids. Patient was given calcium gluconate. Patient was found also have pneumonia. Patient was given Rocephin and doxycycline. Patient also has urinary tract infection which will be covered by Rocephin. Patient's cause may be multifactorial.  Patient's COVID and influenza were negative. Patient was admitted to the ICU. Patient had no fever making meningitis unlikely.               Chronic Conditions:   Past Medical History:   Diagnosis Date    Arthritis     hips    Ataxia     Atrophy of muscle     Bipolar 1 disorder, mixed, moderate (HCC)     Cerebrovascular disease     Coordination

## 2023-12-03 NOTE — PLAN OF CARE
Problem: Confusion  Goal: Confusion, delirium, dementia, or psychosis is improved or at baseline  Description: INTERVENTIONS:  1. Assess for possible contributors to thought disturbance, including medications, impaired vision or hearing, underlying metabolic abnormalities, dehydration, psychiatric diagnoses, and notify attending LIP  2. [place] high risk fall precautions, as indicated  3. Provide frequent short contacts to provide reality reorientation, refocusing and direction  4. Decrease environmental stimuli, including noise as appropriate  5. Monitor and intervene to maintain adequate nutrition, hydration, elimination, sleep and activity  6. If unable to ensure safety without constant attention obtain sitter and review sitter guidelines with assigned personnel  7. Initiate Psychosocial CNS and Spiritual Care consult, as indicated  Outcome: Not Progressing     Problem: Skin/Tissue Integrity  Goal: Absence of new skin breakdown  Description: 1. Monitor for areas of redness and/or skin breakdown  2. Assess vascular access sites hourly  3. Every 4-6 hours minimum:  Change oxygen saturation probe site  4. Every 4-6 hours:  If on nasal continuous positive airway pressure, respiratory therapy assess nares and determine need for appliance change or resting period. Outcome: Progressing     Problem: Safety - Adult  Goal: Free from fall injury  Outcome: Progressing     Problem: Safety - Medical Restraint  Goal: Remains free of injury from restraints (Restraint for Interference with Medical Device)  Description: INTERVENTIONS:  1. Determine that other, less restrictive measures have been tried or would not be effective before applying the restraint  2. Evaluate the patient's condition at the time of restraint application  3. Inform patient/family regarding the reason for restraint  4.  Q2H: Monitor safety, psychosocial status, comfort, nutrition and hydration  Outcome: Progressing  Flowsheets (Taken 12/3/2023 1406)  Remains free of injury from restraints (restraint for interference with medical device):   Every 2 hours: Monitor safety, psychosocial status, comfort, nutrition and hydration   Determine that other, less restrictive measures have been tried or would not be effective before applying the restraint   Evaluate the patient's condition at the time of restraint application   Inform patient/family regarding the reason for restraint     Problem: Confusion  Goal: Confusion, delirium, dementia, or psychosis is improved or at baseline  Description: INTERVENTIONS:  1. Assess for possible contributors to thought disturbance, including medications, impaired vision or hearing, underlying metabolic abnormalities, dehydration, psychiatric diagnoses, and notify attending LIP  2. [place] high risk fall precautions, as indicated  3. Provide frequent short contacts to provide reality reorientation, refocusing and direction  4. Decrease environmental stimuli, including noise as appropriate  5. Monitor and intervene to maintain adequate nutrition, hydration, elimination, sleep and activity  6. If unable to ensure safety without constant attention obtain sitter and review sitter guidelines with assigned personnel  7.  Initiate Psychosocial CNS and Spiritual Care consult, as indicated  Outcome: Not Progressing

## 2023-12-03 NOTE — CONSULTS
[address] [place] [address]  Internal Medicine Residency Program  CONSULT NOTE  MICU    Patient:  Keith Span 70 y.o. female MRN: [id number]     Date of Service: 12/3/2023    Hospital Day: 2      Chief complaint: AMS  History of Present Illness   *History taken by chart review as patient is not answering questions. The patient is a 70 y.o. female with  has a past medical history of Arthritis, Ataxia, Atrophy of muscle, Bipolar 1 disorder, mixed, moderate ([address]), Cerebrovascular disease, Coordination problem, Hyperlipidemia, Hypertension, Overactive bladder, Paranoid schizophrenia ([address]), and Thyroid disease. Who presented to the ED on 12/3/2023 with altered mental status of unknown duration. Patient has a history of CVA with residual weakness on the right. At the ED patient had equal pupils and no focal deficits. Patient not able to follow commands. 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 ED Course: Patient was hypertensive 169/98, bradycardic 47, temperature was normal at 36.7 respiratory rate 12. BMP was relevant for hypernatremia sodium 157, hyperkalemia potassium 5.6, creatinine 1.8 BUN 33 otherwise within normal limits. CBC showed WBCs 10, hemoglobin 7, platelet count 061. Lactic acid 0.8. Troponin 35. TSH 1.39.  COVID-19 negative. Urinalysis positive for nitrates, trace ketones, negative leukocyte esterase, 3+ bacteria, WBC 0-5, RBCs 3-5. Microalbumin creatinine ratio 1619. Checks x-ray showed patchy left lung opacities. CT chest showed septal thickening in the lower segments with small to moderate bilateral pleural effusions most consistent with pulmonary edema pattern with decompensation less likely atypical bronchopneumonia given overall distribution in the setting of placed mild cardiomegaly. CT head showed no acute intracranial abnormality.   UDS negative  ED Meds: Patient was given insulin regular 10 units x2, illness that is causing systemic symptoms and have a high risk of morbidity without treatment. The patient is a 70 y.o. female with  has a past medical history of Arthritis, Ataxia, Atrophy of muscle, Bipolar 1 disorder, mixed, moderate ([address]), Cerebrovascular disease, Coordination problem, Hyperlipidemia, Hypertension, Overactive bladder, Paranoid schizophrenia ([address]), and Thyroid disease. Who presented to the ED on 12/3/2023 with altered mental status of unknown duration. Patient has a history of CVA with residual weakness on the right. 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 Medication changes in addition to clinical pharmacist and/or resident are Increase seizure medication and adjust abx for coverage given recent admission   Oxygen and NIV pressure adjustments were made based on ABGs   Decision was made to order specific drug levels and antibody testing. Depakote and Urine lytes  Review of chest radiogram/Imaging possible aspiration  Review of prior external notes was done such as reviewing previous echocardiogram reports. We reviewed EKG and compared to previous showing no signs of acute ischemia  Discussed with Neurology , the decision was made to get MRI brain  Pallative care evaluation requested  Decision regarding emergency surgery was discussed, no intervention planned. Note: Dione Brown was seen and during this encounter most was spent providing face-to-face patient care, including:  and coordinating care, reviewing the chart, labs, and diagnostics, as well as medical decision making.      [facility]

## 2023-12-03 NOTE — PROGRESS NOTES
4 Eyes Skin Assessment     NAME:  Fernando Ray  YOB: 1945  MEDICAL RECORD NUMBER:  [id number]    The patient is being assessed for  Admission    I agree that at least one RN has performed a thorough Head to Toe Skin Assessment on the patient. ALL assessment sites listed below have been assessed. Areas assessed by both nurses:    Head, Face, Ears, Shoulders, Back, Chest, Arms, Elbows, Hands, Sacrum. Buttock, Coccyx, Ischium, Legs. Feet and Heels, and Under Medical Devices         Does the Patient have a Wound?  No noted wound(s)       Efren Prevention initiated by RN: Yes  Wound Care Orders initiated by RN: No    Pressure Injury (Stage 3,4, Unstageable, DTI, NWPT, and Complex wounds) if present, place Wound referral order by RN under : No    New Ostomies, if present place, Ostomy referral order under : No     Nurse 1 eSignature: Electronically signed by Do Parks RN on 12/3/23 at 10:29 AM EST    **SHARE this note so that the co-signing nurse can place an eSignature**    Nurse 2 eSignature: {Esignature:[id number]}

## 2023-12-04 ENCOUNTER — APPOINTMENT (OUTPATIENT)
Dept: NEUROLOGY | Age: 78
End: 2023-12-04
Payer: MEDICARE

## 2023-12-04 ENCOUNTER — APPOINTMENT (OUTPATIENT)
Dept: ULTRASOUND IMAGING | Age: 78
End: 2023-12-04
Payer: MEDICARE

## 2023-12-04 LAB
ALBUMIN SERPL-MCNC: 2.6 G/DL (ref 3.5–5.2)
ALP SERPL-CCNC: 72 U/L (ref 35–104)
ALT SERPL-CCNC: 25 U/L (ref 0–32)
ANION GAP SERPL CALCULATED.3IONS-SCNC: 11 MMOL/L (ref 7–16)
AST SERPL-CCNC: 14 U/L (ref 0–31)
BASOPHILS # BLD: 0.02 K/UL (ref 0–0.2)
BASOPHILS NFR BLD: 0 % (ref 0–2)
BILIRUB SERPL-MCNC: 0.2 MG/DL (ref 0–1.2)
BUN SERPL-MCNC: 24 MG/DL (ref 6–23)
BUN SERPL-MCNC: 27 MG/DL (ref 6–23)
BUN SERPL-MCNC: 28 MG/DL (ref 6–23)
CALCIUM SERPL-MCNC: 7.9 MG/DL (ref 8.6–10.2)
CALCIUM SERPL-MCNC: 8.3 MG/DL (ref 8.6–10.2)
CALCIUM SERPL-MCNC: 8.6 MG/DL (ref 8.6–10.2)
CHLORIDE SERPL-SCNC: 122 MMOL/L (ref 98–107)
CHLORIDE SERPL-SCNC: 122 MMOL/L (ref 98–107)
CHLORIDE SERPL-SCNC: 123 MMOL/L (ref 98–107)
CO2 SERPL-SCNC: 18 MMOL/L (ref 22–29)
CO2 SERPL-SCNC: 18 MMOL/L (ref 22–29)
CO2 SERPL-SCNC: 19 MMOL/L (ref 22–29)
CREAT SERPL-MCNC: 2.1 MG/DL (ref 0.5–1)
CREAT SERPL-MCNC: 2.1 MG/DL (ref 0.5–1)
CREAT SERPL-MCNC: 2.2 MG/DL (ref 0.5–1)
EKG ATRIAL RATE: 326 BPM
EKG ATRIAL RATE: 55 BPM
EKG ATRIAL RATE: 56 BPM
EKG P AXIS: -20 DEGREES
EKG P AXIS: 18 DEGREES
EKG P-R INTERVAL: 108 MS
EKG P-R INTERVAL: 192 MS
EKG Q-T INTERVAL: 458 MS
EKG Q-T INTERVAL: 486 MS
EKG Q-T INTERVAL: 496 MS
EKG QRS DURATION: 102 MS
EKG QRS DURATION: 110 MS
EKG QRS DURATION: 122 MS
EKG QTC CALCULATION (BAZETT): 464 MS
EKG QTC CALCULATION (BAZETT): 468 MS
EKG QTC CALCULATION (BAZETT): 478 MS
EKG R AXIS: -11 DEGREES
EKG R AXIS: -8 DEGREES
EKG R AXIS: 3 DEGREES
EKG T AXIS: 118 DEGREES
EKG T AXIS: 92 DEGREES
EKG T AXIS: 98 DEGREES
EKG VENTRICULAR RATE: 55 BPM
EKG VENTRICULAR RATE: 56 BPM
EKG VENTRICULAR RATE: 63 BPM
EOSINOPHIL # BLD: 0.49 K/UL (ref 0.05–0.5)
EOSINOPHILS RELATIVE PERCENT: 4 % (ref 0–6)
ERYTHROCYTE [DISTWIDTH] IN BLOOD BY AUTOMATED COUNT: 18.4 % (ref 11.5–15)
FOLATE SERPL-MCNC: 10.6 NG/ML (ref 4.8–24.2)
GFR SERPL CREATININE-BSD FRML MDRD: 23 ML/MIN/1.73M2
GFR SERPL CREATININE-BSD FRML MDRD: 24 ML/MIN/1.73M2
GFR SERPL CREATININE-BSD FRML MDRD: 24 ML/MIN/1.73M2
GLUCOSE BLD-MCNC: 148 MG/DL (ref 74–99)
GLUCOSE BLD-MCNC: 64 MG/DL (ref 74–99)
GLUCOSE BLD-MCNC: 85 MG/DL (ref 74–99)
GLUCOSE BLD-MCNC: 87 MG/DL (ref 74–99)
GLUCOSE BLD-MCNC: 98 MG/DL (ref 74–99)
GLUCOSE SERPL-MCNC: 72 MG/DL (ref 74–99)
GLUCOSE SERPL-MCNC: 76 MG/DL (ref 74–99)
GLUCOSE SERPL-MCNC: 81 MG/DL (ref 74–99)
HCT VFR BLD AUTO: 25 % (ref 34–48)
HGB BLD-MCNC: 7.9 G/DL (ref 11.5–15.5)
IMM GRANULOCYTES # BLD AUTO: 0.06 K/UL (ref 0–0.58)
IMM GRANULOCYTES NFR BLD: 1 % (ref 0–5)
LYMPHOCYTES NFR BLD: 0.78 K/UL (ref 1.5–4)
LYMPHOCYTES RELATIVE PERCENT: 7 % (ref 20–42)
MAGNESIUM SERPL-MCNC: 1.4 MG/DL (ref 1.6–2.6)
MCH RBC QN AUTO: 29.8 PG (ref 26–35)
MCHC RBC AUTO-ENTMCNC: 31.6 G/DL (ref 32–34.5)
MCV RBC AUTO: 94.3 FL (ref 80–99.9)
MONOCYTES NFR BLD: 0.53 K/UL (ref 0.1–0.95)
MONOCYTES NFR BLD: 5 % (ref 2–12)
NEUTROPHILS NFR BLD: 83 % (ref 43–80)
NEUTS SEG NFR BLD: 9.14 K/UL (ref 1.8–7.3)
OSMOLALITY UR: 160 MOSM/KG (ref 300–900)
PHOSPHATE SERPL-MCNC: 3.4 MG/DL (ref 2.5–4.5)
PLATELET # BLD AUTO: 98 K/UL (ref 130–450)
PMV BLD AUTO: 12.4 FL (ref 7–12)
POTASSIUM SERPL-SCNC: 4.3 MMOL/L (ref 3.5–5)
POTASSIUM SERPL-SCNC: 4.7 MMOL/L (ref 3.5–5)
POTASSIUM SERPL-SCNC: 5 MMOL/L (ref 3.5–5)
POTASSIUM, UR: 8.1 MMOL/L
PROCALCITONIN SERPL-MCNC: 0.19 NG/ML (ref 0–0.08)
PROT SERPL-MCNC: 4.7 G/DL (ref 6.4–8.3)
RBC # BLD AUTO: 2.65 M/UL (ref 3.5–5.5)
SODIUM SERPL-SCNC: 151 MMOL/L (ref 132–146)
SODIUM SERPL-SCNC: 151 MMOL/L (ref 132–146)
SODIUM SERPL-SCNC: 153 MMOL/L (ref 132–146)
SODIUM UR-SCNC: 42 MMOL/L
VIT B12 SERPL-MCNC: >2000 PG/ML (ref 211–946)
WBC OTHER # BLD: 11 K/UL (ref 4.5–11.5)

## 2023-12-04 PROCEDURE — 2500000003 HC RX 250 WO HCPCS: Performed by: STUDENT IN AN ORGANIZED HEALTH CARE EDUCATION/TRAINING PROGRAM

## 2023-12-04 PROCEDURE — 83735 ASSAY OF MAGNESIUM: CPT

## 2023-12-04 PROCEDURE — 80165 DIPROPYLACETIC ACID FREE: CPT

## 2023-12-04 PROCEDURE — 82607 VITAMIN B-12: CPT

## 2023-12-04 PROCEDURE — 6360000002 HC RX W HCPCS: Performed by: STUDENT IN AN ORGANIZED HEALTH CARE EDUCATION/TRAINING PROGRAM

## 2023-12-04 PROCEDURE — 2580000003 HC RX 258: Performed by: INTERNAL MEDICINE

## 2023-12-04 PROCEDURE — 6360000002 HC RX W HCPCS: Performed by: INTERNAL MEDICINE

## 2023-12-04 PROCEDURE — 85025 COMPLETE CBC W/AUTO DIFF WBC: CPT

## 2023-12-04 PROCEDURE — 76770 US EXAM ABDO BACK WALL COMP: CPT

## 2023-12-04 PROCEDURE — 82962 GLUCOSE BLOOD TEST: CPT

## 2023-12-04 PROCEDURE — 2580000003 HC RX 258: Performed by: STUDENT IN AN ORGANIZED HEALTH CARE EDUCATION/TRAINING PROGRAM

## 2023-12-04 PROCEDURE — 83935 ASSAY OF URINE OSMOLALITY: CPT

## 2023-12-04 PROCEDURE — 93010 ELECTROCARDIOGRAM REPORT: CPT | Performed by: INTERNAL MEDICINE

## 2023-12-04 PROCEDURE — 2000000000 HC ICU R&B

## 2023-12-04 PROCEDURE — 6370000000 HC RX 637 (ALT 250 FOR IP): Performed by: STUDENT IN AN ORGANIZED HEALTH CARE EDUCATION/TRAINING PROGRAM

## 2023-12-04 PROCEDURE — 80048 BASIC METABOLIC PNL TOTAL CA: CPT

## 2023-12-04 PROCEDURE — 2700000000 HC OXYGEN THERAPY PER DAY

## 2023-12-04 PROCEDURE — 6360000002 HC RX W HCPCS

## 2023-12-04 PROCEDURE — 84100 ASSAY OF PHOSPHORUS: CPT

## 2023-12-04 PROCEDURE — 99233 SBSQ HOSP IP/OBS HIGH 50: CPT | Performed by: INTERNAL MEDICINE

## 2023-12-04 PROCEDURE — 2580000003 HC RX 258: Performed by: EMERGENCY MEDICINE

## 2023-12-04 PROCEDURE — 99232 SBSQ HOSP IP/OBS MODERATE 35: CPT | Performed by: CLINICAL NURSE SPECIALIST

## 2023-12-04 PROCEDURE — 99232 SBSQ HOSP IP/OBS MODERATE 35: CPT | Performed by: INTERNAL MEDICINE

## 2023-12-04 PROCEDURE — 84133 ASSAY OF URINE POTASSIUM: CPT

## 2023-12-04 PROCEDURE — 95822 EEG COMA OR SLEEP ONLY: CPT

## 2023-12-04 PROCEDURE — P9047 ALBUMIN (HUMAN), 25%, 50ML: HCPCS | Performed by: INTERNAL MEDICINE

## 2023-12-04 PROCEDURE — 80053 COMPREHEN METABOLIC PANEL: CPT

## 2023-12-04 PROCEDURE — 80164 ASSAY DIPROPYLACETIC ACD TOT: CPT

## 2023-12-04 PROCEDURE — 84145 PROCALCITONIN (PCT): CPT

## 2023-12-04 PROCEDURE — 84300 ASSAY OF URINE SODIUM: CPT

## 2023-12-04 PROCEDURE — 82746 ASSAY OF FOLIC ACID SERUM: CPT

## 2023-12-04 RX ORDER — ALBUMIN (HUMAN) 12.5 G/50ML
25 SOLUTION INTRAVENOUS EVERY 12 HOURS
Status: COMPLETED | OUTPATIENT
Start: 2023-12-04 | End: 2023-12-05

## 2023-12-04 RX ORDER — LEVETIRACETAM 500 MG/1
500 TABLET ORAL ONCE
Status: COMPLETED | OUTPATIENT
Start: 2023-12-04 | End: 2023-12-04

## 2023-12-04 RX ORDER — VALPROIC ACID 250 MG/5ML
250 SOLUTION ORAL 2 TIMES DAILY
Status: DISCONTINUED | OUTPATIENT
Start: 2023-12-04 | End: 2023-12-08

## 2023-12-04 RX ORDER — DEXTROSE MONOHYDRATE 50 MG/ML
INJECTION, SOLUTION INTRAVENOUS CONTINUOUS
Status: DISCONTINUED | OUTPATIENT
Start: 2023-12-04 | End: 2023-12-05

## 2023-12-04 RX ORDER — MAGNESIUM SULFATE 1 G/100ML
1000 INJECTION INTRAVENOUS ONCE
Status: COMPLETED | OUTPATIENT
Start: 2023-12-04 | End: 2023-12-04

## 2023-12-04 RX ORDER — LEVETIRACETAM 500 MG/1
1000 TABLET ORAL 2 TIMES DAILY
Status: DISCONTINUED | OUTPATIENT
Start: 2023-12-05 | End: 2023-12-06

## 2023-12-04 RX ADMIN — SODIUM CHLORIDE, PRESERVATIVE FREE 10 ML: 5 INJECTION INTRAVENOUS at 20:35

## 2023-12-04 RX ADMIN — WATER 1000 MG: 1 INJECTION INTRAMUSCULAR; INTRAVENOUS; SUBCUTANEOUS at 04:40

## 2023-12-04 RX ADMIN — FOLIC ACID 1 MG: 5 INJECTION, SOLUTION INTRAMUSCULAR; INTRAVENOUS; SUBCUTANEOUS at 08:53

## 2023-12-04 RX ADMIN — SODIUM CHLORIDE, PRESERVATIVE FREE 10 ML: 5 INJECTION INTRAVENOUS at 08:53

## 2023-12-04 RX ADMIN — VALPROIC ACID 250 MG: 250 SOLUTION ORAL at 20:35

## 2023-12-04 RX ADMIN — DOXYCYCLINE 100 MG: 100 INJECTION, POWDER, LYOPHILIZED, FOR SOLUTION INTRAVENOUS at 17:55

## 2023-12-04 RX ADMIN — DEXTROSE MONOHYDRATE: 50 INJECTION, SOLUTION INTRAVENOUS at 10:36

## 2023-12-04 RX ADMIN — LEVETIRACETAM 3000 MG: 100 INJECTION, SOLUTION, CONCENTRATE INTRAVENOUS at 16:55

## 2023-12-04 RX ADMIN — ALBUMIN (HUMAN) 25 G: 0.25 INJECTION, SOLUTION INTRAVENOUS at 10:39

## 2023-12-04 RX ADMIN — DEXTROSE MONOHYDRATE: 50 INJECTION, SOLUTION INTRAVENOUS at 23:44

## 2023-12-04 RX ADMIN — LEVETIRACETAM 500 MG: 500 TABLET, FILM COATED ORAL at 23:40

## 2023-12-04 RX ADMIN — DEXTROSE MONOHYDRATE: 50 INJECTION, SOLUTION INTRAVENOUS at 17:23

## 2023-12-04 RX ADMIN — DOXYCYCLINE 100 MG: 100 INJECTION, POWDER, LYOPHILIZED, FOR SOLUTION INTRAVENOUS at 04:56

## 2023-12-04 RX ADMIN — DEXTROSE AND SODIUM CHLORIDE: 5; 450 INJECTION, SOLUTION INTRAVENOUS at 04:49

## 2023-12-04 RX ADMIN — LEVETIRACETAM 500 MG: 500 TABLET, FILM COATED ORAL at 08:53

## 2023-12-04 RX ADMIN — DEXTROSE MONOHYDRATE 125 ML: 100 INJECTION, SOLUTION INTRAVENOUS at 04:47

## 2023-12-04 RX ADMIN — MAGNESIUM SULFATE HEPTAHYDRATE 1000 MG: 1 INJECTION, SOLUTION INTRAVENOUS at 08:55

## 2023-12-04 RX ADMIN — ALBUMIN (HUMAN) 25 G: 0.25 INJECTION, SOLUTION INTRAVENOUS at 20:43

## 2023-12-04 RX ADMIN — LEVETIRACETAM 500 MG: 500 TABLET, FILM COATED ORAL at 20:35

## 2023-12-04 ASSESSMENT — PAIN SCALES - GENERAL
PAINLEVEL_OUTOF10: 0
PAINLEVEL_OUTOF10: 0

## 2023-12-04 NOTE — PROCEDURES
EEG Report  Vanna Franks is a 70 y.o. female      Appointment Date 12/4/2023   Appointment Time 1:30pm   Facility Location [facility] EEG Number [id number]   Type of Study portable Floor 4425     Technical Specifications  Technician [address] of consciousness unresponsive   Sleep deprived? no   Hyperventilation tested? no   Photic stim tested? no   EEG recording Standard 10-20 electrode placement    Duration of recording 25 mins   EEG complete?  Yes         Clinical History   ***    Medications    Current Facility-Administered Medications:     albumin human 25% IV solution 25 g, 25 g, IntraVENous, Q12H, Blaire Torres MD, Stopped at 12/04/23 1144    dextrose 5 % solution, , IntraVENous, Continuous, Blaire Torres MD, Last Rate: 150 mL/hr at 12/04/23 1036, New Bag at 12/04/23 1036    sodium chloride flush 0.9 % injection 5-40 mL, 5-40 mL, IntraVENous, 2 times per day, Kenzie Lihgt MD, 10 mL at 12/04/23 0853    sodium chloride flush 0.9 % injection 5-40 mL, 5-40 mL, IntraVENous, PRN, Kenzie Light MD    0.9 % sodium chloride infusion, , IntraVENous, PRN, Kenzie Light MD    ondansetron (ZOFRAN-ODT) disintegrating tablet 4 mg, 4 mg, Oral, Q8H PRN **OR** ondansetron (ZOFRAN) injection 4 mg, 4 mg, IntraVENous, Q6H PRN, eKnzie Light MD    polyethylene glycol (GLYCOLAX) packet 17 g, 17 g, Oral, Daily PRN, Kenzie Light MD    acetaminophen (TYLENOL) tablet 650 mg, 650 mg, Oral, Q6H PRN **OR** acetaminophen (TYLENOL) suppository 650 mg, 650 mg, Rectal, Q6H PRN, Kenzie Light MD    cefTRIAXone (ROCEPHIN) 1,000 mg in sterile water 10 mL IV syringe, 1,000 mg, IntraVENous, Q24H, Kenzie Light MD, 1,000 mg at 12/04/23 0440    doxycycline (VIBRAMYCIN) 100 mg in sodium chloride 0.9 % 100 mL IVPB ([id number]), 100 mg, IntraVENous, Q12H, Kenzie Light MD, Stopped at 12/04/23 0630    divalproex (DEPAKOTE) DR tablet 250 mg, 250 mg, Oral, BID, Kenzie Light MD, 250 mg at 12/03/23 2052    levETIRAcetam (KEPPRA) tablet 500 mg, 500 mg, Oral, BID, Roberto Carlos Martinez MD, 500 mg at 88/64/86 7755    folic acid injection 1 mg, 1 mg, IntraVENous, Daily, Maryam Hartman MD, 1 mg at 12/04/23 0853    glucose chewable tablet 16 g, 4 tablet, Oral, PRN, Maryam Hartman MD    dextrose bolus 10% 125 mL, 125 mL, IntraVENous, PRN, Stopped at 12/04/23 0455 **OR** dextrose bolus 10% 250 mL, 250 mL, IntraVENous, PRN, Maryam Hartman MD, Stopped at 12/03/23 1518    glucagon injection 1 mg, 1 mg, SubCUTAneous, PRN, Maryam Hartman MD    dextrose 10 % infusion, , IntraVENous, Continuous PRN, Maryam Hartman MD    0.9 % sodium chloride infusion, , IntraVENous, PRN, Maryam Hartman MD        Physician Interpretation    General EEG Report  ***    Type of EEG >>  ***          Epileptiform Discharge   ***    Non-Epileptiform Abnormality  ***    Ictal  ***    General Impression  ***    Eneida Justin

## 2023-12-04 NOTE — PLAN OF CARE
Problem: Discharge Planning  Goal: Discharge to home or other facility with appropriate resources  12/4/2023 0058 by Livan Raines RN  Outcome: Progressing  12/4/2023 0057 by Livan Raines RN  Outcome: Progressing     Problem: Pain  Goal: Verbalizes/displays adequate comfort level or baseline comfort level  12/4/2023 1439 by Carlin Simon RN  Outcome: Progressing  12/4/2023 0058 by Livan Raines RN  Outcome: Progressing  12/4/2023 0057 by Livan Raines RN  Outcome: Progressing     Problem: Skin/Tissue Integrity  Goal: Absence of new skin breakdown  Description: 1. Monitor for areas of redness and/or skin breakdown  2. Assess vascular access sites hourly  3. Every 4-6 hours minimum:  Change oxygen saturation probe site  4. Every 4-6 hours:  If on nasal continuous positive airway pressure, respiratory therapy assess nares and determine need for appliance change or resting period. 12/4/2023 1439 by aCrlin Simon RN  Outcome: Progressing  12/4/2023 0058 by Livan Raines RN  Outcome: Progressing  12/4/2023 0057 by Livan Raines RN  Outcome: Progressing     Problem: Safety - Adult  Goal: Free from fall injury  12/4/2023 0058 by Livan Raines RN  Outcome: Progressing  12/4/2023 0057 by Livan Raines RN  Outcome: Progressing     Problem: Safety - Medical Restraint  Goal: Remains free of injury from restraints (Restraint for Interference with Medical Device)  Description: INTERVENTIONS:  1. Determine that other, less restrictive measures have been tried or would not be effective before applying the restraint  2. Evaluate the patient's condition at the time of restraint application  3. Inform patient/family regarding the reason for restraint  4.  Q2H: Monitor safety, psychosocial status, comfort, nutrition and hydration  12/4/2023 1439 by Carlin Simon RN  Outcome: Progressing  12/4/2023 0058 by Livan Raines RN  Outcome: Progressing  12/4/2023 0057 by Livan Raines RN  Outcome: Progressing  Flowsheets (Taken 12/3/2023 2000)  Remains free of injury from restraints (restraint for interference with medical device): Every 2 hours: Monitor safety, psychosocial status, comfort, nutrition and hydration     Problem: Confusion  Goal: Confusion, delirium, dementia, or psychosis is improved or at baseline  Description: INTERVENTIONS:  1. Assess for possible contributors to thought disturbance, including medications, impaired vision or hearing, underlying metabolic abnormalities, dehydration, psychiatric diagnoses, and notify attending LIP  2. [place] high risk fall precautions, as indicated  3. Provide frequent short contacts to provide reality reorientation, refocusing and direction  4. Decrease environmental stimuli, including noise as appropriate  5. Monitor and intervene to maintain adequate nutrition, hydration, elimination, sleep and activity  6. If unable to ensure safety without constant attention obtain sitter and review sitter guidelines with assigned personnel  7. Initiate Psychosocial CNS and Spiritual Care consult, as indicated  12/4/2023 0058 by Vivienne Connolly RN  Outcome: Not Progressing  12/4/2023 0057 by Vivienne Connolly RN  Outcome: Progressing     Problem: Confusion  Goal: Confusion, delirium, dementia, or psychosis is improved or at baseline  Description: INTERVENTIONS:  1. Assess for possible contributors to thought disturbance, including medications, impaired vision or hearing, underlying metabolic abnormalities, dehydration, psychiatric diagnoses, and notify attending LIP  2. [place] high risk fall precautions, as indicated  3. Provide frequent short contacts to provide reality reorientation, refocusing and direction  4. Decrease environmental stimuli, including noise as appropriate  5. Monitor and intervene to maintain adequate nutrition, hydration, elimination, sleep and activity  6.  If unable to ensure safety without constant attention obtain sitter and review sitter guidelines with assigned personnel  7.  Initiate Psychosocial CNS and Spiritual Care consult, as indicated  12/4/2023 0058 by Maryam Osborne RN  Outcome: Not Progressing  12/4/2023 0057 by Maryam Osborne, RN  Outcome: Progressing

## 2023-12-04 NOTE — PROGRESS NOTES
Stage  CI HR MAP TFC SVI   Baseline 2.5 74  41.8 34   Challenge 3.1 77  44.2 41   Result (%?) 23.7 % 4.7%  5.8% 21.3%      Patient was FLUID RESPONSIVE during NICOM with 250cc bolus of normal saline.

## 2023-12-04 NOTE — PLAN OF CARE
Problem: Confusion  Goal: Confusion, delirium, dementia, or psychosis is improved or at baseline  Description: INTERVENTIONS:  1. Assess for possible contributors to thought disturbance, including medications, impaired vision or hearing, underlying metabolic abnormalities, dehydration, psychiatric diagnoses, and notify attending LIP  2. [place] high risk fall precautions, as indicated  3. Provide frequent short contacts to provide reality reorientation, refocusing and direction  4. Decrease environmental stimuli, including noise as appropriate  5. Monitor and intervene to maintain adequate nutrition, hydration, elimination, sleep and activity  6. If unable to ensure safety without constant attention obtain sitter and review sitter guidelines with assigned personnel  7. Initiate Psychosocial CNS and Spiritual Care consult, as indicated  12/4/2023 0058 by Austen Khanna RN  Outcome: Not Progressing      Problem: Discharge Planning  Goal: Discharge to home or other facility with appropriate resources  12/4/2023 0058 by Austen Khanna RN  Outcome: Progressing       Problem: Pain  Goal: Verbalizes/displays adequate comfort level or baseline comfort level  12/4/2023 0058 by Austen Khanna RN  Outcome: Progressing       Problem: Skin/Tissue Integrity  Goal: Absence of new skin breakdown  Description: 1. Monitor for areas of redness and/or skin breakdown  2. Assess vascular access sites hourly  3. Every 4-6 hours minimum:  Change oxygen saturation probe site  4. Every 4-6 hours:  If on nasal continuous positive airway pressure, respiratory therapy assess nares and determine need for appliance change or resting period.   12/4/2023 0058 by Austen Khanna RN  Outcome: Progressing    Problem: Safety - Adult  Goal: Free from fall injury  12/4/2023 0058 by Austen Khanna RN  Outcome: Progressing       Problem: Safety - Medical Restraint  Goal: Remains free of injury from restraints (Restraint for Interference with Medical Device)  Description: INTERVENTIONS:  1. Determine that other, less restrictive measures have been tried or would not be effective before applying the restraint  2. Evaluate the patient's condition at the time of restraint application  3. Inform patient/family regarding the reason for restraint  4.  Q2H: Monitor safety, psychosocial status, comfort, nutrition and hydration  12/4/2023 0058 by Jose Cruz Youngblood RN  Outcome: Progressing  Flowsheets (Taken 12/3/2023 2000)  Remains free of injury from restraints (restraint for interference with medical device): Every 2 hours: Monitor safety, psychosocial status, comfort, nutrition and hydration

## 2023-12-04 NOTE — PROCEDURES
Spoke with RN, the screening form was filled out by LPN at nursing [place], which we cannot accept. RN to get a hold of guardian/family member, if not Dr. Yashira Welch would need to sign a waiver to cover the answers.

## 2023-12-04 NOTE — PROGRESS NOTES
EEG preliminary report  Date of procedure 12/4/2023  Routine inpatient EEG done at bedside with video reporting. Description: Mildly asymmetric background with diffuse increased amplitude slowing with frequent rhythmic left hemisphere slow sharp discharges with greatest amplitude in frontal region. Both positive and negative initial deflection with presence of some phasic sharp activity with negative phase inversion across T5 electrode and less so in P3 electrode. Prominent delta slowing with presence of bilateral triphasic waves. No significant fast activity other was except for low amplitude right frontotemporal beta activity. No sleep architecture appreciated. Activation procedures were not performed. Interpretation:  Abnormal EEG with presence of some sharp activity with triphasic wave and frontal predominance. Some phasic sharp discharges suggestive of epileptiform activity with some spike and wave type discharges as well. 70 does not suggest status epilepticus with more concern for severe encephalopathy due to presence of triphasic ways with prominent bifrontal delta slowing. Cannot exclude structural abnormality given asymmetric slowing with polymorphic sharps. Clinical correlation is indicated. Recommend initiation of AED therapy as a precaution with CT/MRI of the brain as well as repeat EEG.

## 2023-12-05 ENCOUNTER — APPOINTMENT (OUTPATIENT)
Dept: GENERAL RADIOLOGY | Age: 78
End: 2023-12-05
Payer: MEDICARE

## 2023-12-05 LAB
AADO2: 132.9 MMHG
AADO2: 238.1 MMHG
ANION GAP SERPL CALCULATED.3IONS-SCNC: 10 MMOL/L (ref 7–16)
ANION GAP SERPL CALCULATED.3IONS-SCNC: 12 MMOL/L (ref 7–16)
ANION GAP SERPL CALCULATED.3IONS-SCNC: 12 MMOL/L (ref 7–16)
ANION GAP SERPL CALCULATED.3IONS-SCNC: 15 MMOL/L (ref 7–16)
B.E.: -5.2 MMOL/L (ref -3–3)
B.E.: -6.3 MMOL/L (ref -3–3)
B.E.: -8.4 MMOL/L (ref -3–3)
BASOPHILS # BLD: 0 K/UL (ref 0–0.2)
BASOPHILS # BLD: 0.02 K/UL (ref 0–0.2)
BASOPHILS NFR BLD: 0 % (ref 0–2)
BASOPHILS NFR BLD: 0 % (ref 0–2)
BNP SERPL-MCNC: 768 PG/ML (ref 0–450)
BUN SERPL-MCNC: 25 MG/DL (ref 6–23)
BUN SERPL-MCNC: 26 MG/DL (ref 6–23)
BUN SERPL-MCNC: 27 MG/DL (ref 6–23)
BUN SERPL-MCNC: 29 MG/DL (ref 6–23)
CALCIUM SERPL-MCNC: 8.5 MG/DL (ref 8.6–10.2)
CALCIUM SERPL-MCNC: 8.8 MG/DL (ref 8.6–10.2)
CALCIUM SERPL-MCNC: 8.8 MG/DL (ref 8.6–10.2)
CALCIUM SERPL-MCNC: 9.1 MG/DL (ref 8.6–10.2)
CHLORIDE SERPL-SCNC: 114 MMOL/L (ref 98–107)
CHLORIDE SERPL-SCNC: 114 MMOL/L (ref 98–107)
CHLORIDE SERPL-SCNC: 115 MMOL/L (ref 98–107)
CHLORIDE SERPL-SCNC: 119 MMOL/L (ref 98–107)
CO2 SERPL-SCNC: 16 MMOL/L (ref 22–29)
CO2 SERPL-SCNC: 16 MMOL/L (ref 22–29)
CO2 SERPL-SCNC: 18 MMOL/L (ref 22–29)
CO2 SERPL-SCNC: 18 MMOL/L (ref 22–29)
COHB: 0.6 % (ref 0–1.5)
COHB: 0.7 % (ref 0–1.5)
COHB: 0.9 % (ref 0–1.5)
CREAT SERPL-MCNC: 2.2 MG/DL (ref 0.5–1)
CREAT SERPL-MCNC: 2.3 MG/DL (ref 0.5–1)
CREAT SERPL-MCNC: 2.3 MG/DL (ref 0.5–1)
CREAT SERPL-MCNC: 2.4 MG/DL (ref 0.5–1)
CRITICAL: ABNORMAL
DATE ANALYZED: ABNORMAL
DATE OF COLLECTION: ABNORMAL
EOSINOPHIL # BLD: 0.3 K/UL (ref 0.05–0.5)
EOSINOPHIL # BLD: 0.83 K/UL (ref 0.05–0.5)
EOSINOPHILS RELATIVE PERCENT: 3 % (ref 0–6)
EOSINOPHILS RELATIVE PERCENT: 8 % (ref 0–6)
ERYTHROCYTE [DISTWIDTH] IN BLOOD BY AUTOMATED COUNT: 18.6 % (ref 11.5–15)
ERYTHROCYTE [DISTWIDTH] IN BLOOD BY AUTOMATED COUNT: 18.6 % (ref 11.5–15)
FERRITIN SERPL-MCNC: 238 NG/ML
FIO2: 45 %
FIO2: 50 %
GFR SERPL CREATININE-BSD FRML MDRD: 20 ML/MIN/1.73M2
GFR SERPL CREATININE-BSD FRML MDRD: 21 ML/MIN/1.73M2
GFR SERPL CREATININE-BSD FRML MDRD: 22 ML/MIN/1.73M2
GFR SERPL CREATININE-BSD FRML MDRD: 22 ML/MIN/1.73M2
GLUCOSE SERPL-MCNC: 101 MG/DL (ref 74–99)
GLUCOSE SERPL-MCNC: 106 MG/DL (ref 74–99)
GLUCOSE SERPL-MCNC: 81 MG/DL (ref 74–99)
GLUCOSE SERPL-MCNC: 87 MG/DL (ref 74–99)
HCO3: 17.9 MMOL/L (ref 22–26)
HCO3: 18.6 MMOL/L (ref 22–26)
HCO3: 19.7 MMOL/L (ref 22–26)
HCT VFR BLD AUTO: 22.8 % (ref 34–48)
HCT VFR BLD AUTO: 23.9 % (ref 34–48)
HGB BLD-MCNC: 7 G/DL (ref 11.5–15.5)
HGB BLD-MCNC: 7.4 G/DL (ref 11.5–15.5)
HHB: 1.6 % (ref 0–5)
HHB: 12.9 % (ref 0–5)
HHB: 6.2 % (ref 0–5)
IMM GRANULOCYTES # BLD AUTO: 0.08 K/UL (ref 0–0.58)
IMM GRANULOCYTES NFR BLD: 1 % (ref 0–5)
IRON SATN MFR SERPL: 28 % (ref 15–50)
IRON SERPL-MCNC: 44 UG/DL (ref 37–145)
L PNEUMO1 AG UR QL IA.RAPID: NEGATIVE
LAB: ABNORMAL
LACTATE BLDV-SCNC: 2.4 MMOL/L (ref 0.5–2.2)
LYMPHOCYTES NFR BLD: 0.48 K/UL (ref 1.5–4)
LYMPHOCYTES NFR BLD: 0.55 K/UL (ref 1.5–4)
LYMPHOCYTES RELATIVE PERCENT: 5 % (ref 20–42)
LYMPHOCYTES RELATIVE PERCENT: 5 % (ref 20–42)
Lab: 1026
Lab: 1610
Lab: 824
MAGNESIUM SERPL-MCNC: 1.7 MG/DL (ref 1.6–2.6)
MAGNESIUM SERPL-MCNC: 2.2 MG/DL (ref 1.6–2.6)
MCH RBC QN AUTO: 29.4 PG (ref 26–35)
MCH RBC QN AUTO: 29.7 PG (ref 26–35)
MCHC RBC AUTO-ENTMCNC: 30.7 G/DL (ref 32–34.5)
MCHC RBC AUTO-ENTMCNC: 31 G/DL (ref 32–34.5)
MCV RBC AUTO: 94.8 FL (ref 80–99.9)
MCV RBC AUTO: 96.6 FL (ref 80–99.9)
METHB: 0.6 % (ref 0–1.5)
METHB: 0.6 % (ref 0–1.5)
METHB: 0.8 % (ref 0–1.5)
MODE: ABNORMAL
MODE: AC
MODE: AC
MONOCYTES NFR BLD: 0.09 K/UL (ref 0.1–0.95)
MONOCYTES NFR BLD: 0.46 K/UL (ref 0.1–0.95)
MONOCYTES NFR BLD: 1 % (ref 2–12)
MONOCYTES NFR BLD: 4 % (ref 2–12)
NEUTROPHILS NFR BLD: 86 % (ref 43–80)
NEUTROPHILS NFR BLD: 87 % (ref 43–80)
NEUTS SEG NFR BLD: 9.03 K/UL (ref 1.8–7.3)
NEUTS SEG NFR BLD: 9.13 K/UL (ref 1.8–7.3)
NUCLEATED RED BLOOD CELLS: 2 PER 100 WBC
O2 CONTENT: 10.1 ML/DL
O2 CONTENT: 10.8 ML/DL
O2 CONTENT: 11.1 ML/DL
O2 SATURATION: 86.9 % (ref 92–98.5)
O2 SATURATION: 93.7 % (ref 92–98.5)
O2 SATURATION: 98.4 % (ref 92–98.5)
O2HB: 85.6 % (ref 94–97)
O2HB: 92.6 % (ref 94–97)
O2HB: 96.9 % (ref 94–97)
OPERATOR ID: 7292
OPERATOR ID: 8214
OPERATOR ID: ABNORMAL
PATIENT TEMP: 37 C
PCO2: 34 MMHG (ref 35–45)
PCO2: 35.7 MMHG (ref 35–45)
PCO2: 40 MMHG (ref 35–45)
PEEP/CPAP: 8 CMH2O
PEEP/CPAP: 8 CMH2O
PFO2: 1.22 MMHG/%
PFO2: 3.07 MMHG/%
PH BLOOD GAS: 7.27 (ref 7.35–7.45)
PH BLOOD GAS: 7.36 (ref 7.35–7.45)
PH BLOOD GAS: 7.36 (ref 7.35–7.45)
PHOSPHATE SERPL-MCNC: 3.6 MG/DL (ref 2.5–4.5)
PLATELET # BLD AUTO: 84 K/UL (ref 130–450)
PLATELET # BLD AUTO: 88 K/UL (ref 130–450)
PLATELET CONFIRMATION: NORMAL
PLATELET CONFIRMATION: NORMAL
PMV BLD AUTO: 12.7 FL (ref 7–12)
PMV BLD AUTO: 13 FL (ref 7–12)
PO2: 138 MMHG (ref 75–100)
PO2: 60.9 MMHG (ref 75–100)
PO2: 74.8 MMHG (ref 75–100)
POTASSIUM SERPL-SCNC: 4.4 MMOL/L (ref 3.5–5)
POTASSIUM SERPL-SCNC: 4.5 MMOL/L (ref 3.5–5)
POTASSIUM SERPL-SCNC: 4.5 MMOL/L (ref 3.5–5)
POTASSIUM SERPL-SCNC: 4.6 MMOL/L (ref 3.5–5)
PREALB SERPL-MCNC: 11 MG/DL (ref 20–40)
RBC # BLD AUTO: 2.36 M/UL (ref 3.5–5.5)
RBC # BLD AUTO: 2.52 M/UL (ref 3.5–5.5)
RBC # BLD: ABNORMAL 10*6/UL
RI(T): 0.96
RI(T): 3.91
RR MECHANICAL: 18 B/MIN
RR MECHANICAL: 18 B/MIN
S PNEUM AG SPEC QL: NEGATIVE
SODIUM SERPL-SCNC: 142 MMOL/L (ref 132–146)
SODIUM SERPL-SCNC: 145 MMOL/L (ref 132–146)
SODIUM SERPL-SCNC: 145 MMOL/L (ref 132–146)
SODIUM SERPL-SCNC: 147 MMOL/L (ref 132–146)
SOURCE, BLOOD GAS: ABNORMAL
SPECIMEN SOURCE: NORMAL
THB: 7.9 G/DL (ref 11.5–16.5)
THB: 8.2 G/DL (ref 11.5–16.5)
THB: 8.3 G/DL (ref 11.5–16.5)
TIBC SERPL-MCNC: 155 UG/DL (ref 250–450)
TIME ANALYZED: 1032
TIME ANALYZED: 1617
TIME ANALYZED: 831
VT MECHANICAL: 350 ML
VT MECHANICAL: 350 ML
WBC OTHER # BLD: 10.4 K/UL (ref 4.5–11.5)
WBC OTHER # BLD: 10.6 K/UL (ref 4.5–11.5)

## 2023-12-05 PROCEDURE — 3E033XZ INTRODUCTION OF VASOPRESSOR INTO PERIPHERAL VEIN, PERCUTANEOUS APPROACH: ICD-10-PCS | Performed by: STUDENT IN AN ORGANIZED HEALTH CARE EDUCATION/TRAINING PROGRAM

## 2023-12-05 PROCEDURE — 2000000000 HC ICU R&B

## 2023-12-05 PROCEDURE — 87086 URINE CULTURE/COLONY COUNT: CPT

## 2023-12-05 PROCEDURE — 71045 X-RAY EXAM CHEST 1 VIEW: CPT

## 2023-12-05 PROCEDURE — 6360000002 HC RX W HCPCS

## 2023-12-05 PROCEDURE — A4216 STERILE WATER/SALINE, 10 ML: HCPCS | Performed by: STUDENT IN AN ORGANIZED HEALTH CARE EDUCATION/TRAINING PROGRAM

## 2023-12-05 PROCEDURE — 87040 BLOOD CULTURE FOR BACTERIA: CPT

## 2023-12-05 PROCEDURE — 82805 BLOOD GASES W/O2 SATURATION: CPT

## 2023-12-05 PROCEDURE — 84145 PROCALCITONIN (PCT): CPT

## 2023-12-05 PROCEDURE — 2500000003 HC RX 250 WO HCPCS

## 2023-12-05 PROCEDURE — 94002 VENT MGMT INPAT INIT DAY: CPT

## 2023-12-05 PROCEDURE — 82728 ASSAY OF FERRITIN: CPT

## 2023-12-05 PROCEDURE — 85025 COMPLETE CBC W/AUTO DIFF WBC: CPT

## 2023-12-05 PROCEDURE — 6360000002 HC RX W HCPCS: Performed by: INTERNAL MEDICINE

## 2023-12-05 PROCEDURE — 83605 ASSAY OF LACTIC ACID: CPT

## 2023-12-05 PROCEDURE — 4A133J1 MONITORING OF ARTERIAL PULSE, PERIPHERAL, PERCUTANEOUS APPROACH: ICD-10-PCS

## 2023-12-05 PROCEDURE — 83540 ASSAY OF IRON: CPT

## 2023-12-05 PROCEDURE — 84100 ASSAY OF PHOSPHORUS: CPT

## 2023-12-05 PROCEDURE — 99291 CRITICAL CARE FIRST HOUR: CPT | Performed by: INTERNAL MEDICINE

## 2023-12-05 PROCEDURE — 2580000003 HC RX 258: Performed by: STUDENT IN AN ORGANIZED HEALTH CARE EDUCATION/TRAINING PROGRAM

## 2023-12-05 PROCEDURE — 6360000002 HC RX W HCPCS: Performed by: STUDENT IN AN ORGANIZED HEALTH CARE EDUCATION/TRAINING PROGRAM

## 2023-12-05 PROCEDURE — 2700000000 HC OXYGEN THERAPY PER DAY

## 2023-12-05 PROCEDURE — 31624 DX BRONCHOSCOPE/LAVAGE: CPT

## 2023-12-05 PROCEDURE — 83880 ASSAY OF NATRIURETIC PEPTIDE: CPT

## 2023-12-05 PROCEDURE — 6370000000 HC RX 637 (ALT 250 FOR IP): Performed by: STUDENT IN AN ORGANIZED HEALTH CARE EDUCATION/TRAINING PROGRAM

## 2023-12-05 PROCEDURE — 2580000003 HC RX 258: Performed by: INTERNAL MEDICINE

## 2023-12-05 PROCEDURE — 02HV33Z INSERTION OF INFUSION DEVICE INTO SUPERIOR VENA CAVA, PERCUTANEOUS APPROACH: ICD-10-PCS

## 2023-12-05 PROCEDURE — 87899 AGENT NOS ASSAY W/OPTIC: CPT

## 2023-12-05 PROCEDURE — 84134 ASSAY OF PREALBUMIN: CPT

## 2023-12-05 PROCEDURE — 36415 COLL VENOUS BLD VENIPUNCTURE: CPT

## 2023-12-05 PROCEDURE — 03HY32Z INSERTION OF MONITORING DEVICE INTO UPPER ARTERY, PERCUTANEOUS APPROACH: ICD-10-PCS

## 2023-12-05 PROCEDURE — 2500000003 HC RX 250 WO HCPCS: Performed by: STUDENT IN AN ORGANIZED HEALTH CARE EDUCATION/TRAINING PROGRAM

## 2023-12-05 PROCEDURE — 31500 INSERT EMERGENCY AIRWAY: CPT

## 2023-12-05 PROCEDURE — 87070 CULTURE OTHR SPECIMN AEROBIC: CPT

## 2023-12-05 PROCEDURE — 5A1945Z RESPIRATORY VENTILATION, 24-96 CONSECUTIVE HOURS: ICD-10-PCS | Performed by: INTERNAL MEDICINE

## 2023-12-05 PROCEDURE — 0BJ08ZZ INSPECTION OF TRACHEOBRONCHIAL TREE, VIA NATURAL OR ARTIFICIAL OPENING ENDOSCOPIC: ICD-10-PCS | Performed by: INTERNAL MEDICINE

## 2023-12-05 PROCEDURE — 87102 FUNGUS ISOLATION CULTURE: CPT

## 2023-12-05 PROCEDURE — 83735 ASSAY OF MAGNESIUM: CPT

## 2023-12-05 PROCEDURE — 87077 CULTURE AEROBIC IDENTIFY: CPT

## 2023-12-05 PROCEDURE — 2709999900 HC NON-CHARGEABLE SUPPLY

## 2023-12-05 PROCEDURE — 86403 PARTICLE AGGLUT ANTBDY SCRN: CPT

## 2023-12-05 PROCEDURE — 0BH17EZ INSERTION OF ENDOTRACHEAL AIRWAY INTO TRACHEA, VIA NATURAL OR ARTIFICIAL OPENING: ICD-10-PCS | Performed by: INTERNAL MEDICINE

## 2023-12-05 PROCEDURE — 80048 BASIC METABOLIC PNL TOTAL CA: CPT

## 2023-12-05 PROCEDURE — 83550 IRON BINDING TEST: CPT

## 2023-12-05 PROCEDURE — 99232 SBSQ HOSP IP/OBS MODERATE 35: CPT | Performed by: CLINICAL NURSE SPECIALIST

## 2023-12-05 PROCEDURE — 94640 AIRWAY INHALATION TREATMENT: CPT

## 2023-12-05 PROCEDURE — 87205 SMEAR GRAM STAIN: CPT

## 2023-12-05 PROCEDURE — P9047 ALBUMIN (HUMAN), 25%, 50ML: HCPCS | Performed by: INTERNAL MEDICINE

## 2023-12-05 PROCEDURE — 31622 DX BRONCHOSCOPE/WASH: CPT | Performed by: INTERNAL MEDICINE

## 2023-12-05 PROCEDURE — 36556 INSERT NON-TUNNEL CV CATH: CPT

## 2023-12-05 PROCEDURE — 87449 NOS EACH ORGANISM AG IA: CPT

## 2023-12-05 PROCEDURE — 31500 INSERT EMERGENCY AIRWAY: CPT | Performed by: INTERNAL MEDICINE

## 2023-12-05 PROCEDURE — 36620 INSERTION CATHETER ARTERY: CPT

## 2023-12-05 PROCEDURE — 4A133B1 MONITORING OF ARTERIAL PRESSURE, PERIPHERAL, PERCUTANEOUS APPROACH: ICD-10-PCS

## 2023-12-05 RX ORDER — ARFORMOTEROL TARTRATE 15 UG/2ML
15 SOLUTION RESPIRATORY (INHALATION)
Status: DISCONTINUED | OUTPATIENT
Start: 2023-12-05 | End: 2023-12-08

## 2023-12-05 RX ORDER — IPRATROPIUM BROMIDE AND ALBUTEROL SULFATE 2.5; .5 MG/3ML; MG/3ML
1 SOLUTION RESPIRATORY (INHALATION)
Status: DISCONTINUED | OUTPATIENT
Start: 2023-12-05 | End: 2023-12-08

## 2023-12-05 RX ORDER — MIDAZOLAM HYDROCHLORIDE 2 MG/2ML
3 INJECTION, SOLUTION INTRAMUSCULAR; INTRAVENOUS ONCE
Status: COMPLETED | OUTPATIENT
Start: 2023-12-05 | End: 2023-12-05

## 2023-12-05 RX ORDER — BUMETANIDE 0.25 MG/ML
1 INJECTION INTRAMUSCULAR; INTRAVENOUS ONCE
Status: COMPLETED | OUTPATIENT
Start: 2023-12-05 | End: 2023-12-05

## 2023-12-05 RX ORDER — FENTANYL CITRATE-0.9 % NACL/PF 10 MCG/ML
25-200 PLASTIC BAG, INJECTION (ML) INTRAVENOUS CONTINUOUS
Status: DISCONTINUED | OUTPATIENT
Start: 2023-12-05 | End: 2023-12-08

## 2023-12-05 RX ORDER — FENTANYL CITRATE 50 UG/ML
50 INJECTION, SOLUTION INTRAMUSCULAR; INTRAVENOUS ONCE
Status: DISCONTINUED | OUTPATIENT
Start: 2023-12-05 | End: 2023-12-06

## 2023-12-05 RX ORDER — NOREPINEPHRINE BITARTRATE 0.06 MG/ML
1-100 INJECTION, SOLUTION INTRAVENOUS CONTINUOUS
Status: DISCONTINUED | OUTPATIENT
Start: 2023-12-05 | End: 2023-12-05 | Stop reason: SDUPTHER

## 2023-12-05 RX ORDER — FENTANYL CITRATE 50 UG/ML
INJECTION, SOLUTION INTRAMUSCULAR; INTRAVENOUS
Status: COMPLETED
Start: 2023-12-05 | End: 2023-12-05

## 2023-12-05 RX ORDER — MAGNESIUM SULFATE IN WATER 40 MG/ML
2000 INJECTION, SOLUTION INTRAVENOUS ONCE
Status: COMPLETED | OUTPATIENT
Start: 2023-12-05 | End: 2023-12-05

## 2023-12-05 RX ORDER — NOREPINEPHRINE BITARTRATE 0.06 MG/ML
INJECTION, SOLUTION INTRAVENOUS
Status: COMPLETED
Start: 2023-12-05 | End: 2023-12-05

## 2023-12-05 RX ORDER — ASCORBIC ACID 500 MG
500 TABLET ORAL DAILY
Status: DISCONTINUED | OUTPATIENT
Start: 2023-12-05 | End: 2023-12-08

## 2023-12-05 RX ORDER — CHLORHEXIDINE GLUCONATE ORAL RINSE 1.2 MG/ML
15 SOLUTION DENTAL 2 TIMES DAILY
Status: DISCONTINUED | OUTPATIENT
Start: 2023-12-05 | End: 2023-12-08

## 2023-12-05 RX ORDER — GUAIFENESIN 400 MG/1
400 TABLET ORAL 3 TIMES DAILY
Status: DISCONTINUED | OUTPATIENT
Start: 2023-12-05 | End: 2023-12-08

## 2023-12-05 RX ORDER — THIAMINE HYDROCHLORIDE 100 MG/ML
100 INJECTION, SOLUTION INTRAMUSCULAR; INTRAVENOUS DAILY
Status: DISCONTINUED | OUTPATIENT
Start: 2023-12-05 | End: 2023-12-08

## 2023-12-05 RX ORDER — MIDAZOLAM HYDROCHLORIDE 1 MG/ML
INJECTION INTRAMUSCULAR; INTRAVENOUS
Status: COMPLETED
Start: 2023-12-05 | End: 2023-12-05

## 2023-12-05 RX ORDER — GUAIFENESIN 600 MG/1
600 TABLET, EXTENDED RELEASE ORAL 2 TIMES DAILY
Status: DISCONTINUED | OUTPATIENT
Start: 2023-12-05 | End: 2023-12-05 | Stop reason: CLARIF

## 2023-12-05 RX ORDER — ETOMIDATE 2 MG/ML
INJECTION INTRAVENOUS
Status: COMPLETED
Start: 2023-12-05 | End: 2023-12-05

## 2023-12-05 RX ORDER — NOREPINEPHRINE BITARTRATE 0.06 MG/ML
1-100 INJECTION, SOLUTION INTRAVENOUS CONTINUOUS
Status: DISCONTINUED | OUTPATIENT
Start: 2023-12-05 | End: 2023-12-08

## 2023-12-05 RX ORDER — BUDESONIDE 0.5 MG/2ML
0.5 INHALANT ORAL
Status: DISCONTINUED | OUTPATIENT
Start: 2023-12-05 | End: 2023-12-08

## 2023-12-05 RX ORDER — SUCCINYLCHOLINE CHLORIDE 20 MG/ML
INJECTION INTRAMUSCULAR; INTRAVENOUS
Status: COMPLETED
Start: 2023-12-05 | End: 2023-12-05

## 2023-12-05 RX ORDER — SODIUM CHLORIDE FOR INHALATION 3 %
4 VIAL, NEBULIZER (ML) INHALATION PRN
Status: DISCONTINUED | OUTPATIENT
Start: 2023-12-05 | End: 2023-12-08

## 2023-12-05 RX ADMIN — PIPERACILLIN AND TAZOBACTAM 3375 MG: 3; .375 INJECTION, POWDER, LYOPHILIZED, FOR SOLUTION INTRAVENOUS at 13:48

## 2023-12-05 RX ADMIN — PIPERACILLIN AND TAZOBACTAM 3375 MG: 3; .375 INJECTION, POWDER, LYOPHILIZED, FOR SOLUTION INTRAVENOUS at 18:59

## 2023-12-05 RX ADMIN — IPRATROPIUM BROMIDE AND ALBUTEROL SULFATE 1 DOSE: .5; 2.5 SOLUTION RESPIRATORY (INHALATION) at 20:17

## 2023-12-05 RX ADMIN — FAMOTIDINE 20 MG: 10 INJECTION, SOLUTION INTRAVENOUS at 11:03

## 2023-12-05 RX ADMIN — VANCOMYCIN HYDROCHLORIDE 1500 MG: 10 INJECTION, POWDER, LYOPHILIZED, FOR SOLUTION INTRAVENOUS at 12:06

## 2023-12-05 RX ADMIN — MIDAZOLAM HYDROCHLORIDE 3 MG: 2 INJECTION, SOLUTION INTRAMUSCULAR; INTRAVENOUS at 10:01

## 2023-12-05 RX ADMIN — CHLORHEXIDINE GLUCONATE 15 ML: 1.2 RINSE ORAL at 11:03

## 2023-12-05 RX ADMIN — IPRATROPIUM BROMIDE AND ALBUTEROL SULFATE 1 DOSE: .5; 2.5 SOLUTION RESPIRATORY (INHALATION) at 16:31

## 2023-12-05 RX ADMIN — IPRATROPIUM BROMIDE AND ALBUTEROL SULFATE 1 DOSE: .5; 2.5 SOLUTION RESPIRATORY (INHALATION) at 12:57

## 2023-12-05 RX ADMIN — Medication 5 MCG/MIN: at 14:38

## 2023-12-05 RX ADMIN — BUDESONIDE INHALATION 500 MCG: 0.5 SUSPENSION RESPIRATORY (INHALATION) at 12:57

## 2023-12-05 RX ADMIN — CHLORHEXIDINE GLUCONATE 15 ML: 1.2 RINSE ORAL at 20:02

## 2023-12-05 RX ADMIN — DOXYCYCLINE 100 MG: 100 INJECTION, POWDER, LYOPHILIZED, FOR SOLUTION INTRAVENOUS at 04:38

## 2023-12-05 RX ADMIN — GUAIFENESIN 400 MG: 400 TABLET ORAL at 20:17

## 2023-12-05 RX ADMIN — WATER 1000 MG: 1 INJECTION INTRAMUSCULAR; INTRAVENOUS; SUBCUTANEOUS at 04:40

## 2023-12-05 RX ADMIN — BUMETANIDE 1 MG: 0.25 INJECTION INTRAMUSCULAR; INTRAVENOUS at 11:03

## 2023-12-05 RX ADMIN — Medication 50 MCG/HR: at 09:56

## 2023-12-05 RX ADMIN — SODIUM CHLORIDE, PRESERVATIVE FREE 10 ML: 5 INJECTION INTRAVENOUS at 20:02

## 2023-12-05 RX ADMIN — Medication 500 MG: at 18:59

## 2023-12-05 RX ADMIN — ETOMIDATE 20 MG: 2 INJECTION, SOLUTION INTRAVENOUS at 09:59

## 2023-12-05 RX ADMIN — FOLIC ACID 1 MG: 5 INJECTION, SOLUTION INTRAMUSCULAR; INTRAVENOUS; SUBCUTANEOUS at 08:12

## 2023-12-05 RX ADMIN — SODIUM CHLORIDE, PRESERVATIVE FREE 10 ML: 5 INJECTION INTRAVENOUS at 08:13

## 2023-12-05 RX ADMIN — ALBUMIN (HUMAN) 25 G: 0.25 INJECTION, SOLUTION INTRAVENOUS at 20:45

## 2023-12-05 RX ADMIN — ARFORMOTEROL TARTRATE 15 MCG: 15 SOLUTION RESPIRATORY (INHALATION) at 20:17

## 2023-12-05 RX ADMIN — FENTANYL CITRATE 50 MCG: 50 INJECTION INTRAMUSCULAR; INTRAVENOUS at 10:00

## 2023-12-05 RX ADMIN — ALBUMIN (HUMAN) 25 G: 0.25 INJECTION, SOLUTION INTRAVENOUS at 11:09

## 2023-12-05 RX ADMIN — THIAMINE HYDROCHLORIDE 100 MG: 100 INJECTION, SOLUTION INTRAMUSCULAR; INTRAVENOUS at 08:12

## 2023-12-05 RX ADMIN — ARFORMOTEROL TARTRATE 15 MCG: 15 SOLUTION RESPIRATORY (INHALATION) at 12:57

## 2023-12-05 RX ADMIN — VALPROIC ACID 250 MG: 250 SOLUTION ORAL at 20:17

## 2023-12-05 RX ADMIN — MAGNESIUM SULFATE HEPTAHYDRATE 2000 MG: 40 INJECTION, SOLUTION INTRAVENOUS at 08:15

## 2023-12-05 RX ADMIN — MIDAZOLAM 3 MG: 1 INJECTION INTRAMUSCULAR; INTRAVENOUS at 10:01

## 2023-12-05 RX ADMIN — GUAIFENESIN 400 MG: 400 TABLET ORAL at 16:23

## 2023-12-05 RX ADMIN — LEVETIRACETAM 1000 MG: 500 TABLET, FILM COATED ORAL at 08:12

## 2023-12-05 RX ADMIN — VALPROIC ACID 250 MG: 250 SOLUTION ORAL at 08:12

## 2023-12-05 RX ADMIN — DEXTROSE MONOHYDRATE: 50 INJECTION, SOLUTION INTRAVENOUS at 06:33

## 2023-12-05 RX ADMIN — SUCCINYLCHOLINE CHLORIDE 50 MG: 20 INJECTION, SOLUTION INTRAMUSCULAR; INTRAVENOUS at 09:58

## 2023-12-05 RX ADMIN — BUDESONIDE INHALATION 500 MCG: 0.5 SUSPENSION RESPIRATORY (INHALATION) at 20:17

## 2023-12-05 RX ADMIN — GUAIFENESIN 400 MG: 400 TABLET ORAL at 11:03

## 2023-12-05 RX ADMIN — LEVETIRACETAM 1000 MG: 500 TABLET, FILM COATED ORAL at 20:11

## 2023-12-05 ASSESSMENT — PULMONARY FUNCTION TESTS
PIF_VALUE: 24
PIF_VALUE: 21
PIF_VALUE: 25
PIF_VALUE: 23
PIF_VALUE: 29
PIF_VALUE: 21
PIF_VALUE: 23
PIF_VALUE: 21
PIF_VALUE: 30
PIF_VALUE: 22
PIF_VALUE: 24
PIF_VALUE: 27
PIF_VALUE: 23
PIF_VALUE: 26
PIF_VALUE: 23

## 2023-12-05 ASSESSMENT — PAIN SCALES - GENERAL
PAINLEVEL_OUTOF10: 0
PAINLEVEL_OUTOF10: 0

## 2023-12-05 NOTE — PLAN OF CARE
Problem: Confusion  Goal: Confusion, delirium, dementia, or psychosis is improved or at baseline  Description: INTERVENTIONS:  1. Assess for possible contributors to thought disturbance, including medications, impaired vision or hearing, underlying metabolic abnormalities, dehydration, psychiatric diagnoses, and notify attending LIP  2. [place] high risk fall precautions, as indicated  3. Provide frequent short contacts to provide reality reorientation, refocusing and direction  4. Decrease environmental stimuli, including noise as appropriate  5. Monitor and intervene to maintain adequate nutrition, hydration, elimination, sleep and activity  6. If unable to ensure safety without constant attention obtain sitter and review sitter guidelines with assigned personnel  7. Initiate Psychosocial CNS and Spiritual Care consult, as indicated  Outcome: Not Progressing     Problem: Discharge Planning  Goal: Discharge to home or other facility with appropriate resources  Outcome: Progressing     Problem: Pain  Goal: Verbalizes/displays adequate comfort level or baseline comfort level  12/4/2023 2348 by Griselda Reed RN  Outcome: Progressing     Problem: Skin/Tissue Integrity  Goal: Absence of new skin breakdown  Description: 1. Monitor for areas of redness and/or skin breakdown  2. Assess vascular access sites hourly  3. Every 4-6 hours minimum:  Change oxygen saturation probe site  4. Every 4-6 hours:  If on nasal continuous positive airway pressure, respiratory therapy assess nares and determine need for appliance change or resting period. 12/4/2023 2348 by Griselda Reed RN  Outcome: Progressing     Problem: Safety - Adult  Goal: Free from fall injury  Outcome: Progressing     Problem: Safety - Medical Restraint  Goal: Remains free of injury from restraints (Restraint for Interference with Medical Device)  Description: INTERVENTIONS:  1.  Determine that other, less restrictive measures have been tried or

## 2023-12-05 NOTE — PROCEDURES
Date: 12/5/2023  Time: 10:06 AM    Patient identity confirmed:  Yes  Indications: impending resp failure  Sedation Standing Order Used: Yes  Preoxygenation: BVM with 100% O2  Laryngoscope size and type Glidescope  Airway introducer used: No  Evac: Yes  Tube size:a 7.5 cuffed  Number of attempts:1   Cords visualized:  [x] Clearly  [] Poorly  Breath sounds present bilaterally: Yes   ETCO2 30 mmhg  ETT to lip: 24  Tube secured with: tube rivera   Chest x-ray ordered: Yes  Difficulties encountered:     Difficult Airway: No    Performed by: Braden Jeronimo DO,

## 2023-12-05 NOTE — PROGRESS NOTES
Comprehensive Nutrition Assessment    Type and Reason for Visit:  Initial, Positive Nutrition Screen    Nutrition Recommendations/Plan:     Continue NPO. Pt just intubated this AM.    TF Rec if needed: Standard with fiber (Jevity 1.5) @ 35 ml/hr + 1 protein mod daily. Provides: 840 ml tv, 1260 kcals, 53 gm pro (1360 kcals & 79 gm pro w/ mod), 638 ml free water    Current renal formula ordered not appropriate as K/Phos WNL       Malnutrition Assessment:  Malnutrition Status: At risk for malnutrition (12/05/23 1209)    Context:  Acute Illness     Findings of the 6 clinical characteristics of malnutrition:  Energy Intake:  50% or less of estimated energy requirements for 5 or more days (poor intake x 2 months)  Weight Loss:  Unable to assess (no longterm wt hx)     Body Fat Loss:  Unable to assess     Muscle Mass Loss:  Unable to assess    Fluid Accumulation:  No significant fluid accumulation     Strength:  Not Performed    Nutrition Assessment:    Pt admit w/ Acute Metabolic Encephalopathy 2/2 dehydration/ hypernatremia +PNA/ UTI, now intubated today s/p bronch. PMHx CVA, Paranoid schizophrenia, MDD, Suicide attempt, Dysphagia, Recent hospital stays/ FTT. Noted poor PO intake last RD encounters x ~2months. NGT in place. Will provide TF recs if/ when needed & monitor. Nutrition Related Findings:    Pt intubated/ sedated, intermittent hypotension (not on pressor), +I/O's 3L, trace gen edema, active BS, hx dysphagia, NGT clamped     Wound Type: Skin Tears       Current Nutrition Intake & Therapies:    Average Meal Intake: NPO    Current Tube Feeding (TF) Orders:  Feeding Route: Nasogastric  Formula: Renal Formula  Schedule: Continuous  Feeding Regimen: 35 ml/hr, not started at pt just intubated this AM      Anthropometric Measures:  Height: 162.6 cm (5' 4\")  Ideal Body Weight (IBW): 120 lbs (55 kg)       Current Body Weight: 77.1 kg (170 lb) (no method 12/2), 141.7 % IBW.     Current BMI (kg/m2): 29.2  Usual

## 2023-12-05 NOTE — CARE COORDINATION
Care Coordination:  70year old admitted from [facility] with altered mental status . Found to be dehydrated with hypernatremia. EEG also demonstrated suggestion of epileptiform activity. Mri pending. Decompensated this am and sedated and intubated. NG tube. NPO. Patient is in long term care at [facility] . Has hx of CVA  with right fazal, bi-polar disorder, and schizophrenia. She has a legal guardian through [employer]. [phone]. Sw called and left VM informing her of admission. Long term bed hold and has no needs to return  Patient is DNR-CCA. Also left message with liaison from [place]. They will follow. SW will follow clinical course at this time to determine needs and ability to return,  to nursing home based on current critical needs.   Envelop  and ambulance form started and placed in soft chart

## 2023-12-05 NOTE — PROCEDURES
Arterial line placement    Procedure: Left Radial arterial line placement. Indications: Continuous monitoring of blood pressure in a patient with hypotension +/- shock, on Levophed. Anesthesia: Local infiltration of 1% lidocaine. Consent:  Obtained from legal guardian Noe Ramirez. Technique: Time Out: Immediately prior to the procedure a \"timeout\" was called to verify the correct patient and procedure. Procedure was done using strict aseptic technique. Rafael's test was performed and was normal. Left Radial site was cleaned with chloraprep and draped. Left Radial was identified, then Lidocaine 1% was infiltrated locally. Arterial line was inserted, a good blood flow was obtained, after which guidewire was inserted all the way with no resistance. Then the canula was inserted and needle with guidewire was withdrawn. Pulsatile bright red blood flow was observed. The canula was connected to BP monitoring apparatus and a good quality waveform was noted. Then the canula was secured with 2 stay sutures of 2-0 silk after Lidocaine infiltration, following which dressing was applied. Number of sticks: 1. Number of Kits used: 1. Complications: No immediate complication. Estimated blood loss: About 1 ml. Comment: Patient tolerated the procedure well. Dr. Doroteo Lin was present during the procedure.     Анна Mario MD PGY-1  12/5/2023 4:01 PM

## 2023-12-05 NOTE — PROGRESS NOTES
Pharmacy Consultation Note  (Antibiotic Dosing and Monitoring)    Initial consult date: 12/5/23  Consulting physician/provider: Dr. Johnnie De Luna  Drug: Vancomycin  Indication: cap; 5 days    Age/  Gender Height Weight IBW  Allergy Information   78 y.o./female 162.6 cm (5' 4\") 77.1 kg (170 lb)     Ideal body weight: 54.7 kg (120 lb 9.5 oz)  Adjusted ideal body weight: 63.7 kg (140 lb 5.7 oz)   Mysoline [primidone] and Tofranil [imipramine hcl]      Renal Function:  Recent Labs     12/04/23  1818 12/04/23  2336 12/05/23  0408   BUN 24* 25* 26*   CREATININE 2.1* 2.3* 2.3*       Intake/Output Summary (Last 24 hours) at 12/5/2023 1157  Last data filed at 12/5/2023 0856  Gross per 24 hour   Intake 4304.96 ml   Output 2200 ml   Net 2104.96 ml       Vancomycin Monitoring:  Trough:  No results for input(s): \"VANCOTROUGH\" in the last 72 hours. Random:  No results for input(s): \"VANCORANDOM\" in the last 72 hours. Vancomycin Administration Times:  Recent vancomycin administrations        No vancomycin IV orders with administrations found. Orders not given:            vancomycin (VANCOCIN) 1500 mg in sodium chloride 0.9 % 250 mL IVPB    vancomycin (VANCOCIN) intermittent dosing (placeholder)                    Assessment:  Patient is a 70 y.o. female who has been initiated on vancomycin  Estimated Creatinine Clearance: 21 mL/min (A) (based on SCr of 2.3 mg/dL (H)).   To dose vancomycin, pharmacy will be utilizing dosing based off of levels because of patient's renal impairment/insufficiency  12/5: Scr trending up 2.3 (JOSE A on CKD)    Plan:  Vancomycin 1500 mg IV x 1 dose  Will check vancomycin random level tomorrow am on 12/6  Will continue to monitor renal function   Pharmacy to follow      Thank you for the consult,     Irma Morgan, PharmD, BCPS, BCCCP 12/5/2023 11:57 AM

## 2023-12-05 NOTE — PROCEDURES
Bronchoscopy Procedure Note    Date of Operation: 12/5/2023    Pre-op Diagnosis: resp failure hemoptysis    Post-op Diagnosis: resp failure    Surgeon: Neeraj Chaudhari DO    Anesthesia: Versed 3 mg IV,     Operation: Flexible fiberoptic bronchoscopy, diagnostic BAL    Findings: NO active areas of hemorrhage noted    Specimen: BAL    Estimated Blood Loss: none    Complications: None    Indications and History:  The patient is a 70 y.o. female with impending resp failure, Hemoptysis? Procedure completed emergently. Description of Procedure: The patient was taken to the endoscopy suite, identified as [facility] and the procedure verified as Flexible Fiberoptic Bronchoscopy. A Time Out was held and the above information confirmed. After the induction of topical nasopharyngeal anesthesia, the patient was positioned supine and the bronchoscope was passed through the 7.5 ET tube. he scope was then passed into the trachea. Careful inspection of the tracheal lumen was accomplished. The scope was sequentially passed into all airways. Endobronchial findings: No active hemorrhage  Trachea: Normal mucosa  Cookie: Normal mucosa  Right main bronchus: Normal mucosa  Right upper lobe bronchus including anterior, apical and posterior uptill 4 generation bronchi: Normal mucosa  Right intermedius bronchus: Normal mucosa  Right lower lobe basilar segments uptill  4th generation  bronchii: Normal mucosa  Right lower lobe superior segment uptill 3rd generation bronchi: Normal mucosa  Left main bronchus: Normal mucosa  Left upper lobe including Lingula, anterior segment, and apico-posterior segment uptill 4th generation bronchi: Normal mucosa  Left lower lobe basilar and superior segments uptill 4th generation bronchi: Normal mucosa    Samples: BAL    The Patient was taken to the Endoscopy Recovery area in satisfactory condition.         Neeraj Chaudhari DO

## 2023-12-05 NOTE — PLAN OF CARE
Problem: Discharge Planning  Goal: Discharge to home or other facility with appropriate resources  12/4/2023 2348 by Seema Gonzales RN  Outcome: Progressing     Problem: Pain  Goal: Verbalizes/displays adequate comfort level or baseline comfort level  12/5/2023 1158 by Mike Gagnon RN  Outcome: Progressing  12/4/2023 2348 by Seema Gonzales RN  Outcome: Progressing     Problem: Skin/Tissue Integrity  Goal: Absence of new skin breakdown  Description: 1. Monitor for areas of redness and/or skin breakdown  2. Assess vascular access sites hourly  3. Every 4-6 hours minimum:  Change oxygen saturation probe site  4. Every 4-6 hours:  If on nasal continuous positive airway pressure, respiratory therapy assess nares and determine need for appliance change or resting period. 12/5/2023 1158 by Mike Gagnon RN  Outcome: Progressing  12/4/2023 2348 by Seema Gonzales RN  Outcome: Progressing     Problem: Safety - Adult  Goal: Free from fall injury  12/5/2023 1158 by Mike Gagnon RN  Outcome: Progressing  12/4/2023 2348 by Seema Gonzales RN  Outcome: Progressing     Problem: Safety - Medical Restraint  Goal: Remains free of injury from restraints (Restraint for Interference with Medical Device)  Description: INTERVENTIONS:  1. Determine that other, less restrictive measures have been tried or would not be effective before applying the restraint  2. Evaluate the patient's condition at the time of restraint application  3. Inform patient/family regarding the reason for restraint  4. Q2H: Monitor safety, psychosocial status, comfort, nutrition and hydration  12/5/2023 1158 by Mike Gagnon RN  Outcome: Progressing  12/4/2023 2348 by Seema Gonzales RN  Outcome: Progressing     Problem: Confusion  Goal: Confusion, delirium, dementia, or psychosis is improved or at baseline  Description: INTERVENTIONS:  1.  Assess for possible contributors to thought disturbance, including medications, impaired vision or hearing, underlying metabolic abnormalities, dehydration, psychiatric diagnoses, and notify attending LIP  2. [place] high risk fall precautions, as indicated  3. Provide frequent short contacts to provide reality reorientation, refocusing and direction  4. Decrease environmental stimuli, including noise as appropriate  5. Monitor and intervene to maintain adequate nutrition, hydration, elimination, sleep and activity  6. If unable to ensure safety without constant attention obtain sitter and review sitter guidelines with assigned personnel  7. Initiate Psychosocial CNS and Spiritual Care consult, as indicated  12/4/2023 2348 by Casey Quick RN  Outcome: Not Progressing     Problem: Confusion  Goal: Confusion, delirium, dementia, or psychosis is improved or at baseline  Description: INTERVENTIONS:  1. Assess for possible contributors to thought disturbance, including medications, impaired vision or hearing, underlying metabolic abnormalities, dehydration, psychiatric diagnoses, and notify attending LIP  2. [place] high risk fall precautions, as indicated  3. Provide frequent short contacts to provide reality reorientation, refocusing and direction  4. Decrease environmental stimuli, including noise as appropriate  5. Monitor and intervene to maintain adequate nutrition, hydration, elimination, sleep and activity  6. If unable to ensure safety without constant attention obtain sitter and review sitter guidelines with assigned personnel  7.  Initiate Psychosocial CNS and Spiritual Care consult, as indicated  12/4/2023 2348 by Casey Quick RN  Outcome: Not Progressing

## 2023-12-05 NOTE — CARE COORDINATION
Case Management Assessment  Initial Evaluation    Date/Time of Evaluation: 12/5/2023 1:08 PM  Assessment Completed by: EVA Ardon    If patient is discharged prior to next notation, then this note serves as note for discharge by case management. Patient Name: Keith Mcghee                   YOB: 1945  Diagnosis: Hyperkalemia [E87.5]  Hypernatremia [E87.0]  Altered mental status [R41.82]  Altered mental status, unspecified altered mental status type [R41.82]  Pneumonia of left lung due to infectious organism, unspecified part of lung [J18.9]  Urinary tract infection in female [N39.0]                   Date / Time: 12/2/2023  8:30 PM    Patient Admission Status: Inpatient   Readmission Risk (Low < 19, Mod (19-27), High > 27): Readmission Risk Score: 24.8    Current PCP: Easton Rodas MD  PCP verified by CM? (P) Yes    Chart Reviewed: Yes      History Provided by: (P) Medical Record  Patient Orientation: (P) Sedated    Patient Cognition: (P) Severely Impaired    Hospitalization in the last 30 days (Readmission):  Yes    If yes, Readmission Assessment in  Navigator will be completed.     Advance Directives:      Code Status: DNR-CCA   Patient's Primary Decision Maker is: (P) Named in Scanned ACP Document    Primary Decision Maker: Jeanette Trevino , [facility] - [phone]    Discharge Planning:    Patient lives with:   Type of Home:    Primary Care Giver: (P)  (long term care resident with legal guardian)  Patient Support Systems include: (P) Other (Comment) (guardian)   Current Financial resources:    Current community resources:    Current services prior to admission:              Current DME:              Type of Home Care services:       ADLS  Prior functional level: (P) Assistance with the following:, Bathing, Dressing, Toileting, Feeding, Cooking, Housework, Shopping, Mobility  Current functional level: (P) Assistance with the following:, Bathing, Dressing, Toileting, Feeding, Cooking, Housework, Shopping, Mobility    PT AM-PAC:   /24  OT AM-PAC:   /24    Family can provide assistance at DC: (P) No  Would you like Case Management to discuss the discharge plan with any other family members/significant others, and if so, who? (P) No  Plans to Return to Present Housing: (P) Unknown at present  Other Identified Issues/Barriers to RETURNING to current housing:   Potential Assistance needed at discharge:              Potential DME:    Patient expects to discharge to:    Plan for transportation at discharge:      Financial    Payor: Fritz Stage / Plan: MEDICARE PART A AND B / Product Type: *No Product type* /     Does insurance require precert for SNF: No    Potential assistance Purchasing Medications:    Meds-to-Beds request:        [address] [phone] - f [phone]  [address] [place] [address]  Phone: [phone] Fax: [phone]      Notes:    Factors facilitating achievement of predicted outcomes: Has needed Durable Medical Equipment at home and Home is wheelchair accessible    Barriers to discharge: No family support, Cognitive deficit, and Long standing deficits    Additional Case Management Notes: see note     The Plan for Transition of Care is related to the following treatment goals of Hyperkalemia [E87.5]  Hypernatremia [E87.0]  Altered mental status [R41.82]  Altered mental status, unspecified altered mental status type [R41.82]  Pneumonia of left lung due to infectious organism, unspecified part of lung [J18.9]  Urinary tract infection in female [S58.5]    IF APPLICABLE: The Patient and/or patient representative Kacie Trinh and her family were provided with a choice of provider and agrees with the discharge plan.  Freedom of choice list with basic dialogue that supports the patient's individualized plan of care/goals and shares the quality data associated with the providers was provided to:     Patient Representative Name:       The Patient and/or Patient Representative Agree with the Discharge Plan?       Radha Calderon, [place]  Case Management Department  Ph: [phone] Fax:

## 2023-12-05 NOTE — ACP (ADVANCE CARE PLANNING)
Advance Care Planning   The patient has the following advanced directives on file:  Advance Directives       Power of  Living Will ACP-Advance Directive ACP-Power of Suzanne Domenica on 02/16/21 Filed on 02/16/21 Yinka Stern            The patient has appointed the following active healthcare agents:    Primary Decision Maker: 2020 Belinda Jarrell, [facility] - [phone]    The Patient has the following current code status:    Code Status: DNR-CCA    Visit Documentation:  Verified  Guardianship through EVA Zamora Cea  12/5/2023

## 2023-12-06 ENCOUNTER — APPOINTMENT (OUTPATIENT)
Dept: GENERAL RADIOLOGY | Age: 78
End: 2023-12-06
Payer: MEDICARE

## 2023-12-06 ENCOUNTER — APPOINTMENT (OUTPATIENT)
Dept: NEUROLOGY | Age: 78
End: 2023-12-06
Payer: MEDICARE

## 2023-12-06 ENCOUNTER — APPOINTMENT (OUTPATIENT)
Dept: MRI IMAGING | Age: 78
End: 2023-12-06
Attending: PSYCHIATRY & NEUROLOGY
Payer: MEDICARE

## 2023-12-06 LAB
AADO2: 122 MMHG
AADO2: 151.8 MMHG
ABO/RH: NORMAL
ANION GAP SERPL CALCULATED.3IONS-SCNC: 16 MMOL/L (ref 7–16)
ANTIBODY SCREEN: NEGATIVE
ARM BAND NUMBER: NORMAL
B.E.: -5.5 MMOL/L (ref -3–3)
B.E.: -7.2 MMOL/L (ref -3–3)
BASOPHILS # BLD: 0.02 K/UL (ref 0–0.2)
BASOPHILS NFR BLD: 0 % (ref 0–2)
BLOOD BANK BLOOD PRODUCT EXPIRATION DATE: NORMAL
BLOOD BANK DISPENSE STATUS: NORMAL
BLOOD BANK ISBT PRODUCT BLOOD TYPE: 9500
BLOOD BANK PRODUCT CODE: NORMAL
BLOOD BANK SAMPLE EXPIRATION: NORMAL
BLOOD BANK UNIT TYPE AND RH: NORMAL
BPU ID: NORMAL
BUN SERPL-MCNC: 31 MG/DL (ref 6–23)
CALCIUM SERPL-MCNC: 9.2 MG/DL (ref 8.6–10.2)
CHLORIDE SERPL-SCNC: 116 MMOL/L (ref 98–107)
CO2 SERPL-SCNC: 16 MMOL/L (ref 22–29)
COHB: 0.6 % (ref 0–1.5)
COHB: 1.2 % (ref 0–1.5)
COMPONENT: NORMAL
CREAT SERPL-MCNC: 2.6 MG/DL (ref 0.5–1)
CRITICAL: ABNORMAL
CRITICAL: ABNORMAL
CROSSMATCH RESULT: NORMAL
DATE ANALYZED: ABNORMAL
DATE ANALYZED: ABNORMAL
DATE LAST DOSE: ABNORMAL
DATE LAST DOSE: NORMAL
DATE OF COLLECTION: ABNORMAL
DATE OF COLLECTION: ABNORMAL
EOSINOPHIL # BLD: 0.52 K/UL (ref 0.05–0.5)
EOSINOPHILS RELATIVE PERCENT: 4 % (ref 0–6)
ERYTHROCYTE [DISTWIDTH] IN BLOOD BY AUTOMATED COUNT: 18.6 % (ref 11.5–15)
FIO2: 40 %
FIO2: 40 %
GFR SERPL CREATININE-BSD FRML MDRD: 18 ML/MIN/1.73M2
GLUCOSE BLD-MCNC: 91 MG/DL (ref 74–99)
GLUCOSE BLD-MCNC: 93 MG/DL (ref 74–99)
GLUCOSE SERPL-MCNC: 111 MG/DL (ref 74–99)
HCO3: 17.2 MMOL/L (ref 22–26)
HCO3: 18.9 MMOL/L (ref 22–26)
HCT VFR BLD AUTO: 20.5 % (ref 34–48)
HCT VFR BLD AUTO: 23.9 % (ref 34–48)
HGB BLD-MCNC: 6.5 G/DL (ref 11.5–15.5)
HGB BLD-MCNC: 7.6 G/DL (ref 11.5–15.5)
HHB: 2.1 % (ref 0–5)
HHB: 4.4 % (ref 0–5)
IMM GRANULOCYTES # BLD AUTO: 0.16 K/UL (ref 0–0.58)
IMM GRANULOCYTES NFR BLD: 1 % (ref 0–5)
INR PPP: 1.1
LAB: ABNORMAL
LAB: ABNORMAL
LACTATE BLDV-SCNC: 1.9 MMOL/L (ref 0.5–2.2)
LYMPHOCYTES NFR BLD: 1.33 K/UL (ref 1.5–4)
LYMPHOCYTES RELATIVE PERCENT: 10 % (ref 20–42)
Lab: 1129
Lab: 400
MAGNESIUM SERPL-MCNC: 2.3 MG/DL (ref 1.6–2.6)
MCH RBC QN AUTO: 29.8 PG (ref 26–35)
MCHC RBC AUTO-ENTMCNC: 31.7 G/DL (ref 32–34.5)
MCV RBC AUTO: 94 FL (ref 80–99.9)
METHB: 0.6 % (ref 0–1.5)
METHB: 0.8 % (ref 0–1.5)
MODE: AC
MODE: AC
MONOCYTES NFR BLD: 0.83 K/UL (ref 0.1–0.95)
MONOCYTES NFR BLD: 6 % (ref 2–12)
NEUTROPHILS NFR BLD: 78 % (ref 43–80)
NEUTS SEG NFR BLD: 10.4 K/UL (ref 1.8–7.3)
O2 CONTENT: 10 ML/DL
O2 CONTENT: 11 ML/DL
O2 SATURATION: 95.5 % (ref 92–98.5)
O2 SATURATION: 97.9 % (ref 92–98.5)
O2HB: 94.2 % (ref 94–97)
O2HB: 96.1 % (ref 94–97)
OPERATOR ID: 405
OPERATOR ID: ABNORMAL
PATIENT TEMP: 37 C
PATIENT TEMP: 37 C
PCO2: 29.7 MMHG (ref 35–45)
PCO2: 32.1 MMHG (ref 35–45)
PEEP/CPAP: 8 CMH2O
PEEP/CPAP: 8 CMH2O
PFO2: 2.16 MMHG/%
PFO2: 2.97 MMHG/%
PH BLOOD GAS: 7.38 (ref 7.35–7.45)
PH BLOOD GAS: 7.39 (ref 7.35–7.45)
PLATELET # BLD AUTO: 86 K/UL (ref 130–450)
PLATELET CONFIRMATION: NORMAL
PMV BLD AUTO: 11.8 FL (ref 7–12)
PO2: 119 MMHG (ref 75–100)
PO2: 86.5 MMHG (ref 75–100)
POTASSIUM SERPL-SCNC: 4.5 MMOL/L (ref 3.5–5)
PROCALCITONIN SERPL-MCNC: 0.23 NG/ML (ref 0–0.08)
PROTHROMBIN TIME: 12.1 SEC (ref 9.3–12.4)
RBC # BLD AUTO: 2.18 M/UL (ref 3.5–5.5)
RI(T): 1.03
RI(T): 1.75
RR MECHANICAL: 16 B/MIN
RR MECHANICAL: 18 B/MIN
SODIUM SERPL-SCNC: 148 MMOL/L (ref 132–146)
SOURCE, BLOOD GAS: ABNORMAL
SOURCE, BLOOD GAS: ABNORMAL
THB: 7.2 G/DL (ref 11.5–16.5)
THB: 8.2 G/DL (ref 11.5–16.5)
TIME ANALYZED: 1130
TIME ANALYZED: 407
TME LAST DOSE: ABNORMAL H
TME LAST DOSE: NORMAL H
TRANSFUSION STATUS: NORMAL
UNIT DIVISION: 0
UNIT ISSUE DATE/TIME: NORMAL
VALPROATE FREE MFR SERPL: ABNORMAL % (ref 5–18.4)
VALPROATE FREE SERPL-MCNC: <0.4 UG/ML (ref 7–23)
VALPROATE SERPL-MCNC: 3 UG/ML (ref 50–125)
VANCOMYCIN DOSE: ABNORMAL MG
VANCOMYCIN DOSE: NORMAL MG
VANCOMYCIN SERPL-MCNC: 13 UG/ML (ref 5–40)
VT MECHANICAL: 350 ML
VT MECHANICAL: 350 ML
WBC OTHER # BLD: 13.3 K/UL (ref 4.5–11.5)

## 2023-12-06 PROCEDURE — 36430 TRANSFUSION BLD/BLD COMPNT: CPT

## 2023-12-06 PROCEDURE — 71045 X-RAY EXAM CHEST 1 VIEW: CPT

## 2023-12-06 PROCEDURE — 85025 COMPLETE CBC W/AUTO DIFF WBC: CPT

## 2023-12-06 PROCEDURE — 86901 BLOOD TYPING SEROLOGIC RH(D): CPT

## 2023-12-06 PROCEDURE — 86900 BLOOD TYPING SEROLOGIC ABO: CPT

## 2023-12-06 PROCEDURE — 2500000003 HC RX 250 WO HCPCS: Performed by: STUDENT IN AN ORGANIZED HEALTH CARE EDUCATION/TRAINING PROGRAM

## 2023-12-06 PROCEDURE — 70551 MRI BRAIN STEM W/O DYE: CPT

## 2023-12-06 PROCEDURE — 6370000000 HC RX 637 (ALT 250 FOR IP): Performed by: STUDENT IN AN ORGANIZED HEALTH CARE EDUCATION/TRAINING PROGRAM

## 2023-12-06 PROCEDURE — 99232 SBSQ HOSP IP/OBS MODERATE 35: CPT | Performed by: STUDENT IN AN ORGANIZED HEALTH CARE EDUCATION/TRAINING PROGRAM

## 2023-12-06 PROCEDURE — 85610 PROTHROMBIN TIME: CPT

## 2023-12-06 PROCEDURE — 80048 BASIC METABOLIC PNL TOTAL CA: CPT

## 2023-12-06 PROCEDURE — 86850 RBC ANTIBODY SCREEN: CPT

## 2023-12-06 PROCEDURE — 85018 HEMOGLOBIN: CPT

## 2023-12-06 PROCEDURE — 6360000002 HC RX W HCPCS

## 2023-12-06 PROCEDURE — A4216 STERILE WATER/SALINE, 10 ML: HCPCS | Performed by: STUDENT IN AN ORGANIZED HEALTH CARE EDUCATION/TRAINING PROGRAM

## 2023-12-06 PROCEDURE — 82805 BLOOD GASES W/O2 SATURATION: CPT

## 2023-12-06 PROCEDURE — 2000000000 HC ICU R&B

## 2023-12-06 PROCEDURE — 6370000000 HC RX 637 (ALT 250 FOR IP): Performed by: CLINICAL NURSE SPECIALIST

## 2023-12-06 PROCEDURE — 2580000003 HC RX 258: Performed by: STUDENT IN AN ORGANIZED HEALTH CARE EDUCATION/TRAINING PROGRAM

## 2023-12-06 PROCEDURE — 2500000003 HC RX 250 WO HCPCS

## 2023-12-06 PROCEDURE — 94640 AIRWAY INHALATION TREATMENT: CPT

## 2023-12-06 PROCEDURE — 99232 SBSQ HOSP IP/OBS MODERATE 35: CPT | Performed by: CLINICAL NURSE SPECIALIST

## 2023-12-06 PROCEDURE — 2580000003 HC RX 258

## 2023-12-06 PROCEDURE — 99291 CRITICAL CARE FIRST HOUR: CPT | Performed by: INTERNAL MEDICINE

## 2023-12-06 PROCEDURE — 6360000002 HC RX W HCPCS: Performed by: CLINICAL NURSE SPECIALIST

## 2023-12-06 PROCEDURE — 95819 EEG AWAKE AND ASLEEP: CPT

## 2023-12-06 PROCEDURE — 37799 UNLISTED PX VASCULAR SURGERY: CPT

## 2023-12-06 PROCEDURE — P9016 RBC LEUKOCYTES REDUCED: HCPCS

## 2023-12-06 PROCEDURE — 83735 ASSAY OF MAGNESIUM: CPT

## 2023-12-06 PROCEDURE — 94003 VENT MGMT INPAT SUBQ DAY: CPT

## 2023-12-06 PROCEDURE — 85014 HEMATOCRIT: CPT

## 2023-12-06 PROCEDURE — 6360000002 HC RX W HCPCS: Performed by: STUDENT IN AN ORGANIZED HEALTH CARE EDUCATION/TRAINING PROGRAM

## 2023-12-06 PROCEDURE — C9254 INJECTION, LACOSAMIDE: HCPCS | Performed by: STUDENT IN AN ORGANIZED HEALTH CARE EDUCATION/TRAINING PROGRAM

## 2023-12-06 PROCEDURE — 80202 ASSAY OF VANCOMYCIN: CPT

## 2023-12-06 PROCEDURE — 82962 GLUCOSE BLOOD TEST: CPT

## 2023-12-06 PROCEDURE — 86923 COMPATIBILITY TEST ELECTRIC: CPT

## 2023-12-06 PROCEDURE — 83605 ASSAY OF LACTIC ACID: CPT

## 2023-12-06 RX ORDER — LEVETIRACETAM 500 MG/5ML
1000 INJECTION, SOLUTION, CONCENTRATE INTRAVENOUS ONCE
Status: COMPLETED | OUTPATIENT
Start: 2023-12-06 | End: 2023-12-06

## 2023-12-06 RX ORDER — LEVETIRACETAM 500 MG/1
1500 TABLET ORAL 2 TIMES DAILY
Status: DISCONTINUED | OUTPATIENT
Start: 2023-12-06 | End: 2023-12-08

## 2023-12-06 RX ORDER — SODIUM CHLORIDE, SODIUM LACTATE, POTASSIUM CHLORIDE, AND CALCIUM CHLORIDE .6; .31; .03; .02 G/100ML; G/100ML; G/100ML; G/100ML
500 INJECTION, SOLUTION INTRAVENOUS ONCE
Status: COMPLETED | OUTPATIENT
Start: 2023-12-06 | End: 2023-12-06

## 2023-12-06 RX ORDER — DIMETHICONE, OXYBENZONE, AND PADIMATE O 2; 2.5; 6.6 G/100G; G/100G; G/100G
STICK TOPICAL PRN
Status: DISCONTINUED | OUTPATIENT
Start: 2023-12-06 | End: 2023-12-08

## 2023-12-06 RX ORDER — SODIUM CHLORIDE 9 MG/ML
INJECTION, SOLUTION INTRAVENOUS PRN
Status: DISCONTINUED | OUTPATIENT
Start: 2023-12-06 | End: 2023-12-07

## 2023-12-06 RX ADMIN — ARFORMOTEROL TARTRATE 15 MCG: 15 SOLUTION RESPIRATORY (INHALATION) at 10:29

## 2023-12-06 RX ADMIN — LACOSAMIDE 100 MG: 10 INJECTION INTRAVENOUS at 21:53

## 2023-12-06 RX ADMIN — GUAIFENESIN 400 MG: 400 TABLET ORAL at 09:26

## 2023-12-06 RX ADMIN — GUAIFENESIN 400 MG: 400 TABLET ORAL at 14:06

## 2023-12-06 RX ADMIN — VALPROIC ACID 250 MG: 250 SOLUTION ORAL at 20:19

## 2023-12-06 RX ADMIN — FAMOTIDINE 20 MG: 10 INJECTION, SOLUTION INTRAVENOUS at 09:25

## 2023-12-06 RX ADMIN — CHLORHEXIDINE GLUCONATE 15 ML: 1.2 RINSE ORAL at 20:17

## 2023-12-06 RX ADMIN — VANCOMYCIN HYDROCHLORIDE 1250 MG: 10 INJECTION, POWDER, LYOPHILIZED, FOR SOLUTION INTRAVENOUS at 12:35

## 2023-12-06 RX ADMIN — IPRATROPIUM BROMIDE AND ALBUTEROL SULFATE 1 DOSE: .5; 2.5 SOLUTION RESPIRATORY (INHALATION) at 16:10

## 2023-12-06 RX ADMIN — SODIUM CHLORIDE, POTASSIUM CHLORIDE, SODIUM LACTATE AND CALCIUM CHLORIDE 500 ML: 600; 310; 30; 20 INJECTION, SOLUTION INTRAVENOUS at 14:31

## 2023-12-06 RX ADMIN — VALPROIC ACID 250 MG: 250 SOLUTION ORAL at 09:35

## 2023-12-06 RX ADMIN — THIAMINE HYDROCHLORIDE 100 MG: 100 INJECTION, SOLUTION INTRAMUSCULAR; INTRAVENOUS at 09:25

## 2023-12-06 RX ADMIN — SODIUM CHLORIDE: 9 INJECTION, SOLUTION INTRAVENOUS at 23:07

## 2023-12-06 RX ADMIN — BUDESONIDE INHALATION 500 MCG: 0.5 SUSPENSION RESPIRATORY (INHALATION) at 20:28

## 2023-12-06 RX ADMIN — IPRATROPIUM BROMIDE AND ALBUTEROL SULFATE 1 DOSE: .5; 2.5 SOLUTION RESPIRATORY (INHALATION) at 10:29

## 2023-12-06 RX ADMIN — PIPERACILLIN AND TAZOBACTAM 3375 MG: 3; .375 INJECTION, POWDER, LYOPHILIZED, FOR SOLUTION INTRAVENOUS at 18:42

## 2023-12-06 RX ADMIN — Medication 75 MCG/HR: at 20:52

## 2023-12-06 RX ADMIN — LEVETIRACETAM 1000 MG: 500 TABLET, FILM COATED ORAL at 09:26

## 2023-12-06 RX ADMIN — ARFORMOTEROL TARTRATE 15 MCG: 15 SOLUTION RESPIRATORY (INHALATION) at 20:28

## 2023-12-06 RX ADMIN — CHLORHEXIDINE GLUCONATE 15 ML: 1.2 RINSE ORAL at 09:26

## 2023-12-06 RX ADMIN — LEVETIRACETAM 1500 MG: 500 TABLET, FILM COATED ORAL at 20:19

## 2023-12-06 RX ADMIN — GUAIFENESIN 400 MG: 400 TABLET ORAL at 20:19

## 2023-12-06 RX ADMIN — PIPERACILLIN AND TAZOBACTAM 3375 MG: 3; .375 INJECTION, POWDER, LYOPHILIZED, FOR SOLUTION INTRAVENOUS at 11:06

## 2023-12-06 RX ADMIN — FOLIC ACID 1 MG: 5 INJECTION, SOLUTION INTRAMUSCULAR; INTRAVENOUS; SUBCUTANEOUS at 09:26

## 2023-12-06 RX ADMIN — BUDESONIDE INHALATION 500 MCG: 0.5 SUSPENSION RESPIRATORY (INHALATION) at 10:28

## 2023-12-06 RX ADMIN — LACOSAMIDE 200 MG: 10 INJECTION INTRAVENOUS at 16:35

## 2023-12-06 RX ADMIN — SODIUM CHLORIDE, POTASSIUM CHLORIDE, SODIUM LACTATE AND CALCIUM CHLORIDE 500 ML: 600; 310; 30; 20 INJECTION, SOLUTION INTRAVENOUS at 10:17

## 2023-12-06 RX ADMIN — Medication 500 MG: at 09:26

## 2023-12-06 RX ADMIN — IPRATROPIUM BROMIDE AND ALBUTEROL SULFATE 1 DOSE: .5; 2.5 SOLUTION RESPIRATORY (INHALATION) at 20:28

## 2023-12-06 RX ADMIN — Medication 25 MCG/HR: at 05:38

## 2023-12-06 RX ADMIN — SODIUM CHLORIDE, PRESERVATIVE FREE 10 ML: 5 INJECTION INTRAVENOUS at 20:16

## 2023-12-06 RX ADMIN — LEVETIRACETAM 1000 MG: 100 INJECTION, SOLUTION INTRAVENOUS at 09:48

## 2023-12-06 RX ADMIN — SODIUM CHLORIDE, PRESERVATIVE FREE 10 ML: 5 INJECTION INTRAVENOUS at 09:27

## 2023-12-06 RX ADMIN — PIPERACILLIN AND TAZOBACTAM 3375 MG: 3; .375 INJECTION, POWDER, LYOPHILIZED, FOR SOLUTION INTRAVENOUS at 02:28

## 2023-12-06 RX ADMIN — Medication 8 MCG/MIN: at 20:51

## 2023-12-06 ASSESSMENT — PULMONARY FUNCTION TESTS
PIF_VALUE: 21
PIF_VALUE: 29
PIF_VALUE: 29
PIF_VALUE: 22
PIF_VALUE: 23
PIF_VALUE: 21
PIF_VALUE: 20
PIF_VALUE: 24
PIF_VALUE: 20
PIF_VALUE: 21
PIF_VALUE: 20
PIF_VALUE: 22
PIF_VALUE: 21
PIF_VALUE: 28
PIF_VALUE: 21
PIF_VALUE: 23
PIF_VALUE: 29
PIF_VALUE: 21
PIF_VALUE: 23
PIF_VALUE: 21
PIF_VALUE: 23
PIF_VALUE: 20
PIF_VALUE: 20
PIF_VALUE: 21
PIF_VALUE: 25
PIF_VALUE: 21

## 2023-12-06 ASSESSMENT — PAIN SCALES - GENERAL
PAINLEVEL_OUTOF10: 0
PAINLEVEL_OUTOF10: 0
PAINLEVEL_OUTOF10: 5
PAINLEVEL_OUTOF10: 0

## 2023-12-06 ASSESSMENT — PAIN SCALES - WONG BAKER: WONGBAKER_NUMERICALRESPONSE: 0

## 2023-12-06 NOTE — PROGRESS NOTES
Physician Progress Note      Floridalma Cali  CSN #:                  [id number]  :                       1945  ADMIT DATE:       2023 8:30 PM  [facility] DATE:  RESPONDING  PROVIDER #:        Darinel Swain MD          QUERY TEXT:    Pt admitted with Pneumonia and UTI. Pt noted to have respiratory failure and   documentation of \"Shock likely septic 2/2 CAP\" in critical care progress note   on  If possible, please document in the progress notes and discharge   summary if you are evaluating and /or treating any of the following: The medical record reflects the following:  Risk Factors: Advanced age with pna and uti  Clinical Indicators: Per Critical care progress note : \". Jamshid Number Jamshid Number Cardiology:   Shock likely septic 2/2 CAP. Jamshid Number Jamshid Number \", Labs: WBC 13.3  Lactic acid 2.4 1.9  Procal   0.19  0.23  Treatment: Pip-tazo, Vanco, Norepinephrine, intubation, serial labs,   continuous pt monitoring  Options provided:  -- Sepsis, present on admission  -- Sepsis, developed following admission  -- Pneumonia without Sepsis  -- Other - I will add my own diagnosis  -- Disagree - Not applicable / Not valid  -- Disagree - Clinically unable to determine / Unknown  -- Refer to Clinical Documentation Reviewer    PROVIDER RESPONSE TEXT:    This patient has sepsis which was present on admission.     Query created by: Ama Teague on 2023 3:29 PM      Electronically signed by:  Darinel Swain MD 2023 3:33 PM

## 2023-12-06 NOTE — PROGRESS NOTES
[facility] Progress Note    Admitting Date and Time: 12/2/2023  8:30 PM  Admit Dx: Hyperkalemia [E87.5]  Hypernatremia [E87.0]  Altered mental status [R41.82]  Altered mental status, unspecified altered mental status type [R41.82]  Pneumonia of left lung due to infectious organism, unspecified part of lung [J18.9]  Urinary tract infection in female [N39.0]    Subjective:  Patient is being followed for Hyperkalemia [E87.5]  Hypernatremia [E87.0]  Altered mental status [R41.82]  Altered mental status, unspecified altered mental status type [R41.82]  Pneumonia of left lung due to infectious organism, unspecified part of lung [J18.9]  Urinary tract infection in female [N39.0]     Patient seen and examined at bedside this morning.     Intubated and sedated  Continues EEG monitoring  Patient did have a tonic-clonic seizure this morning    ROS: denies fever, chills, cp, sob, n/v, HA unless stated above.      levETIRAcetam  1,500 mg Oral BID    lacosamide (VIMPAT) 100 mg in sodium chloride 0.9 % 60 mL IVPB  100 mg IntraVENous BID    thiamine  100 mg IntraVENous Daily    guaiFENesin  400 mg Oral TID    chlorhexidine  15 mL Mouth/Throat BID    famotidine (PEPCID) injection  20 mg IntraVENous Daily    budesonide  0.5 mg Nebulization BID RT    arformoterol tartrate  15 mcg Nebulization BID RT    ipratropium 0.5 mg-albuterol 2.5 mg  1 Dose Inhalation Q4H WA RT    piperacillin-tazobactam  3,375 mg IntraVENous Q8H    vancomycin (VANCOCIN) intermittent dosing (placeholder)   Other RX Placeholder    ascorbic acid  500 mg Oral Daily    valproic acid  250 mg Oral BID    sodium chloride flush  5-40 mL IntraVENous 2 times per day    folic acid  1 mg IntraVENous Daily     sodium chloride, , PRN  medicated lip balm, , PRN  sodium chloride (Inhalant), 4 mL, PRN  sodium chloride flush, 5-40 mL, PRN  sodium chloride, , PRN  ondansetron, 4 mg, Q8H PRN   Or  ondansetron, 4 mg, Q6H PRN  polyethylene glycol, 17 g, Daily

## 2023-12-06 NOTE — PLAN OF CARE
Problem: Discharge Planning  Goal: Discharge to home or other facility with appropriate resources  Outcome: Not Progressing     Problem: Pain  Goal: Verbalizes/displays adequate comfort level or baseline comfort level  12/6/2023 0011 by Genevieve Cotton RN  Outcome: Progressing  12/5/2023 1158 by Gino Martinez RN  Outcome: Progressing     Problem: Confusion  Goal: Confusion, delirium, dementia, or psychosis is improved or at baseline  Description: INTERVENTIONS:  1. Assess for possible contributors to thought disturbance, including medications, impaired vision or hearing, underlying metabolic abnormalities, dehydration, psychiatric diagnoses, and notify attending LIP  2. [place] high risk fall precautions, as indicated  3. Provide frequent short contacts to provide reality reorientation, refocusing and direction  4. Decrease environmental stimuli, including noise as appropriate  5. Monitor and intervene to maintain adequate nutrition, hydration, elimination, sleep and activity  6. If unable to ensure safety without constant attention obtain sitter and review sitter guidelines with assigned personnel  7. Initiate Psychosocial CNS and Spiritual Care consult, as indicated  Outcome: Not Progressing     Problem: Skin/Tissue Integrity  Goal: Absence of new skin breakdown  Description: 1. Monitor for areas of redness and/or skin breakdown  2. Assess vascular access sites hourly  3. Every 4-6 hours minimum:  Change oxygen saturation probe site  4. Every 4-6 hours:  If on nasal continuous positive airway pressure, respiratory therapy assess nares and determine need for appliance change or resting period.   12/6/2023 0011 by Genevieve Cotton RN  Outcome: Progressing  12/5/2023 1158 by Gino Martinez RN  Outcome: Progressing     Problem: Safety - Adult  Goal: Free from fall injury  12/6/2023 0011 by Genevieve Cotton RN  Outcome: Progressing  12/5/2023 1158 by Gino Martinez RN  Outcome: Progressing Problem: Safety - Medical Restraint  Goal: Remains free of injury from restraints (Restraint for Interference with Medical Device)  Description: INTERVENTIONS:  1. Determine that other, less restrictive measures have been tried or would not be effective before applying the restraint  2. Evaluate the patient's condition at the time of restraint application  3. Inform patient/family regarding the reason for restraint  4. Q2H: Monitor safety, psychosocial status, comfort, nutrition and hydration  12/6/2023 0011 by Chasity Perkins RN  Outcome: Progressing  Flowsheets  Taken 12/6/2023 0000  Remains free of injury from restraints (restraint for interference with medical device): Every 2 hours: Monitor safety, psychosocial status, comfort, nutrition and hydration  Taken 12/5/2023 2200  Remains free of injury from restraints (restraint for interference with medical device): Every 2 hours: Monitor safety, psychosocial status, comfort, nutrition and hydration  Taken 12/5/2023 2000  Remains free of injury from restraints (restraint for interference with medical device): Every 2 hours: Monitor safety, psychosocial status, comfort, nutrition and hydration  12/5/2023 1158 by Rigo Rutledge RN  Outcome: Progressing     Problem: Nutrition Deficit:  Goal: Optimize nutritional status  Outcome: Not Progressing     Problem: Discharge Planning  Goal: Discharge to home or other facility with appropriate resources  Outcome: Not Progressing     Problem: Confusion  Goal: Confusion, delirium, dementia, or psychosis is improved or at baseline  Description: INTERVENTIONS:  1. Assess for possible contributors to thought disturbance, including medications, impaired vision or hearing, underlying metabolic abnormalities, dehydration, psychiatric diagnoses, and notify attending LIP  2. [place] high risk fall precautions, as indicated  3. Provide frequent short contacts to provide reality reorientation, refocusing and direction  4.  Decrease environmental stimuli, including noise as appropriate  5. Monitor and intervene to maintain adequate nutrition, hydration, elimination, sleep and activity  6. If unable to ensure safety without constant attention obtain sitter and review sitter guidelines with assigned personnel  7.  Initiate Psychosocial CNS and Spiritual Care consult, as indicated  Outcome: Not Progressing     Problem: Nutrition Deficit:  Goal: Optimize nutritional status  Outcome: Not Progressing

## 2023-12-06 NOTE — PROGRESS NOTES
Patient reaches and pulls at life saving medical devices and ET tube when released from bilateral soft wrist restraints. Patient remains in bilateral soft wrist restraints. Patient is stable, and comfortable. Will continue to monitor.

## 2023-12-06 NOTE — PROGRESS NOTES
Physician Progress Note      PATIENTTracie Isaac  CSN #:                  [id number]  :                       1945  ADMIT DATE:       2023 8:30 PM  [facility] DATE:  RESPONDING  PROVIDER #:        Mohini Hernandes MD          QUERY TEXT:    Pt admitted with hypernatremia and dehydration and has respiratory failure   documented. If possible, please document in progress notes and discharge   summary further specificity regarding the type and acuity of respiratory   failure: The medical record reflects the following:  Risk Factors: Advanced age, Pneumonia  Clinical Indicators: Documentation reflects the following: Respiratory failure   status post intubation Pneumonia, on ceftriaxone and   doxycycline. Kiki Quispe Respiratory failure status post intubation. Kiki Quispe She was comatose   and non-responsive. Kiki Quispe In the morning, she was intubated for desaturation and   inability to protect her airway and was subsequently started on norepinephrine   for shock, Blood gases: B.E. 5.2  HCO3 19.7  O2hgb 85.6  PCO2 29.7  PH 7.268    PO2 74.8 60.9  Treatment: Intubation    Thank you,  Estiven RONDON, RN, CRCR  Clinical Documentation Improvement  [email or web address]  Options provided:  -- Acute respiratory failure with hypoxia  -- Acute respiratory failure with hypercapnia  -- Acute on chronic respiratory failure with hypoxia  -- Acute on chronic respiratory failure with hypercapnia  -- Other - I will add my own diagnosis  -- Disagree - Not applicable / Not valid  -- Disagree - Clinically unable to determine / Unknown  -- Refer to Clinical Documentation Reviewer    PROVIDER RESPONSE TEXT:    This patient is in acute on chronic respiratory failure with hypoxia.     Query created by: Nini Hampton on 2023 3:14 PM      Electronically signed by:  Mohini Hernandes MD 2023 3:17 PM

## 2023-12-06 NOTE — PROGRESS NOTES
12/06/23 1109   Patient Observation   Pulse 65   Respirations 16   SpO2 100 %   Vent Information   Vent Mode AC/VC   Ventilator Settings   Vt (Set, mL) 350 mL   Resp Rate (Set) 16 bpm   PEEP/CPAP (cmH2O) 8   FiO2  40 %   Peak Inspiratory Flow (Set) 60 L/sec   Vent Patient Data (Readings)   Vt (Measured) 367 mL   Peak Inspiratory Pressure (cmH2O) 21 cmH2O   Rate Measured 16 br/min   Minute Volume (L/min) 5.85 Liters   Mean Airway Pressure (cmH2O) 11 cmH20   Plateau Pressure (cm H2O) 17 cm H2O   Driving Pressure 9   I:E Ratio 1:4.90   Flow Sensitivity 3 L/min   Static Compliance (L/cm H2O) 43   Backup Apnea On   Backup Rate 16 Breaths Per Minute   Backup Vt 350   Backup I Time 20   Vent Alarm Settings   High Pressure (cmH2O) 45 cmH2O   Low Minute Volume (lpm) 4 L/min   High Minute Volume (lpm) 15 L/min   Low Exhaled Vt (ml) 250 mL   High Exhaled Vt (ml) 700 mL   RR High (bpm) 35 br/min   Apnea (secs) 20 secs   Additional Respiratoray Assessments   Humidification Source Heated wire   Humidification Temp 37   Circuit Condensation Drained   Ambu Bag With Mask At Bedside Yes   Airway Clearance   Suction ET Tube   Suction Device Inline suction catheter   Sputum Method Obtained Endotracheal   Sputum Amount Small   Sputum Color/Odor Tan   Sputum Consistency Thick   ETT    Placement Date/Time: 12/05/23 1000   Present on Admission/Arrival: No  Placed By: Licensed provider  Placement Verified By: Auscultation;Colorimetric ETCO2 device  Preoxygenation: Yes  Mask Ventilation: Ventilated by mask (1)  Technique: Video laryngo. ..    Secured At 24 cm   Measured From Lips   Secured By Commercial tube rivera   Site Assessment Dry

## 2023-12-06 NOTE — PLAN OF CARE
I called the legal guardian, Ramana Humphrey, gave her updates on Eppie Keep current situation (s/p emergent intubation), confirmed code status (DNR-CCA as documented in ACP document), and obtained informed consent for an arterial line and a central line (2nd person verification of consent by Dr. Carrington Tapia). I answered all her questions.

## 2023-12-06 NOTE — PROCEDURES
Central Line Insertion     Procedure: right internal jugular vein triple lumen catheter placement. Indications: vascular access    Consent: The legal guadian counseled regarding the procedure, its indications, risks, potential complications and alternatives, and any questions were answered. Consent was obtained to proceed. Number of sticks: 1    Number of Kits used: 1    Procedure: Time Out: Immediately prior to the procedure a \"timeout\" was called to verify the correct patient and procedure. The patient was place in the trendelenburg position and the skin over the right internal jugular vein was prepped with betadine and draped in a sterile fashion. Local anesthesia was obtained by infiltration using 1% Lidocaine without epinephrine. With ultrasound guidance a large bore needle was used to identify the vein, dark non pulsatile blood returned. The guide wire was then inserted through the needle with minimal resistance. 2 mm nick was made in the skin beside the guidewire. Then a dilator was inserted and removed. A triple lumen catheter was then inserted into the vessel over the guide wire using the Seldinger technique to the 15 cm mary. All ports showed good, free flowing blood return and were flushed with saline solution. The catheter was then securely fastened to the skin with sutures and covered with a bio patch and sterile dressing. A post procedure X-ray was ordered and is still pending at this time. Complications: None   The patient tolerated the procedure well. Estimated blood loss: 10 ml.     Early MD Jarek PGY-1  12/5/2023  8:24 PM

## 2023-12-06 NOTE — PROCEDURES
EEG Report  Jennifer Cadet is a 70 y.o. female      Appointment Date 12/6/2023   Appointment Time  11:30am   Facility Location [facility] EEG Number [id number]   Type of Study continuous Floor 4425     Technical Specifications  Technician [address] of consciousness Awake/sleep   Sleep deprived? no   Hyperventilation tested? no   Photic stim tested? no   EEG recording Standard 10-20 electrode placement    Duration of recording 31/2 hrs   EEG complete? yes       Clinical History   ***    Medications    Current Facility-Administered Medications:     0.9 % sodium chloride infusion, , IntraVENous, PRN, Shanda Banegas MD    levETIRAcetam (KEPPRA) tablet 1,500 mg, 1,500 mg, Oral, BID, Katt Balo., APRN - CNS    medicated lip balm (BLISTEX/CARMEX) stick, , Topical, PRN, Joanie Roman MD    lacosamide (VIMPAT) 100 mg in sodium chloride 0.9 % 60 mL IVPB, 100 mg, IntraVENous, BID, Joanie Roman MD    lacosamide (VIMPAT) 200 mg in sodium chloride 0.9 % 70 mL IVPB, 200 mg, IntraVENous, BID, Joanie Roman MD    thiamine (B-1) injection 100 mg, 100 mg, IntraVENous, Daily, Alzu'Bi, Bella Baig MD, 100 mg at 12/06/23 0925    sodium chloride (Inhalant) 3 % nebulizer solution 4 mL, 4 mL, Nebulization, PRN, Joanie Roman MD    guaiFENesin tablet 400 mg, 400 mg, Oral, TID, Joanie Roman MD, 400 mg at 12/06/23 1406    fentaNYL (SUBLIMAZE) 1,000 mcg in sodium chloride 0.9% 100 mL infusion,  mcg/hr, IntraVENous, Continuous, Shanda Banegas MD, Last Rate: 7.5 mL/hr at 12/06/23 0650, 75 mcg/hr at 12/06/23 0650    chlorhexidine (PERIDEX) 0.12 % solution 15 mL, 15 mL, Mouth/Throat, BID, Joanie Roman MD, 15 mL at 12/06/23 0926    famotidine (PEPCID) 20 mg in sodium chloride (PF) 0.9 % 10 mL injection, 20 mg, IntraVENous, Daily, Joanie Roman MD, 20 mg at 12/06/23 0925    budesonide (PULMICORT) nebulizer suspension 500 mcg, 0.5 mg, Nebulization, BID RT, Joanie Roman MD, 500 mcg at 12/06/23 102 arformoterol tartrate (BROVANA) nebulizer solution 15 mcg, 15 mcg, Nebulization, BID RT, Joanie Roman MD, 15 mcg at 12/06/23 1029    ipratropium 0.5 mg-albuterol 2.5 mg (DUONEB) nebulizer solution 1 Dose, 1 Dose, Inhalation, Q4H WA RT, Joanie Roman MD, 1 Dose at 12/06/23 1029    piperacillin-tazobactam (ZOSYN) 3,375 mg in sodium chloride 0.9 % 50 mL IVPB ([id number]), 3,375 mg, IntraVENous, Q8H, Joanie Roman MD, Stopped at 12/06/23 1506    vancomycin (VANCOCIN) intermittent dosing (placeholder), , Other, RX Placeholder, Joanie Roman MD    norepinephrine (LEVOPHED) 16 mg in sodium chloride 0.9 % 250 mL infusion, 1-100 mcg/min, IntraVENous, Continuous, Kori Delarosa MD, Last Rate: 3.8 mL/hr at 12/06/23 1247, 4 mcg/min at 12/06/23 1247    ascorbic acid (VITAMIN C) tablet 500 mg, 500 mg, Oral, Daily, Joanie Roman MD, 500 mg at 12/06/23 0926    valproic acid (DEPAKENE) 250 MG/5ML oral solution 250 mg, 250 mg, Oral, BID, Joanie Roman MD, 250 mg at 12/06/23 0935    sodium chloride flush 0.9 % injection 5-40 mL, 5-40 mL, IntraVENous, 2 times per day, Liv Rutledge MD, 10 mL at 12/06/23 0927    sodium chloride flush 0.9 % injection 5-40 mL, 5-40 mL, IntraVENous, PRN, Liv Rutledge MD    0.9 % sodium chloride infusion, , IntraVENous, PRN, Liv Rutledge MD    ondansetron (ZOFRAN-ODT) disintegrating tablet 4 mg, 4 mg, Oral, Q8H PRN **OR** ondansetron (ZOFRAN) injection 4 mg, 4 mg, IntraVENous, Q6H PRN, Liv Rutledge MD    polyethylene glycol (GLYCOLAX) packet 17 g, 17 g, Oral, Daily PRN, Liv Rutledge MD    acetaminophen (TYLENOL) tablet 650 mg, 650 mg, Oral, Q6H PRN **OR** acetaminophen (TYLENOL) suppository 650 mg, 650 mg, Rectal, Q6H PRN, Liv Rutledge MD    folic acid injection 1 mg, 1 mg, IntraVENous, Daily, Randolph Vela MD, 1 mg at 12/06/23 0926    glucose chewable tablet 16 g, 4 tablet, Oral, PRN, Milton Collier MD    dextrose bolus 10% 125 mL, 125 mL, IntraVENous, PRN, Stopped

## 2023-12-06 NOTE — PROGRESS NOTES
12/06/23 1031   Patient Observation   Pulse 55   Respirations 16   SpO2 97 %   Ventilator Settings   Vt (Set, mL) 350 mL   Resp Rate (Set) (S)  16 bpm  (changed per order)   PEEP/CPAP (cmH2O) 8   FiO2  40 %   Vent Patient Data (Readings)   Vt (Measured) 365 mL   Peak Inspiratory Pressure (cmH2O) 21 cmH2O   Rate Measured 16 br/min   Minute Volume (L/min) 5.83 Liters   Mean Airway Pressure (cmH2O) 11 cmH20   Plateau Pressure (cm H2O) 18 cm H2O   Driving Pressure 10   I:E Ratio 1:4.90   Vent Alarm Settings   High Pressure (cmH2O) 45 cmH2O

## 2023-12-06 NOTE — PROGRESS NOTES
Pharmacy Consultation Note  (Antibiotic Dosing and Monitoring)    Initial consult date: 12/5/23  Consulting physician/provider: Dr. Kathy Bedoya  Drug: Vancomycin  Indication: cap; 5 days    Age/  Gender Height Weight IBW  Allergy Information   78 y.o./female 162.6 cm (5' 4\") 77.1 kg (170 lb)     Ideal body weight: 54.7 kg (120 lb 9.5 oz)  Adjusted ideal body weight: 63.7 kg (140 lb 5.7 oz)   Mysoline [primidone] and Tofranil [imipramine hcl]      Renal Function:  Recent Labs     12/05/23  1116 12/05/23  1841 12/06/23  0355   BUN 27* 29* 31*   CREATININE 2.2* 2.4* 2.6*         Intake/Output Summary (Last 24 hours) at 12/6/2023 1108  Last data filed at 12/6/2023 0830  Gross per 24 hour   Intake 350 ml   Output 2525 ml   Net -2175 ml         Vancomycin Monitoring:  Trough:  No results for input(s): \"VANCOTROUGH\" in the last 72 hours. Random:    Recent Labs     12/06/23  0355   VANCORANDOM 13.0       Vancomycin Administration Times:    Recent vancomycin administrations                     vancomycin (VANCOCIN) 1500 mg in sodium chloride 0.9 % 250 mL IVPB (mg) 1,500 mg New Bag 12/05/23 1206                  Assessment:  Patient is a 70 y.o. female who has been initiated on vancomycin  Estimated Creatinine Clearance: 19 mL/min (A) (based on SCr of 2.6 mg/dL (H)).   To dose vancomycin, pharmacy will be utilizing dosing based off of levels because of patient's renal impairment/insufficiency  12/5: Scr trending up 2.3 (JOSE A on CKD)  12/6: Scr 2.6, UOP 1.5 mL/kg/hr, random level is 13 mcg/mL    Plan:  Vancomycin 1250 mg IV x 1 dose  Will check vancomycin random level tomorrow am on 12/7  Will continue to monitor renal function   Pharmacy to follow      Thank you for the consult,     Derrel Knuckles, PharmD, BCPS, BCCCP 12/6/2023 11:08 AM

## 2023-12-07 ENCOUNTER — APPOINTMENT (OUTPATIENT)
Dept: GENERAL RADIOLOGY | Age: 78
End: 2023-12-07
Payer: MEDICARE

## 2023-12-07 LAB
AADO2: 129.8 MMHG
ABO/RH: NORMAL
ANION GAP SERPL CALCULATED.3IONS-SCNC: 13 MMOL/L (ref 7–16)
ANION GAP SERPL CALCULATED.3IONS-SCNC: 13 MMOL/L (ref 7–16)
ANION GAP SERPL CALCULATED.3IONS-SCNC: 14 MMOL/L (ref 7–16)
ANION GAP SERPL CALCULATED.3IONS-SCNC: 14 MMOL/L (ref 7–16)
ANTIBODY SCREEN: NEGATIVE
ARM BAND NUMBER: NORMAL
B.E.: -6.2 MMOL/L (ref -3–3)
BASOPHILS # BLD: 0.03 K/UL (ref 0–0.2)
BASOPHILS NFR BLD: 0 % (ref 0–2)
BLOOD BANK BLOOD PRODUCT EXPIRATION DATE: NORMAL
BLOOD BANK DISPENSE STATUS: NORMAL
BLOOD BANK ISBT PRODUCT BLOOD TYPE: 6200
BLOOD BANK PRODUCT CODE: NORMAL
BLOOD BANK SAMPLE EXPIRATION: NORMAL
BLOOD BANK UNIT TYPE AND RH: NORMAL
BPU ID: NORMAL
BUN SERPL-MCNC: 34 MG/DL (ref 6–23)
BUN SERPL-MCNC: 36 MG/DL (ref 6–23)
BUN SERPL-MCNC: 38 MG/DL (ref 6–23)
BUN SERPL-MCNC: 39 MG/DL (ref 6–23)
CALCIUM SERPL-MCNC: 8.9 MG/DL (ref 8.6–10.2)
CALCIUM SERPL-MCNC: 9.1 MG/DL (ref 8.6–10.2)
CALCIUM SERPL-MCNC: 9.1 MG/DL (ref 8.6–10.2)
CALCIUM SERPL-MCNC: 9.3 MG/DL (ref 8.6–10.2)
CHLORIDE SERPL-SCNC: 118 MMOL/L (ref 98–107)
CHLORIDE SERPL-SCNC: 118 MMOL/L (ref 98–107)
CHLORIDE SERPL-SCNC: 119 MMOL/L (ref 98–107)
CHLORIDE SERPL-SCNC: 120 MMOL/L (ref 98–107)
CO2 SERPL-SCNC: 16 MMOL/L (ref 22–29)
CO2 SERPL-SCNC: 16 MMOL/L (ref 22–29)
CO2 SERPL-SCNC: 17 MMOL/L (ref 22–29)
CO2 SERPL-SCNC: 18 MMOL/L (ref 22–29)
COHB: 0.7 % (ref 0–1.5)
COMPONENT: NORMAL
CREAT SERPL-MCNC: 2.7 MG/DL (ref 0.5–1)
CREAT SERPL-MCNC: 2.7 MG/DL (ref 0.5–1)
CREAT SERPL-MCNC: 2.8 MG/DL (ref 0.5–1)
CREAT SERPL-MCNC: 2.8 MG/DL (ref 0.5–1)
CRITICAL: ABNORMAL
CROSSMATCH RESULT: NORMAL
DATE ANALYZED: ABNORMAL
DATE LAST DOSE: NORMAL
DATE OF COLLECTION: ABNORMAL
EOSINOPHIL # BLD: 1.13 K/UL (ref 0.05–0.5)
EOSINOPHILS RELATIVE PERCENT: 9 % (ref 0–6)
ERYTHROCYTE [DISTWIDTH] IN BLOOD BY AUTOMATED COUNT: 18.3 % (ref 11.5–15)
FIO2: 40 %
GFR SERPL CREATININE-BSD FRML MDRD: 17 ML/MIN/1.73M2
GFR SERPL CREATININE-BSD FRML MDRD: 17 ML/MIN/1.73M2
GFR SERPL CREATININE-BSD FRML MDRD: 18 ML/MIN/1.73M2
GFR SERPL CREATININE-BSD FRML MDRD: 18 ML/MIN/1.73M2
GLUCOSE BLD-MCNC: 128 MG/DL (ref 74–99)
GLUCOSE SERPL-MCNC: 108 MG/DL (ref 74–99)
GLUCOSE SERPL-MCNC: 121 MG/DL (ref 74–99)
GLUCOSE SERPL-MCNC: 122 MG/DL (ref 74–99)
GLUCOSE SERPL-MCNC: 168 MG/DL (ref 74–99)
HCO3: 17.7 MMOL/L (ref 22–26)
HCT VFR BLD AUTO: 23 % (ref 34–48)
HGB BLD-MCNC: 7.3 G/DL (ref 11.5–15.5)
HHB: 2.3 % (ref 0–5)
IMM GRANULOCYTES # BLD AUTO: 0.14 K/UL (ref 0–0.58)
IMM GRANULOCYTES NFR BLD: 1 % (ref 0–5)
LAB: ABNORMAL
LYMPHOCYTES NFR BLD: 1 K/UL (ref 1.5–4)
LYMPHOCYTES RELATIVE PERCENT: 8 % (ref 20–42)
Lab: 416
MAGNESIUM SERPL-MCNC: 2.2 MG/DL (ref 1.6–2.6)
MCH RBC QN AUTO: 29.2 PG (ref 26–35)
MCHC RBC AUTO-ENTMCNC: 31.7 G/DL (ref 32–34.5)
MCV RBC AUTO: 92 FL (ref 80–99.9)
METHB: 0.4 % (ref 0–1.5)
MICROORGANISM SPEC CULT: ABNORMAL
MICROORGANISM SPEC CULT: ABNORMAL
MICROORGANISM/AGENT SPEC: ABNORMAL
MODE: AC
MONOCYTES NFR BLD: 1.04 K/UL (ref 0.1–0.95)
MONOCYTES NFR BLD: 9 % (ref 2–12)
NEUTROPHILS NFR BLD: 72 % (ref 43–80)
NEUTS SEG NFR BLD: 8.67 K/UL (ref 1.8–7.3)
O2 CONTENT: 12.3 ML/DL
O2 SATURATION: 97.7 % (ref 92–98.5)
O2HB: 96.6 % (ref 94–97)
OPERATOR ID: 2593
OSMOLALITY UR: 301 MOSM/KG (ref 300–900)
PATIENT TEMP: 37 C
PCO2: 29 MMHG (ref 35–45)
PEEP/CPAP: 8 CMH2O
PFO2: 2.8 MMHG/%
PH BLOOD GAS: 7.4 (ref 7.35–7.45)
PLATELET # BLD AUTO: 99 K/UL (ref 130–450)
PLATELET CONFIRMATION: NORMAL
PMV BLD AUTO: 12.7 FL (ref 7–12)
PO2: 112.1 MMHG (ref 75–100)
POTASSIUM SERPL-SCNC: 4.3 MMOL/L (ref 3.5–5)
POTASSIUM SERPL-SCNC: 4.3 MMOL/L (ref 3.5–5)
POTASSIUM SERPL-SCNC: 4.5 MMOL/L (ref 3.5–5)
POTASSIUM SERPL-SCNC: 4.8 MMOL/L (ref 3.5–5)
POTASSIUM, UR: 12.8 MMOL/L
RBC # BLD AUTO: 2.5 M/UL (ref 3.5–5.5)
RI(T): 1.16
RR MECHANICAL: 16 B/MIN
SODIUM SERPL-SCNC: 148 MMOL/L (ref 132–146)
SODIUM SERPL-SCNC: 148 MMOL/L (ref 132–146)
SODIUM SERPL-SCNC: 150 MMOL/L (ref 132–146)
SODIUM SERPL-SCNC: 150 MMOL/L (ref 132–146)
SODIUM UR-SCNC: 76 MMOL/L
SOURCE, BLOOD GAS: ABNORMAL
SPECIMEN DESCRIPTION: ABNORMAL
THB: 8.9 G/DL (ref 11.5–16.5)
TIME ANALYZED: 428
TME LAST DOSE: NORMAL H
TRANSFUSION STATUS: NORMAL
UNIT DIVISION: 0
UNIT ISSUE DATE/TIME: NORMAL
UUN UR-MCNC: 327 MG/DL (ref 800–1666)
VANCOMYCIN DOSE: NORMAL MG
VANCOMYCIN SERPL-MCNC: 17.8 UG/ML (ref 5–40)
VT MECHANICAL: 350 ML
WBC OTHER # BLD: 12 K/UL (ref 4.5–11.5)

## 2023-12-07 PROCEDURE — 6360000002 HC RX W HCPCS: Performed by: STUDENT IN AN ORGANIZED HEALTH CARE EDUCATION/TRAINING PROGRAM

## 2023-12-07 PROCEDURE — 99291 CRITICAL CARE FIRST HOUR: CPT | Performed by: INTERNAL MEDICINE

## 2023-12-07 PROCEDURE — 71045 X-RAY EXAM CHEST 1 VIEW: CPT

## 2023-12-07 PROCEDURE — 82805 BLOOD GASES W/O2 SATURATION: CPT

## 2023-12-07 PROCEDURE — 84133 ASSAY OF URINE POTASSIUM: CPT

## 2023-12-07 PROCEDURE — 2500000003 HC RX 250 WO HCPCS: Performed by: STUDENT IN AN ORGANIZED HEALTH CARE EDUCATION/TRAINING PROGRAM

## 2023-12-07 PROCEDURE — 99232 SBSQ HOSP IP/OBS MODERATE 35: CPT | Performed by: STUDENT IN AN ORGANIZED HEALTH CARE EDUCATION/TRAINING PROGRAM

## 2023-12-07 PROCEDURE — 2000000000 HC ICU R&B

## 2023-12-07 PROCEDURE — 85025 COMPLETE CBC W/AUTO DIFF WBC: CPT

## 2023-12-07 PROCEDURE — 94003 VENT MGMT INPAT SUBQ DAY: CPT

## 2023-12-07 PROCEDURE — 99232 SBSQ HOSP IP/OBS MODERATE 35: CPT | Performed by: CLINICAL NURSE SPECIALIST

## 2023-12-07 PROCEDURE — 6370000000 HC RX 637 (ALT 250 FOR IP): Performed by: STUDENT IN AN ORGANIZED HEALTH CARE EDUCATION/TRAINING PROGRAM

## 2023-12-07 PROCEDURE — 83735 ASSAY OF MAGNESIUM: CPT

## 2023-12-07 PROCEDURE — 84540 ASSAY OF URINE/UREA-N: CPT

## 2023-12-07 PROCEDURE — 2580000003 HC RX 258

## 2023-12-07 PROCEDURE — 80048 BASIC METABOLIC PNL TOTAL CA: CPT

## 2023-12-07 PROCEDURE — 95822 EEG COMA OR SLEEP ONLY: CPT | Performed by: PSYCHIATRY & NEUROLOGY

## 2023-12-07 PROCEDURE — 94640 AIRWAY INHALATION TREATMENT: CPT

## 2023-12-07 PROCEDURE — 82962 GLUCOSE BLOOD TEST: CPT

## 2023-12-07 PROCEDURE — 2580000003 HC RX 258: Performed by: STUDENT IN AN ORGANIZED HEALTH CARE EDUCATION/TRAINING PROGRAM

## 2023-12-07 PROCEDURE — 6370000000 HC RX 637 (ALT 250 FOR IP): Performed by: CLINICAL NURSE SPECIALIST

## 2023-12-07 PROCEDURE — A4216 STERILE WATER/SALINE, 10 ML: HCPCS | Performed by: STUDENT IN AN ORGANIZED HEALTH CARE EDUCATION/TRAINING PROGRAM

## 2023-12-07 PROCEDURE — 2500000003 HC RX 250 WO HCPCS

## 2023-12-07 PROCEDURE — 80202 ASSAY OF VANCOMYCIN: CPT

## 2023-12-07 PROCEDURE — 84300 ASSAY OF URINE SODIUM: CPT

## 2023-12-07 PROCEDURE — C9254 INJECTION, LACOSAMIDE: HCPCS | Performed by: STUDENT IN AN ORGANIZED HEALTH CARE EDUCATION/TRAINING PROGRAM

## 2023-12-07 PROCEDURE — 83935 ASSAY OF URINE OSMOLALITY: CPT

## 2023-12-07 PROCEDURE — 6360000002 HC RX W HCPCS

## 2023-12-07 PROCEDURE — 6360000002 HC RX W HCPCS: Performed by: INTERNAL MEDICINE

## 2023-12-07 RX ORDER — WATER 10 ML/10ML
INJECTION INTRAMUSCULAR; INTRAVENOUS; SUBCUTANEOUS
Status: COMPLETED
Start: 2023-12-07 | End: 2023-12-07

## 2023-12-07 RX ORDER — LORAZEPAM 2 MG/ML
4 INJECTION INTRAMUSCULAR ONCE
Status: DISCONTINUED | OUTPATIENT
Start: 2023-12-07 | End: 2023-12-07

## 2023-12-07 RX ORDER — SODIUM CHLORIDE, SODIUM LACTATE, POTASSIUM CHLORIDE, AND CALCIUM CHLORIDE .6; .31; .03; .02 G/100ML; G/100ML; G/100ML; G/100ML
1000 INJECTION, SOLUTION INTRAVENOUS ONCE
Status: COMPLETED | OUTPATIENT
Start: 2023-12-07 | End: 2023-12-07

## 2023-12-07 RX ORDER — MIDAZOLAM HYDROCHLORIDE 1 MG/ML
10 INJECTION INTRAMUSCULAR; INTRAVENOUS ONCE
Status: COMPLETED | OUTPATIENT
Start: 2023-12-07 | End: 2023-12-07

## 2023-12-07 RX ORDER — MIDAZOLAM HYDROCHLORIDE 1 MG/ML
1-10 INJECTION, SOLUTION INTRAVENOUS CONTINUOUS
Status: DISCONTINUED | OUTPATIENT
Start: 2023-12-07 | End: 2023-12-08

## 2023-12-07 RX ADMIN — VANCOMYCIN HYDROCHLORIDE 1000 MG: 1 INJECTION, POWDER, LYOPHILIZED, FOR SOLUTION INTRAVENOUS at 11:10

## 2023-12-07 RX ADMIN — IPRATROPIUM BROMIDE AND ALBUTEROL SULFATE 1 DOSE: .5; 2.5 SOLUTION RESPIRATORY (INHALATION) at 08:21

## 2023-12-07 RX ADMIN — ARFORMOTEROL TARTRATE 15 MCG: 15 SOLUTION RESPIRATORY (INHALATION) at 20:12

## 2023-12-07 RX ADMIN — IPRATROPIUM BROMIDE AND ALBUTEROL SULFATE 1 DOSE: .5; 2.5 SOLUTION RESPIRATORY (INHALATION) at 12:31

## 2023-12-07 RX ADMIN — VALPROIC ACID 250 MG: 250 SOLUTION ORAL at 09:08

## 2023-12-07 RX ADMIN — GUAIFENESIN 400 MG: 400 TABLET ORAL at 09:05

## 2023-12-07 RX ADMIN — THIAMINE HYDROCHLORIDE 100 MG: 100 INJECTION, SOLUTION INTRAMUSCULAR; INTRAVENOUS at 09:05

## 2023-12-07 RX ADMIN — Medication 2 MG/HR: at 01:57

## 2023-12-07 RX ADMIN — SODIUM CHLORIDE, PRESERVATIVE FREE 10 ML: 5 INJECTION INTRAVENOUS at 09:08

## 2023-12-07 RX ADMIN — SODIUM CHLORIDE, PRESERVATIVE FREE 10 ML: 5 INJECTION INTRAVENOUS at 20:29

## 2023-12-07 RX ADMIN — BUDESONIDE INHALATION 500 MCG: 0.5 SUSPENSION RESPIRATORY (INHALATION) at 08:21

## 2023-12-07 RX ADMIN — FAMOTIDINE 20 MG: 10 INJECTION, SOLUTION INTRAVENOUS at 09:05

## 2023-12-07 RX ADMIN — ARFORMOTEROL TARTRATE 15 MCG: 15 SOLUTION RESPIRATORY (INHALATION) at 08:21

## 2023-12-07 RX ADMIN — LEVETIRACETAM 1500 MG: 500 TABLET, FILM COATED ORAL at 20:29

## 2023-12-07 RX ADMIN — FOLIC ACID 1 MG: 5 INJECTION, SOLUTION INTRAMUSCULAR; INTRAVENOUS; SUBCUTANEOUS at 09:04

## 2023-12-07 RX ADMIN — HYDROCORTISONE SODIUM SUCCINATE 50 MG: 100 INJECTION, POWDER, FOR SOLUTION INTRAMUSCULAR; INTRAVENOUS at 23:35

## 2023-12-07 RX ADMIN — VALPROIC ACID 250 MG: 250 SOLUTION ORAL at 20:32

## 2023-12-07 RX ADMIN — IPRATROPIUM BROMIDE AND ALBUTEROL SULFATE 1 DOSE: .5; 2.5 SOLUTION RESPIRATORY (INHALATION) at 20:12

## 2023-12-07 RX ADMIN — BUDESONIDE INHALATION 500 MCG: 0.5 SUSPENSION RESPIRATORY (INHALATION) at 20:12

## 2023-12-07 RX ADMIN — MIDAZOLAM 10 MG: 1 INJECTION INTRAMUSCULAR; INTRAVENOUS at 02:04

## 2023-12-07 RX ADMIN — LACOSAMIDE 100 MG: 10 INJECTION INTRAVENOUS at 20:43

## 2023-12-07 RX ADMIN — SODIUM CHLORIDE, POTASSIUM CHLORIDE, SODIUM LACTATE AND CALCIUM CHLORIDE 1000 ML: 600; 310; 30; 20 INJECTION, SOLUTION INTRAVENOUS at 16:54

## 2023-12-07 RX ADMIN — LACOSAMIDE 100 MG: 10 INJECTION INTRAVENOUS at 08:56

## 2023-12-07 RX ADMIN — HYDROCORTISONE SODIUM SUCCINATE 50 MG: 100 INJECTION, POWDER, FOR SOLUTION INTRAMUSCULAR; INTRAVENOUS at 18:05

## 2023-12-07 RX ADMIN — PIPERACILLIN AND TAZOBACTAM 3375 MG: 3; .375 INJECTION, POWDER, LYOPHILIZED, FOR SOLUTION INTRAVENOUS at 02:22

## 2023-12-07 RX ADMIN — IPRATROPIUM BROMIDE AND ALBUTEROL SULFATE 1 DOSE: .5; 2.5 SOLUTION RESPIRATORY (INHALATION) at 16:38

## 2023-12-07 RX ADMIN — LEVETIRACETAM 1500 MG: 500 TABLET, FILM COATED ORAL at 09:05

## 2023-12-07 RX ADMIN — GUAIFENESIN 400 MG: 400 TABLET ORAL at 13:13

## 2023-12-07 RX ADMIN — PIPERACILLIN AND TAZOBACTAM 3375 MG: 3; .375 INJECTION, POWDER, LYOPHILIZED, FOR SOLUTION INTRAVENOUS at 19:15

## 2023-12-07 RX ADMIN — HYDROCORTISONE SODIUM SUCCINATE 50 MG: 100 INJECTION, POWDER, FOR SOLUTION INTRAMUSCULAR; INTRAVENOUS at 13:13

## 2023-12-07 RX ADMIN — WATER 10 ML: 1 INJECTION INTRAMUSCULAR; INTRAVENOUS; SUBCUTANEOUS at 23:35

## 2023-12-07 RX ADMIN — Medication 25 MCG/HR: at 19:14

## 2023-12-07 RX ADMIN — Medication 500 MG: at 09:05

## 2023-12-07 RX ADMIN — CHLORHEXIDINE GLUCONATE 15 ML: 1.2 RINSE ORAL at 20:28

## 2023-12-07 RX ADMIN — CHLORHEXIDINE GLUCONATE 15 ML: 1.2 RINSE ORAL at 09:09

## 2023-12-07 RX ADMIN — PIPERACILLIN AND TAZOBACTAM 3375 MG: 3; .375 INJECTION, POWDER, LYOPHILIZED, FOR SOLUTION INTRAVENOUS at 11:20

## 2023-12-07 RX ADMIN — WATER 2 ML: 1 INJECTION INTRAMUSCULAR; INTRAVENOUS; SUBCUTANEOUS at 13:13

## 2023-12-07 RX ADMIN — GUAIFENESIN 400 MG: 400 TABLET ORAL at 20:28

## 2023-12-07 ASSESSMENT — PULMONARY FUNCTION TESTS
PIF_VALUE: 22
PIF_VALUE: 20
PIF_VALUE: 20
PIF_VALUE: 24
PIF_VALUE: 21
PIF_VALUE: 21
PIF_VALUE: 24
PIF_VALUE: 20
PIF_VALUE: 23
PIF_VALUE: 22
PIF_VALUE: 20
PIF_VALUE: 24
PIF_VALUE: 26
PIF_VALUE: 21
PIF_VALUE: 20
PIF_VALUE: 20
PIF_VALUE: 21
PIF_VALUE: 22
PIF_VALUE: 21
PIF_VALUE: 22
PIF_VALUE: 21
PIF_VALUE: 22
PIF_VALUE: 20
PIF_VALUE: 23
PIF_VALUE: 21
PIF_VALUE: 24
PIF_VALUE: 21
PIF_VALUE: 22
PIF_VALUE: 20

## 2023-12-07 ASSESSMENT — PAIN SCALES - GENERAL
PAINLEVEL_OUTOF10: 0

## 2023-12-07 NOTE — PROGRESS NOTES
Pharmacy Consultation Note  (Antibiotic Dosing and Monitoring)    Initial consult date: 12/5/23  Consulting physician/provider: Dr. Livan Cuenca  Drug: Vancomycin  Indication: cap; 5 days    Age/  Gender Height Weight IBW  Allergy Information   78 y.o./female 162.6 cm (5' 4\") 77.1 kg (170 lb)     Ideal body weight: 54.7 kg (120 lb 9.5 oz)  Adjusted ideal body weight: 63.7 kg (140 lb 5.7 oz)   Mysoline [primidone] and Tofranil [imipramine hcl]      Renal Function:  Recent Labs     12/06/23  0355 12/06/23  2350 12/07/23  0411   BUN 31* 34* 36*   CREATININE 2.6* 2.8* 2.7*         Intake/Output Summary (Last 24 hours) at 12/7/2023 0956  Last data filed at 12/7/2023 0900  Gross per 24 hour   Intake 2776.91 ml   Output 1940 ml   Net 836.91 ml         Vancomycin Monitoring:  Trough:  No results for input(s): \"VANCOTROUGH\" in the last 72 hours. Random:    Recent Labs     12/06/23  0355 12/07/23  0411   VANCORANDOM 13.0 17.8         Vancomycin Administration Times:    Recent vancomycin administrations                     vancomycin (VANCOCIN) 1500 mg in sodium chloride 0.9 % 250 mL IVPB (mg) 1,500 mg New Bag 12/05/23 1206                  Assessment:  Patient is a 70 y.o. female who has been initiated on vancomycin  Estimated Creatinine Clearance: 18 mL/min (A) (based on SCr of 2.7 mg/dL (H)).   To dose vancomycin, pharmacy will be utilizing dosing based off of levels because of patient's renal impairment/insufficiency    Plan:  Vancomycin 1000 mg IV x1    Thank you for the consult,   Deidra Moralez, PharmD, BCPS, BCCCP 12/7/2023 9:56 AM

## 2023-12-07 NOTE — PLAN OF CARE
Problem: Pain  Goal: Verbalizes/displays adequate comfort level or baseline comfort level  Outcome: Progressing  Flowsheets (Taken 12/6/2023 2000)  Verbalizes/displays adequate comfort level or baseline comfort level:   Assess pain using appropriate pain scale   Administer analgesics based on type and severity of pain and evaluate response     Problem: Skin/Tissue Integrity  Goal: Absence of new skin breakdown  Description: 1. Monitor for areas of redness and/or skin breakdown  2. Assess vascular access sites hourly  3. Every 4-6 hours minimum:  Change oxygen saturation probe site  4. Every 4-6 hours:  If on nasal continuous positive airway pressure, respiratory therapy assess nares and determine need for appliance change or resting period. Outcome: Progressing     Problem: Safety - Adult  Goal: Free from fall injury  Outcome: Progressing  Flowsheets  Taken 12/6/2023 2000 by Anselmo Solis RN  Free From Fall Injury:   Instruct family/caregiver on patient safety   Based on caregiver fall risk screen, instruct family/caregiver to ask for assistance with transferring infant if caregiver noted to have fall risk factors  Taken 12/6/2023 0800 by Angeline Pearson RN  Free From Fall Injury: Instruct family/caregiver on patient safety     Problem: Safety - Medical Restraint  Goal: Remains free of injury from restraints (Restraint for Interference with Medical Device)  Description: INTERVENTIONS:  1. Determine that other, less restrictive measures have been tried or would not be effective before applying the restraint  2. Evaluate the patient's condition at the time of restraint application  3. Inform patient/family regarding the reason for restraint  4.  Q2H: Monitor safety, psychosocial status, comfort, nutrition and hydration  Outcome: Progressing  Flowsheets  Taken 12/6/2023 2000 by Anselmo Solis RN  Remains free of injury from restraints (restraint for interference with medical device):   Every 2 hours: Monitor psychosis is improved or at baseline  Description: INTERVENTIONS:  1. Assess for possible contributors to thought disturbance, including medications, impaired vision or hearing, underlying metabolic abnormalities, dehydration, psychiatric diagnoses, and notify attending LIP  2. [place] high risk fall precautions, as indicated  3. Provide frequent short contacts to provide reality reorientation, refocusing and direction  4. Decrease environmental stimuli, including noise as appropriate  5. Monitor and intervene to maintain adequate nutrition, hydration, elimination, sleep and activity  6. If unable to ensure safety without constant attention obtain sitter and review sitter guidelines with assigned personnel  7.  Initiate Psychosocial CNS and Spiritual Care consult, as indicated  Outcome: Not Progressing  Flowsheets (Taken 12/6/2023 2000)  Effect of thought disturbance (confusion, delirium, dementia, or psychosis) are managed with adequate functional status:   Assess for contributors to thought disturbance, including medications, impaired vision or hearing, underlying metabolic abnormalities, dehydration, psychiatric diagnoses, notify [address] high risk fall precautions, as indicated   Provide frequent short contacts to provide reality reorientation, refocusing and direction   Decrease environmental stimuli, including noise as appropriate     Problem: Nutrition Deficit:  Goal: Optimize nutritional status  Outcome: Not Progressing

## 2023-12-07 NOTE — PROGRESS NOTES
Stage  CI HR MAP TPRI SVI   Baseline 3.0 71   42   Challenge 3.7 80 96 2049 50   Result (%?) 25.6% 12.9%   18.2%

## 2023-12-07 NOTE — PLAN OF CARE
Problem: Safety - Medical Restraint  Goal: Remains free of injury from restraints (Restraint for Interference with Medical Device)  Description: INTERVENTIONS:  1. Determine that other, less restrictive measures have been tried or would not be effective before applying the restraint  2. Evaluate the patient's condition at the time of restraint application  3. Inform patient/family regarding the reason for restraint  4.  Q2H: Monitor safety, psychosocial status, comfort, nutrition and hydration  12/7/2023 1419 by Ellie Herrera RN  Outcome: Completed  12/7/2023 1001 by Ellie Herrera RN  Outcome: Progressing

## 2023-12-07 NOTE — CARE COORDINATION
Care Coordination LOS 4 day: Remains intubated, Fentanyl, Versed, Levophed, Zosyn, Vanc. Per prev [facility], long term bed hold, no needs to return, transport envelope competed.       Electronically signed by Mateo Phillips RN on 12/7/2023 at 10:35 AM

## 2023-12-07 NOTE — PLAN OF CARE
Problem: Discharge Planning  Goal: Discharge to home or other facility with appropriate resources  Outcome: Not Progressing     Problem: Confusion  Goal: Confusion, delirium, dementia, or psychosis is improved or at baseline  Description: INTERVENTIONS:  1. Assess for possible contributors to thought disturbance, including medications, impaired vision or hearing, underlying metabolic abnormalities, dehydration, psychiatric diagnoses, and notify attending LIP  2. [place] high risk fall precautions, as indicated  3. Provide frequent short contacts to provide reality reorientation, refocusing and direction  4. Decrease environmental stimuli, including noise as appropriate  5. Monitor and intervene to maintain adequate nutrition, hydration, elimination, sleep and activity  6. If unable to ensure safety without constant attention obtain sitter and review sitter guidelines with assigned personnel  7.  Initiate Psychosocial CNS and Spiritual Care consult, as indicated  12/7/2023 1718 by Marquis Mervat RN  Outcome: Not Progressing  12/7/2023 1001 by Rosendo Foss RN  Outcome: Progressing     Problem: Respiratory - Adult  Goal: Achieves optimal ventilation and oxygenation  12/7/2023 1718 by Marquis Mervat RN  Outcome: Not Progressing  12/7/2023 1001 by Rosendo Foss RN  Outcome: Progressing

## 2023-12-07 NOTE — PLAN OF CARE
Problem: Pain  Goal: Verbalizes/displays adequate comfort level or baseline comfort level  12/7/2023 1001 by Francisco Samaniego RN  Outcome: Progressing  12/6/2023 2015 by Ml Albright RN  Outcome: Progressing  Flowsheets (Taken 12/6/2023 2000)  Verbalizes/displays adequate comfort level or baseline comfort level:   Assess pain using appropriate pain scale   Administer analgesics based on type and severity of pain and evaluate response     Problem: Confusion  Goal: Confusion, delirium, dementia, or psychosis is improved or at baseline  Description: INTERVENTIONS:  1. Assess for possible contributors to thought disturbance, including medications, impaired vision or hearing, underlying metabolic abnormalities, dehydration, psychiatric diagnoses, and notify attending LIP  2. [place] high risk fall precautions, as indicated  3. Provide frequent short contacts to provide reality reorientation, refocusing and direction  4. Decrease environmental stimuli, including noise as appropriate  5. Monitor and intervene to maintain adequate nutrition, hydration, elimination, sleep and activity  6. If unable to ensure safety without constant attention obtain sitter and review sitter guidelines with assigned personnel  7.  Initiate Psychosocial CNS and Spiritual Care consult, as indicated  12/7/2023 1001 by Francisco Samaniego RN  Outcome: Progressing  12/6/2023 2015 by Ml Albright RN  Outcome: Not Progressing  Flowsheets (Taken 12/6/2023 2000)  Effect of thought disturbance (confusion, delirium, dementia, or psychosis) are managed with adequate functional status:   Assess for contributors to thought disturbance, including medications, impaired vision or hearing, underlying metabolic abnormalities, dehydration, psychiatric diagnoses, notify [address] [facility] high risk fall precautions, as indicated   Provide frequent short contacts to provide reality reorientation, refocusing and direction   Decrease environmental stimuli, including Progressing

## 2023-12-07 NOTE — PROGRESS NOTES
[facility] Progress Note    Admitting Date and Time: 12/2/2023  8:30 PM  Admit Dx: Hyperkalemia [E87.5]  Hypernatremia [E87.0]  Altered mental status [R41.82]  Altered mental status, unspecified altered mental status type [R41.82]  Pneumonia of left lung due to infectious organism, unspecified part of lung [J18.9]  Urinary tract infection in female [N39.0]    Subjective:  Patient is being followed for Hyperkalemia [E87.5]  Hypernatremia [E87.0]  Altered mental status [R41.82]  Altered mental status, unspecified altered mental status type [R41.82]  Pneumonia of left lung due to infectious organism, unspecified part of lung [J18.9]  Urinary tract infection in female [N39.0]     Patient seen and examined at bedside this morning.     Remains Intubated and sedated    ROS: denies fever, chills, cp, sob, n/v, HA unless stated above.      hydrocortisone sodium succinate PF  50 mg IntraVENous Q6H    levETIRAcetam  1,500 mg Oral BID    lacosamide (VIMPAT) 100 mg in sodium chloride 0.9 % 60 mL IVPB  100 mg IntraVENous BID    thiamine  100 mg IntraVENous Daily    guaiFENesin  400 mg Oral TID    chlorhexidine  15 mL Mouth/Throat BID    famotidine (PEPCID) injection  20 mg IntraVENous Daily    budesonide  0.5 mg Nebulization BID RT    arformoterol tartrate  15 mcg Nebulization BID RT    ipratropium 0.5 mg-albuterol 2.5 mg  1 Dose Inhalation Q4H WA RT    piperacillin-tazobactam  3,375 mg IntraVENous Q8H    vancomycin (VANCOCIN) intermittent dosing (placeholder)   Other RX Placeholder    ascorbic acid  500 mg Oral Daily    valproic acid  250 mg Oral BID    sodium chloride flush  5-40 mL IntraVENous 2 times per day    folic acid  1 mg IntraVENous Daily     medicated lip balm, , PRN  sodium chloride (Inhalant), 4 mL, PRN  sodium chloride flush, 5-40 mL, PRN  sodium chloride, , PRN  ondansetron, 4 mg, Q8H PRN   Or  ondansetron, 4 mg, Q6H PRN  polyethylene glycol, 17 g, Daily PRN  acetaminophen, 650 mg, Q6H PRN

## 2023-12-07 NOTE — PROGRESS NOTES
Physician Progress Note      Vivek Self  CSN #:                  [id number]  :                       1945  ADMIT DATE:       2023 8:30 PM  DISCH DATE:  RESPONDING  PROVIDER #:        eMra RODRIGUEZ          QUERY TEXT:    Pt admitted with Dehydration. Pt noted to have AMS and \"Multiple metabolic   abnormalities\" per neurology consult note on 12/3. If possible, please   document in the progress notes and discharge summary if you are evaluating and   / or treating any of the following: The medical record reflects the following:  Risk Factors: Severe Dehydration  Clinical Indicators: Per neurology consult note documentation : \". ..admitted   with altered mental status in the setting of multiple metabolic abnormalities. This may be the only etiology for her AMS. Orvilla Vineet Orvilla Vineet \", CT head: No acute intracranial   abnormality, Labs on arrival showed Dehydration with severe hypernatremia   hyperkalemia with JOSE A on CKD  Treatment: Serial labs, close pt monitoring    Thank you,  Enrique RONDON, RN, CRCR  Clinical Documentation Improvement  [email or web address]  Options provided:  -- Metabolic encephalopathy  -- Delirium due to, Please specify cause. -- Delirium  -- Other - I will add my own diagnosis  -- Disagree - Not applicable / Not valid  -- Disagree - Clinically unable to determine / Unknown  -- Refer to Clinical Documentation Reviewer    PROVIDER RESPONSE TEXT:    This patient has metabolic encephalopathy.     Query created by: Theresa Moreno on 2023 9:27 AM      Electronically signed by:  Mera RODRIGUEZ 2023 7:22 AM

## 2023-12-08 ENCOUNTER — APPOINTMENT (OUTPATIENT)
Dept: GENERAL RADIOLOGY | Age: 78
End: 2023-12-08
Payer: MEDICARE

## 2023-12-08 LAB
AADO2: 109.5 MMHG
ANION GAP SERPL CALCULATED.3IONS-SCNC: 16 MMOL/L (ref 7–16)
B.E.: -7.5 MMOL/L (ref -3–3)
BASOPHILS # BLD: 0.02 K/UL (ref 0–0.2)
BASOPHILS NFR BLD: 0 % (ref 0–2)
BUN SERPL-MCNC: 39 MG/DL (ref 6–23)
CALCIUM SERPL-MCNC: 9.3 MG/DL (ref 8.6–10.2)
CHLORIDE SERPL-SCNC: 115 MMOL/L (ref 98–107)
CO2 SERPL-SCNC: 15 MMOL/L (ref 22–29)
COHB: 0.7 % (ref 0–1.5)
CREAT SERPL-MCNC: 2.6 MG/DL (ref 0.5–1)
CRITICAL: ABNORMAL
DATE ANALYZED: ABNORMAL
DATE OF COLLECTION: ABNORMAL
EOSINOPHIL # BLD: 0 K/UL (ref 0.05–0.5)
EOSINOPHILS RELATIVE PERCENT: 0 % (ref 0–6)
ERYTHROCYTE [DISTWIDTH] IN BLOOD BY AUTOMATED COUNT: 18.6 % (ref 11.5–15)
FIO2: 40 %
GFR SERPL CREATININE-BSD FRML MDRD: 19 ML/MIN/1.73M2
GLUCOSE SERPL-MCNC: 155 MG/DL (ref 74–99)
HCO3: 17.4 MMOL/L (ref 22–26)
HCT VFR BLD AUTO: 23.3 % (ref 34–48)
HGB BLD-MCNC: 7.3 G/DL (ref 11.5–15.5)
HHB: 1.8 % (ref 0–5)
IMM GRANULOCYTES # BLD AUTO: 0.2 K/UL (ref 0–0.58)
IMM GRANULOCYTES NFR BLD: 2 % (ref 0–5)
LAB: ABNORMAL
LYMPHOCYTES NFR BLD: 0.35 K/UL (ref 1.5–4)
LYMPHOCYTES RELATIVE PERCENT: 4 % (ref 20–42)
Lab: 415
MCH RBC QN AUTO: 29.3 PG (ref 26–35)
MCHC RBC AUTO-ENTMCNC: 31.3 G/DL (ref 32–34.5)
MCV RBC AUTO: 93.6 FL (ref 80–99.9)
METHB: 0.4 % (ref 0–1.5)
MICROORGANISM SPEC CULT: ABNORMAL
MICROORGANISM SPEC CULT: NORMAL
MICROORGANISM/AGENT SPEC: NORMAL
MODE: AC
MONOCYTES NFR BLD: 0.4 K/UL (ref 0.1–0.95)
MONOCYTES NFR BLD: 4 % (ref 2–12)
NEUTROPHILS NFR BLD: 90 % (ref 43–80)
NEUTS SEG NFR BLD: 8.31 K/UL (ref 1.8–7.3)
O2 CONTENT: 11.3 ML/DL
O2 SATURATION: 98.2 % (ref 92–98.5)
O2HB: 97.1 % (ref 94–97)
OPERATOR ID: 914
PATIENT TEMP: 37 C
PCO2: 32.6 MMHG (ref 35–45)
PEEP/CPAP: 8 CMH2O
PFO2: 3.2 MMHG/%
PH BLOOD GAS: 7.34 (ref 7.35–7.45)
PLATELET, FLUORESCENCE: 121 K/UL (ref 130–450)
PMV BLD AUTO: 11.6 FL (ref 7–12)
PO2: 128.2 MMHG (ref 75–100)
POTASSIUM SERPL-SCNC: 4.6 MMOL/L (ref 3.5–5)
RBC # BLD AUTO: 2.49 M/UL (ref 3.5–5.5)
RBC # BLD: ABNORMAL 10*6/UL
RI(T): 0.85
RR MECHANICAL: 16 B/MIN
SODIUM SERPL-SCNC: 146 MMOL/L (ref 132–146)
SOURCE, BLOOD GAS: ABNORMAL
SPECIMEN DESCRIPTION: ABNORMAL
SPECIMEN DESCRIPTION: NORMAL
THB: 8.1 G/DL (ref 11.5–16.5)
TIME ANALYZED: 423
VANCOMYCIN SERPL-MCNC: 23.6 UG/ML (ref 5–40)
VT MECHANICAL: 350 ML
WBC OTHER # BLD: 9.3 K/UL (ref 4.5–11.5)

## 2023-12-08 PROCEDURE — 85025 COMPLETE CBC W/AUTO DIFF WBC: CPT

## 2023-12-08 PROCEDURE — C9254 INJECTION, LACOSAMIDE: HCPCS | Performed by: STUDENT IN AN ORGANIZED HEALTH CARE EDUCATION/TRAINING PROGRAM

## 2023-12-08 PROCEDURE — 2500000003 HC RX 250 WO HCPCS: Performed by: STUDENT IN AN ORGANIZED HEALTH CARE EDUCATION/TRAINING PROGRAM

## 2023-12-08 PROCEDURE — 2580000003 HC RX 258

## 2023-12-08 PROCEDURE — 99232 SBSQ HOSP IP/OBS MODERATE 35: CPT | Performed by: CLINICAL NURSE SPECIALIST

## 2023-12-08 PROCEDURE — 71045 X-RAY EXAM CHEST 1 VIEW: CPT

## 2023-12-08 PROCEDURE — 82805 BLOOD GASES W/O2 SATURATION: CPT

## 2023-12-08 PROCEDURE — 2000000000 HC ICU R&B

## 2023-12-08 PROCEDURE — A4216 STERILE WATER/SALINE, 10 ML: HCPCS | Performed by: STUDENT IN AN ORGANIZED HEALTH CARE EDUCATION/TRAINING PROGRAM

## 2023-12-08 PROCEDURE — 80202 ASSAY OF VANCOMYCIN: CPT

## 2023-12-08 PROCEDURE — 94003 VENT MGMT INPAT SUBQ DAY: CPT

## 2023-12-08 PROCEDURE — 6360000002 HC RX W HCPCS: Performed by: STUDENT IN AN ORGANIZED HEALTH CARE EDUCATION/TRAINING PROGRAM

## 2023-12-08 PROCEDURE — 80048 BASIC METABOLIC PNL TOTAL CA: CPT

## 2023-12-08 PROCEDURE — 2580000003 HC RX 258: Performed by: STUDENT IN AN ORGANIZED HEALTH CARE EDUCATION/TRAINING PROGRAM

## 2023-12-08 PROCEDURE — 6370000000 HC RX 637 (ALT 250 FOR IP): Performed by: STUDENT IN AN ORGANIZED HEALTH CARE EDUCATION/TRAINING PROGRAM

## 2023-12-08 PROCEDURE — 99291 CRITICAL CARE FIRST HOUR: CPT | Performed by: INTERNAL MEDICINE

## 2023-12-08 PROCEDURE — 6360000002 HC RX W HCPCS

## 2023-12-08 PROCEDURE — 2500000003 HC RX 250 WO HCPCS

## 2023-12-08 PROCEDURE — 6370000000 HC RX 637 (ALT 250 FOR IP): Performed by: CLINICAL NURSE SPECIALIST

## 2023-12-08 PROCEDURE — 94640 AIRWAY INHALATION TREATMENT: CPT

## 2023-12-08 PROCEDURE — 37799 UNLISTED PX VASCULAR SURGERY: CPT

## 2023-12-08 RX ORDER — SODIUM CHLORIDE 0.9 % (FLUSH) 0.9 %
SYRINGE (ML) INJECTION
Status: COMPLETED
Start: 2023-12-08 | End: 2023-12-08

## 2023-12-08 RX ORDER — MORPHINE SULFATE 4 MG/ML
4 INJECTION, SOLUTION INTRAMUSCULAR; INTRAVENOUS
Status: DISCONTINUED | OUTPATIENT
Start: 2023-12-08 | End: 2023-12-09 | Stop reason: HOSPADM

## 2023-12-08 RX ORDER — MIDAZOLAM HYDROCHLORIDE 2 MG/2ML
1 INJECTION, SOLUTION INTRAMUSCULAR; INTRAVENOUS
Status: DISCONTINUED | OUTPATIENT
Start: 2023-12-08 | End: 2023-12-09 | Stop reason: HOSPADM

## 2023-12-08 RX ORDER — GLYCOPYRROLATE 0.2 MG/ML
0.2 INJECTION INTRAMUSCULAR; INTRAVENOUS EVERY 4 HOURS PRN
Status: DISCONTINUED | OUTPATIENT
Start: 2023-12-08 | End: 2023-12-09 | Stop reason: HOSPADM

## 2023-12-08 RX ORDER — LORAZEPAM 2 MG/ML
0.5 INJECTION INTRAMUSCULAR
Status: DISCONTINUED | OUTPATIENT
Start: 2023-12-08 | End: 2023-12-08

## 2023-12-08 RX ORDER — MORPHINE SULFATE 2 MG/ML
2 INJECTION, SOLUTION INTRAMUSCULAR; INTRAVENOUS
Status: DISCONTINUED | OUTPATIENT
Start: 2023-12-08 | End: 2023-12-09 | Stop reason: HOSPADM

## 2023-12-08 RX ORDER — GLYCOPYRROLATE 0.2 MG/ML
0.2 INJECTION INTRAMUSCULAR; INTRAVENOUS EVERY 4 HOURS PRN
Status: DISCONTINUED | OUTPATIENT
Start: 2023-12-08 | End: 2023-12-08

## 2023-12-08 RX ORDER — MORPHINE SULFATE 2 MG/ML
2 INJECTION, SOLUTION INTRAMUSCULAR; INTRAVENOUS
Status: DISCONTINUED | OUTPATIENT
Start: 2023-12-08 | End: 2023-12-08

## 2023-12-08 RX ORDER — MIDAZOLAM HYDROCHLORIDE 2 MG/2ML
0.5 INJECTION, SOLUTION INTRAMUSCULAR; INTRAVENOUS
Status: DISCONTINUED | OUTPATIENT
Start: 2023-12-08 | End: 2023-12-09 | Stop reason: HOSPADM

## 2023-12-08 RX ADMIN — FOLIC ACID 1 MG: 5 INJECTION, SOLUTION INTRAMUSCULAR; INTRAVENOUS; SUBCUTANEOUS at 09:36

## 2023-12-08 RX ADMIN — LACOSAMIDE 100 MG: 10 INJECTION INTRAVENOUS at 08:25

## 2023-12-08 RX ADMIN — MIDAZOLAM 0.5 MG: 1 INJECTION INTRAMUSCULAR; INTRAVENOUS at 16:53

## 2023-12-08 RX ADMIN — IPRATROPIUM BROMIDE AND ALBUTEROL SULFATE 1 DOSE: .5; 2.5 SOLUTION RESPIRATORY (INHALATION) at 08:22

## 2023-12-08 RX ADMIN — MIDAZOLAM 0.5 MG: 1 INJECTION INTRAMUSCULAR; INTRAVENOUS at 18:33

## 2023-12-08 RX ADMIN — MORPHINE SULFATE 4 MG: 4 INJECTION, SOLUTION INTRAMUSCULAR; INTRAVENOUS at 15:24

## 2023-12-08 RX ADMIN — FAMOTIDINE 20 MG: 10 INJECTION, SOLUTION INTRAVENOUS at 08:23

## 2023-12-08 RX ADMIN — IPRATROPIUM BROMIDE AND ALBUTEROL SULFATE 1 DOSE: .5; 2.5 SOLUTION RESPIRATORY (INHALATION) at 12:28

## 2023-12-08 RX ADMIN — HYDROCORTISONE SODIUM SUCCINATE 50 MG: 100 INJECTION, POWDER, FOR SOLUTION INTRAMUSCULAR; INTRAVENOUS at 04:58

## 2023-12-08 RX ADMIN — SODIUM CHLORIDE, PRESERVATIVE FREE 10 ML: 5 INJECTION INTRAVENOUS at 08:23

## 2023-12-08 RX ADMIN — CHLORHEXIDINE GLUCONATE 15 ML: 1.2 RINSE ORAL at 08:24

## 2023-12-08 RX ADMIN — HYDROCORTISONE SODIUM SUCCINATE 50 MG: 100 INJECTION, POWDER, FOR SOLUTION INTRAMUSCULAR; INTRAVENOUS at 12:00

## 2023-12-08 RX ADMIN — GLYCOPYRROLATE 0.2 MG: 0.2 INJECTION INTRAMUSCULAR; INTRAVENOUS at 15:24

## 2023-12-08 RX ADMIN — GUAIFENESIN 400 MG: 400 TABLET ORAL at 08:23

## 2023-12-08 RX ADMIN — MORPHINE SULFATE 4 MG: 4 INJECTION, SOLUTION INTRAMUSCULAR; INTRAVENOUS at 15:39

## 2023-12-08 RX ADMIN — Medication 10 ML: at 15:50

## 2023-12-08 RX ADMIN — ERTAPENEM SODIUM 500 MG: 1 INJECTION INTRAMUSCULAR; INTRAVENOUS at 11:53

## 2023-12-08 RX ADMIN — MORPHINE SULFATE 2 MG: 2 INJECTION, SOLUTION INTRAMUSCULAR; INTRAVENOUS at 21:16

## 2023-12-08 RX ADMIN — LEVETIRACETAM 1500 MG: 500 TABLET, FILM COATED ORAL at 08:23

## 2023-12-08 RX ADMIN — ARFORMOTEROL TARTRATE 15 MCG: 15 SOLUTION RESPIRATORY (INHALATION) at 08:22

## 2023-12-08 RX ADMIN — Medication 500 MG: at 08:22

## 2023-12-08 RX ADMIN — MORPHINE SULFATE 2 MG: 2 INJECTION, SOLUTION INTRAMUSCULAR; INTRAVENOUS at 16:52

## 2023-12-08 RX ADMIN — VALPROIC ACID 250 MG: 250 SOLUTION ORAL at 08:23

## 2023-12-08 RX ADMIN — VANCOMYCIN HYDROCHLORIDE 1000 MG: 1 INJECTION, POWDER, LYOPHILIZED, FOR SOLUTION INTRAVENOUS at 12:41

## 2023-12-08 RX ADMIN — BUDESONIDE INHALATION 500 MCG: 0.5 SUSPENSION RESPIRATORY (INHALATION) at 08:22

## 2023-12-08 RX ADMIN — PIPERACILLIN AND TAZOBACTAM 3375 MG: 3; .375 INJECTION, POWDER, LYOPHILIZED, FOR SOLUTION INTRAVENOUS at 02:15

## 2023-12-08 RX ADMIN — THIAMINE HYDROCHLORIDE 100 MG: 100 INJECTION, SOLUTION INTRAMUSCULAR; INTRAVENOUS at 08:23

## 2023-12-08 RX ADMIN — MORPHINE SULFATE 2 MG: 2 INJECTION, SOLUTION INTRAMUSCULAR; INTRAVENOUS at 18:34

## 2023-12-08 ASSESSMENT — PULMONARY FUNCTION TESTS
PIF_VALUE: 21
PIF_VALUE: 20
PIF_VALUE: 19
PIF_VALUE: 21
PIF_VALUE: 20
PIF_VALUE: 20
PIF_VALUE: 23
PIF_VALUE: 21
PIF_VALUE: 20
PIF_VALUE: 22
PIF_VALUE: 21
PIF_VALUE: 20
PIF_VALUE: 21
PIF_VALUE: 22
PIF_VALUE: 20
PIF_VALUE: 22
PIF_VALUE: 22
PIF_VALUE: 20
PIF_VALUE: 21
PIF_VALUE: 0
PIF_VALUE: 20
PIF_VALUE: 22
PIF_VALUE: 22
PIF_VALUE: 21

## 2023-12-08 ASSESSMENT — PAIN SCALES - GENERAL
PAINLEVEL_OUTOF10: 0

## 2023-12-08 NOTE — CONSULTS
Palliative Care Department  [phone]  Palliative Care Initial Consult  Provider FISH Narvaez - CINDY     Angeles Pompa  [id number]  Hospital Day: 7  Date of Initial Consult: 12/8/23  Referring Provider: Fernando Gauthier MD   Palliative Medicine was consulted for assistance with: Goals of care, CODE STATUS discussion    HPI:   Angeles Pompa is a 70 y.o. with a medical history of HTN, bipolar, schizophrenia, thyroid disease, HLD, CVA with ataxia, arthritis who was admitted on 12/2/2023 from nursing facility with a CHIEF COMPLAINT of altered mental status. ED workup significant for: Na 157, K 5.6, BUN/creatinine 33/1.8, albumin 3.2, hemoglobin 7.0.  UDS negative. UA showed trace ketones, +3 bacteria, positive nitrites. Urine culture positive for Klebsiella pneumoniae. Respiratory culture positive for MRSA. Blood cultures negative x 2 days. CT chest showed septal thickening in the lower segments with small to moderate bilateral pleural effusions most consistent with pulmonary edema pattern with decompensation less likely atypical bronchopneumonia given overall distribution in the setting of mild cardiomegaly. CT head showed no acute intracranial abnormality. Nephrology consulted. Patient transferred to ICU for further management of care. 12/6 brain MRI revealed no acute intracranial abnormality, no mass effect, edema or hemorrhage. Patient did develop seizures and was started on Keppra, Depakote, Vimpat. Repeat EEG to be completed today. 12/5 patient decompensated requiring intubation and vasopressors. Patient is DNR CCA. Palliative medicine consulted for further assistance.     ASSESSMENT/PLAN:     Pertinent Hospital Diagnoses     Acute encephalopathy  Community-acquired pneumonia  Klebsiella pneumonia UTI  Hypernatremia  Hyperkalemia  Acute respiratory failure with hypoxia  New onset seizures    Palliative Care Encounter / Counseling Regarding Goals of Care  Please see detailed goals of care discussion as below  At this time, Ramo Alfaro, Does Not have capacity for medical decision-making. Capacity is time limited and situation/question specific  During encounter legal guardian was surrogate medical decision-maker  Outcome of goals of care meeting:   ICU with ending to complete paperwork in order to withdraw care  Palliative medicine will follow in consult hospice as appropriate  Code status DNR-CCA  Advanced Directives: no POA or living will in epic  Surrogate/Legal NOK:  Brittnee Paez (legal guardian): [phone]    Spiritual assessment: no spiritual distress identified  Bereavement and grief: to be determined  Referrals to: none today  SUBJECTIVE:     Current medical issues leading to Palliative Medicine involvement include   Active Hospital Problems    Diagnosis Date Noted    Hypernatremia [E87.0] 10/01/2022     Priority: Medium    Altered mental status [R41.82] 11/03/2023       Details of Conversation:    Chart reviewed. Care discussed with ICU attending and bedside nurse. Patient seen resting in bed unresponsive, intubated on Versed, fentanyl. Patient has a legal guardian who we were able to get on the phone and discussed goals of care and [id number] [place] [address]. At this time the plan is to change CODE STATUS to DNR CC and withdrawal care for a terminal wean. Legal guardian and ICU attending to fill out proper paperwork in order to proceed with this. Palliative medicine needed for medication recommendations and possible need for hospice. Per ICU attending, patient is not a hospice vent wean program candidate due to her uncontrolled seizures as this would be a comfort issue. Terminal wean to take place in hospital setting. Orders signed and held. Okay to release once paperwork is filed and given the all clear from legal guardian. Patient has a sister who is an ICU nurse who was involved in patient's life and spoke to legal guardian yesterday about wanting to withdraw care as well.   All

## 2023-12-08 NOTE — PROGRESS NOTES
I visited patient at bedside. She is s/p extubation. RN at bedside with comfort medications. SpO2 hanging around 68-76%, bp stable, . Does have some labored breathing pre-medication. Hospice is appropriate to be consulted at this time. Call placed to [facility]. She is agreeable to Hospice consult and would like HOTV to be brought on board. Consult placed, intake notified and liaison aware. We will continue to follow.     Cheko Branch, APRN - CNP  Palliative Medicine

## 2023-12-08 NOTE — PROGRESS NOTES
Pharmacy Consultation Note  (Antibiotic Dosing and Monitoring)    Initial consult date: 12/5/23  Consulting physician/provider: Dr. Ozzy Rowell  Drug: Vancomycin  Indication: cap; 5 days    Age/  Gender Height Weight IBW  Allergy Information   78 y.o./female 162.6 cm (5' 4\") 77.1 kg (170 lb)     Ideal body weight: 54.7 kg (120 lb 9.5 oz)  Adjusted ideal body weight: 65.7 kg (144 lb 12.1 oz)   Mysoline [primidone] and Tofranil [imipramine hcl]      Renal Function:  Recent Labs     12/07/23  1400 12/07/23  1921 12/08/23  0157   BUN 39* 38* 39*   CREATININE 2.8* 2.7* 2.6*         Intake/Output Summary (Last 24 hours) at 12/8/2023 0933  Last data filed at 12/8/2023 0507  Gross per 24 hour   Intake 4815.74 ml   Output 1755 ml   Net 3060.74 ml         Vancomycin Monitoring:  Trough:  No results for input(s): \"VANCOTROUGH\" in the last 72 hours. Random:    Recent Labs     12/06/23  0355 12/07/23  0411 12/08/23  0157   VANCORANDOM 13.0 17.8 23.6         Vancomycin Administration Times:    Recent vancomycin administrations                     vancomycin (VANCOCIN) 1500 mg in sodium chloride 0.9 % 250 mL IVPB (mg) 1,500 mg New Bag 12/05/23 1206                  Assessment:  Patient is a 70 y.o. female who has been initiated on vancomycin  Estimated Creatinine Clearance: 19 mL/min (A) (based on SCr of 2.6 mg/dL (H)).   To dose vancomycin, pharmacy will be utilizing dosing based off of levels because of patient's renal impairment/insufficiency    Plan:  Vancomycin 1000 mg IV x1 - level this AM of 23.6 was taken roughly 12 hours post dose    Thank you for the consult,   Anitra Elder, RianD, BCPS, BCCCP 12/8/2023 9:33 AM

## 2023-12-08 NOTE — PLAN OF CARE
Problem: Discharge Planning  Goal: Discharge to home or other facility with appropriate resources  12/8/2023 0032 by Whitley Polo RN  Outcome: Progressing     Problem: Pain  Goal: Verbalizes/displays adequate comfort level or baseline comfort level  12/8/2023 0032 by Whitley Polo RN  Outcome: Progressing     Problem: Skin/Tissue Integrity  Goal: Absence of new skin breakdown  Description: 1. Monitor for areas of redness and/or skin breakdown  2. Assess vascular access sites hourly  3. Every 4-6 hours minimum:  Change oxygen saturation probe site  4. Every 4-6 hours:  If on nasal continuous positive airway pressure, respiratory therapy assess nares and determine need for appliance change or resting period. 12/8/2023 0032 by Whitley Polo RN  Outcome: Progressing     Problem: Safety - Adult  Goal: Free from fall injury  12/8/2023 0032 by Whitley Polo RN  Outcome: Progressing     Problem: Discharge Planning  Goal: Discharge to home or other facility with appropriate resources  12/8/2023 0032 by Whitley Polo RN  Outcome: Progressing  12/7/2023 1718 by Liss Cody RN  Outcome: Not Progressing     Problem: Confusion  Goal: Confusion, delirium, dementia, or psychosis is improved or at baseline  Description: INTERVENTIONS:  1. Assess for possible contributors to thought disturbance, including medications, impaired vision or hearing, underlying metabolic abnormalities, dehydration, psychiatric diagnoses, and notify attending LIP  2. [place] high risk fall precautions, as indicated  3. Provide frequent short contacts to provide reality reorientation, refocusing and direction  4. Decrease environmental stimuli, including noise as appropriate  5. Monitor and intervene to maintain adequate nutrition, hydration, elimination, sleep and activity  6. If unable to ensure safety without constant attention obtain sitter and review sitter guidelines with assigned personnel  7.  Initiate Psychosocial CNS and

## 2023-12-08 NOTE — PROGRESS NOTES
[facility] CONSULT NOTE  Reason for Consult:  ESBL in urine  Requesting Physician:   Dr Peggyann Simmonds   Patient presents with    Altered Mental Status     (Not able to form words, normally talkative per facility) Hx of previous CVA       History Obtained From: chart     HISTORY OFPRESENT ILLNESS              The patient is a 70 y.o. female with significant past medical history as below. Presented to [facility] on dec 3 with AMS.  Ct chest showed septal thickening in the lower decompensation less likely atypical  she was given rocephin and doxy  I saw her nov 8 for rule out scabies  She is being followed by pulm critical care and neurology  she had seizures on dec 7      Past Medical History:   Diagnosis Date    Arthritis     hips    Ataxia     Atrophy of muscle     Bipolar 1 disorder, mixed, moderate (HCC)     Cerebrovascular disease     Coordination problem     Hyperlipidemia     Hypertension     Overactive bladder     Paranoid schizophrenia ([address])     Thyroid disease        Past Surgical History:   Procedure Laterality Date    BLADDER SUSPENSION      DILATION AND CURETTAGE OF UTERUS      DILATION AND CURETTAGE OF UTERUS N/A 2/16/2021    endometrial biopsy, HYSTEROSCOPY performed by Alexis Graham MD at [address]  12/5/2023    TONSILLECTOMY AND ADENOIDECTOMY         Current Facility-Administered Medications   Medication Dose Route Frequency Provider Last Rate Last Admin    vancomycin (VANCOCIN) 1,000 mg in sodium chloride 0.9 % 250 mL IVPB ([id number])  1,000 mg IntraVENous Once Joanie Roman MD        ertapenem (INVanz) 500 mg in sodium chloride 0.9 % 50 mL IVPB  500 mg IntraVENous Q24H Joanie Roman  mL/hr at 12/08/23 1153 500 mg at 12/08/23 1153    midazolam (VERSED) 100mg/100mL in NS infusion  1-10 mg/hr IntraVENous Continuous Kathi Crum MD 2 mL/hr at 12/08/23 0507 2 mg/hr at 12/08/23 0507    hydrocortisone sodium succinate PF (SOLU-CORTEF) injection 50 mg  50 mg IntraVENous Q6H Joanie Roman MD   50 mg at 12/08/23 1200    levETIRAcetam (KEPPRA) tablet 1,500 mg  1,500 mg Oral BID Ova Chime., APRN - CNS   1,500 mg at 12/08/23 3287    medicated lip balm (BLISTEX/CARMEX) stick   Topical PRN Joanie Roman MD        lacosamide (VIMPAT) 100 mg in sodium chloride 0.9 % 60 mL IVPB  100 mg IntraVENous BID Joanie Roman MD   Stopped at 12/08/23 0855    thiamine (B-1) injection 100 mg  100 mg IntraVENous Daily Nely Peterson MD   100 mg at 12/08/23 0823    sodium chloride (Inhalant) 3 % nebulizer solution 4 mL  4 mL Nebulization PRN Joanie Roman MD        guaiFENesin tablet 400 mg  400 mg Oral TID Joanie Roman MD   400 mg at 12/08/23 0823    fentaNYL (SUBLIMAZE) 1,000 mcg in sodium chloride 0.9% 100 mL infusion   mcg/hr IntraVENous Continuous Lydia Barone MD 2.5 mL/hr at 12/08/23 0507 25 mcg/hr at 12/08/23 0507    chlorhexidine (PERIDEX) 0.12 % solution 15 mL  15 mL Mouth/Throat BID Joanie Roman MD   15 mL at 12/08/23 0824    famotidine (PEPCID) 20 mg in sodium chloride (PF) 0.9 % 10 mL injection  20 mg IntraVENous Daily Joanie Roman MD   20 mg at 12/08/23 0823    budesonide (PULMICORT) nebulizer suspension 500 mcg  0.5 mg Nebulization BID RT Joanie Roman MD   500 mcg at 12/08/23 6998    arformoterol tartrate (BROVANA) nebulizer solution 15 mcg  15 mcg Nebulization BID RT Joanie Roman MD   15 mcg at 12/08/23 0822    ipratropium 0.5 mg-albuterol 2.5 mg (DUONEB) nebulizer solution 1 Dose  1 Dose Inhalation Q4H WA RT Joanie Roman MD   1 Dose at 12/08/23 1228    vancomycin (VANCOCIN) intermittent dosing (placeholder)   Other RX Placeholder Joanie Roman MD        norepinephrine (LEVOPHED) 16 mg in sodium chloride 0.9 % 250 mL infusion  1-100 mcg/min IntraVENous Continuous Nelly Delarosa MD 1.9 mL/hr at 12/08/23 0507 2 mcg/min at 12/08/23 0507    ascorbic acid (VITAMIN C) tablet 500 mg  500 mg Oral Daily Joanie Roman MD   500 mg at 12/08/23 5252    valproic acid (DEPAKENE) 250 MG/5ML oral solution 250 mg  250 mg Oral BID Joanie Roman MD   250 mg at 12/08/23 1573    sodium chloride flush 0.9 % injection 5-40 mL  5-40 mL IntraVENous 2 times per day Jorge Vail MD   10 mL at 12/08/23 0862    sodium chloride flush 0.9 % injection 5-40 mL  5-40 mL IntraVENous PRN Jorge Vail MD        0.9 % sodium chloride infusion   IntraVENous PRN Jorge Vail MD   Stopped at 12/07/23 1915    ondansetron (ZOFRAN-ODT) disintegrating tablet 4 mg  4 mg Oral Q8H PRN Jorge Vail MD        Or    ondansetron [facility] injection 4 mg  4 mg IntraVENous Q6H PRN Jorge Vail MD        polyethylene glycol (GLYCOLAX) packet 17 g  17 g Oral Daily PRN Jorge Vail MD        acetaminophen (TYLENOL) tablet 650 mg  650 mg Oral Q6H PRN Jorge Vail MD        Or    acetaminophen (TYLENOL) suppository 650 mg  650 mg Rectal Q6H PRN Jorge Vail MD        folic acid injection 1 mg  1 mg IntraVENous Daily Suhail Crain MD   1 mg at 12/08/23 0936    glucose chewable tablet 16 g  4 tablet Oral PRN Suhail Crain MD        dextrose bolus 10% 125 mL  125 mL IntraVENous PRN Suhail Crain MD   Stopped at 12/04/23 0455    Or    dextrose bolus 10% 250 mL  250 mL IntraVENous PRN Suhail Crain MD   Stopped at 12/03/23 1518    glucagon injection 1 mg  1 mg SubCUTAneous PRN Suhail Crain MD        dextrose 10 % infusion   IntraVENous Continuous PRN Suhail Crain MD           Allergies   Allergen Reactions    Mysoline [Primidone]     Tofranil [Imipramine Hcl]         Social History     Socioeconomic History    Marital status: Single     Spouse name: Not on file    Number of children: Not on file    Years of education: Not on file    Highest education level: Not on file   Occupational History    Not on file   Tobacco Use    Smoking status: Never    Smokeless tobacco: Never   Vaping Use    Vaping Use:

## 2023-12-08 NOTE — PROGRESS NOTES
Patients sister Erin Redmond requesting code status change to [facility]. Dr. Micky Bearden spoke to patients guardian Janusz Abbott. Code status change request filled out by Dr. Oren Toney and emailed to guardian.

## 2023-12-08 NOTE — PROGRESS NOTES
HOSPICE OF [facility]    Met patient at bedside. No family members present. Patient resting quietly. Receiving IV medications for comfort. Patient updates received from bedside nurse. Call placed to patient's guardian Gm Ascencio at [phone]. Phone was answered by Janette Hodgkin who is covering this weekend for Gm Ascencio. Information about Hospice services provided over the phone. Andreina Lynn stated she will reach out to Gm Ascencio to give patient updates because she has known her for a very long time. The hospice benefit and philosophy were explained including that hospice is end of life care in which, per Medicare, a patient has a terminal diagnosis that life expectancy would be 6 months or less. Hospice care is a service that is covered by most insurance plans. Explained hospice services at home, at [facility] with room/board private pay unless patient has Medicaid and the [facility]. Explained that once in hospice care, all aggressive treatments would be stopped and allow nature to takes its course with focus on comfort care for the patient. Andreina Lynn agrees to [facility] services for patient. Courtney's first choice is for patient to transfer to [facility] GIP level of care. Education provided that if patient survives her [facility] stay the patient would transition to ECF when stable. Andreina Lynn requested Carrie Wilkins consents be emailed to her for signature. Andreina Lynn is unsure if consents can be signed tonight because she does not have access to fax and is working remotely. Andreina Lynn will also inquire if her Manager can go to [facility] to sign consents in person tomorrow. Andreina Lynn will reach out to Gm Ascencio for updates. Andreina Lnyn confirmed that emailed consents may not be able to be completed tonight but will be signed and returned tomorrow to allow for patient transfer. Hospice consents were emailed to [email or web address]. Await return of signed consents. HOTV to continue to follow. HOT plan:  Hospice care at [facility].  Await signed consents from patient Guardian at [facility]. Please call Gallito Porter [phone] with any questions. Patient not currently under the care of hospice.     Electronically signed by John Walters RN on 12/8/2023 at 5:28 PM     Electronically signed by John Walters RN on 12/8/2023 at 5:08 PM  [phone]: [phone]

## 2023-12-09 VITALS
TEMPERATURE: 95 F | DIASTOLIC BLOOD PRESSURE: 57 MMHG | WEIGHT: 181 LBS | HEIGHT: 64 IN | HEART RATE: 49 BPM | SYSTOLIC BLOOD PRESSURE: 126 MMHG | RESPIRATION RATE: 7 BRPM | BODY MASS INDEX: 30.9 KG/M2 | OXYGEN SATURATION: 85 %

## 2023-12-09 PROCEDURE — 99239 HOSP IP/OBS DSCHRG MGMT >30: CPT | Performed by: STUDENT IN AN ORGANIZED HEALTH CARE EDUCATION/TRAINING PROGRAM

## 2023-12-09 PROCEDURE — 6360000002 HC RX W HCPCS

## 2023-12-09 PROCEDURE — 2500000003 HC RX 250 WO HCPCS

## 2023-12-09 RX ADMIN — MORPHINE SULFATE 2 MG: 2 INJECTION, SOLUTION INTRAMUSCULAR; INTRAVENOUS at 12:57

## 2023-12-09 RX ADMIN — MORPHINE SULFATE 4 MG: 4 INJECTION, SOLUTION INTRAMUSCULAR; INTRAVENOUS at 13:33

## 2023-12-09 RX ADMIN — GLYCOPYRROLATE 0.2 MG: 0.2 INJECTION INTRAMUSCULAR; INTRAVENOUS at 10:04

## 2023-12-09 RX ADMIN — MORPHINE SULFATE 2 MG: 2 INJECTION, SOLUTION INTRAMUSCULAR; INTRAVENOUS at 10:04

## 2023-12-09 RX ADMIN — MORPHINE SULFATE 2 MG: 2 INJECTION, SOLUTION INTRAMUSCULAR; INTRAVENOUS at 00:41

## 2023-12-09 RX ADMIN — MORPHINE SULFATE 2 MG: 2 INJECTION, SOLUTION INTRAMUSCULAR; INTRAVENOUS at 11:37

## 2023-12-09 RX ADMIN — MIDAZOLAM 1 MG: 1 INJECTION INTRAMUSCULAR; INTRAVENOUS at 13:33

## 2023-12-09 ASSESSMENT — PAIN SCALES - GENERAL
PAINLEVEL_OUTOF10: 0

## 2023-12-09 NOTE — PROGRESS NOTES
HOSPICE [facility]    Received consents from legal guardian. Made visit to bedside. Brent Gonzalez is having labored breathing with the use of accessory muscles. Mouth is open. Does not respond to verbal stimuli or touching. Eyes open. No tracking or responding. She is being medicated and still having distress. Spoke with Dr. Ammon Salinas who accepted patient for inpatient level of care hospice. Received bed 110 with a requested transport of [facility]. [facility] able to transport at 1500. Ambulance forms placed in soft chart. Updated nursing. Requested discharge order be obtained. Please leave IV, Triple Lumen, and rojas. Aterial line needs removed. Gave nurse to nurse report to hospice house.     Electronically signed by Jacob Adams RN on 12/9/2023 at 12:07 PM  [phone], [phone]

## 2023-12-09 NOTE — PROGRESS NOTES
Patient transported by physicians ambulance to hospice house along with patients soft chart. Sister, Divina Reeder, notified of transfer.

## 2023-12-09 NOTE — PLAN OF CARE
Problem: Discharge Planning  Goal: Discharge to home or other facility with appropriate resources  Outcome: Progressing     Problem: Pain  Goal: Verbalizes/displays adequate comfort level or baseline comfort level  Outcome: Progressing     Problem: Confusion  Goal: Confusion, delirium, dementia, or psychosis is improved or at baseline  Description: INTERVENTIONS:  1. Assess for possible contributors to thought disturbance, including medications, impaired vision or hearing, underlying metabolic abnormalities, dehydration, psychiatric diagnoses, and notify attending LIP  2. [place] high risk fall precautions, as indicated  3. Provide frequent short contacts to provide reality reorientation, refocusing and direction  4. Decrease environmental stimuli, including noise as appropriate  5. Monitor and intervene to maintain adequate nutrition, hydration, elimination, sleep and activity  6. If unable to ensure safety without constant attention obtain sitter and review sitter guidelines with assigned personnel  7. Initiate Psychosocial CNS and Spiritual Care consult, as indicated  Outcome: Progressing  Flowsheets (Taken 12/8/2023 2000)  Effect of thought disturbance (confusion, delirium, dementia, or psychosis) are managed with adequate functional status: Monitor and intervene to maintain adequate nutrition, hydration, elimination, sleep and activity     Problem: Skin/Tissue Integrity  Goal: Absence of new skin breakdown  Description: 1. Monitor for areas of redness and/or skin breakdown  2. Assess vascular access sites hourly  3. Every 4-6 hours minimum:  Change oxygen saturation probe site  4. Every 4-6 hours:  If on nasal continuous positive airway pressure, respiratory therapy assess nares and determine need for appliance change or resting period.   Outcome: Progressing     Problem: Safety - Adult  Goal: Free from fall injury  Outcome: Progressing     Problem: ABCDS Injury Assessment  Goal: Absence of physical injury  Outcome: Progressing

## 2023-12-10 LAB
MICROORGANISM SPEC CULT: NORMAL
MICROORGANISM SPEC CULT: NORMAL
SERVICE CMNT-IMP: NORMAL
SERVICE CMNT-IMP: NORMAL
SPECIMEN DESCRIPTION: NORMAL
SPECIMEN DESCRIPTION: NORMAL

## 2023-12-17 LAB
MICROORGANISM SPEC CULT: NORMAL
MICROORGANISM/AGENT SPEC: NORMAL
SPECIMEN DESCRIPTION: NORMAL

## 2023-12-26 NOTE — PROGRESS NOTES
Physician Progress Note      PATIENT:               Marcus Mabry  CSN #:                  [id number]  :                       1945  ADMIT DATE:       2023 8:30 PM  [address] [facility] DATE:        2023 3:35 PM  RESPONDING  PROVIDER #:        Traci Valdez DO          QUERY TEXT:    Pt admitted with Hypernatremia and has shock documented. If possible, please   document in progress notes and discharge summary further specificity regarding   the type of shock: The medical record reflects the following:  Risk Factors: Dehydration with electrolyte derangement  Clinical Indicators: Per your documentation on : \". .. In the morning, she   was intubated for desaturation and inability to protect her airway and was   subsequently started on norepinephrine for shock. Bill Kindra Bill Robbchet Shock likely septic 2/2   CAP\"  Treatment: Norepinephrine, continuous pt monitoring  Options provided:  -- Cardiogenic Shock  -- Septic Shock  -- Neurogenic Shock  -- Hypovolemic Shock  -- Other - I will add my own diagnosis  -- Disagree - Not applicable / Not valid  -- Disagree - Clinically unable to determine / Unknown  -- Refer to Clinical Documentation Reviewer    PROVIDER RESPONSE TEXT:    This patient is in Septic shock.     Query created by: Elder Bosworth on 2023 3:01 PM      Electronically signed by:  Jignesh Stiles DO 2023 7:39 AM

## 2023-12-31 LAB
MICROORGANISM SPEC CULT: NORMAL
MICROORGANISM/AGENT SPEC: NORMAL
SPECIMEN DESCRIPTION: NORMAL

## 2024-01-04 LAB
MICROORGANISM SPEC CULT: NORMAL
MICROORGANISM/AGENT SPEC: NORMAL
SPECIMEN DESCRIPTION: NORMAL

## (undated) DEVICE — Y-TYPE TUR/BLADDER IRRIGATION SET, REGULATING CLAMP

## (undated) DEVICE — SET FLD COLL CLR W/ BLT IN WARN SYS FOR HYSTSCP DOLPHIN

## (undated) DEVICE — JELLY,LUBE,STERILE,FLIP TOP,TUBE,2-OZ: Brand: MEDLINE

## (undated) DEVICE — SOLUTION IV IRRIG POUR BRL 0.9% SODIUM CHL 2F7124

## (undated) DEVICE — COVER,LIGHT HANDLE,FLX,2/PK: Brand: MEDLINE INDUSTRIES, INC.

## (undated) DEVICE — GOWN,SIRUS,FABRNF,XL,20/CS: Brand: MEDLINE

## (undated) DEVICE — PAD,SANITARY,11 IN,MAXI,N-STRL,IND WRAP: Brand: MEDLINE

## (undated) DEVICE — TRAY,VAG PREP,2PR VNYL GLV,4 C: Brand: MEDLINE INDUSTRIES, INC.

## (undated) DEVICE — GLOVE SURG SZ 65 L12IN FNGR THK94MIL STD WHT LTX FREE

## (undated) DEVICE — TRAY SET D

## (undated) DEVICE — 4-PORT MANIFOLD: Brand: NEPTUNE 2

## (undated) DEVICE — LEGGINGS, PAIR, 31X48, STERILE: Brand: MEDLINE

## (undated) DEVICE — DRAPE,REIN 53X77,STERILE: Brand: MEDLINE

## (undated) DEVICE — GOWN,SIRUS,FABRNF,L,20/CS: Brand: MEDLINE

## (undated) DEVICE — MARKER,SKIN,WI/RULER AND LABELS: Brand: MEDLINE

## (undated) DEVICE — GAUZE,SPONGE,4"X4",16PLY,XRAY,STRL,LF: Brand: MEDLINE

## (undated) DEVICE — COVER HNDL LT DISP

## (undated) DEVICE — DOUBLE BASIN SET: Brand: MEDLINE INDUSTRIES, INC.

## (undated) DEVICE — Z INACTIVE USE 2660664 SOLUTION IRRIG 3000ML 0.9% SOD CHL USP UROMATIC PLAS CONT

## (undated) DEVICE — TOWEL,OR,DSP,ST,BLUE,STD,6/PK,12PK/CS: Brand: MEDLINE

## (undated) DEVICE — PAD,NON-ADHERENT,3X8,STERILE,LF,1/PK: Brand: MEDLINE